# Patient Record
Sex: FEMALE | Race: BLACK OR AFRICAN AMERICAN | NOT HISPANIC OR LATINO | Employment: UNEMPLOYED | URBAN - METROPOLITAN AREA
[De-identification: names, ages, dates, MRNs, and addresses within clinical notes are randomized per-mention and may not be internally consistent; named-entity substitution may affect disease eponyms.]

---

## 2017-01-16 ENCOUNTER — HISTORICAL (OUTPATIENT)
Dept: ADMINISTRATIVE | Facility: HOSPITAL | Age: 62
End: 2017-01-16

## 2017-01-26 ENCOUNTER — HISTORICAL (OUTPATIENT)
Dept: OTOLARYNGOLOGY | Facility: CLINIC | Age: 62
End: 2017-01-26

## 2017-02-08 ENCOUNTER — HISTORICAL (OUTPATIENT)
Dept: LAB | Facility: HOSPITAL | Age: 62
End: 2017-02-08

## 2017-02-16 ENCOUNTER — HISTORICAL (OUTPATIENT)
Dept: INTERNAL MEDICINE | Facility: CLINIC | Age: 62
End: 2017-02-16

## 2017-04-11 ENCOUNTER — HISTORICAL (OUTPATIENT)
Dept: INTERNAL MEDICINE | Facility: CLINIC | Age: 62
End: 2017-04-11

## 2017-09-30 ENCOUNTER — HISTORICAL (OUTPATIENT)
Dept: INTERNAL MEDICINE | Facility: CLINIC | Age: 62
End: 2017-09-30

## 2017-10-20 ENCOUNTER — HISTORICAL (OUTPATIENT)
Dept: INTERNAL MEDICINE | Facility: CLINIC | Age: 62
End: 2017-10-20

## 2017-10-23 ENCOUNTER — HISTORICAL (OUTPATIENT)
Dept: INTERNAL MEDICINE | Facility: CLINIC | Age: 62
End: 2017-10-23

## 2017-10-31 LAB
COLOR STL: NORMAL
CONSISTENCY STL: NORMAL
HEMOCCULT SP1 STL QL: NEGATIVE

## 2017-11-01 LAB
COLOR STL: NORMAL
CONSISTENCY STL: NORMAL
HEMOCCULT SP2 STL QL: NEGATIVE

## 2017-11-02 ENCOUNTER — HISTORICAL (OUTPATIENT)
Dept: INTERNAL MEDICINE | Facility: CLINIC | Age: 62
End: 2017-11-02

## 2017-11-02 LAB
COLOR STL: NORMAL
CONSISTENCY STL: NORMAL

## 2017-11-07 ENCOUNTER — HISTORICAL (OUTPATIENT)
Dept: RADIOLOGY | Facility: HOSPITAL | Age: 62
End: 2017-11-07

## 2017-11-16 ENCOUNTER — HISTORICAL (OUTPATIENT)
Dept: INTERNAL MEDICINE | Facility: CLINIC | Age: 62
End: 2017-11-16

## 2017-11-21 ENCOUNTER — HISTORICAL (OUTPATIENT)
Dept: INFUSION THERAPY | Facility: HOSPITAL | Age: 62
End: 2017-11-21

## 2017-11-28 ENCOUNTER — HISTORICAL (OUTPATIENT)
Dept: HEMATOLOGY/ONCOLOGY | Facility: CLINIC | Age: 62
End: 2017-11-28

## 2017-12-05 ENCOUNTER — HISTORICAL (OUTPATIENT)
Dept: ADMINISTRATIVE | Facility: HOSPITAL | Age: 62
End: 2017-12-05

## 2017-12-05 ENCOUNTER — HISTORICAL (OUTPATIENT)
Dept: INFUSION THERAPY | Facility: HOSPITAL | Age: 62
End: 2017-12-05

## 2017-12-05 LAB
ABS NEUT (OLG): 5.07 X10(3)/MCL (ref 2.1–9.2)
ALBUMIN SERPL-MCNC: 2.8 GM/DL (ref 3.4–5)
ALBUMIN/GLOB SERPL: 1 RATIO (ref 1–2)
ALP SERPL-CCNC: 79 UNIT/L (ref 45–117)
ALT SERPL-CCNC: 12 UNIT/L (ref 12–78)
APPEARANCE, UA: CLEAR
AST SERPL-CCNC: 10 UNIT/L (ref 15–37)
BACTERIA #/AREA URNS AUTO: ABNORMAL /[HPF]
BASOPHILS # BLD AUTO: 0.04 X10(3)/MCL
BASOPHILS NFR BLD AUTO: 0 % (ref 0–1)
BILIRUB SERPL-MCNC: 0.2 MG/DL (ref 0.2–1)
BILIRUB UR QL STRIP: NEGATIVE
BILIRUBIN DIRECT+TOT PNL SERPL-MCNC: <0.1 MG/DL
BILIRUBIN DIRECT+TOT PNL SERPL-MCNC: ABNORMAL MG/DL
BUN SERPL-MCNC: 60 MG/DL (ref 7–18)
CALCIUM SERPL-MCNC: 9.5 MG/DL (ref 8.5–10.1)
CHLORIDE SERPL-SCNC: 106 MMOL/L (ref 98–107)
CO2 SERPL-SCNC: 29 MMOL/L (ref 21–32)
COLOR UR: ABNORMAL
CREAT SERPL-MCNC: 2.6 MG/DL (ref 0.6–1.3)
CREAT UR-MCNC: 86 MG/DL
DEPRECATED CALCIDIOL+CALCIFEROL SERPL-MC: 23.73 NG/ML (ref 30–80)
EOSINOPHIL # BLD AUTO: 0.31 X10(3)/MCL
EOSINOPHIL NFR BLD AUTO: 4 % (ref 0–5)
ERYTHROCYTE [DISTWIDTH] IN BLOOD BY AUTOMATED COUNT: 13.8 % (ref 11.5–14.5)
FERRITIN SERPL-MCNC: 364.7 NG/ML (ref 8–388)
GLOBULIN SER-MCNC: 4.7 GM/ML (ref 2.3–3.5)
GLUCOSE (UA): NORMAL
GLUCOSE SERPL-MCNC: 205 MG/DL (ref 74–106)
HCT VFR BLD AUTO: 32.4 % (ref 35–46)
HGB BLD-MCNC: 10.4 GM/DL (ref 12–16)
HGB UR QL STRIP: NEGATIVE
HYALINE CASTS #/AREA URNS LPF: ABNORMAL /[LPF]
IMM GRANULOCYTES # BLD AUTO: 0.02 10*3/UL
IMM GRANULOCYTES NFR BLD AUTO: 0 %
IRON SATN MFR SERPL: 33.5 % (ref 15–50)
IRON SERPL-MCNC: 69 MCG/DL (ref 50–170)
KETONES UR QL STRIP: NEGATIVE
LEUKOCYTE ESTERASE UR QL STRIP: 250 LEU/UL
LYMPHOCYTES # BLD AUTO: 1.68 X10(3)/MCL
LYMPHOCYTES NFR BLD AUTO: 22 % (ref 15–40)
MAGNESIUM SERPL-MCNC: 2.2 MG/DL (ref 1.8–2.4)
MCH RBC QN AUTO: 29.5 PG (ref 26–34)
MCHC RBC AUTO-ENTMCNC: 32.1 GM/DL (ref 31–37)
MCV RBC AUTO: 92 FL (ref 80–100)
MONOCYTES # BLD AUTO: 0.54 X10(3)/MCL
MONOCYTES NFR BLD AUTO: 7 % (ref 4–12)
NEUTROPHILS # BLD AUTO: 5.07 X10(3)/MCL
NEUTROPHILS NFR BLD AUTO: 66 X10(3)/MCL
NITRITE UR QL STRIP: NEGATIVE
PH UR STRIP: 6.5 [PH] (ref 4.5–8)
PHOSPHATE SERPL-MCNC: 4.5 MG/DL (ref 2.5–4.9)
PLATELET # BLD AUTO: 199 X10(3)/MCL (ref 130–400)
PMV BLD AUTO: 10.4 FL (ref 7.4–10.4)
POTASSIUM SERPL-SCNC: 4.2 MMOL/L (ref 3.5–5.1)
PROT SERPL-MCNC: 7.5 GM/DL (ref 6.4–8.2)
PROT UR QL STRIP: 300 MG/DL
PROT UR STRIP-MCNC: 353.5 MG/DL
PROT/CREAT UR-RTO: 4110.5 MG/GM
PTH-INTACT SERPL-MCNC: 278.5 PG/ML (ref 13.8–85)
RBC # BLD AUTO: 3.52 X10(6)/MCL (ref 4–5.2)
RBC #/AREA URNS AUTO: ABNORMAL /[HPF]
SODIUM SERPL-SCNC: 139 MMOL/L (ref 136–145)
SP GR UR STRIP: 1.01 (ref 1–1.03)
SQUAMOUS #/AREA URNS LPF: ABNORMAL /[LPF]
TIBC SERPL-MCNC: 206 MCG/DL (ref 250–450)
TRANSFERRIN SERPL-MCNC: 163 MG/DL (ref 200–360)
UROBILINOGEN UR STRIP-ACNC: NORMAL MG/DL
WBC # SPEC AUTO: 7.7 X10(3)/MCL (ref 4.5–11)
WBC #/AREA URNS AUTO: ABNORMAL /HPF

## 2017-12-23 ENCOUNTER — HISTORICAL (OUTPATIENT)
Dept: LAB | Facility: HOSPITAL | Age: 62
End: 2017-12-23

## 2017-12-23 LAB
ABS NEUT (OLG): 3.24 X10(3)/MCL (ref 2.1–9.2)
ALBUMIN SERPL-MCNC: 2.8 GM/DL (ref 3.4–5)
ALBUMIN/GLOB SERPL: 1 RATIO (ref 1–2)
ALP SERPL-CCNC: 68 UNIT/L (ref 45–117)
ALT SERPL-CCNC: 8 UNIT/L (ref 12–78)
APPEARANCE, UA: ABNORMAL
AST SERPL-CCNC: 11 UNIT/L (ref 15–37)
BACTERIA #/AREA URNS AUTO: ABNORMAL /[HPF]
BASOPHILS # BLD AUTO: 0.02 X10(3)/MCL
BASOPHILS NFR BLD AUTO: 0 % (ref 0–1)
BILIRUB SERPL-MCNC: 0.3 MG/DL (ref 0.2–1)
BILIRUB UR QL STRIP: NEGATIVE
BILIRUBIN DIRECT+TOT PNL SERPL-MCNC: <0.1 MG/DL
BILIRUBIN DIRECT+TOT PNL SERPL-MCNC: ABNORMAL MG/DL
BUN SERPL-MCNC: 57 MG/DL (ref 7–18)
CALCIUM SERPL-MCNC: 8.8 MG/DL (ref 8.5–10.1)
CHLORIDE SERPL-SCNC: 111 MMOL/L (ref 98–107)
CO2 SERPL-SCNC: 29 MMOL/L (ref 21–32)
COLOR UR: ABNORMAL
CREAT SERPL-MCNC: 2.4 MG/DL (ref 0.6–1.3)
CREAT UR-MCNC: 68 MG/DL
DEPRECATED CALCIDIOL+CALCIFEROL SERPL-MC: 31.74 NG/ML (ref 30–80)
EOSINOPHIL # BLD AUTO: 0.19 10*3/UL
EOSINOPHIL NFR BLD AUTO: 4 % (ref 0–5)
ERYTHROCYTE [DISTWIDTH] IN BLOOD BY AUTOMATED COUNT: 14.3 % (ref 11.5–14.5)
EST. AVERAGE GLUCOSE BLD GHB EST-MCNC: 154 MG/DL
GLOBULIN SER-MCNC: 4.1 GM/ML (ref 2.3–3.5)
GLUCOSE (UA): NORMAL
GLUCOSE SERPL-MCNC: 127 MG/DL (ref 74–106)
HBA1C MFR BLD: 7 % (ref 4.2–6.3)
HCT VFR BLD AUTO: 29.2 % (ref 35–46)
HGB BLD-MCNC: 9.2 GM/DL (ref 12–16)
HGB UR QL STRIP: NEGATIVE
HYALINE CASTS #/AREA URNS LPF: ABNORMAL /[LPF]
IMM GRANULOCYTES # BLD AUTO: 0.01 10*3/UL
IMM GRANULOCYTES NFR BLD AUTO: 0 %
KETONES UR QL STRIP: NEGATIVE
LEUKOCYTE ESTERASE UR QL STRIP: 500 LEU/UL
LYMPHOCYTES # BLD AUTO: 1.34 X10(3)/MCL
LYMPHOCYTES NFR BLD AUTO: 25 % (ref 15–40)
MAGNESIUM SERPL-MCNC: 2.1 MG/DL (ref 1.8–2.4)
MCH RBC QN AUTO: 29.3 PG (ref 26–34)
MCHC RBC AUTO-ENTMCNC: 31.5 GM/DL (ref 31–37)
MCV RBC AUTO: 93 FL (ref 80–100)
MONOCYTES # BLD AUTO: 0.47 X10(3)/MCL
MONOCYTES NFR BLD AUTO: 9 % (ref 4–12)
NEUTROPHILS # BLD AUTO: 3.24 X10(3)/MCL
NEUTROPHILS NFR BLD AUTO: 62 X10(3)/MCL
NITRITE UR QL STRIP: NEGATIVE
PH UR STRIP: 6.5 [PH] (ref 4.5–8)
PHOSPHATE SERPL-MCNC: 3.9 MG/DL (ref 2.5–4.9)
PLATELET # BLD AUTO: 119 X10(3)/MCL (ref 130–400)
PMV BLD AUTO: 11.1 FL (ref 7.4–10.4)
POTASSIUM SERPL-SCNC: 4.5 MMOL/L (ref 3.5–5.1)
PROT SERPL-MCNC: 6.9 GM/DL (ref 6.4–8.2)
PROT UR QL STRIP: 300 MG/DL
PROT UR STRIP-MCNC: 236.9 MG/DL
PROT/CREAT UR-RTO: 3483.8 MG/GM
PTH-INTACT SERPL-MCNC: 254.1 PG/ML (ref 13.8–85)
RBC # BLD AUTO: 3.14 X10(6)/MCL (ref 4–5.2)
RBC #/AREA URNS AUTO: ABNORMAL /[HPF]
SODIUM SERPL-SCNC: 146 MMOL/L (ref 136–145)
SP GR UR STRIP: 1.01 (ref 1–1.03)
SQUAMOUS #/AREA URNS LPF: >100 /[LPF]
UROBILINOGEN UR STRIP-ACNC: NORMAL
WBC # SPEC AUTO: 5.3 X10(3)/MCL (ref 4.5–11)
WBC #/AREA URNS AUTO: ABNORMAL /HPF

## 2018-03-09 ENCOUNTER — HISTORICAL (OUTPATIENT)
Dept: ADMINISTRATIVE | Facility: HOSPITAL | Age: 63
End: 2018-03-09

## 2018-03-13 ENCOUNTER — HISTORICAL (OUTPATIENT)
Dept: INFUSION THERAPY | Facility: HOSPITAL | Age: 63
End: 2018-03-13

## 2018-03-29 ENCOUNTER — HISTORICAL (OUTPATIENT)
Dept: INFUSION THERAPY | Facility: HOSPITAL | Age: 63
End: 2018-03-29

## 2018-04-05 ENCOUNTER — HISTORICAL (OUTPATIENT)
Dept: INFUSION THERAPY | Facility: HOSPITAL | Age: 63
End: 2018-04-05

## 2018-04-12 ENCOUNTER — HISTORICAL (OUTPATIENT)
Dept: INFUSION THERAPY | Facility: HOSPITAL | Age: 63
End: 2018-04-12

## 2018-04-12 ENCOUNTER — HISTORICAL (OUTPATIENT)
Dept: INTERNAL MEDICINE | Facility: CLINIC | Age: 63
End: 2018-04-12

## 2018-04-19 ENCOUNTER — HISTORICAL (OUTPATIENT)
Dept: INFUSION THERAPY | Facility: HOSPITAL | Age: 63
End: 2018-04-19

## 2018-06-12 ENCOUNTER — HISTORICAL (OUTPATIENT)
Dept: ADMINISTRATIVE | Facility: HOSPITAL | Age: 63
End: 2018-06-12

## 2018-06-13 ENCOUNTER — HISTORICAL (OUTPATIENT)
Dept: ADMINISTRATIVE | Facility: HOSPITAL | Age: 63
End: 2018-06-13

## 2018-07-09 ENCOUNTER — HISTORICAL (OUTPATIENT)
Dept: ADMINISTRATIVE | Facility: HOSPITAL | Age: 63
End: 2018-07-09

## 2018-08-06 ENCOUNTER — HISTORICAL (OUTPATIENT)
Dept: LAB | Facility: HOSPITAL | Age: 63
End: 2018-08-06

## 2018-08-08 ENCOUNTER — HISTORICAL (OUTPATIENT)
Dept: INTERNAL MEDICINE | Facility: CLINIC | Age: 63
End: 2018-08-08

## 2018-08-09 ENCOUNTER — HISTORICAL (OUTPATIENT)
Dept: ADMINISTRATIVE | Facility: HOSPITAL | Age: 63
End: 2018-08-09

## 2018-08-09 ENCOUNTER — HISTORICAL (OUTPATIENT)
Dept: INFUSION THERAPY | Facility: HOSPITAL | Age: 63
End: 2018-08-09

## 2018-08-13 ENCOUNTER — TELEPHONE (OUTPATIENT)
Dept: TRANSPLANT | Facility: CLINIC | Age: 63
End: 2018-08-13

## 2018-09-04 ENCOUNTER — HISTORICAL (OUTPATIENT)
Dept: ADMINISTRATIVE | Facility: HOSPITAL | Age: 63
End: 2018-09-04

## 2018-09-28 DIAGNOSIS — Z76.82 ORGAN TRANSPLANT CANDIDATE: Primary | ICD-10-CM

## 2018-10-11 ENCOUNTER — HOSPITAL ENCOUNTER (OUTPATIENT)
Dept: RADIOLOGY | Facility: HOSPITAL | Age: 63
Discharge: HOME | End: 2018-10-11
Attending: NURSE PRACTITIONER
Payer: MEDICARE

## 2018-10-11 ENCOUNTER — TELEPHONE (OUTPATIENT)
Dept: TRANSPLANT | Facility: CLINIC | Age: 63
End: 2018-10-11

## 2018-10-11 ENCOUNTER — CLINICAL SUPPORT (OUTPATIENT)
Dept: INFECTIOUS DISEASES | Facility: CLINIC | Age: 63
End: 2018-10-11
Payer: MEDICARE

## 2018-10-11 ENCOUNTER — OFFICE VISIT (OUTPATIENT)
Dept: TRANSPLANT | Facility: CLINIC | Age: 63
End: 2018-10-11
Payer: MEDICARE

## 2018-10-11 VITALS
HEART RATE: 54 BPM | RESPIRATION RATE: 16 BRPM | TEMPERATURE: 98 F | SYSTOLIC BLOOD PRESSURE: 116 MMHG | BODY MASS INDEX: 23.57 KG/M2 | WEIGHT: 146.63 LBS | HEIGHT: 66 IN | DIASTOLIC BLOOD PRESSURE: 51 MMHG | OXYGEN SATURATION: 96 %

## 2018-10-11 DIAGNOSIS — Z76.82 ORGAN TRANSPLANT CANDIDATE: ICD-10-CM

## 2018-10-11 DIAGNOSIS — Z99.2 ESRD ON PERITONEAL DIALYSIS: ICD-10-CM

## 2018-10-11 DIAGNOSIS — Z99.2 ESRD ON DIALYSIS: ICD-10-CM

## 2018-10-11 DIAGNOSIS — E11.22 TYPE 2 DIABETES MELLITUS WITH CHRONIC KIDNEY DISEASE ON CHRONIC DIALYSIS, WITHOUT LONG-TERM CURRENT USE OF INSULIN: ICD-10-CM

## 2018-10-11 DIAGNOSIS — N18.6 TYPE 2 DIABETES MELLITUS WITH CHRONIC KIDNEY DISEASE ON CHRONIC DIALYSIS, WITHOUT LONG-TERM CURRENT USE OF INSULIN: ICD-10-CM

## 2018-10-11 DIAGNOSIS — I12.9 RENAL HYPERTENSION: ICD-10-CM

## 2018-10-11 DIAGNOSIS — N18.6 ESRD ON DIALYSIS: ICD-10-CM

## 2018-10-11 DIAGNOSIS — N18.6 ESRD ON PERITONEAL DIALYSIS: ICD-10-CM

## 2018-10-11 DIAGNOSIS — Z99.2 TYPE 2 DIABETES MELLITUS WITH CHRONIC KIDNEY DISEASE ON CHRONIC DIALYSIS, WITHOUT LONG-TERM CURRENT USE OF INSULIN: ICD-10-CM

## 2018-10-11 DIAGNOSIS — Z01.818 PRE-TRANSPLANT EVALUATION FOR CHRONIC KIDNEY DISEASE: Primary | ICD-10-CM

## 2018-10-11 PROCEDURE — 72170 X-RAY EXAM OF PELVIS: CPT | Mod: TC,TXP

## 2018-10-11 PROCEDURE — 72170 X-RAY EXAM OF PELVIS: CPT | Mod: 26,TXP,, | Performed by: RADIOLOGY

## 2018-10-11 PROCEDURE — 71046 X-RAY EXAM CHEST 2 VIEWS: CPT | Mod: TC,TXP

## 2018-10-11 PROCEDURE — 93978 VASCULAR STUDY: CPT | Mod: 26,TXP,, | Performed by: RADIOLOGY

## 2018-10-11 PROCEDURE — 90632 HEPA VACCINE ADULT IM: CPT | Mod: PBBFAC,TXP

## 2018-10-11 PROCEDURE — 93978 VASCULAR STUDY: CPT | Mod: TC,TXP

## 2018-10-11 PROCEDURE — 90670 PCV13 VACCINE IM: CPT | Mod: PBBFAC,TXP

## 2018-10-11 PROCEDURE — 99204 OFFICE O/P NEW MOD 45 MIN: CPT | Mod: S$PBB,TXP,, | Performed by: PHYSICIAN ASSISTANT

## 2018-10-11 PROCEDURE — 99205 OFFICE O/P NEW HI 60 MIN: CPT | Mod: S$PBB,TXP,, | Performed by: INTERNAL MEDICINE

## 2018-10-11 PROCEDURE — 99204 OFFICE O/P NEW MOD 45 MIN: CPT | Mod: S$PBB,TXP,, | Performed by: TRANSPLANT SURGERY

## 2018-10-11 PROCEDURE — 99999 PR PBB SHADOW E&M-EST. PATIENT-LVL II: CPT | Mod: PBBFAC,TXP,,

## 2018-10-11 PROCEDURE — 99999 PR PBB SHADOW E&M-EST. PATIENT-LVL IV: CPT | Mod: PBBFAC,TXP,, | Performed by: INTERNAL MEDICINE

## 2018-10-11 PROCEDURE — 99212 OFFICE O/P EST SF 10 MIN: CPT | Mod: PBBFAC,25,TXP

## 2018-10-11 PROCEDURE — 76770 US EXAM ABDO BACK WALL COMP: CPT | Mod: 26,TXP,, | Performed by: RADIOLOGY

## 2018-10-11 PROCEDURE — 99214 OFFICE O/P EST MOD 30 MIN: CPT | Mod: PBBFAC,25,27,TXP | Performed by: INTERNAL MEDICINE

## 2018-10-11 PROCEDURE — 71046 X-RAY EXAM CHEST 2 VIEWS: CPT | Mod: 26,TXP,, | Performed by: RADIOLOGY

## 2018-10-11 PROCEDURE — 76770 US EXAM ABDO BACK WALL COMP: CPT | Mod: TC,TXP

## 2018-10-11 RX ORDER — ASPIRIN 81 MG/1
81 TABLET ORAL DAILY
Status: ON HOLD | COMMUNITY
End: 2020-11-19 | Stop reason: HOSPADM

## 2018-10-11 RX ORDER — AMLODIPINE BESYLATE 10 MG/1
10 TABLET ORAL DAILY
Status: ON HOLD | COMMUNITY
End: 2020-11-16 | Stop reason: SDUPTHER

## 2018-10-11 RX ORDER — HYDRALAZINE HYDROCHLORIDE 100 MG/1
100 TABLET, FILM COATED ORAL 3 TIMES DAILY
Status: ON HOLD | COMMUNITY
End: 2020-11-19 | Stop reason: HOSPADM

## 2018-10-11 RX ORDER — LABETALOL 300 MG/1
300 TABLET, FILM COATED ORAL 2 TIMES DAILY
Status: ON HOLD | COMMUNITY
End: 2020-11-19 | Stop reason: HOSPADM

## 2018-10-11 RX ORDER — ATORVASTATIN CALCIUM 20 MG/1
20 TABLET, FILM COATED ORAL DAILY
COMMUNITY
End: 2022-05-06

## 2018-10-11 RX ORDER — GABAPENTIN 300 MG/1
400 CAPSULE ORAL 3 TIMES DAILY
Status: ON HOLD | COMMUNITY
End: 2020-11-19 | Stop reason: HOSPADM

## 2018-10-11 RX ORDER — FUROSEMIDE 20 MG/1
40 TABLET ORAL DAILY
Status: ON HOLD | COMMUNITY
End: 2020-11-19 | Stop reason: HOSPADM

## 2018-10-11 RX ORDER — CLONIDINE HYDROCHLORIDE 0.1 MG/1
0.1 TABLET ORAL 2 TIMES DAILY
Status: ON HOLD | COMMUNITY
End: 2020-11-19 | Stop reason: HOSPADM

## 2018-10-11 RX ORDER — ERGOCALCIFEROL 1.25 MG/1
50000 CAPSULE ORAL
COMMUNITY
End: 2020-12-07

## 2018-10-11 RX ORDER — ASCORBIC ACID 500 MG
500 TABLET ORAL DAILY
Status: ON HOLD | COMMUNITY
End: 2020-11-16 | Stop reason: HOSPADM

## 2018-10-11 NOTE — PROGRESS NOTES
CLINIC TEACHING NOTE    Kendra Mailk was seen in transplant clinic today.  Coordinator verified that the patient viewed the teaching video and received written educational material.    The following information was reviewed:  · Waiting list time for cadaveric vs. living donation  · Waiting list process including: back-up calls, notification of changes in contact information, dialysis/physician information to the transplant coordinator, notifications of changes in health or if hospitalized, and notification of travel out of the area  · Monthly antibody testing to be obtained by the dialysis unit or arranged through a local lab and mailed to the transplant center.  Patient informed that if blood is not sent monthly and a kidney becomes available, the patient will need to come to the hospital to provide a fresh sample of blood for testing.    Patient was instructed that once on the waiting list, an appointment with the transplant physician will be scheduled yearly or every 6 months to ensure patient remains an acceptable transplant candidate.    Patient also informed that at the time of transplant, participation in a research protocol may be offered.  Notified that this is strictly voluntary and will not affect the quality of care received.      The option to have name placed on multiple transplant lists to increase opportunity for transplant was reviewed.    All patient questions were answered.  Patient verbalized understanding of above information.    Patient presents as a suitable candidate for transplant.

## 2018-10-11 NOTE — PROGRESS NOTES
Transplant Surgery  Kidney Transplant Recipient Evaluation    Referring Physician: Anali Moura  Current Nephrologist: Anali Moura    Subjective:     Reason for Visit: evaluate transplant candidacy    History of Present Illness: Kendra Malik is a 63 y.o. year old female undergoing transplant evaluation.    Dialysis History: Kendra is on peritoneal dialysis.      Transplant History: N/A    Etiology of Renal Disease: Diabetes Mellitus - Type Other / Unknown (based on medical records from referral).    Review of Systems   Constitutional: Negative for activity change and chills.   HENT: Negative for congestion and sore throat.    Eyes: Negative for discharge and redness.   Respiratory: Negative for cough and shortness of breath.    Cardiovascular: Negative for chest pain and palpitations.   Gastrointestinal: Negative for nausea and vomiting.   Endocrine: Negative for polydipsia and polyuria.   Genitourinary: Negative for dysuria and frequency.   Musculoskeletal: Negative for arthralgias and gait problem.   Skin: Negative for pallor and rash.   Neurological: Negative for dizziness and light-headedness.   Hematological: Negative for adenopathy. Does not bruise/bleed easily.   Psychiatric/Behavioral: Negative for agitation and suicidal ideas.       Objective:     Physical Exam:  Constitutional:   Vitals reviewed: yes   Well-nourished and well-groomed: yes  Eyes:   Sclerae icteric: no   Extraocular movements intact: yes  GI:    Bowel sounds normal: yes   Tenderness: no    If yes, quadrant/location: not applicable   Palpable masses: no    If yes, quadrant/location: not applicable   Hepatosplenomegaly: no   Ascites: no   Hernia: no    If yes, type/location: not applicable   Surgical scars: no    If yes, type/location: not applicable  Resp:   Effort normal: yes   Breath sounds normal: yes    CV:   Regular rate and rhythm: yes   Heart sounds normal: yes   Femoral pulses normal: yes   Extremities edematous:  no  Skin:   Rashes or lesions present: no    If yes, describe:not applicable   Jaundice:: no    Musculoskeletal:   Gait normal: yes   Strength normal: yes  Psych:   Oriented to person, place, and time: yes   Affect and mood normal: yes    Additional comments: not applicable    Counseling: We provided Kendra Malik with a group education session today.  We discussed kidney transplantation at length with her, including risks, potential complications, and alternatives in the management of her renal failure.  The discussion included complications related to anesthesia, bleeding, infection, primary nonfunction, and ATN.  I discussed the typical postoperative course, length of hospitalization, the need for long-term immunosuppression, and the need for long-term routine follow-up.  I discussed living-donor and -donor transplantation and the relative advantages and disadvantages of each.  I also discussed average waiting times for both living donation and  donation.  I discussed national and center-specific survival rates.  I also mentioned the potential benefit of multicenter listing to candidates listed with centers within more than one organ procurement organization.  All questions were answered.    Final determination of transplant candidacy will be made once evaluation is complete and reviewed by the Kidney & Kidney/Pancreas Selection Committee.         Transplant Surgery - Candidacy   Assessment/Plan:   Kendra Malik has end stage renal disease (ESRD) on dialysis. I see no surgical contraindication to placing a kidney transplant. Based on available information, Kendra Malik is a suitable kidney transplant candidate.     Eron Colvin MD

## 2018-10-11 NOTE — PROGRESS NOTES
Transplant Recipient Adult Psychosocial Assessment    Kendra Malik  [address] [facility] [place] [address]    Telephone Information:   Mobile [phone]   Home  [phone] (home)  Work  There is no work phone number on file.  E-mail  No e-mail address on record    Sex: female  YOB: 1955  Age: 63 y.o.    Encounter Date: 10/11/2018  [place] Citizen: yes  Primary Language: English   Needed: no    Emergency Contact:  Name: Yue Malik  Relationship: daughter  Address: [address] [place] [address], [place] [address]  Phone Numbers:  n/a(home), [phone] (work), n/a (mobile)    Family/Social Support:   Number of dependents/: no dependents  Marital history:  and  once.  Not currently in a relationship. Four adult children who she reports are supportive.  Other family dynamics: One  sister, two brothers with whom she is close and living in [place].    Household Composition:  Name: Kendra Malik  Age: 63  Relationship: patient  Does person drive? yes    Name: Kelley Corral (Jackie)  [phone]  Age: 51  Relationship: friend  Does person drive? yes    Name: Yue Malik  (lives with pt part time)  Age: 43  Relationship: daughter  Does person drive? yes    Do you and your caregivers have access to reliable transportation? yes  PRIMARY CAREGIVER: Yue Malik (unless donor) will be primary caregiver, phone number [phone].      provided in-depth information to patient and caregiver regarding pre- and post-transplant caregiver role.   strongly encourages patient and caregiver to have concrete plan regarding post-transplant care giving, including back-up caregiver(s) to ensure care giving needs are met as needed.    Patient and Caregiver states understanding all aspects of caregiver role/commitment and is able/willing/committed to being caregiver to the fullest extent necessary.    Patient and Caregiver verbalizes understanding of  the education provided today and caregiver responsibilities.         remains available. Patient and Caregiver agree to contact  in a timely manner if concerns arise.      Able to take time off work without financial concerns: yes. Yue and siblings will take turns as caregivers.    Additional Significant Others who will Assist with Transplant:  Name: Gladys Corral  Age: 51  City: [place] State: [place]  Relationship: friend  Does person drive? yes    Name: Sathish Malik [phone]  Age: 44  City: [place] State: [place]  Relationship: son  Does person drive? yes     Name: Kaylee Malik  Age: 39  City: [place]: [place]  Relationship: son  Does person drive? yes    Name: Ubaldo Malik  Age: 29  City: [place] State: [place]  Relationship: son  Does person drive? yes      Living Will: no  Healthcare Power of : no  Advance Directives on file: <<no information> per medical record.  Verbally reviewed LW/HCPA information.   provided patient with copy of LW/HCPA documents and provided education on completion of forms.    Living Donors: Education and resource information given to patient. All four of pt's children have expressed interest in donation.    Highest Education Level: High School (9-12) or GED  Reading Ability: 12th grade  Reports difficulty with: some visual disturbance from retinopathy.  Being treated.  Pt able to read.  Learns Best By:  listening     Status: no  VA Benefits: no     Working for Income: yes, retiring on 10/13/18  If yes, working activity level: Working, Part Time vs. 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    Spouse/Significant Other Employment: dtr is a pharmacist tech, one son is an LPN and friend works full time.  Dtr and friend state ability to get off work for caregiving.    Disabled: no    Monthly Income:  SS Reitrement: $749.00; encouraged pt to apply for food stamps  Able  to afford all costs now and if transplanted, including medications: yes  Patient and Caregiver verbalizes understanding of personal responsibilities related to transplant costs and the importance of having a financial plan to ensure that patients transplant costs are fully covered.       provided fundraising information/education. Patient and Caregiververbalizes understanding.   remains available.    Insurance:   Payor/Plan Subscr  Sex Relation Sub. Ins. ID Effective Group Num   1. MEDICAID - HE* HOLLAND PETERSEN 1955 Female  [id number]                                   [address] [address]     Primary Insurance (for UNOS reporting): Public Insurance - Medicaid  Secondary Insurance (for UNOS reporting): None  Patient and Caregiver verbalizes clear understanding that patient may experience difficulty obtaining and/or be denied insurance coverage post-surgery. This includes and is not limited to disability insurance, life insurance, health insurance, burial insurance, long term care insurance, and other insurances.      Patient and Caregiver also reports understanding that future health concerns related to or unrelated to transplantation may not be covered by patient's insurance.  Resources and information provided and reviewed.     Patient and Caregiver provides verbal permission to release any necessary information to outside resources for patient care and discharge planning.  Resources and information provided are reviewed.      Dialysis Adherence: Patient reports began PD (four exchanges daily and training for cycler) two weeks ago.  She states she is highly adherent to PD treatments and MD orders.  Please see separate note for dialysis compliance report.  Infusion Service: patient utilizing? no  Home Health: patient utilizing? no  DME: PD equipment and supplies  Pulmonary/Cardiac Rehab: none   ADLS:  Pt states ability to independently accomplish all  adls.    Adherence:   Pt states current and expected compliance with all healthcare recommendations..  Adherence education and counseling provided.     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  Alcohol: less than once per month.  Illicit Drugs/Non-prescribed Medications: none, patient denies any use.    Patient and Caregiver states clear understanding of the potential impact of substance use as it relates to transplant candidacy and is aware of possible random substance screening.  Substance abstinence/cessation counseling, education and resources provided and reviewed.     Arrests/DWI/Treatment/Rehab: patient denies    Psychiatric History:    Mental Health: Pt denies mental health concerns.  Psychiatrist/Counselor:  Pt denied  Medications:  none  Suicide/Homicide Issues: Pt denies current or past suicidal/homicidal ideation.   Safety at home: Pt denies any home safety concerns.    Knowledge: Patient and Caregiver states having clear understanding and realistic expectations regarding the potential risks and potential benefits of organ transplantation and organ donation and agrees to discuss with health care team members and support system members, as well as to utilize available resources and express questions and/or concerns in order to further facilitate the pt informed  decision-making.  Resources and information provided and reviewed.    Patient and Caregiver is aware of [facility]'s affiliation and/or partnership with agencies in home health care, LTAC, SNF, [facility] and other hospitals and clinics.    Understanding: Patient and Caregiver reports having a clear understanding of the many lifetime commitments involved with being a transplant recipient, including costs, compliance, medications, lab work, procedures, appointments, concrete and financial planning, preparedness, timely and appropriate communication of concerns, abstinence (ETOH, tobacco, illicit non-prescribed drugs), adherence to all health care team recommendations, support system and caregiver involvement, appropriate and timely resource utilization and follow-through, mental health counseling as needed/recommended, and patient and caregiver responsibilities.  Social Service Handbook, resources and detailed educational information provided and reviewed.  Educational information provided.    Patient and Caregiver also reports current and expected compliance with health care regime and states having a clear understanding of the importance of compliance.      Patient and Caregiver reports a clear understanding that risks and benefits may be involved with organ transplantation and with organ donation.       Patient and Caregiver also reports clear understanding that psychosocial risk factors may affect patient, and include but are not limited to feelings of depression, generalized anxiety, anxiety regarding dependence on others, post traumatic stress disorder, feelings of guilt and other emotional and/or mental concerns, and/or exacerbation of existing mental health concerns.  Detailed resources provided and discussed.      Patient and Caregiver agrees to access appropriate resources in a timely manner as needed and/or as recommended, and to communicate concerns appropriately.  Patient and Caregiver also reports a clear  understanding of treatment options available.     Patient and Caregiver received education in a group setting.   reviewed education, provided additional information, and answered questions.    Feelings or Concerns: Pt and wife deny concerns or questions.    Coping: Pt stated she camilo through prayer, [employer] attendance, cooking, spending time with family, decorating her home and shopping.     Goals: End dialysis.  Patient referred to Vocational Rehabilitation.    Interview Behavior: Patient and Caregivers presents as alert and oriented x 4, pleasant, good eye contact, well groomed, recall good, concentration/judgement good, average intelligence, calm, communicative, cooperative and asking and answering questions appropriately.  She was accompanied by dtr, Yue and friend, Gladys who presented as very supportive of pt's pursuit of transplant.          Transplant Social Work - Candidacy  Assessment/Plan:     Psychosocial Suitability: Patient presents as a suitable candidate for kidney transplant at this time. Based on psychosocial risk factors, patient presents as low risk, due to absence of significant psychosocial risk factors. .    Recommendations/Additional Comments: fundraising, will benefit from [facility].    Josie Sheikh LCSW

## 2018-10-11 NOTE — TELEPHONE ENCOUNTER
Reviewed pt transplant labs.  Notified dialysis unit dietitian of the following abnormal labs via fax.     Albumin           2.7g/dl                                                                   Phosphorus    6.0mg/dl                                                                    Cholesterol      275mg/dl        Pao Nicole MS RD LDN

## 2018-10-11 NOTE — PROGRESS NOTES
PHARM.D. PRE-TRANSPLANT NOTE:    This patient's medication therapy was evaluated as part of her pre-transplant evaluation.    The following pharmacologic concerns were noted: none    This patient's medication profile was reviewed for contraindications for DAA Hepatitis C therapy:    [x]  No current inducers of CYP 3A4 or PGP  [x]  No amiodarone on this patient's EMR profile in the last 24 months  [x]  No past or current atrial fibrillation on this patient's EMR profile       Current Outpatient Medications   Medication Sig Dispense Refill    amLODIPine (NORVASC) 10 MG tablet Take 10 mg by mouth once daily.      ascorbic acid, vitamin C, (VITAMIN C) 500 MG tablet Take 500 mg by mouth once daily.      aspirin (ECOTRIN) 81 MG EC tablet Take 81 mg by mouth once daily.      atorvastatin (LIPITOR) 20 MG tablet Take 20 mg by mouth once daily.      cloNIDine (CATAPRES) 0.1 MG tablet Take 0.1 mg by mouth 2 (two) times daily.      ergocalciferol (ERGOCALCIFEROL) 50,000 unit Cap Take 50,000 Units by mouth every 7 days.      folic acid/vit B complex and C (FOLBEE PLUS ORAL) Take 1 tablet by mouth once daily.      furosemide (LASIX) 20 MG tablet Take 20 mg by mouth 2 (two) times daily.      gabapentin (NEURONTIN) 300 MG capsule Take 300 mg by mouth 2 (two) times daily.      hydrALAZINE (APRESOLINE) 100 MG tablet Take 100 mg by mouth 3 (three) times daily.      labetalol (NORMODYNE) 300 MG tablet Take 300 mg by mouth 2 (two) times daily.       No current facility-administered medications for this visit.          Currently she is responsible for preparing / administering this patient's medications on a daily basis.  I am available for consultation and can be contacted, as needed by the other members of the Kidney Transplant team.

## 2018-10-11 NOTE — PROGRESS NOTES
Transplant Nephrology  Kidney Transplant Recipient Evaluation    Referring Physician: Anali Moura  Current Nephrologist: Anali Moura    Subjective:   CC:  Initial evaluation of kidney transplant candidacy.    HPI:  Ms. Malik is a 63 y.o. year old Black or  female who has presented to be evaluated as a potential kidney transplant recipient.  She has ESRD secondary to diabetic nephropathy, just initiated dialysis about 2 weeks prior to today's visit.  Patient is currently on peritoneal dialysis started on mid Sept 2018. Patient is dialyzing on CAPD x 6 wks then will convert to cycler.  Patient reports that she is tolerating dialysis well. . She has a peritoneal dialysis catheter for dialysis access.     Kendra brought outside records for us to review.  These show a negative mammogram March 2016 and again November 2017.  She is aware she will need a follow-up mammogram November 2018.  Kidney ultrasound in February 2017 showed normal size kidneys without hydronephrosis or masses, or other significant abnormalities.  CT of the abdomen and pelvis November 2017 (for abd pain)  showed no significant abnormalities. + diffuse thickening of gallbladder wall was noted which may have been related to decompression.  Right pleural effusion was noted to be improved, and the pericardial effusion was appreciated, but no further information provided.    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  She remains active doing household chores.  She reports she can walk greater than 6 blocks.  She is afraid of stairs, and will avoid them at all costs.  She reports making a good bit of urine, and reports no lower urinary tract symptoms.    Previous Transplant: no    Past Medical and Surgical History: Ms. Malik  has a past medical history of DM2 w CKD on chronic dialysis, without long-term current use of insulin, ESRD on dialysis, and Renal  "hypertension.  She has a past surgical history that includes Peritoneal catheter insertion; Hysterectomy;  section; and Retinal detachment surgery.    Past Social and Family History: Ms. Malik reports that  has never smoked. she has never used smokeless tobacco. She reports that she does not use drugs. Her family history includes Cancer in her paternal uncle; Diabetes in her brother, brother, father, mother, paternal aunt, and paternal grandmother; Heart disease in her father.    Review of Systems   Constitutional: Negative.  Negative for activity change, fatigue, fever and unexpected weight change.   HENT: Negative.         Denies ENT concerns.   Eyes: Positive for visual disturbance (Decreased vision, but still can drive).   Respiratory: Negative for shortness of breath.    Cardiovascular: Positive for leg swelling (Occasional). Negative for chest pain and palpitations.   Gastrointestinal: Positive for constipation. Negative for abdominal pain.   Genitourinary: Negative for decreased urine volume, difficulty urinating and hematuria.   Musculoskeletal: Positive for back pain (Intermittent).   Skin: Negative for rash.   Neurological: Negative for weakness.   Hematological: Does not bruise/bleed easily.   Psychiatric/Behavioral: Negative for sleep disturbance.     Objective:   Blood pressure (!) 116/51, pulse (!) 54, temperature 97.7 °F (36.5 °C), temperature source Oral, resp. rate 16, height 5' 5.91" (1.674 m), weight 66.5 kg (146 lb 9.7 oz), SpO2 96 %.body mass index is 23.73 kg/m².    Physical Exam   Constitutional: She is oriented to person, place, and time. She is cooperative. No distress.   HENT:   Head: Normocephalic.   Right Ear: External ear normal.   Left Ear: External ear normal.   Nose: Nose normal. No nasal deformity.   Mouth/Throat: Mucous membranes are normal. No oral lesions.   Eyes: Conjunctivae and lids are normal. No scleral icterus.   Neck: Trachea normal. Neck supple. No JVD present. " No thyroid mass and no thyromegaly present.   Cardiovascular: Normal rate and regular rhythm. Exam reveals no friction rub.   No murmur heard.  Pulmonary/Chest: Effort normal. She has no rales.   Abdominal: Soft. She exhibits no mass. There is no hepatosplenomegaly. There is no tenderness. There is no CVA tenderness. No hernia.   Musculoskeletal: Normal range of motion. She exhibits no edema or tenderness (knees or ankes).   Neurological: She is alert and oriented to person, place, and time. She displays no tremor. Gait normal.   Skin: Skin is warm and dry. No lesion and no rash noted. No cyanosis. Nails show no clubbing.   Psychiatric: She has a normal mood and affect. Her speech is normal. Cognition and memory are normal.     Labs:  Lab Results   Component Value Date    WBC 8.20 10/11/2018    HGB 10.1 (L) 10/11/2018    HCT 31.9 (L) 10/11/2018     10/11/2018    K 3.1 (L) 10/11/2018    CL 99 10/11/2018    CO2 25 10/11/2018    BUN 60 (H) 10/11/2018    CREATININE 6.2 (H) 10/11/2018    EGFRNONAA 6.7 (A) 10/11/2018    CALCIUM 9.7 10/11/2018    PHOS 6.0 (H) 10/11/2018    ALBUMIN 2.7 (L) 10/11/2018    AST 21 10/11/2018    ALT 8 (L) 10/11/2018    .0 (H) 10/11/2018   Labs were reviewed with the patient.    Assessment:     1. Pre-transplant evaluation for chronic kidney disease    2. Organ transplant candidate    3. ESRD on peritoneal dialysis    4. ESRD on dialysis    5. DM2 w CKD on chronic dialysis, without long-term current use of insulin    6. Renal hypertension      Plan:     Transplant Candidacy:   Based on available information, Ms. Malik is a suitable kidney transplant candidate.  Up-to-date GYN and mammography will be requested.  She is aware she will need a colonoscopy performed.  Additionally, routine echo and stress testing will be requested given her age, diabetes, and standard written guidelines for transplant eval.    Outside records mention that she was to see Urology for hematuria, and these  records will need to be reviewed.    No additional evaluation is needed at present time.  Final determination of transplant candidacy will be made once workup is complete and reviewed by the selection committee.    MD FRANCESCA Serna Patient Status  Functional Status: 60% - Requires occasional assistance but is able to care for needs  Physical Capacity: No Limitations

## 2018-10-11 NOTE — PROGRESS NOTES
Pre Transplant Infectious Diseases Consult  Kidney Transplant Recipient Evaluation    Requesting Physician: Anali Moura    Reason for Visit:  Kidney transplant evaluation    History of Present Illness  Kendra Malik is a 63 y.o. year old  female with advanced Kidney disease secondary to diabetic nephropathy currently being evaluated for Kidney transplant. Patient is currently on peritoneal dialysis. She is tolerating dialysis well. Denies PD related peritonitis/problems with her catheter. Reports hx of a two day hospitalization for a UTI and dehydration September 17th. She was treated with IV Ciprofloxacin in the hospital. Denies current urinary symptoms. Patient denies any recent fever, chills, or other infective illnesses.      1) Do you have a history of:   YES NO   Diabetes      [x] []     Diabetic Foot Infection/Bone Infection  []        [x] follows with a podiatrist. Hx of calluses. No infections.    Surgical Removal of Spleen   []  [x]                  2) Have you had recurrent infections involving:             YES NO  Sinus infections  []         [x]   Sore Throat   []         [x]                 Prostate Infections  []         [x]              Bladder Infections  [x]         [] 3x/year                     Kidney Infections  []         [x]                               Intestinal Infections  []         [x]      Skin Infections   []         [x]       Reproductive Infections          []  [x]   Periodontal Disease  []         [x]        3)Have you ever had: YES     NO UNKNOWN      Chicken Pox   []         [x]  []   Shingles   []         [x]  []   Orolabial Herpes             []  [x]  []   Genital Herpes  []         [x]  []   Cytomegalovirus  []         []  [x]   Tania-Barr Virus  []         []  [x]   Genital Warts   []         [x]  []   Hepatitis A   []         [x]  []   Hepatitis B   []         [x]  []   Hepatitis C   []         [x]  []   Syphilis   []         [x]  []   Gonorrhea   []          [x]  []   Pelvic Inflammatory   Disease   []         [x]  []    Chlamydia Infection  []         [x]  []   Intestinal parasites   or worms   []         [x]  []   Fungal Infections  []         [x]  []   Blood Infections  []         [x]  []     Hospitalized two days for UTI and hypotension - Sept 2018     Comment:       4) Have you ever been exposed   YES NO  To someone with tuberculosis?  [x]   []   If yes, what treatment did you receive:  Unclear exposure. Hx of positive PPD skin tests. CXR clear. She has never been treated for latent or active Tb.    5) What states have you lived in? [place]    6) What countries have you visited for more than 2 weeks? N/A                        YES NO  7) Did you have any associated infections?  []  [x]       8) Are you planning to travel outside the    []  [x]   [place] after your transplant?    9) Household                   YES NO  Do you have pets living in your house?    []         [x]   If yes, describe:     Do you spend time or live on a farm or     []         [x]   have livestock or other farm animals?  If yes, which ones:    Do you have a fish tank?          []  [x]       Do you have a litter box?      []         [x]     Do you fish or hunt?      []         [x]     Do you clean or skin fish or animals?    []         [x]     Do you consume raw or undercooked    []         [x]   meat, fish, or shellfish?      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    []         [x]   13)Do you hike, camp, or spend     time in wooded areas?   []         [x]        14) The patient's immunization history was reviewed.    Have you ever received:  YES NO UNKNOWN DATES   Routine Childhood vaccines  [x]         []  []      Influenza vaccine   [x]  []  [] 10/2018   Pneumovax    [x]  []  []     Tetanus-diptheria   [x]         []  []    Hepatitis A vaccine series        []  [x]  []    Hepatitis B vaccine series         [x]  []  []    Meningitis vaccine   []         []  [x]    Varicella vaccine   []         []  [x]         Based on the patients immunization history and serologies, immunizations were ordered:         Ordered  Not Ordered  Influenza Vaccine     []    []   Hepatitis A series at 0,  6 months   [x]    []   Hepatitis B seriesat 0, 1, and 6 months  []    []   Hepatitis B High Dose 0,1, and 6 months  []    []   Prevnar      [x]    []   Pneumovax      []    []    TDap       []    []    Zoster       []    []   Menactra      []    []            The patient was encouraged to contact us about any problems that may develop after immunization and possible side effects were reviewed.      Previous Transplant: no    Etiology of Kidney Disease: diabetic nephropathy    Allergies: Patient has no known allergies.  There is no immunization history for the selected administration types on file for this patient.  History reviewed. No pertinent past medical history.  History reviewed. No pertinent surgical history.   Social History     Socioeconomic History    Marital status:      Spouse name: Not on file    Number of children: Not on file    Years of education: Not on file    Highest education level: Not on file   Social Needs    Financial resource strain: Not on file    Food insecurity - worry: Not on file    Food insecurity - inability: Not on file    Transportation needs - medical: Not on file    Transportation needs - non-medical: Not on file   Occupational History    Not on file   Tobacco Use    Smoking status: Not on file   Substance and Sexual Activity    Alcohol use: Not on file    Drug use: Not on file    Sexual activity: Not on file   Other Topics Concern    Not on file   Social History Narrative    Not on file       Review of Systems   Constitution: Positive for weight loss. Negative for chills, decreased appetite, fever, weakness, malaise/fatigue,  night sweats and weight gain.   HENT: Negative for congestion, ear pain, hearing loss, hoarse voice, sore throat and tinnitus.    Eyes: Negative for blurred vision, redness and visual disturbance.   Cardiovascular: Negative for chest pain, leg swelling and palpitations.   Respiratory: Negative for cough, hemoptysis, shortness of breath, sputum production and wheezing.    Endocrine: Negative for cold intolerance and heat intolerance.   Hematologic/Lymphatic: Negative for adenopathy. Does not bruise/bleed easily.   Skin: Negative for dry skin, itching, rash and suspicious lesions.   Musculoskeletal: Negative for back pain, joint pain, myalgias and neck pain.   Gastrointestinal: Negative for abdominal pain, constipation, diarrhea, heartburn, nausea and vomiting.   Genitourinary: Negative for dysuria, flank pain, frequency, hematuria, hesitancy and urgency.   Neurological: Negative for dizziness, headaches, numbness and paresthesias.   Psychiatric/Behavioral: Negative for depression and memory loss. The patient does not have insomnia and is not nervous/anxious.    Allergic/Immunologic: Negative for environmental allergies, HIV exposure, hives and persistent infections.     Physical Exam   Constitutional: She is oriented to person, place, and time. She appears well-developed and well-nourished. No distress.   HENT:   Head: Normocephalic and atraumatic.   Mouth/Throat: She does not have dentures. No oral lesions. Abnormal dentition. Dental caries present. No dental abscesses or lacerations. No oropharyngeal exudate.   Eyes: Conjunctivae are normal. Pupils are equal, round, and reactive to light. No scleral icterus.   Neck: Neck supple.   Cardiovascular: Normal rate, regular rhythm, normal heart sounds and intact distal pulses.   No murmur heard.  Pulmonary/Chest: Effort normal and breath sounds normal. No respiratory distress. She has no wheezes.   Abdominal: Soft. Bowel sounds are normal. She exhibits no distension.  There is no tenderness. There is no guarding.   PD catheter c/d/i. No drainage, erythema, warmth or induration.    Musculoskeletal: She exhibits no edema or deformity.   Lymphadenopathy:        Head (right side): No submandibular adenopathy present.        Head (left side): No submandibular adenopathy present.     She has no cervical adenopathy.   Neurological: She is alert and oriented to person, place, and time. No cranial nerve deficit.   Skin: Skin is warm and dry. No rash noted. She is not diaphoretic. No erythema.   No open wounds. Calluses noted on the plantar surfaces of feet.   Psychiatric: She has a normal mood and affect. Her behavior is normal. Judgment and thought content normal.   Vitals reviewed.    Diagnostics: No results found for: RPR  No results found for: CMVANTIBODIE  No results found for: HIV1X2  No results found for: HTLVIIIANTIB  No results found for: HEPBSAG  No results found for: HEPBCAB  No results found for: HEPCAB  No results found for: TOXOIGG  No components found for: TOXOIGGINTER  No results found for: LKI5IGC  No results found for: BKX0GUK  No results found for: VARICELLAZOS  No results found for: VARICELLAINT  No results found for: STRONGANTIGG  No results found for: EPSTEINBARRV  No results found for: HEPBSAB  No results found for: QUANTIFERON  No results found for: HEPAIGM  No results found for: PPD  No results found for this or any previous visit.         Transplant Infectious Diseases - Candidacy    Assessment/Plan:     Transplant Candidacy: Based on available information, there are no identified significant barriers to transplantation from an infectious disease standpoint pending acceptable serologies and the following. HIV, strongyloides IgG and RPR are pending.  If positive, please refer to ID clinic. Patient reports history of a positive PPD skin test with negative CXR. Would obtain documentation of positive PPD. A Quantiferon gold is pending. If positive, please refer to ID  clinic for further evaluation and treatment. Patient reports history of recurrent urinary tract infections. Recommend evaluation by Urology prior to transplant. It is recommended the patient have their teeth treated prior to transplant.    Immunizations recommended:  - Influenza annually  - Prevnar 13 today followed by Pneumovax 23 booster every 5 years  - Hepatitis A vaccine today and in 6 months  - Shingrix (two doses at 0 and 2-6 months) if Varicella IgG is positive.     Final determination of transplant candidacy will be made once evaluation is complete and reviewed by the Transplant Selection Committee.    Ansley Staples PA-C         Counseling:I discussed with Kendra the risk for increased susceptibility to infections following transplantation including increased risk for infection right after transplant and if rejection should occur.  The patients has been counseled on the importance of vaccinations including but not limited to a yearly flu vaccine.  Specific guidance has been provided to the patient regarding the patients occupation, hobbies and activities to avoid future infectious complications including but not limited to avoiding undercooked meats and seafood, proper hygiene, and contact with animals.

## 2018-10-11 NOTE — LETTER
October 15, 2018        Anali Moura  [address]  [place] [address]  Phone: [phone]  Fax: [phone]             [facility]  [address]  [facility] [address]  Phone: [phone]   Patient: Kendra Malki   MR Number: [id number]   YOB: 1955   Date of Visit: 10/11/2018       Dear Dr. Anali Moura    Thank you for referring Kendra Malik to me for evaluation. Attached you will find relevant portions of my assessment and plan of care.    If you have questions, please do not hesitate to call me. I look forward to following Kendra Malik along with you.    Sincerely,    Kelley Denny MD    Enclosure    If you would like to receive this communication electronically, please contact [email or web address] or [phone] to request [employer] access.    [employer] is a tool which provides read-only access to select patient information with whom you have a relationship. Its easy to use and provides real time access to review your patients record including encounter summaries, notes, results, and demographic information.    If you feel you have received this communication in error or would no longer like to receive these types of communications, please e-mail [email or web address]

## 2018-10-18 ENCOUNTER — TELEPHONE (OUTPATIENT)
Dept: TRANSPLANT | Facility: CLINIC | Age: 63
End: 2018-10-18

## 2018-10-18 NOTE — TELEPHONE ENCOUNTER
"Compliance check:  Dialysis unit reports in the past three months pt has had "none" no shows, "none" AMAs, labs , no concerns with labs identified, transportation no concerns identified and family support no concerns identified .    Reported by fax back sheet    "

## 2018-10-18 NOTE — TELEPHONE ENCOUNTER
----- Message from Kindra Felton sent at 10/17/2018  3:58 PM CDT -----  Contact: patient  Kaye not sure if you called patient, but she's returning someone's call. She came to round [facility] on 10/11/18.     ----- Message -----  From: Mansi Fair  Sent: 10/17/2018   3:48 PM  To: Kindra Felton        ----- Message -----  From: Anival Rodriguez RN  Sent: 10/17/2018  12:03 PM  To: [facility] Pre-Kidney Transplant Non-Clinical        ----- Message -----  From: Bonnie Patel  Sent: 10/17/2018  11:42 AM  To: [facility] Pre-Kidney Transplant Clinical    Needs Advice    Reason for call: returning call        Communication Preference: [phone]     Additional Information: n/a

## 2018-10-30 ENCOUNTER — TELEPHONE (OUTPATIENT)
Dept: TRANSPLANT | Facility: CLINIC | Age: 63
End: 2018-10-30

## 2018-10-30 NOTE — TELEPHONE ENCOUNTER
Nutritional assessment from dialysis unit received and reviewed--no nutritional changes to plan needed at this time.  Pt to continue to follow-up with renal dietitians recommendations.      Pao Nicole MS RD LDN

## 2018-12-31 ENCOUNTER — HISTORICAL (OUTPATIENT)
Dept: INTERNAL MEDICINE | Facility: CLINIC | Age: 63
End: 2018-12-31

## 2018-12-31 LAB
CHOLEST SERPL-MCNC: 291 MG/DL
CHOLEST/HDLC SERPL: 3.8 {RATIO} (ref 0–4.4)
EST. AVERAGE GLUCOSE BLD GHB EST-MCNC: 137 MG/DL
HBA1C MFR BLD: 6.4 % (ref 4.2–6.3)
HDLC SERPL-MCNC: 76 MG/DL
LDLC SERPL CALC-MCNC: 192 MG/DL (ref 0–130)
TRIGL SERPL-MCNC: 115 MG/DL
VLDLC SERPL CALC-MCNC: 23 MG/DL

## 2019-01-07 ENCOUNTER — HISTORICAL (OUTPATIENT)
Dept: RADIOLOGY | Facility: HOSPITAL | Age: 64
End: 2019-01-07

## 2019-02-04 ENCOUNTER — TELEPHONE (OUTPATIENT)
Dept: TRANSPLANT | Facility: CLINIC | Age: 64
End: 2019-02-04

## 2019-02-04 DIAGNOSIS — Z76.82 ORGAN TRANSPLANT CANDIDATE: Primary | ICD-10-CM

## 2019-02-04 NOTE — TELEPHONE ENCOUNTER
----- Message from Terri Shaffer sent at 1/31/2019  3:47 PM CST -----  Contact: Terri KABA   The above patient would like to have colonoscopy done in [place], please put an order in. SESAR stress and echo were requested 1/31/2019 from [facility], cardiac clearance pending patient will call with doctor's name so we can request, mammo and pap smear to be scheduled within a week by patient or she will need it scheduled in [place] also. HBV PCR labs requested from dialysis center on 1/31/2019. Patient will be receiving a 30 day letter since she was seen in  in October 2018 and is still pending all of her evaluation testing. Please make note of this.    Thank you,   Terri

## 2019-02-07 ENCOUNTER — TELEPHONE (OUTPATIENT)
Dept: ENDOSCOPY | Facility: HOSPITAL | Age: 64
End: 2019-02-07

## 2019-02-07 RX ORDER — POLYETHYLENE GLYCOL 3350, SODIUM SULFATE ANHYDROUS, SODIUM BICARBONATE, SODIUM CHLORIDE, POTASSIUM CHLORIDE 236; 22.74; 6.74; 5.86; 2.97 G/4L; G/4L; G/4L; G/4L; G/4L
4 POWDER, FOR SOLUTION ORAL ONCE
Qty: 4000 ML | Refills: 0 | OUTPATIENT
Start: 2019-02-07 | End: 2019-02-07

## 2019-02-07 NOTE — TELEPHONE ENCOUNTER
Endoscopy Scheduling Questionnaire:    Call Type:Incoming call via [facility]    1. Have you been admitted overnight to the hospital in the past 3 months? no  2. Do you get CP and SOB while walking up a flight of stairs? no  3. Have you had a stent placed in the past 12 months? no  4. Have you had a stroke or heart attack in the past 6 months? no  5. Have you had any chest pain in the past 3 months? no      If so, have you been evaluated by your PCP or Cardiologist? no  6. Do you take weight loss medications? no  7. Have you been diagnosed with Diverticulitis within the past 3 months? no  8. Are you having any GI symptoms that you feel need to be evaluated prior to your procedure? no  9. Are you on dialysis? yes  10. Are you diabetic? no  11. Do you have any other health issues that you feel might limit your ability to safely have the procedure and/or sedation? no  12. Is the patient over 79 yo? no        If so, has the patient been seen by their PCP or GI in the last 3 months? N/A       -I have reviewed the last colonoscopy for recommendations regarding surveillance and bowel prep  is NA  -I have reviewed the patient's medications and allergies. She is not on high risk medications and will require cardiac clearance. A clearance request NA  -I have verified the pharmacy information. The prep being used is [facility]. The patient's prep instructions were sent via mail..    Date Endoscopy Scheduled: Date: 3-27-19  Or  Date Gastro office visit Scheduled: NA

## 2019-02-08 ENCOUNTER — TELEPHONE (OUTPATIENT)
Dept: ENDOSCOPY | Facility: HOSPITAL | Age: 64
End: 2019-02-08

## 2019-02-08 NOTE — TELEPHONE ENCOUNTER
2/7/19 @ 1312 Per [employer], Person pressed touch tone key to speak with an endoscopy . Per chart, pt scheduled for endoscopy 3/27/19

## 2019-02-14 ENCOUNTER — TELEPHONE (OUTPATIENT)
Dept: ENDOSCOPY | Facility: HOSPITAL | Age: 64
End: 2019-02-14

## 2019-03-15 ENCOUNTER — HISTORICAL (OUTPATIENT)
Dept: ADMINISTRATIVE | Facility: HOSPITAL | Age: 64
End: 2019-03-15

## 2019-03-21 ENCOUNTER — HISTORICAL (OUTPATIENT)
Dept: SURGERY | Facility: HOSPITAL | Age: 64
End: 2019-03-21

## 2019-03-25 ENCOUNTER — TELEPHONE (OUTPATIENT)
Dept: ENDOSCOPY | Facility: HOSPITAL | Age: 64
End: 2019-03-25

## 2019-03-25 RX ORDER — SODIUM, POTASSIUM,MAG SULFATES 17.5-3.13G
1 SOLUTION, RECONSTITUTED, ORAL ORAL DAILY
Qty: 1 KIT | Refills: 0 | Status: CANCELLED | OUTPATIENT
Start: 2019-03-25 | End: 2019-03-27

## 2019-03-26 PROBLEM — Z12.11 COLON CANCER SCREENING: Status: ACTIVE | Noted: 2019-03-26

## 2019-03-27 ENCOUNTER — HOSPITAL ENCOUNTER (OUTPATIENT)
Facility: HOSPITAL | Age: 64
Discharge: HOME | End: 2019-03-27
Attending: INTERNAL MEDICINE | Admitting: INTERNAL MEDICINE
Payer: MEDICARE

## 2019-03-27 ENCOUNTER — ANESTHESIA (OUTPATIENT)
Dept: ENDOSCOPY | Facility: HOSPITAL | Age: 64
End: 2019-03-27
Payer: MEDICARE

## 2019-03-27 ENCOUNTER — ANESTHESIA EVENT (OUTPATIENT)
Dept: ENDOSCOPY | Facility: HOSPITAL | Age: 64
End: 2019-03-27
Payer: MEDICARE

## 2019-03-27 DIAGNOSIS — Z12.11 COLON CANCER SCREENING: Primary | ICD-10-CM

## 2019-03-27 LAB
POCT GLUCOSE: 207 MG/DL (ref 70–110)
POTASSIUM SERPL-SCNC: 3.2 MMOL/L (ref 3.5–5.1)

## 2019-03-27 PROCEDURE — 88305 TISSUE EXAM BY PATHOLOGIST: CPT | Mod: TXP | Performed by: PATHOLOGY

## 2019-03-27 PROCEDURE — 37000009 HC ANESTHESIA EA ADD 15 MINS: Mod: TXP | Performed by: INTERNAL MEDICINE

## 2019-03-27 PROCEDURE — 63600175 PHARM REV CODE 636 W HCPCS: Mod: TXP | Performed by: NURSE ANESTHETIST, CERTIFIED REGISTERED

## 2019-03-27 PROCEDURE — 25000003 PHARM REV CODE 250: Mod: TXP | Performed by: NURSE ANESTHETIST, CERTIFIED REGISTERED

## 2019-03-27 PROCEDURE — 36415 COLL VENOUS BLD VENIPUNCTURE: CPT | Mod: TXP

## 2019-03-27 PROCEDURE — 84132 ASSAY OF SERUM POTASSIUM: CPT | Mod: TXP

## 2019-03-27 PROCEDURE — 45385 COLONOSCOPY W/LESION REMOVAL: CPT | Mod: PT,NTX,, | Performed by: INTERNAL MEDICINE

## 2019-03-27 PROCEDURE — 25000003 PHARM REV CODE 250: Mod: TXP | Performed by: INTERNAL MEDICINE

## 2019-03-27 PROCEDURE — 27201089 HC SNARE, DISP (ANY): Mod: TXP | Performed by: INTERNAL MEDICINE

## 2019-03-27 PROCEDURE — 37000008 HC ANESTHESIA 1ST 15 MINUTES: Mod: TXP | Performed by: INTERNAL MEDICINE

## 2019-03-27 PROCEDURE — 88305 TISSUE SPECIMEN TO PATHOLOGY - SURGERY: ICD-10-PCS | Mod: 26,TXP,, | Performed by: PATHOLOGY

## 2019-03-27 PROCEDURE — 45385 COLONOSCOPY W/LESION REMOVAL: CPT | Mod: TXP | Performed by: INTERNAL MEDICINE

## 2019-03-27 PROCEDURE — 88305 TISSUE EXAM BY PATHOLOGIST: CPT | Mod: 26,TXP,, | Performed by: PATHOLOGY

## 2019-03-27 PROCEDURE — 45385 PR COLONOSCOPY,REMV LESN,SNARE: ICD-10-PCS | Mod: PT,NTX,, | Performed by: INTERNAL MEDICINE

## 2019-03-27 RX ORDER — LIDOCAINE HYDROCHLORIDE 20 MG/ML
INJECTION, SOLUTION EPIDURAL; INFILTRATION; INTRACAUDAL; PERINEURAL
Status: DISCONTINUED | OUTPATIENT
Start: 2019-03-27 | End: 2019-03-27

## 2019-03-27 RX ORDER — SODIUM CHLORIDE 9 MG/ML
INJECTION, SOLUTION INTRAVENOUS CONTINUOUS
Status: DISCONTINUED | OUTPATIENT
Start: 2019-03-27 | End: 2019-03-27 | Stop reason: HOSPADM

## 2019-03-27 RX ORDER — SODIUM CHLORIDE, SODIUM LACTATE, POTASSIUM CHLORIDE, CALCIUM CHLORIDE 600; 310; 30; 20 MG/100ML; MG/100ML; MG/100ML; MG/100ML
INJECTION, SOLUTION INTRAVENOUS CONTINUOUS
Status: DISCONTINUED | OUTPATIENT
Start: 2019-03-27 | End: 2019-03-27 | Stop reason: HOSPADM

## 2019-03-27 RX ORDER — PROPOFOL 10 MG/ML
VIAL (ML) INTRAVENOUS
Status: DISCONTINUED | OUTPATIENT
Start: 2019-03-27 | End: 2019-03-27

## 2019-03-27 RX ORDER — SODIUM CHLORIDE, SODIUM LACTATE, POTASSIUM CHLORIDE, CALCIUM CHLORIDE 600; 310; 30; 20 MG/100ML; MG/100ML; MG/100ML; MG/100ML
INJECTION, SOLUTION INTRAVENOUS CONTINUOUS PRN
Status: DISCONTINUED | OUTPATIENT
Start: 2019-03-27 | End: 2019-03-27

## 2019-03-27 RX ADMIN — PROPOFOL 30 MG: 10 INJECTION, EMULSION INTRAVENOUS at 10:03

## 2019-03-27 RX ADMIN — SODIUM CHLORIDE: 0.9 INJECTION, SOLUTION INTRAVENOUS at 10:03

## 2019-03-27 RX ADMIN — SODIUM CHLORIDE, SODIUM LACTATE, POTASSIUM CHLORIDE, AND CALCIUM CHLORIDE: 600; 310; 30; 20 INJECTION, SOLUTION INTRAVENOUS at 10:03

## 2019-03-27 RX ADMIN — PROPOFOL 20 MG: 10 INJECTION, EMULSION INTRAVENOUS at 10:03

## 2019-03-27 RX ADMIN — PROPOFOL 40 MG: 10 INJECTION, EMULSION INTRAVENOUS at 10:03

## 2019-03-27 RX ADMIN — PROPOFOL 30 MG: 10 INJECTION, EMULSION INTRAVENOUS at 11:03

## 2019-03-27 RX ADMIN — LIDOCAINE HYDROCHLORIDE 40 MG: 20 INJECTION, SOLUTION EPIDURAL; INFILTRATION; INTRACAUDAL; PERINEURAL at 10:03

## 2019-03-27 RX ADMIN — PROPOFOL 60 MG: 10 INJECTION, EMULSION INTRAVENOUS at 10:03

## 2019-03-27 NOTE — ANESTHESIA RELEASE NOTE
"Anesthesia Release from PACU Note    Patient: Kendra Malik    Procedure(s) Performed: Procedure(s) (LRB):  COLONOSCOPY (N/A)    Anesthesia type: MAC    Post pain: Adequate analgesia    Post assessment: no apparent anesthetic complications and tolerated procedure well    Last Vitals:   Visit Vitals  BP (!) 114/58 (BP Location: Left arm, Patient Position: Lying)   Pulse 72   Temp 36.6 °C (97.8 °F) (Oral)   Resp 16   Ht 5' 5" (1.651 m)   Wt 70 kg (154 lb 5.2 oz)   SpO2 98%   Breastfeeding? No   BMI 25.68 kg/m²       Post vital signs: stable    Level of consciousness: awake, alert  and oriented    Nausea/Vomiting: no nausea/no vomiting    Complications: none    Airway Patency: patent    Respiratory: unassisted, spontaneous ventilation, room air    Cardiovascular: stable and blood pressure at baseline    Hydration: euvolemic  "

## 2019-03-27 NOTE — ANESTHESIA PREPROCEDURE EVALUATION
03/27/2019  Kendra Malik is a 63 y.o., female.    Anesthesia Evaluation    I have reviewed the Patient Summary Reports.     I have reviewed the Medications.     Review of Systems  Anesthesia Hx:  No problems with previous Anesthesia  History of prior surgery of interest to airway management or planning: Previous anesthesia: General Denies Family Hx of Anesthesia complications.   Denies Personal Hx of Anesthesia complications.   Social:  Non-Smoker    Hematology/Oncology:     Oncology Normal    -- Anemia:   EENT/Dental:EENT/Dental Normal   Cardiovascular:   Hypertension Sees cardiology for clearance with procedures. Last time saw him approximately 5 months ago   Pulmonary:  Pulmonary Normal FINDINGS:  There is borderline prominent appearance of the cardiac silhouette with otherwise unremarkable radiographic appearance of the cardiomediastinal shadow.  The trachea is midline.  There are findings consistent with vascular densities in the perihilar regions bilaterally with no radiographic indication of hilar enlargement or pulmonary edema.    Minor linear atelectasis or scarring is demonstrated within the midportion of the left lung with both lungs otherwise appearing fully expanded and clear.  The hemidiaphragms are sharply outlined and the costophrenic angles are acute with no pleural effusion demonstrated.  There is unremarkable appearance of the included osseous structures.    Impression      No radiographic evidence of acute cardiac or pulmonary process with chronic type findings as described.     Renal/:   Chronic Renal Disease, ESRD, Dialysis    Hepatic/GI:   Bowel Prep.    Musculoskeletal:  Musculoskeletal Normal    Neurological:  Neurology Normal    Endocrine:   Diabetes, type 2    Dermatological:  Skin Normal    Psych:  Psychiatric Normal           Physical Exam  General:  Well nourished     Airway/Jaw/Neck:  Airway Findings: Mouth Opening: Normal Tongue: Normal  Mallampati: II  Improves to II with phonation.  TM Distance: Normal, at least 6 cm  Jaw/Neck Findings:  Neck ROM: Normal ROM      Dental:  Dental Findings: In tact, Upper front caps        Mental Status:  Mental Status Findings:  Alert and Oriented, Cooperative         Anesthesia Plan  Type of Anesthesia, risks & benefits discussed:  Anesthesia Type:  MAC  Patient's Preference:   Intra-op Monitoring Plan: standard ASA monitors  Intra-op Monitoring Plan Comments:   Post Op Pain Control Plan:   Post Op Pain Control Plan Comments:   Induction:   IV  Beta Blocker:  Patient is on a Beta-Blocker and has received one dose within the past 24 hours (No further documentation required).       Informed Consent: Patient understands risks and agrees with Anesthesia plan.  Questions answered. Anesthesia consent signed with patient.  ASA Score: 4     Day of Surgery Review of History & Physical: I have interviewed and examined the patient. I have reviewed the patient's H&P dated:    H&P update referred to the provider.         Ready For Surgery From Anesthesia Perspective.

## 2019-03-27 NOTE — DISCHARGE SUMMARY
Endoscopy Discharge Summary      Admit Date: 3/27/2019    Discharge Date and Time:  3/27/2019 11:12 AM    Attending Physician: Arianna Srinivasan MD     Discharge Physician: Arianna Srinivasan MD     Principal Admitting Diagnoses: Colon cancer screening         Discharge Diagnosis: The encounter diagnosis was Colon cancer screening.     Discharged Condition: Good    Indication for Admission: Colon cancer screening     Hospital Course: Patient was admitted for an inpatient procedure and tolerated the procedure well with no complications.    Significant Diagnostic Studies: Colonoscopy with polypectomy    Pathology (if any):  Specimen (12h ago, onward)    Start     Ordered    03/27/19 1059  Specimen to Pathology - Surgery  Once     Comments:  #1 Transverse Colon Polyp     Start Status     03/27/19 1059 Collected (03/27/19 1105) Order ID: [id number]       03/27/19 1105          Estimated Blood Loss: 1 ml.    Discussed with: patient.    Disposition: Home.    Follow Up/Patient Instructions:   Current Discharge Medication List      CONTINUE these medications which have NOT CHANGED    Details   amLODIPine (NORVASC) 10 MG tablet Take 10 mg by mouth once daily.      ascorbic acid, vitamin C, (VITAMIN C) 500 MG tablet Take 500 mg by mouth once daily.      aspirin (ECOTRIN) 81 MG EC tablet Take 81 mg by mouth once daily.      atorvastatin (LIPITOR) 20 MG tablet Take 20 mg by mouth once daily.      cloNIDine (CATAPRES) 0.1 MG tablet Take 0.1 mg by mouth 2 (two) times daily.      ergocalciferol (ERGOCALCIFEROL) 50,000 unit Cap Take 50,000 Units by mouth every 7 days.      folic acid/vit B complex and C (FOLBEE PLUS ORAL) Take 1 tablet by mouth once daily.      furosemide (LASIX) 20 MG tablet Take 20 mg by mouth 2 (two) times daily.      gabapentin (NEURONTIN) 300 MG capsule Take 300 mg by mouth 2 (two) times daily.      hydrALAZINE (APRESOLINE) 100 MG tablet Take 100 mg by mouth 3 (three) times daily.      labetalol (NORMODYNE) 300  MG tablet Take 300 mg by mouth 2 (two) times daily.             Discharge Procedure Orders   Diet general     Call MD for:  temperature >100.4     Call MD for:  persistent nausea and vomiting     Call MD for:  severe uncontrolled pain     Call MD for:  difficulty breathing, headache or visual disturbances     Activity as tolerated       Follow-up Information     Arianna Srinivasan MD. Call in 1 week.    Specialty:  Gastroenterology  Why:  For pathology results  Contact information:  [id number]  [place] [address] [phone]

## 2019-03-27 NOTE — TRANSFER OF CARE
"Anesthesia Transfer of Care Note    Patient: Kendra Malik    Procedure(s) Performed: Procedure(s) (LRB):  COLONOSCOPY (N/A)    Patient location: GI    Anesthesia Type: MAC    Transport from OR: Transported from OR on room air with adequate spontaneous ventilation    Post pain: adequate analgesia    Post assessment: no apparent anesthetic complications and tolerated procedure well    Post vital signs: stable    Level of consciousness: awake, alert and oriented    Nausea/Vomiting: no nausea/vomiting    Complications: none    Transfer of care protocol was followed      Last vitals:   Visit Vitals  BP (!) 114/58 (BP Location: Left arm, Patient Position: Lying)   Pulse 72   Temp 36.6 °C (97.8 °F) (Oral)   Resp 16   Ht 5' 5" (1.651 m)   Wt 70 kg (154 lb 5.2 oz)   SpO2 98%   Breastfeeding? No   BMI 25.68 kg/m²     "

## 2019-03-27 NOTE — [employer] PATIENT INSTRUCTIONS
Discharge Summary/Instructions after an Endoscopic Procedure  Patient Name: Kendra Malik  Patient MRN: [id number]  Patient YOB: 1955 Wednesday, March 27, 2019 Arianna Srinivasan MD  RESTRICTIONS:  During your procedure today, you received medications for sedation.  These   medications may affect your judgment, balance and coordination.  Therefore,   for 24 hours, you have the following restrictions:   - DO NOT drive a car, operate machinery, make legal/financial decisions,   sign important papers or drink alcohol.    ACTIVITY:  Today: no heavy lifting, straining or running due to procedural   sedation/anesthesia.  The following day: return to full activity including work.  DIET:  Eat and drink normally unless instructed otherwise.     TREATMENT FOR COMMON SIDE EFFECTS:  - Mild abdominal pain, nausea, belching, bloating or excessive gas:  rest,   eat lightly and use a heating pad.  - Sore Throat: treat with throat lozenges and/or gargle with warm salt   water.  - Because air was used during the procedure, expelling large amounts of air   from your rectum or belching is normal.  - If a bowel prep was taken, you may not have a bowel movement for 1-3 days.    This is normal.  SYMPTOMS TO WATCH FOR AND REPORT TO YOUR PHYSICIAN:  1. Abdominal pain or bloating, other than gas cramps.  2. Chest pain.  3. Back pain.  4. Signs of infection such as: chills or fever occurring within 24 hours   after the procedure.  5. Rectal bleeding, which would show as bright red, maroon, or black stools.   (A tablespoon of blood from the rectum is not serious, especially if   hemorrhoids are present.)  6. Vomiting.  7. Weakness or dizziness.  GO DIRECTLY TO THE NEAREST EMERGENCY ROOM IF YOU HAVE ANY OF THE FOLLOWING:      Difficulty breathing              Chills and/or fever over 101 F   Persistent vomiting and/or vomiting blood   Severe abdominal pain   Severe chest pain   Black, tarry stools   Bleeding- more than one  tablespoon   Any other symptom or condition that you feel may need urgent attention  Your doctor recommends these additional instructions:  If any biopsies were taken, your doctors clinic will contact you in 1 to 2   weeks with any results.  - Patient has a contact number available for emergencies.  The signs and   symptoms of potential delayed complications were discussed with the   patient.  Return to normal activities tomorrow.  Written discharge   instructions were provided to the patient.   - Discharge patient to home (via wheelchair).   - Resume previous diet today.   - Continue present medications.   - No aspirin, ibuprofen, naproxen, or other non-steroidal anti-inflammatory   drugs for 7 days after polyp removal.   - Await pathology results.   - Repeat colonoscopy in 1 year for surveillance after piecemeal   polypectomy.  For questions, problems or results please call your physician Arianna Srinivasan MD at Work:  [phone]  If you have any questions about the above instructions, call the GI   department at [phone] or call the endoscopy unit at [phone]   from 7am until 3 pm.  [facility] [place], EMERGENCY ROOM PHONE NUMBER:   [phone]  IF A COMPLICATION OR EMERGENCY SITUATION ARISES AND YOU ARE UNABLE TO REACH   YOUR PHYSICIAN - GO DIRECTLY TO THE EMERGENCY ROOM.  I have read or have had read to me these discharge instructions for my   procedure and have received a written copy.  I understand these   instructions and will follow-up with my physician if I have any questions.     __________________________________       _____________________________________  Nurse Signature                                          Patient/Designated   Responsible Party Signature  MD Arianna Barragan MD  3/27/2019 11:11:08 AM  This report has been verified and signed electronically.  [employer]

## 2019-03-27 NOTE — PLAN OF CARE
Craig MCKEON at bedside. Discussed procedure results with patient and family. Vital signs stable.

## 2019-03-27 NOTE — H&P
PRE PROCEDURE H&P    Patient Name: Kendra Malik  MRN: [id number]  : 1955  Date of Procedure:  3/27/2019  Referring Physician: Ana Maria Patel RN  Primary Physician: Provider Not[employer]  Procedure Physician: Arianna Srinivasan MD       Planned Procedure: Colonoscopy  Diagnosis: screening for colon cancer  Chief Complaint: Same as above    HPI: Patient is an 63 y.o. female is here for screening.     Last colonoscopy: no prior scopes  Family history: negative  Anticoagulation: none    Past Medical History:   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, without long-term current use of insulin 10/11/2018    ESRD on dialysis 10/11/2018    Peritoneal dialysis initiated mid 2018    Renal hypertension 10/11/2018        Past Surgical History:  Past Surgical History:   Procedure Laterality Date    cataract surgery       SECTION      x3    HYSTERECTOMY      PERITONEAL CATHETER INSERTION      RETINAL DETACHMENT SURGERY          Home Medications:  Prior to Admission medications    Medication Sig Start Date End Date Taking? Authorizing Provider   amLODIPine (NORVASC) 10 MG tablet Take 10 mg by mouth once daily.   Yes Historical Provider, MD   ascorbic acid, vitamin C, (VITAMIN C) 500 MG tablet Take 500 mg by mouth once daily.   Yes Historical Provider, MD   aspirin (ECOTRIN) 81 MG EC tablet Take 81 mg by mouth once daily.   Yes Historical Provider, MD   atorvastatin (LIPITOR) 20 MG tablet Take 20 mg by mouth once daily.   Yes Historical Provider, MD   cloNIDine (CATAPRES) 0.1 MG tablet Take 0.1 mg by mouth 2 (two) times daily.   Yes Historical Provider, MD   ergocalciferol (ERGOCALCIFEROL) 50,000 unit Cap Take 50,000 Units by mouth every 7 days.   Yes Historical Provider, MD   folic acid/vit B complex and C (FOLBEE PLUS ORAL) Take 1 tablet by mouth once daily.   Yes Historical Provider, MD   furosemide (LASIX) 20 MG tablet Take 20 mg by mouth 2 (two) times  daily.   Yes Historical Provider, MD   gabapentin (NEURONTIN) 300 MG capsule Take 300 mg by mouth 2 (two) times daily.   Yes Historical Provider, MD   hydrALAZINE (APRESOLINE) 100 MG tablet Take 100 mg by mouth 3 (three) times daily.   Yes Historical Provider, MD   labetalol (NORMODYNE) 300 MG tablet Take 300 mg by mouth 2 (two) times daily.   Yes Historical Provider, MD        Allergies:  Review of patient's allergies indicates:  No Known Allergies     Social History:   Social History     Socioeconomic History    Marital status:      Spouse name: Not on file    Number of children: Not on file    Years of education: Not on file    Highest education level: Not on file   Occupational History    Not on file   Social Needs    Financial resource strain: Not on file    Food insecurity:     Worry: Not on file     Inability: Not on file    Transportation needs:     Medical: Not on file     Non-medical: Not on file   Tobacco Use    Smoking status: Never Smoker    Smokeless tobacco: Never Used   Substance and Sexual Activity    Alcohol use: Not on file     Comment: rare; can go months w/o a drink    Drug use: No    Sexual activity: Not on file   Lifestyle    Physical activity:     Days per week: Not on file     Minutes per session: Not on file    Stress: Not on file   Relationships    Social connections:     Talks on phone: Not on file     Gets together: Not on file     Attends Religion service: Not on file     Active member of club or organization: Not on file     Attends meetings of clubs or organizations: Not on file     Relationship status: Not on file    Intimate partner violence:     Fear of current or ex partner: Not on file     Emotionally abused: Not on file     Physically abused: Not on file     Forced sexual activity: Not on file   Other Topics Concern    Not on file   Social History Narrative    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     Physical Exam (all patients):    /63 (BP Location: Left arm, Patient Position: Lying)   Pulse 84   Temp 97.8 °F (36.6 °C) (Oral)   Resp 20   Ht 5' 5" (1.651 m)   Wt 70 kg (154 lb 5.2 oz)   SpO2 99%   Breastfeeding? No   BMI 25.68 kg/m²   Lungs: Clear to auscultation bilaterally, respirations unlabored  Heart: Regular rate and rhythm, S1 and S2 normal, no obvious murmurs  Abdomen:         Soft, non-tender, bowel sounds normal, no masses, no organomegaly    Lab Results   Component Value Date    WBC 8.20 10/11/2018    MCV 94 10/11/2018    RDW 17.0 (H) 10/11/2018     10/11/2018    INR 0.9 10/11/2018     (H) 10/11/2018    HGBA1C 6.7 (H) 10/11/2018    BUN 60 (H) 10/11/2018     10/11/2018    K 3.1 (L) 10/11/2018    CL 99 10/11/2018        SEDATION PLAN: per anesthesia      History reviewed, vital signs satisfactory, cardiopulmonary status satisfactory, sedation options, risks and plans have been discussed with the patient  All their questions were answered and the patient agrees to the sedation procedures as planned and the patient is deemed an appropriate candidate for the sedation as planned.    Procedure explained to patient, informed consent obtained and placed in chart.    Arianna Srinivasan  3/27/2019  10:35 AM   "

## 2019-03-27 NOTE — ANESTHESIA POSTPROCEDURE EVALUATION
Anesthesia Post Evaluation    Patient: Kendra Malik    Procedure(s) Performed: Procedure(s) (LRB):  COLONOSCOPY (N/A)    Final Anesthesia Type: MAC  Patient location during evaluation: GI PACU  Patient participation: Yes- Able to Participate  Level of consciousness: awake and alert and oriented  Post-procedure vital signs: reviewed and stable  Pain management: adequate  Airway patency: patent  PONV status at discharge: No PONV  Anesthetic complications: no      Cardiovascular status: hemodynamically stable  Respiratory status: unassisted, room air and spontaneous ventilation  Hydration status: euvolemic  Follow-up not needed.          Vitals Value Taken Time   /58 3/27/2019 11:09 AM   Temp 36.6 °C (97.8 °F) 3/27/2019 10:15 AM   Pulse 72 3/27/2019 11:09 AM   Resp 16 3/27/2019 11:09 AM   SpO2 98 % 3/27/2019 11:09 AM         No case tracking events are documented in the log.      Pain/Lisa Score: Lisa Score: 10 (3/27/2019 11:10 AM)

## 2019-03-27 NOTE — DISCHARGE INSTRUCTIONS
Understanding Colon and Rectal Polyps    The colon (also called the large intestine) is a muscular tube that forms the last part of the digestive tract. It absorbs water and stores food waste. The colon is about 4 to 6 feet long. The rectum is the last 6 inches of the colon. The colon and rectum have a smooth lining composed of millions of cells. Changes in these cells can lead to growths in the colon that can become cancerous and should be removed. Multiple tests are available to screen for colon cancer, but the colonoscopy is the most recommended test. During colonoscopy, these polyps can be removed. How often you need this test depends on many things including your condition, your family history, symptoms, and what the findings were at the previous colonoscopy.   When the colon lining changes  Changes that happen in the cells that line the colon or rectum can lead to growths called polyps. Over a period of years, polyps can turn cancerous. Removing polyps early may prevent cancer from ever forming.  Polyps  Polyps are fleshy clumps of tissue that form on the lining of the colon or rectum. Small polyps are usually benign (not cancerous). However, over time, cells in a polyp can change and become cancerous. Certain types of polyps known as adenomatous polyps are premalignant. The risk for invasive cancer increases with the size of the polyp and certain cell and gene features. This means that they can become cancerous if they're not removed. Hyperplastic polyps are benign. They can grow quite large and not turn cancerous.   Cancer  Almost all colorectal cancers start when polyp cells begin growing abnormally. As a cancerous tumor grows, it may involve more and more of the colon or rectum. In time, cancer can also grow beyond the colon or rectum and spread to nearby organs or to glands called lymph nodes. The cells can also travel to other parts of the body. This is known as metastasis. The earlier a cancerous  tumor is removed, the better the chance of preventing its spread.    Date Last Reviewed: 8/1/2016  © 7835-0253 [employer]. [address] [place] [address], [place] [address]. All rights reserved. This information is not intended as a substitute for professional medical care. Always follow your healthcare professional's instructions.        Colonoscopy     A camera attached to a flexible tube with a viewing lens is used to take video pictures.     Colonoscopy is a test to view the inside of your lower digestive tract (colon and rectum). Sometimes it can show the last part of the small intestine (ileum). During the test, small pieces of tissue may be removed for testing. This is called a biopsy. Small growths, such as polyps, may also be removed.   Why is colonoscopy done?  The test is done to help look for colon cancer. And it can help find the source of abdominal pain, bleeding, and changes in bowel habits. It may be needed once a year, depending on factors such as your:  · Age  · Health history  · Family health history  · Symptoms  · Results from any prior colonoscopy  Risks and possible complications  These include:  · Bleeding               · A puncture or tear in the colon   · Risks of anesthesia  · A cancer lesion not being seen  Getting ready   To prepare for the test:  · Talk with your healthcare provider about the risks of the test (see below). Also ask your healthcare provider about alternatives to the test.  · Tell your healthcare provider about any medicines you take. Also tell him or her about any health conditions you may have.  · Make sure your rectum and colon are empty for the test. Follow the diet and bowel prep instructions exactly. If you dont, the test may need to be rescheduled.  · Plan for a friend or family member to drive you home after the test.     Colonoscopy provides an inside view of the entire colon.     You may discuss the results with your doctor right away or at a future  visit.  During the test   The test is usually done in the hospital on an outpatient basis. This means you go home the same day. The procedure takes about 30 minutes. During that time:  · You are given relaxing (sedating) medicine through an IV line. You may be drowsy, or fully asleep.  · The healthcare provider will first give you a physical exam to check for anal and rectal problems.  · Then the anus is lubricated and the scope inserted.  · If you are awake, you may have a feeling similar to needing to have a bowel movement. You may also feel pressure as air is pumped into the colon. Its OK to pass gas during the procedure.  · Biopsy, polyp removal, or other treatments may be done during the test.  After the test   You may have gas right after the test. It can help to try to pass it to help prevent later bloating. Your healthcare provider may discuss the results with you right away. Or you may need to schedule a follow-up visit to talk about the results. After the test, you can go back to your normal eating and other activities. You may be tired from the sedation and need to rest for a few hours.  Date Last Reviewed: 11/1/2016  © 9980-3837 [employer]. [address] [place] [address], [place] [address]. All rights reserved. This information is not intended as a substitute for professional medical care. Always follow your healthcare professional's instructions.

## 2019-03-29 VITALS
TEMPERATURE: 98 F | OXYGEN SATURATION: 97 % | HEIGHT: 65 IN | HEART RATE: 72 BPM | DIASTOLIC BLOOD PRESSURE: 62 MMHG | BODY MASS INDEX: 25.71 KG/M2 | RESPIRATION RATE: 14 BRPM | SYSTOLIC BLOOD PRESSURE: 137 MMHG | WEIGHT: 154.31 LBS

## 2019-04-02 DIAGNOSIS — Z76.82 ORGAN TRANSPLANT CANDIDATE: Primary | ICD-10-CM

## 2019-04-26 DIAGNOSIS — N18.6 ESRD ON DIALYSIS: Primary | ICD-10-CM

## 2019-04-26 DIAGNOSIS — Z99.2 ESRD ON DIALYSIS: Primary | ICD-10-CM

## 2019-04-29 ENCOUNTER — HOSPITAL ENCOUNTER (OUTPATIENT)
Dept: RADIOLOGY | Facility: HOSPITAL | Age: 64
Discharge: HOME | End: 2019-04-29
Attending: NURSE PRACTITIONER
Payer: MEDICARE

## 2019-04-29 ENCOUNTER — OFFICE VISIT (OUTPATIENT)
Dept: CARDIOLOGY | Facility: CLINIC | Age: 64
End: 2019-04-29
Payer: MEDICARE

## 2019-04-29 ENCOUNTER — HOSPITAL ENCOUNTER (OUTPATIENT)
Dept: PULMONOLOGY | Facility: HOSPITAL | Age: 64
Discharge: HOME | End: 2019-04-29
Attending: NURSE PRACTITIONER
Payer: MEDICARE

## 2019-04-29 ENCOUNTER — OFFICE VISIT (OUTPATIENT)
Dept: OBSTETRICS AND GYNECOLOGY | Facility: CLINIC | Age: 64
End: 2019-04-29
Payer: MEDICARE

## 2019-04-29 VITALS
WEIGHT: 166.69 LBS | BODY MASS INDEX: 27.77 KG/M2 | HEIGHT: 65 IN | SYSTOLIC BLOOD PRESSURE: 148 MMHG | DIASTOLIC BLOOD PRESSURE: 66 MMHG

## 2019-04-29 VITALS
HEART RATE: 80 BPM | HEIGHT: 65 IN | BODY MASS INDEX: 27.13 KG/M2 | SYSTOLIC BLOOD PRESSURE: 156 MMHG | DIASTOLIC BLOOD PRESSURE: 70 MMHG | WEIGHT: 162.81 LBS

## 2019-04-29 DIAGNOSIS — Z01.810 PREOP CARDIOVASCULAR EXAM: Primary | ICD-10-CM

## 2019-04-29 DIAGNOSIS — Z00.00 PREVENTATIVE HEALTH CARE: ICD-10-CM

## 2019-04-29 DIAGNOSIS — N18.6 ESRD ON DIALYSIS: ICD-10-CM

## 2019-04-29 DIAGNOSIS — Z76.82 ORGAN TRANSPLANT CANDIDATE: ICD-10-CM

## 2019-04-29 DIAGNOSIS — Z01.419 GYNECOLOGIC EXAM NORMAL: Primary | ICD-10-CM

## 2019-04-29 DIAGNOSIS — N18.6 TYPE 2 DIABETES MELLITUS WITH CHRONIC KIDNEY DISEASE ON CHRONIC DIALYSIS, WITHOUT LONG-TERM CURRENT USE OF INSULIN: ICD-10-CM

## 2019-04-29 DIAGNOSIS — Z12.39 BREAST CANCER SCREENING: ICD-10-CM

## 2019-04-29 DIAGNOSIS — E11.22 TYPE 2 DIABETES MELLITUS WITH CHRONIC KIDNEY DISEASE ON CHRONIC DIALYSIS, WITHOUT LONG-TERM CURRENT USE OF INSULIN: ICD-10-CM

## 2019-04-29 DIAGNOSIS — Z99.2 TYPE 2 DIABETES MELLITUS WITH CHRONIC KIDNEY DISEASE ON CHRONIC DIALYSIS, WITHOUT LONG-TERM CURRENT USE OF INSULIN: ICD-10-CM

## 2019-04-29 DIAGNOSIS — Z99.2 ESRD ON DIALYSIS: ICD-10-CM

## 2019-04-29 DIAGNOSIS — I12.9 RENAL HYPERTENSION: ICD-10-CM

## 2019-04-29 DIAGNOSIS — Z78.0 MENOPAUSE: ICD-10-CM

## 2019-04-29 DIAGNOSIS — E78.2 MIXED HYPERLIPIDEMIA: ICD-10-CM

## 2019-04-29 DIAGNOSIS — Z76.82 ORGAN TRANSPLANT CANDIDATE: Primary | ICD-10-CM

## 2019-04-29 PROCEDURE — 78452 HT MUSCLE IMAGE SPECT MULT: CPT | Mod: 26,TXP,, | Performed by: INTERNAL MEDICINE

## 2019-04-29 PROCEDURE — 93017 CV STRESS TEST TRACING ONLY: CPT | Mod: TXP

## 2019-04-29 PROCEDURE — 93018 NM MULTI PHARM STRESS CARDIAC COMPONENT: ICD-10-PCS | Mod: TXP,,, | Performed by: INTERNAL MEDICINE

## 2019-04-29 PROCEDURE — 77067 MAMMO DIGITAL SCREENING BILAT WITH TOMOSYNTHESIS_CAD: ICD-10-PCS | Mod: 26,TXP,, | Performed by: RADIOLOGY

## 2019-04-29 PROCEDURE — 77063 BREAST TOMOSYNTHESIS BI: CPT | Mod: 26,TXP,, | Performed by: RADIOLOGY

## 2019-04-29 PROCEDURE — 99204 PR OFFICE/OUTPT VISIT, NEW, LEVL IV, 45-59 MIN: ICD-10-PCS | Mod: S$PBB,TXP,, | Performed by: INTERNAL MEDICINE

## 2019-04-29 PROCEDURE — 77067 SCR MAMMO BI INCL CAD: CPT | Mod: TC,TXP

## 2019-04-29 PROCEDURE — A9502 TC99M TETROFOSMIN: HCPCS | Mod: TXP

## 2019-04-29 PROCEDURE — 93018 CV STRESS TEST I&R ONLY: CPT | Mod: TXP,,, | Performed by: INTERNAL MEDICINE

## 2019-04-29 PROCEDURE — G0101 CA SCREEN;PELVIC/BREAST EXAM: HCPCS | Mod: S$PBB,,, | Performed by: NURSE PRACTITIONER

## 2019-04-29 PROCEDURE — 99212 OFFICE O/P EST SF 10 MIN: CPT | Mod: PBBFAC,25,TXP | Performed by: NURSE PRACTITIONER

## 2019-04-29 PROCEDURE — 93016 NM MULTI PHARM STRESS CARDIAC COMPONENT: ICD-10-PCS | Mod: TXP,,, | Performed by: INTERNAL MEDICINE

## 2019-04-29 PROCEDURE — 77067 SCR MAMMO BI INCL CAD: CPT | Mod: 26,TXP,, | Performed by: RADIOLOGY

## 2019-04-29 PROCEDURE — 93016 CV STRESS TEST SUPVJ ONLY: CPT | Mod: TXP,,, | Performed by: INTERNAL MEDICINE

## 2019-04-29 PROCEDURE — 78452 NM MULTI PHARM STRESS CARDIAC COMPONENT: ICD-10-PCS | Mod: 26,TXP,, | Performed by: INTERNAL MEDICINE

## 2019-04-29 PROCEDURE — 99999 PR PBB SHADOW E&M-EST. PATIENT-LVL II: ICD-10-PCS | Mod: PBBFAC,,, | Performed by: NURSE PRACTITIONER

## 2019-04-29 PROCEDURE — 99999 PR PBB SHADOW E&M-EST. PATIENT-LVL III: CPT | Mod: PBBFAC,TXP,, | Performed by: INTERNAL MEDICINE

## 2019-04-29 PROCEDURE — 99213 OFFICE O/P EST LOW 20 MIN: CPT | Mod: PBBFAC,25,27,TXP | Performed by: INTERNAL MEDICINE

## 2019-04-29 PROCEDURE — G0101 PR CA SCREEN;PELVIC/BREAST EXAM: ICD-10-PCS | Mod: S$PBB,,, | Performed by: NURSE PRACTITIONER

## 2019-04-29 PROCEDURE — 63600175 PHARM REV CODE 636 W HCPCS: Mod: TXP | Performed by: NURSE PRACTITIONER

## 2019-04-29 PROCEDURE — 99999 PR PBB SHADOW E&M-EST. PATIENT-LVL III: ICD-10-PCS | Mod: PBBFAC,TXP,, | Performed by: INTERNAL MEDICINE

## 2019-04-29 PROCEDURE — 99204 OFFICE O/P NEW MOD 45 MIN: CPT | Mod: S$PBB,TXP,, | Performed by: INTERNAL MEDICINE

## 2019-04-29 PROCEDURE — 99999 PR PBB SHADOW E&M-EST. PATIENT-LVL II: CPT | Mod: PBBFAC,,, | Performed by: NURSE PRACTITIONER

## 2019-04-29 PROCEDURE — 77063 MAMMO DIGITAL SCREENING BILAT WITH TOMOSYNTHESIS_CAD: ICD-10-PCS | Mod: 26,TXP,, | Performed by: RADIOLOGY

## 2019-04-29 RX ORDER — REGADENOSON 0.08 MG/ML
0.4 INJECTION, SOLUTION INTRAVENOUS ONCE
Status: COMPLETED | OUTPATIENT
Start: 2019-04-29 | End: 2019-04-29

## 2019-04-29 RX ORDER — CALCIUM CITRATE/VITAMIN D3 200MG-6.25
TABLET ORAL
Refills: 6 | COMMUNITY
Start: 2019-01-22 | End: 2020-12-22 | Stop reason: SDUPTHER

## 2019-04-29 RX ORDER — FLUTICASONE PROPIONATE 50 MCG
SPRAY, SUSPENSION (ML) NASAL
Refills: 6 | Status: ON HOLD | COMMUNITY
Start: 2019-01-22 | End: 2020-11-19 | Stop reason: HOSPADM

## 2019-04-29 RX ORDER — LORATADINE 10 MG/1
TABLET ORAL
Status: ON HOLD | COMMUNITY
Start: 2019-02-18 | End: 2020-11-19 | Stop reason: HOSPADM

## 2019-04-29 RX ORDER — INSULIN ASPART 100 [IU]/ML
INJECTION, SUSPENSION SUBCUTANEOUS
Refills: 4 | Status: ON HOLD | COMMUNITY
Start: 2019-04-16 | End: 2020-11-19 | Stop reason: HOSPADM

## 2019-04-29 RX ORDER — CALCITRIOL 0.25 UG/1
0.25 CAPSULE ORAL
Status: ON HOLD | COMMUNITY
End: 2020-11-19 | Stop reason: HOSPADM

## 2019-04-29 RX ORDER — TRAMADOL HYDROCHLORIDE 50 MG/1
TABLET ORAL
Status: ON HOLD | COMMUNITY
Start: 2018-09-04 | End: 2020-11-19 | Stop reason: HOSPADM

## 2019-04-29 RX ORDER — ONDANSETRON 4 MG/1
TABLET, ORALLY DISINTEGRATING ORAL
Status: ON HOLD | COMMUNITY
Start: 2019-02-18 | End: 2020-11-19 | Stop reason: HOSPADM

## 2019-04-29 RX ORDER — PREDNISOLONE ACETATE 10 MG/ML
1 SUSPENSION/ DROPS OPHTHALMIC 4 TIMES DAILY
Status: ON HOLD | COMMUNITY
End: 2020-11-19 | Stop reason: HOSPADM

## 2019-04-29 RX ORDER — CINACALCET 30 MG/1
TABLET, FILM COATED ORAL
Refills: 99 | Status: ON HOLD | COMMUNITY
Start: 2019-04-09 | End: 2020-11-19 | Stop reason: HOSPADM

## 2019-04-29 RX ORDER — FOLIC ACID/VIT B COMPLEX AND C 5 MG
1 TABLET ORAL DAILY
Refills: 11 | COMMUNITY
Start: 2019-04-16 | End: 2019-04-29 | Stop reason: SDUPTHER

## 2019-04-29 RX ADMIN — REGADENOSON 0.4 MG: 0.08 INJECTION, SOLUTION INTRAVENOUS at 03:04

## 2019-04-29 NOTE — PROGRESS NOTES
Subjective:   Patient ID:  Kendra Malik is a 63 y.o. female who presents for evaluation of Pre-op Exam      64 yo female, pre kidney Tx clearance  PMH HTN, IDDM, esrd on HD since , s/p catheter on right chest.  No chest pain, dyspnea, palpitation, dizziness and orthopnea.  At home, her BP was at low end.  Walks a lot.  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 ENDO    HYSTERECTOMY      OOPHORECTOMY      PERITONEAL CATHETER INSERTION      RETINAL DETACHMENT SURGERY         Social History     Tobacco Use    Smoking status: Never Smoker    Smokeless tobacco: Never Used   Substance Use Topics    Alcohol use: Yes     Frequency: Monthly or less     Drinks per session: 1 or 2     Binge frequency: Never     Comment: rare; can go months w/o a drink    Drug use: No       Family History   Problem Relation Age of Onset    Diabetes Mother     Heart disease Mother         CHF    Hypertension Mother     Diabetes Father     Hypertension Father     Diabetes Brother     Diabetes Paternal Aunt     Cancer Paternal Uncle     Diabetes Paternal Grandmother     Diabetes Brother     Gout Brother     HIV Sister     Kidney disease Neg Hx        Review of Systems   Constitution: Negative for decreased appetite, diaphoresis, fever, malaise/fatigue and night sweats.   HENT: Negative for nosebleeds.    Eyes: Negative for blurred vision and double vision.   Cardiovascular: Negative for chest pain, claudication, dyspnea on exertion, irregular heartbeat, leg swelling, near-syncope, orthopnea,  palpitations, paroxysmal nocturnal dyspnea and syncope.   Respiratory: Negative for cough, shortness of breath, sleep disturbances due to breathing, snoring, sputum production and wheezing.    Endocrine: Negative for cold intolerance and polyuria.   Hematologic/Lymphatic: Does not bruise/bleed easily.   Skin: Negative for rash.   Musculoskeletal: Negative for back pain, falls, joint pain, joint swelling and neck pain.   Gastrointestinal: Negative for abdominal pain, heartburn, nausea and vomiting.   Genitourinary: Negative for dysuria, frequency and hematuria.   Neurological: Negative for difficulty with concentration, dizziness, focal weakness, headaches, light-headedness, numbness, seizures and weakness.   Psychiatric/Behavioral: Negative for depression, memory loss and substance abuse. The patient does not have insomnia.    Allergic/Immunologic: Negative for HIV exposure and hives.       Objective:   Physical Exam   Constitutional: She is oriented to person, place, and time. She appears well-nourished.   HENT:   Head: Normocephalic.   Eyes: Pupils are equal, round, and reactive to light.   Neck: Normal carotid pulses and no JVD present. Carotid bruit is not present. No thyromegaly present.   Cardiovascular: Normal rate, regular rhythm and normal pulses.  No extrasystoles are present. PMI is not displaced. Exam reveals no gallop and no S3.   Murmur heard.  Soft SM on LSB   Pulmonary/Chest: Breath sounds normal. No stridor. No respiratory distress.   Abdominal: Soft. Bowel sounds are normal. There is no tenderness. There is no rebound.   Musculoskeletal: Normal range of motion.   Neurological: She is alert and oriented to person, place, and time.   Skin: Skin is intact. No rash noted.   Psychiatric: Her behavior is normal.       Lab Results   Component Value Date    CHOL 275 (H) 10/11/2018     Lab Results   Component Value Date    HDL 62 10/11/2018     Lab Results   Component Value Date    LDLCALC 180.2 (H)  10/11/2018     Lab Results   Component Value Date    TRIG 164 (H) 10/11/2018     Lab Results   Component Value Date    CHOLHDL 22.5 10/11/2018       Chemistry        Component Value Date/Time     10/11/2018 0806    K 3.2 (L) 03/27/2019 0954    CL 99 10/11/2018 0806    CO2 25 10/11/2018 0806    BUN 60 (H) 10/11/2018 0806    CREATININE 6.2 (H) 10/11/2018 0806     (H) 10/11/2018 0806        Component Value Date/Time    CALCIUM 9.7 10/11/2018 0806    ALKPHOS 114 10/11/2018 0806    AST 21 10/11/2018 0806    ALT 8 (L) 10/11/2018 0806    BILITOT 0.3 10/11/2018 0806    ESTGFRAFRICA 7.7 (A) 10/11/2018 0806    EGFRNONAA 6.7 (A) 10/11/2018 0806          Lab Results   Component Value Date    HGBA1C 6.7 (H) 10/11/2018     No results found for: TSH  Lab Results   Component Value Date    INR 0.9 10/11/2018     Lab Results   Component Value Date    WBC 8.20 10/11/2018    HGB 10.1 (L) 10/11/2018    HCT 31.9 (L) 10/11/2018    MCV 94 10/11/2018     10/11/2018     BNP  @LABRCNTIP(BNP,BNPTRIAGEBLO)@  CrCl cannot be calculated (Patient's most recent lab result is older than the maximum 7 days allowed.).  No results found in the last 24 hours.  No results found in the last 24 hours.  No results found in the last 24 hours.    Assessment:      1. Preop cardiovascular exam    2. Renal hypertension    3. ESRD on dialysis    4. 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 Mixed hyperlipidemia      No CP and CHF  Nuke stress showed no stress induced ischemia and normal EF  BP high today but controlled at home   high  BS good    Plan:     Advise to increase Lipitor to 80 mg to keep LDL < 130  Continue Hydralazine, amlodipine, labetolol, and Clonidine for HTN Rx  DM rx per PCP  Continue lasix  DASH  F/u as indicated    Elevated periop risk of CV events for non-high risk procedure.  Good functional and exercise capacity.  No chest pain, active arrhythmia and CHF symptoms.  Ok to proceed  the scheduled surgery without further cardiac study.  OK to hold Aspirin 5 to 7 days before the procedure and resume ASAP postop.  Continue Labetalol and Statin periop.  Avoid periop fluid overloaded.

## 2019-04-29 NOTE — PROGRESS NOTES
"CC: Well woman exam    Kendra Malik is a 63 y.o. female No obstetric history on file. presents for a well woman exam.  No issues, problems, or complaints. Patient is s/p benign hysterectomy, without ovary conservation.Is on the renal transplant list. Patient has ESRF. PCP has ordered mammogram. Not sexually active.     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, without long-term current use of insulin 10/11/2018    ESRD on dialysis 10/11/2018    Peritoneal dialysis initiated mid 2018    Renal hypertension 10/11/2018     Past Surgical History:   Procedure Laterality Date    cataract surgery       SECTION      x3    COLONOSCOPY N/A 3/27/2019    [facility] ENDO    HYSTERECTOMY      PERITONEAL CATHETER INSERTION      RETINAL DETACHMENT SURGERY       Family History   Problem Relation Age of Onset    Diabetes Mother     Heart disease Mother         CHF    Hypertension Mother     Diabetes Father     Hypertension Father     Diabetes Brother     Diabetes Paternal Aunt     Cancer Paternal Uncle     Diabetes Paternal Grandmother     Diabetes Brother     Gout Brother     HIV Sister     Kidney disease Neg Hx      Social History     Tobacco Use    Smoking status: Never Smoker    Smokeless tobacco: Never Used   Substance Use Topics    Alcohol use: Yes     Frequency: Monthly or less     Drinks per session: 1 or 2     Binge frequency: Never     Comment: rare; can go months w/o a drink    Drug use: No     OB History    None         BP (!) 148/66 (BP Location: Left arm, Patient Position: Sitting, BP Method: Medium (Manual))   Ht 5' 5" (1.651 m)   Wt 75.6 kg (166 lb 10.7 oz)   BMI 27.73 kg/m²     ROS:  GENERAL: Denies weight gain or weight loss. Feeling well overall.   SKIN: Denies rash or lesions.   HEAD: Denies head injury or headache.   NODES: Denies enlarged lymph nodes.   CHEST: Denies chest pain or " shortness of breath.   CARDIOVASCULAR: Denies palpitations or left sided chest pain.   ABDOMEN: No abdominal pain, constipation, diarrhea, nausea, vomiting or rectal bleeding.   URINARY: No frequency, dysuria, hematuria, or burning on urination.  REPRODUCTIVE: See HPI.   BREASTS: The patient performs breast self-examination and denies pain, lumps, or nipple discharge.   HEMATOLOGIC: No easy bruisability or excessive bleeding.   MUSCULOSKELETAL: Denies joint pain or swelling.   NEUROLOGIC: Denies syncope or weakness.   PSYCHIATRIC: Denies depression, anxiety or mood swings.    PE:   APPEARANCE: Well nourished, well developed, in no acute distress.  AFFECT: WNL, alert and oriented x 3.  SKIN: No acne or hirsutism.  NECK: Neck symmetric without masses or thyromegaly.  NODES: No inguinal, cervical, axillary or femoral lymph node enlargement.  CHEST: Good respiratory effort.   ABDOMEN: Soft. No tenderness or masses. No hepatosplenomegaly. No hernias.  BREASTS: Symmetrical, no skin changes or visible lesions. No palpable masses, nipple discharge bilaterally.  PELVIC: Normal external female genitalia without lesions. Normal hair distribution. Adequate perineal body, normal urethral meatus. Vagina atrophic without lesions or discharge. No significant cystocele or rectocele. Bimanual exam shows uterus and cervix to be surgically absent. Adnexa without masses or tenderness.  RECTAL: Rectovaginal exam confirms above with normal sphincter tone, no masses.  EXTREMITIES: No edema.    PLAN:  Dexa scan  Patient was counseled today on A.C.S. Pap guidelines and recommendations for yearly pelvic exams, mammograms and monthly self breast exams; to see her PCP for other health maintenance.

## 2019-04-29 NOTE — LETTER
May 2, 2019      Pao Ramires, RAFAEL  [address]  [facility]  1st Floor Clinic  [facility] [address]           [facility]  [address] [place] [address]  Phone: [phone]  Fax: [phone]          Patient: Kendra Malik   MR Number: [id number]   YOB: 1955   Date of Visit: 4/29/2019       Dear Pao Ramires:    Thank you for referring Kendra Malik to me for evaluation. Attached you will find relevant portions of my assessment and plan of care.    If you have questions, please do not hesitate to call me. I look forward to following Kendra Malik along with you.    Sincerely,    Mauricio Ortiz MD    Enclosure  CC:  No Recipients    If you would like to receive this communication electronically, please contact [email or web address] or [phone] to request more information on [employer] access.    For providers and/or their staff who would like to refer a patient to [facility], please contact us through our one-stop-shop provider referral line, [facility], at [phone].    If you feel you have received this communication in error or would no longer like to receive these types of communications, please e-mail [email or web address]

## 2019-04-30 ENCOUNTER — TELEPHONE (OUTPATIENT)
Dept: TRANSPLANT | Facility: CLINIC | Age: 64
End: 2019-04-30

## 2019-04-30 NOTE — TELEPHONE ENCOUNTER
----- Message from Ranjan Shaffer sent at 3/20/2019  8:56 AM CDT -----  Contact: ranjan KABA   Patient was sent 30 day letter on 1/31/2019    The above patient is still pending the following: cardiac stress test, echocardiogram, colonoscopy, mammogram, gyn, and HBV PCR labs, colon scheduled in [facility] 3/27/2019

## 2019-05-01 LAB — DIASTOLIC DYSFUNCTION: NO

## 2019-05-02 ENCOUNTER — HISTORICAL (OUTPATIENT)
Dept: SURGERY | Facility: HOSPITAL | Age: 64
End: 2019-05-02

## 2019-05-02 LAB
ALBUMIN SERPL-MCNC: 2.6 GM/DL (ref 3.4–5)
ALBUMIN/GLOB SERPL: 0.5 RATIO (ref 1.1–2)
ALP SERPL-CCNC: 150 UNIT/L (ref 45–117)
ALT SERPL-CCNC: 14 UNIT/L (ref 12–78)
AST SERPL-CCNC: 23 UNIT/L (ref 15–37)
BILIRUB SERPL-MCNC: 0.3 MG/DL (ref 0.2–1)
BILIRUBIN DIRECT+TOT PNL SERPL-MCNC: <0.1 MG/DL
BILIRUBIN DIRECT+TOT PNL SERPL-MCNC: ABNORMAL MG/DL
BUN SERPL-MCNC: 50 MG/DL (ref 7–18)
CALCIUM SERPL-MCNC: 8.6 MG/DL (ref 8.5–10.1)
CHLORIDE SERPL-SCNC: 103 MMOL/L (ref 98–107)
CO2 SERPL-SCNC: 32 MMOL/L (ref 21–32)
CREAT SERPL-MCNC: 5.7 MG/DL (ref 0.6–1.3)
ERYTHROCYTE [DISTWIDTH] IN BLOOD BY AUTOMATED COUNT: 13.8 % (ref 11.5–14.5)
GLOBULIN SER-MCNC: 4.8 GM/ML (ref 2.3–3.5)
GLUCOSE SERPL-MCNC: 158 MG/DL (ref 74–106)
HCT VFR BLD AUTO: 30.3 % (ref 35–46)
HGB BLD-MCNC: 9.8 GM/DL (ref 12–16)
MCH RBC QN AUTO: 32.7 PG (ref 26–34)
MCHC RBC AUTO-ENTMCNC: 32.3 GM/DL (ref 31–37)
MCV RBC AUTO: 101 FL (ref 80–100)
PLATELET # BLD AUTO: 163 X10(3)/MCL (ref 130–400)
PMV BLD AUTO: 10.8 FL (ref 7.4–10.4)
POTASSIUM SERPL-SCNC: 3.3 MMOL/L (ref 3.5–5.1)
PROT SERPL-MCNC: 7.4 GM/DL (ref 6.4–8.2)
RBC # BLD AUTO: 3 X10(6)/MCL (ref 4–5.2)
SODIUM SERPL-SCNC: 142 MMOL/L (ref 136–145)
WBC # SPEC AUTO: 7.2 X10(3)/MCL (ref 4.5–11)

## 2019-06-04 ENCOUNTER — TELEPHONE (OUTPATIENT)
Dept: TRANSPLANT | Facility: CLINIC | Age: 64
End: 2019-06-04

## 2019-06-04 NOTE — TELEPHONE ENCOUNTER
----- Message from Mansi Fair sent at 6/3/2019  4:58 PM CDT -----  Contact: patient       ----- Message -----  From: Kirk Calderón  Sent: 6/3/2019   1:50 PM  To: [facility] Pre-Kidney Transplant Clinical    Please call pt at [phone]    Patient is calling for her test results    Thank you

## 2019-06-04 NOTE — TELEPHONE ENCOUNTER
----- Message from Benjamin Mendoza sent at 6/4/2019 11:14 AM CDT -----  Contact: patient   Contact: Patient     Message: Patient returning a call from Ana Maria     Communication Preference: [phone]    Additional Information:     Thanks!

## 2019-06-17 ENCOUNTER — HISTORICAL (OUTPATIENT)
Dept: INTERNAL MEDICINE | Facility: CLINIC | Age: 64
End: 2019-06-17

## 2019-06-17 LAB
APPEARANCE, UA: CLEAR
BACTERIA #/AREA URNS AUTO: ABNORMAL /[HPF]
BILIRUB UR QL STRIP: NEGATIVE
BUN SERPL-MCNC: 38 MG/DL (ref 7–18)
CALCIUM SERPL-MCNC: 9.2 MG/DL (ref 8.5–10.1)
CHLORIDE SERPL-SCNC: 110 MMOL/L (ref 98–107)
CHOLEST SERPL-MCNC: 172 MG/DL
CHOLEST/HDLC SERPL: 2.9 {RATIO} (ref 0–4.4)
CO2 SERPL-SCNC: 29 MMOL/L (ref 21–32)
COLOR UR: YELLOW
CREAT SERPL-MCNC: 4.9 MG/DL (ref 0.6–1.3)
CREAT/UREA NIT SERPL: 8
EST. AVERAGE GLUCOSE BLD GHB EST-MCNC: 137 MG/DL
GLUCOSE (UA): NORMAL
GLUCOSE SERPL-MCNC: 124 MG/DL (ref 74–106)
HBA1C MFR BLD: 6.4 % (ref 4.2–6.3)
HDLC SERPL-MCNC: 59 MG/DL
HGB UR QL STRIP: NEGATIVE
HYALINE CASTS #/AREA URNS LPF: ABNORMAL /[LPF]
KETONES UR QL STRIP: NEGATIVE
LDLC SERPL CALC-MCNC: 84 MG/DL (ref 0–130)
LEUKOCYTE ESTERASE UR QL STRIP: NEGATIVE
NITRITE UR QL STRIP: NEGATIVE
PH UR STRIP: 7 [PH] (ref 4.5–8)
POTASSIUM SERPL-SCNC: 4 MMOL/L (ref 3.5–5.1)
PROT UR QL STRIP: 300 MG/DL
RBC #/AREA URNS AUTO: ABNORMAL /[HPF]
SODIUM SERPL-SCNC: 146 MMOL/L (ref 136–145)
SP GR UR STRIP: 1.01 (ref 1–1.03)
SQUAMOUS #/AREA URNS LPF: ABNORMAL /[LPF]
TRIGL SERPL-MCNC: 144 MG/DL
UROBILINOGEN UR STRIP-ACNC: NORMAL
VLDLC SERPL CALC-MCNC: 29 MG/DL
WBC #/AREA URNS AUTO: ABNORMAL /HPF

## 2019-06-21 ENCOUNTER — HISTORICAL (OUTPATIENT)
Dept: ADMINISTRATIVE | Facility: HOSPITAL | Age: 64
End: 2019-06-21

## 2019-06-21 LAB
CREAT UR-MCNC: 210 MG/DL
MICROALBUMIN UR-MCNC: 2860 MG/L (ref 0–19)
MICROALBUMIN/CREAT RATIO PNL UR: 1361.9 MCG/MG CR (ref 0–29)

## 2019-06-28 ENCOUNTER — COMMITTEE REVIEW (OUTPATIENT)
Dept: TRANSPLANT | Facility: CLINIC | Age: 64
End: 2019-06-28

## 2019-06-28 ENCOUNTER — TELEPHONE (OUTPATIENT)
Dept: TRANSPLANT | Facility: CLINIC | Age: 64
End: 2019-06-28

## 2019-06-28 DIAGNOSIS — Z76.82 ORGAN TRANSPLANT CANDIDATE: Primary | ICD-10-CM

## 2019-06-28 NOTE — COMMITTEE REVIEW
"Native Organ Dx: Diabetes Mellitus - Type Other / Unknown      SELECTION COMMITTEE NOTE    Kendra Malik was presented at selection committee on 06/28/19.  Patient met selection criteria for kidney transplant related to ESRD due to  Diabetes Mellitus - Type Other / Unknown.  No absolute contraindications to transplant at this time.  Patient will be placed on the cadaveric wait list pending clarification of moderate Pulmonary Hypertension noted on echo (no PA pressure noted) and final financial approval from insurance company.  Patient will return to clinic for routine appointment in 1 year(s). Patient does meet criteria for High KDPI kidney offer. Patient does meet HCV+ donor offer.     After speaking with patient, this echo was done when she was admitted for "fluid on her lungs" at [facility]. She was seen by Dr. Zheng, [facility], where she underwent a nuclear stress. Patient denies following up with any other cardiologist.     HCV+ donors discussed patient. Declined HCV+ donor at this time and wants to talk with her family. All questions were answered and pt verbalized understanding.      Note written by Ana Maria Patel RN  ===============================================    I was present at the meeting and attest to the decision of the committee.  "

## 2019-06-28 NOTE — LETTER
June 28, 2019    Ann Marie Scruggs MD  [address], [place] [address]       Dear Dr. Scruggs    Patient: Kendra Malik   MR Number: [id number]   YOB: 1955     Your patient, Kendra Malik, was recently discussed at the [facility] Selection Committee.  I am happy to inform you that Kendra has been approved for transplantation pending clarification of moderate Pulmonary Hypertension noted on an echocardiogram done 01/07/2019.  She has met selection criteria for a kidney transplant related to ESRD secondary to primary diagnosis of Diabetes Mellitus - Type Other / Unknown. Your patient will be placed on the cadaveric wait list pending final financial approval from insurance company.     We appreciate your confidence in allowing us to participate in your patients care.      If you have any questions or concerns, please do not hesitate to contact me.    Sincerely,      Kelley Esquivel MD  Medical Director, Kidney & Kidney/Pancreas Transplantation  lh  Copy to patient    Faxed to:  Dr. Karla Scruggs                   [facility]

## 2019-06-28 NOTE — TELEPHONE ENCOUNTER
"----- Message from Kelley Denny MD sent at 6/28/2019  3:52 PM CDT -----  Yes- great plan. Thanks!    ----- Message -----  From: Ana Maria Patel  Sent: 6/28/2019   2:29 PM  To: Kelley Denny MD    This patient was presented today. Approved pending cardiac clarification regarding moderate pulmonary hypertension present on echo (no PA pressure noted).     After speaking with patient, this echo was done when she was admitted for "fluid on her lungs" at [facility]. She was seen by Dr. Zheng, [facility], where she underwent a nuclear stress. Patient denies following up with any other cardiologist.     Would you like for me to get another echo to assess PA pressure at [facility]?     Ana Maria"

## 2019-07-09 ENCOUNTER — TELEPHONE (OUTPATIENT)
Dept: TRANSPLANT | Facility: CLINIC | Age: 64
End: 2019-07-09

## 2019-07-25 ENCOUNTER — CLINICAL SUPPORT (OUTPATIENT)
Dept: CARDIOLOGY | Facility: CLINIC | Age: 64
End: 2019-07-25
Attending: NURSE PRACTITIONER
Payer: MEDICARE

## 2019-07-25 ENCOUNTER — LAB VISIT (OUTPATIENT)
Dept: LAB | Facility: HOSPITAL | Age: 64
End: 2019-07-25
Attending: NURSE PRACTITIONER
Payer: MEDICARE

## 2019-07-25 DIAGNOSIS — Z76.82 ORGAN TRANSPLANT CANDIDATE: ICD-10-CM

## 2019-07-25 DIAGNOSIS — Z76.82 AWAITING ORGAN TRANSPLANT STATUS: Primary | ICD-10-CM

## 2019-07-25 DIAGNOSIS — Z76.82 AWAITING ORGAN TRANSPLANT STATUS: ICD-10-CM

## 2019-07-25 DIAGNOSIS — Z76.82 ORGAN TRANSPLANT CANDIDATE: Primary | ICD-10-CM

## 2019-07-25 LAB
AORTIC VALVE REGURGITATION: ABNORMAL
AORTIC VALVE STENOSIS: ABNORMAL
DIASTOLIC DYSFUNCTION: YES
ESTIMATED PA SYSTOLIC PRESSURE: 44.73
RETIRED EF AND QEF - SEE NOTES: 60 (ref 55–65)
TRICUSPID VALVE REGURGITATION: ABNORMAL

## 2019-07-25 PROCEDURE — 93306 TTE W/DOPPLER COMPLETE: CPT | Mod: PBBFAC,TXP | Performed by: INTERNAL MEDICINE

## 2019-07-25 PROCEDURE — 86977 RBC SERUM PRETX INCUBJ/INHIB: CPT | Mod: TXP

## 2019-07-25 PROCEDURE — 86832 HLA CLASS I HIGH DEFIN QUAL: CPT | Mod: TXP

## 2019-07-25 PROCEDURE — 86833 HLA CLASS II HIGH DEFIN QUAL: CPT | Mod: TXP

## 2019-07-25 PROCEDURE — 93306 2D ECHO WITH COLOR FLOW DOPPLER: ICD-10-PCS | Mod: 26,S$PBB,TXP, | Performed by: INTERNAL MEDICINE

## 2019-08-01 LAB — HPRA INTERPRETATION: NORMAL

## 2019-08-02 LAB
CLASS I ANTIBODIES - LUMINEX: NORMAL
CLASS I ANTIBODY COMMENTS - LUMINEX: NORMAL
CLASS II ANTIBODIES - LUMINEX: NORMAL
CLASS II ANTIBODY COMMENTS - LUMINEX: NORMAL
CPRA %: 84
SERUM COLLECTION DT - LUMINEX CLASS I: NORMAL
SERUM COLLECTION DT - LUMINEX CLASS II: NORMAL
SPCL1 TESTING DATE: NORMAL
SPCL2 TESTING DATE: NORMAL
SPLUA TESTING DATE: NORMAL

## 2019-09-16 DIAGNOSIS — Z76.82 ORGAN TRANSPLANT CANDIDATE: Primary | ICD-10-CM

## 2019-09-27 ENCOUNTER — TELEPHONE (OUTPATIENT)
Dept: TRANSPLANT | Facility: CLINIC | Age: 64
End: 2019-09-27

## 2019-09-27 DIAGNOSIS — Z76.82 ORGAN TRANSPLANT CANDIDATE: Primary | ICD-10-CM

## 2019-09-27 NOTE — LETTER
2019     Kendra Acs  [address] [facility] [address]    Dear Kendra Malik:  MRN: [id number]    Congratulations! On 2019, you were placed on  the waiting list for a  donor transplant.    Your candidacy for kidney transplant is based on the following criteria: ESRD due to Diabetes Mellitus.    Your transplant coordinator while on the waiting list is Catia Hager RN. They can be reached at [phone] or [phone] with any questions.      What to do now?    ? Ask your living donors to call and begin testing   Give your donors our phone number, [phone]   Make sure your donors have your name and date of birth when they call   You will get transplanted much faster if you have a living donor    ? Have your blood sent to our Transplant Lab every month   If you are on dialysis - our Transplant Lab will work with your dialysis unit to send your blood every month   If you are not on dialysis   - If you live near an [facility] lab, we will schedule you to have blood drawn every month  - If you do not live near an [facility] lab, you will be sent blood kits in the mail. You will need to take a kit to your local lab or doctor to have your blood drawn every month and mail to the Transplant Lab.     ? Call us with ANY CHANGES   Phone numbers - we must be able to reach you anytime of the day or night when a kidney is available   Address   Insurance coverage   Dialysis unit or kidney doctor   Elver: if you have surgery, stay in the hospital, have to get blood, or have an infection    ? Review your Kidney/Kidney-Pancreas Transplant Guide    This will give you detailed information about what happens when  - you are on the waiting list   - you are called when a kidney is available     The [facility] has a transplant surgeon and physician available 365  days a year, 24 hours a day to coordinate organ acceptance, procurement, surgical placement and to   address urgent patient care issues.  You will be notified in writing of any changes to our Transplant  Centers staffing plan that would impact your ability to receive a transplant.     Attached is a letter from the United Network for Organ Sharing (UNOS). It describes the services and  information offered to patients by UNOS and the Organ Procurement and Transplant Network. We look  forward to working with you while on the waiting list.      Congratulations,     Your Transplant Partner   [facility]    [address] [place] [id number]   [phone]  /[facility].    Faxed to:  Dr. Ann Marie Scruggs                    [facility]                                                                        OPTN/UNOS: Your Resource for Organ Transplant Information      If you have a question regarding your own medical care, you always should call your transplant center first. However, for general organ transplant-related information, you can call the [employer] for Organ Sharing (UNOS) toll-free patient services line at [phone].    Anyone, including potential transplant candidates, recipients, family members/friends, living donors, and/or donor family members can call this number to:    · talk about organ donation, living donation, how transplant and donation work, the donation process, transplant policies, and transplant/donor information;  · get a free patient information kit with helpful booklets, waiting list and transplant information, and a list of all transplant centers;  · ask questions about the Organ Procurement and Transplantation Network (OPTN) web site (www.optn.transplant.hrsa.gov); the UNOS Web site (www.unos.org); or the UNOS web site for living donors and transplant recipients (www.transplantliving.org);  · learn how UNOS and the OPTN can help you;  · talk about any concerns that you may have with a transplant center and how they perform    UNOS is a  not-for-profit organization that provides all of the administrative services for the national OPTN under federal contract to the Health Resources and Services Administration (HRSA), an agency under the U.S. Department of Health and Human Services (HHS).     UNOS and OPTN responsibilities include:    · writing educational material for patients, the public and professionals;  · helping to make people aware of the need for donated organs and tissue;  · writing organ transplant policy with help from doctors, nurses, transplant patients/candidates, donor families, living donors, and the public;  · coordinating the organ matching and placement process;  · collecting information about every organ transplant and donation that occurs in the [place].    Remember, you should contact your transplant center directly if you have questions or concerns about your own medical care including medical records, work-up progress and test reports. [facility] is not your transplant center, and staff at [facility] will not be able to transfer you to your transplant center, so keep your transplant centers phone number handy. But, while you research your transplant needs and learn as much as you can about transplantation and donation, we welcome your call to our toll-free patient services line at [phone].

## 2019-09-27 NOTE — TELEPHONE ENCOUNTER
"  KIDNEY WAIT LISTING NOTE    Date of Financial clearance to list: 19    [facility]:     Organ: Kidney  Name:       Kendra Malik   : 1955          Gender:     female    MRN#: [id number]                                 State of Permanent Residence:  [address] [place] [id number]  Ethnicity: /Black   Race:      Black or     CLINICAL INFORMATION   Candidate Medical Urgency Status: Active  Number of Previous Kidney Transplants: 0  Number of Previous Solid Organ Transplants: 0  Did you enter number of previous kidney or other solid organ transplants? yes  Is this Candidate a Prior Living Donor: no  (If yes, please generate letter to [facility] with patient's date of donation, recipient SSN, signed by Surgical Director after patient is listed in order to receive priority points).      ABO  ABO Blood Group:   O POS     ABO Confirmation: (THESE DATES MUST BE PRIOR TO THE LIST DATE AND SUPPORTED BY SEPARATE LAB REPORTS)    Internal Results    Lab Results   Component Value Date    GROUPTRH O POS 10/11/2018     No results found for: ABO    External Results    ABO Date 1: 18   ABO Date 2  Are either of these ABO results based on External Labs? yes  (If Yes, STOP and go to source document in Media Tab for verification).    VITALS  Height:  5' 5"  Weight:  146 lbs  (Use height from Transplant clinic visits only).  Did you enter height/weight? Yes    HLA    Class I:  Lab Results   Component Value Date    EUCU7YS 3 10/11/2018    RFTM4JZ 30 10/11/2018    JMEM9BW 71 10/11/2018    IIMG5QW XX 10/11/2018    GLIXY5XR 6 10/11/2018    PPBNZ4HK XX 10/11/2018    BDAYY8JM 10 10/11/2018    EWIVN5YU XX 10/11/2018       Class II:  Lab Results   Component Value Date    ZDMAAJ73UZ 7 10/11/2018    AFUYZY87ML 13 10/11/2018    QGJSRE647JF 52 10/11/2018    ZWWRSD8800 53 10/11/2018    PFTFN8EC 2 10/11/2018    HHCHV2AS 7 10/11/2018       Tested for HLA Antibodies: Yes, antibodies detected     If " "result is "Positive" antibodies are detected     If result is "Negative or questionable" no antibodies detected    Lab Results   Component Value Date    CIPRAS Positive 10/11/2018    CIIPRAS Negative 10/11/2018       DIALYSIS INFORMATION  Is patient Pre-Dialysis: No     GFR Information  Report GFR being used as the criteria for placement on the kidney list. If not, leave blank  GFR < or = 20 ml/min? n/a  If Yes, Specify value  ___   ml/min     Initial date GFR became 20 or less:   Is GFR obtained from an Outside lab Result? n/a  (If YES verify with source document scanned into media)    If patient on Dialysis:    Is candidate currently on dialysis for ESRD? Yes  If Yes,  Date Chronic Dialysis Started:   9/24/2018  (verify with source document in Media Tab)   Dialysis Unit Name: [place] [id number] [facility] [id number]                        Physician Name:  Dr. Kelley Ocasio  NPI#: [id number]    DIABETES INFORMATION  Primary Native Kidney Diagnosis: Diabetes Mellitus - Type Other / Unknown  C-Peptide Value - No results found for: CPEPTIDE  Current Diabetes Status: Type II    FOR NON-KIDNEY DEPARTMENT USE ONLY:  Additional Organs Registered? none    Maximum Acceptable Number of HLA Mismatches  ABDR:     6      (0-6)               AB:               (0-4)  ADR:   _____  (0-4)              BDR: _____ (0-4)  A:        _____  (0-2)              B:      _____ (0-2)          DR: ______ (0-2)    Will Recipient Accept?   Accept HBcAB Positive Organ:            Yes  Accept HBV MELINDA Positive Organ:        no  Accept HCV Antibody Positive Organ: no   Accept HCV MELINDA Positive Organ: no    Dual Kidney and En Bloc Opt In : No  Dual  Local:   No  Dual Import:   No  En Bloc Local:   No  En Bloc Import: No     Accept KDPI > 85: Single: No     Local: No     Import: No  Accept KDPI > 85: Dual: No     Local: No     Import: No      ### NURSE TO VERIFY CONSENT AND MAKE ANY NECESSARY CHANGES NEEDED IN UNET AT THE TIME " OF VERIFICATION ###    Unacceptible Antigens  If yes, list     Lab Results   Component Value Date    VX3XAFB  07/25/2019     Cw12,A34,Cw15,A66,A2,A69,Cw1,A68,A25,A24,A23,B63,A32,B57,B58    CIABCLM WEAK-- B51, B38, B53, B49 07/25/2019    CIIAB DP14 07/25/2019    ABCMT WEAK DRB4*01:01, DQ5, , DR10, DP1, 07/25/2019       ### DO NOT LIST IF ANTIGEN VALUE WEAK ###

## 2019-09-30 DIAGNOSIS — Z76.82 ORGAN TRANSPLANT CANDIDATE: Primary | ICD-10-CM

## 2019-09-30 DIAGNOSIS — Z76.82 PRE-KIDNEY TRANSPLANT, LISTED: Primary | ICD-10-CM

## 2019-09-30 NOTE — PROGRESS NOTES
YEARLY LIST MANAGEMENT NOTE    Kendra Malik's kidney transplant listing status reviewed.  Patient is due for follow-up appointments on 10/11/19.  Appointments will be scheduled per protocol.

## 2019-10-03 ENCOUNTER — TELEPHONE (OUTPATIENT)
Dept: TRANSPLANT | Facility: CLINIC | Age: 64
End: 2019-10-03

## 2019-10-18 ENCOUNTER — HISTORICAL (OUTPATIENT)
Dept: LAB | Facility: HOSPITAL | Age: 64
End: 2019-10-18

## 2019-11-19 ENCOUNTER — TELEPHONE (OUTPATIENT)
Dept: TRANSPLANT | Facility: CLINIC | Age: 64
End: 2019-11-19

## 2019-11-19 NOTE — TELEPHONE ENCOUNTER
----- Message from Mansi Fair sent at 11/18/2019  5:16 PM CST -----  Regarding: FW: Dual / Enbloc Kidney Transplant Eligibility      ----- Message -----  From: Teofilo Butt Jr., MD  Sent: 11/18/2019   1:55 PM CST  To: [facility] Pre-Kidney Transplant Clinical  Subject: Dual / Enbloc Kidney Transplant Eligibility      Acceptable for Dual / Enbloc kidney transplant?    No d/t highly sensitized

## 2019-12-05 ENCOUNTER — TELEPHONE (OUTPATIENT)
Dept: TRANSPLANT | Facility: CLINIC | Age: 64
End: 2019-12-05

## 2020-01-15 ENCOUNTER — HISTORICAL (OUTPATIENT)
Dept: ADMINISTRATIVE | Facility: HOSPITAL | Age: 65
End: 2020-01-15

## 2020-01-31 ENCOUNTER — HOSPITAL ENCOUNTER (OUTPATIENT)
Dept: RADIOLOGY | Facility: HOSPITAL | Age: 65
Discharge: HOME | End: 2020-01-31
Attending: NURSE PRACTITIONER
Payer: MEDICARE

## 2020-01-31 ENCOUNTER — OFFICE VISIT (OUTPATIENT)
Dept: TRANSPLANT | Facility: CLINIC | Age: 65
End: 2020-01-31
Payer: MEDICARE

## 2020-01-31 VITALS
DIASTOLIC BLOOD PRESSURE: 61 MMHG | SYSTOLIC BLOOD PRESSURE: 175 MMHG | RESPIRATION RATE: 18 BRPM | HEIGHT: 66 IN | WEIGHT: 168.19 LBS | BODY MASS INDEX: 27.03 KG/M2 | TEMPERATURE: 98 F | HEART RATE: 69 BPM | OXYGEN SATURATION: 99 %

## 2020-01-31 DIAGNOSIS — Z99.2 TYPE 2 DIABETES MELLITUS WITH CHRONIC KIDNEY DISEASE ON CHRONIC DIALYSIS, WITHOUT LONG-TERM CURRENT USE OF INSULIN: Primary | ICD-10-CM

## 2020-01-31 DIAGNOSIS — N18.6 ESRD ON DIALYSIS: ICD-10-CM

## 2020-01-31 DIAGNOSIS — N18.6 TYPE 2 DIABETES MELLITUS WITH CHRONIC KIDNEY DISEASE ON CHRONIC DIALYSIS, WITHOUT LONG-TERM CURRENT USE OF INSULIN: Primary | ICD-10-CM

## 2020-01-31 DIAGNOSIS — E11.22 TYPE 2 DIABETES MELLITUS WITH CHRONIC KIDNEY DISEASE ON CHRONIC DIALYSIS, WITHOUT LONG-TERM CURRENT USE OF INSULIN: Primary | ICD-10-CM

## 2020-01-31 DIAGNOSIS — Z76.82 KIDNEY TRANSPLANT CANDIDATE: ICD-10-CM

## 2020-01-31 DIAGNOSIS — Z76.82 ORGAN TRANSPLANT CANDIDATE: ICD-10-CM

## 2020-01-31 DIAGNOSIS — I12.9 RENAL HYPERTENSION: ICD-10-CM

## 2020-01-31 DIAGNOSIS — Z99.2 ESRD ON DIALYSIS: ICD-10-CM

## 2020-01-31 PROCEDURE — 99999 PR PBB SHADOW E&M-EST. PATIENT-LVL V: ICD-10-PCS | Mod: PBBFAC,TXP,, | Performed by: INTERNAL MEDICINE

## 2020-01-31 PROCEDURE — 71046 X-RAY EXAM CHEST 2 VIEWS: CPT | Mod: 26,TXP,, | Performed by: RADIOLOGY

## 2020-01-31 PROCEDURE — 99215 OFFICE O/P EST HI 40 MIN: CPT | Mod: PBBFAC,25,TXP | Performed by: INTERNAL MEDICINE

## 2020-01-31 PROCEDURE — 71046 X-RAY EXAM CHEST 2 VIEWS: CPT | Mod: TC,TXP

## 2020-01-31 PROCEDURE — 71046 XR CHEST PA AND LATERAL: ICD-10-PCS | Mod: 26,TXP,, | Performed by: RADIOLOGY

## 2020-01-31 PROCEDURE — 99999 PR PBB SHADOW E&M-EST. PATIENT-LVL V: CPT | Mod: PBBFAC,TXP,, | Performed by: INTERNAL MEDICINE

## 2020-01-31 PROCEDURE — 99215 OFFICE O/P EST HI 40 MIN: CPT | Mod: S$PBB,TXP,, | Performed by: INTERNAL MEDICINE

## 2020-01-31 PROCEDURE — 99215 PR OFFICE/OUTPT VISIT, EST, LEVL V, 40-54 MIN: ICD-10-PCS | Mod: S$PBB,TXP,, | Performed by: INTERNAL MEDICINE

## 2020-01-31 NOTE — PATIENT INSTRUCTIONS
Thank you for visiting me today. Please:      - Monitor your blood pressure and aim to keep < 140/90  - Stay active   - Colonoscopy in March 2020

## 2020-01-31 NOTE — PROGRESS NOTES
INITIAL PATIENT EDUCATION NOTE    Ms. Kendra Malik was seen in pre-kidney transplant clinic for evaluation for kidney, kidney/pancreas or pancreas only transplant.  The patient attended a group education session that discussed/reviewed the following aspects of transplantation: evaluation and selection committee process, [facility] waitlist management/multiple listings, types of organs offered (KDPI < 85%, KDPI > 85%, PHS increased risk, DCD, HCV+, HIV+ for HIV+ recipients and enbloc/dual), financial aspects, surgical procedures, dietary instruction pre- and post-transplant, health maintenance pre- and post-transplant, post-transplant hospitalization and outpatient follow-up, potential to participate in a research protocol, and medication management and side effects.  A question and answer session was provided after the presentation.    The patient was seen by all members of the multi-disciplinary team to include: Nephrologist/PA, , Transplant Coordinator, , Pharmacist and Dietician (if applicable).    The patient reviewed and signed all consents for evaluation which were witnessed and sent to scanning into the [facility] chart.    The patient was given an education book and plan for further evaluation based on her individual assessment.      The patient was encouraged to call with any questions or concerns.

## 2020-01-31 NOTE — PROGRESS NOTES
KIDNEY, KIDNEY/PANCREAS, PANCREAS TRANSPLANT RECIPIENT REEVALUATION    Requesting Physician: Dr. Scruggs    SUBJECTIVE     CC:   Six monthly/Annual reassessment of kidney transplant candidacy.    HPI:  Ms. Malik is a 64 y.o. year old Black or  female who has presented to be reevaluated as a potential kidney transplant recipient.  She has ESRD secondary to diabetic nephropathy, on HD since 2019.  Patient is currently on peritoneal dialysis started on mid Sept 2018. Patient is dialyzing on CAPD x 6 wks then will convert to cycler.   She has a peritoneal dialysis catheter for dialysis access.      - Peritonitis in 2019  - Right little toe fracture in 2019  -  today     Patient denies any hospitalization or recent history of coronary artery disease, stroke, seizure disorder, chronic obstructive pulmonary disease, liver disease, kidney stones, gallstones, deep venous thrombosis, pulmonary embolism.  Patient states that he is doing well. she denies any CP, SOB,  orthopnea,  nausea, vomiting, diarrhea, abdominal pain,  cough, fever, chills, weight loss or night sweats.  she has sensory loss, numbness or paresthesias.         Functional Status:   She remains active doing household chores.  She reports she can walk greater than 6 blocks.  She is afraid of stairs, and will avoid them at all costs.  She reports making a good bit of urine, and reports no lower urinary tract symptoms.  Previous Transplant: no  Previous Blood Transfusion: yes  Previous Pregnancy: 4   Anticoagulation/ antiplatelet therapy and reason: ASA 81 mg daily   Potential Donor: yes  High KDPI candidate: yes  Meets center eligibility for accepting HCV+ donor offer: yes          Past Medical History:  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, without long-term current use of insulin 10/11/2018    ESRD on dialysis 10/11/2018    Peritoneal dialysis initiated mid September 2018    Renal  hypertension 10/11/2018       Past Surgical History:  Past Surgical History:   Procedure Laterality Date    cataract surgery       SECTION      x3    COLONOSCOPY N/A 3/27/2019    Procedure: COLONOSCOPY;  Surgeon: Arianna Srinivasan MD;  Location: [facility];  Service: Endoscopy;  Laterality: N/A;    HYSTERECTOMY      OOPHORECTOMY      PERITONEAL CATHETER INSERTION      RETINAL DETACHMENT SURGERY           Family History:  Family History   Problem Relation Age of Onset    Diabetes Mother     Heart disease Mother         CHF    Hypertension Mother     Diabetes Father     Hypertension Father     Diabetes Brother     Diabetes Paternal Aunt     Cancer Paternal Uncle     Diabetes Paternal Grandmother     Diabetes Brother     Gout Brother     HIV Sister     Kidney disease Neg Hx        Social History:  Social History     Socioeconomic History    Marital status:      Spouse name: Not on file    Number of children: Not on file    Years of education: Not on file    Highest education level: Not on file   Occupational History    Not on file   Social Needs    Financial resource strain: Not on file    Food insecurity:     Worry: Not on file     Inability: Not on file    Transportation needs:     Medical: Not on file     Non-medical: Not on file   Tobacco Use    Smoking status: Never Smoker    Smokeless tobacco: Never Used   Substance and Sexual Activity    Alcohol use: Yes     Frequency: Monthly or less     Drinks per session: 1 or 2     Binge frequency: Never     Comment: rare; can go months w/o a drink    Drug use: No    Sexual activity: Not Currently   Lifestyle    Physical activity:     Days per week: Not on file     Minutes per session: Not on file    Stress: Not on file   Relationships    Social connections:     Talks on phone: Not on file     Gets together: Not on file     Attends Taoism service: Not on file     Active member of club or organization: Not on file      Attends meetings of clubs or organizations: Not on file     Relationship status: Not on file   Other Topics Concern    Not on file   Social History Narrative    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      ascorbic acid, vitamin C, (VITAMIN C) 500 MG tablet Take 500 mg by mouth once daily.      aspirin (ECOTRIN) 81 MG EC tablet Take 81 mg by mouth once daily.      atorvastatin (LIPITOR) 20 MG tablet Take 20 mg by mouth once daily.      calcitRIOL (ROCALTROL) 0.25 MCG Cap Take 0.25 mcg by mouth 4 (four) times a week. Take 1 Capsule by mouth four times a week      cinacalcet (SENSIPAR) 30 MG Tab 5 (five) times daily.  99    cloNIDine (CATAPRES) 0.1 MG tablet Take 0.1 mg by mouth 2 (two) times daily.      diclofenac sodium/capsaicin (DICLOFENAC-CAPSAICIN TOP)   0 Refill(s)      ergocalciferol (ERGOCALCIFEROL) 50,000 unit Cap Take 50,000 Units by mouth every 7 days.      fluticasone (FLONASE) 50 mcg/actuation nasal spray INHALE 2 SPRAYS INTO EACH NOSTRIL TWICE A DAY  6    folic acid/vit B complex and C (FOLBEE PLUS ORAL) Take 1 tablet by mouth once daily.      furosemide (LASIX) 20 MG tablet Take 40 mg by mouth once daily.       gabapentin (NEURONTIN) 300 MG capsule Take 400 mg by mouth 3 (three) times daily.       hydrALAZINE (APRESOLINE) 100 MG tablet Take 100 mg by mouth 3 (three) times daily.      labetalol (NORMODYNE) 300 MG tablet Take 300 mg by mouth 2 (two) times daily.      loratadine (CLARITIN) 10 mg tablet Take by mouth.      NOVOLOG MIX 70-30FLEXPEN U-100 100 unit/mL (70-30) InPn pen TAKE 12 UNITS BEFORE BREAKFAST, LUNCH AND SUPPER IF CBG > 125 ROTATE INJECTION SITES  4    ondansetron (ZOFRAN-ODT) 4 MG TbDL Take by mouth.      SITagliptin (JANUVIA) 25 MG Tab Take by mouth.      traMADol (ULTRAM) 50  mg tablet       TRUE METRIX GLUCOSE TEST STRIP Strp CHECK BLOOD SUGAR TWICE DAILY  6    prednisoLONE acetate (PRED FORTE) 1 % DrpS 1 drop 4 (four) times daily.       No current facility-administered medications for this visit.              Review of Systems   Constitutional: Negative.  Negative for activity change, fatigue, fever and unexpected weight change.   Eyes: Positive for visual disturbance   Respiratory: Negative for shortness of breath.    Cardiovascular:  Negative for chest pain and palpitations.   Gastrointestinal: Positive for constipation. Negative for abdominal pain.   Genitourinary: Negative for decreased urine volume, difficulty urinating and hematuria.   Musculoskeletal: Positive for back pain (Intermittent).   Skin: Negative for rash.   Neurological: Negative for weakness.   Hematological: Does not bruise/bleed easily.   Psychiatric/Behavioral: Negative for sleep disturbance.     OBJECTIVE       Body mass index is 27.49 kg/m².    Vitals:    01/31/20 1246   BP: (!) 175/61   Pulse: 69   Resp: 18   Temp: 97.8 °F (36.6 °C)       Physical Exam  Constitutional: She is oriented to person, place, and time. She is cooperative. No distress.   Mouth/Throat: Mucous membranes are normal. No oral lesions.   Eyes: Conjunctivae and lids are normal. No scleral icterus.   Neck: Trachea normal. Neck supple. No JVD present  Cardiovascular: Normal rate and regular rhythm. Exam reveals no friction rub.   Pulmonary/Chest: Effort normal. She has no rales.   Abdominal: Soft. She exhibits no mass. There is no hepatosplenomegaly.  Musculoskeletal: Normal range of motion.  Neurological: She is alert and oriented to person, place, and time. She displays no tremor. Gait normal.   Skin: Skin is warm and dry. No lesion and no rash noted. No cyanosis. Nails show no clubbing.   Psychiatric: She has a normal mood and affect. Her speech is normal. Cognition and memory are normal.            Labs:  Reviewed with the  patient      ASSESSMENT     1. DM2 w CKD on chronic dialysis, without long-term current use of insulin    2. ESRD on dialysis    3. Renal hypertension    4. Kidney transplant candidate        PLAN       Transplant Candidacy:    Ms. Malik is a a suitable for kidney transplantation.   Meets center eligibility for accepting HCV+ donor offer - yes.  Patient educated on HCV+ donors. Kendra is willing to accept HCV+ donor offer - yes   Patient is a candidate for KDPI > 85 kidney donor offer - yes.   She remains in overall stable health, and will remain active on the transplant list.    - Colonoscopy in 1 year for surveillance which will be  3/2020  - Will follow PA pressure closely, it is 45

## 2020-01-31 NOTE — LETTER
February 2, 2020        Ann Marie Scruggs  [address]  [place] [address]  Phone: [phone]  Fax: [phone]             [facility]  [address]  [place] [address]  Phone: [phone]   Patient: Kendra Malik   MR Number: [id number]   YOB: 1955   Date of Visit: 1/31/2020       Dear Dr. Ann Marie Scruggs    Thank you for referring Kendra Malik to me for evaluation. Attached you will find relevant portions of my assessment and plan of care.    If you have questions, please do not hesitate to call me. I look forward to following Kendra Malik along with you.    Sincerely,    Lianet Bañuelos MD    Enclosure    If you would like to receive this communication electronically, please contact [email or web address] or [phone] to request [employer] access.    [employer] is a tool which provides read-only access to select patient information with whom you have a relationship. Its easy to use and provides real time access to review your patients record including encounter summaries, notes, results, and demographic information.    If you feel you have received this communication in error or would no longer like to receive these types of communications, please e-mail [email or web address]

## 2020-02-03 DIAGNOSIS — Z76.82 PRE-KIDNEY TRANSPLANT, LISTED: Primary | ICD-10-CM

## 2020-02-03 NOTE — PROGRESS NOTES
YEARLY LIST MANAGEMENT NOTE    Kendra Malik's kidney transplant listing status reviewed.  Patient is due for follow-up appointments on 1/31/21.  Appointments will be scheduled per protocol.

## 2020-02-04 NOTE — PROGRESS NOTES
Transplant Recipient Adult Psychosocial Assessment  Update from 10/11/2018    Kendra Malik  [address] [facility] [address]  Telephone Information:   Mobile [phone]   Home  [phone] (home)  Work  There is no work phone number on file.  E-mail  No e-mail address on record    Sex: female  YOB: 1955  Age: 64 y.o.    Encounter Date: 2020  [place] Citizen: yes  Primary Language: English   Needed: no    Emergency Contact:  Name: Yue Malik  Relationship: daughter  Address: [place], [place] (pt couldn't recall address)  Phone Numbers:  [phone] (work)    Name: Kelley Corral (Jackie)  [phone]  Age: 52  Address: same as pt  Relationship: significant other  Does person drive? yes    Family/Social Support:   Number of dependents/: no dependents  Marital history:  and  once.  Pt reports being in relationship with Kelley Iles, who she presented with, for 30 years. Four adult children who she reports are supportive.  Other family dynamics: One  sister, two brothers with whom she is close and living in [place].    Household Composition:  Name: Kendra Malik  Age: 64  Relationship: patient  Does person drive? yes    Name: Kelley Corral (Jackie)  [phone]  Age: 53  Relationship: significant other  Does person drive? yes    Do you and your caregivers have access to reliable transportation? yes  PRIMARY CAREGIVER: Yue Malik and Kelley  Ellis will be primary caregiver, phone number [phone] (Yue) and # [phone] (Gladys).      provided in-depth information to patient and caregiver regarding pre- and post-transplant caregiver role.   strongly encourages patient and caregiver to have concrete plan regarding post-transplant care giving, including back-up caregiver(s) to ensure care giving needs are met as needed.    Patient and Caregiver states understanding all aspects of caregiver  role/commitment and is able/willing/committed to being caregiver to the fullest extent necessary.    Patient and Caregiver verbalizes understanding of the education provided today and caregiver responsibilities.         remains available. Patient and Caregiver agree to contact  in a timely manner if concerns arise.      Able to take time off work without financial concerns: yes. Yue and siblings will take turns as caregivers.    Additional Significant Others who will Assist with Transplant:  Name: Sathish Malik [phone]  Age: 45  City: [place] State: [place]  Relationship: son  Does person drive? yes     Name: Kaylee Malik  Age: 39  City: [place]: [place]  Relationship: son  Does person drive? yes    Name: Ubaldo Malik   Age: 30  City: [place] State: [place]  Relationship: son  Does person drive? yes    Living Will: no  Healthcare Power of : no  Advance Directives on file: <<no information> per medical record.  Verbally reviewed LW/HCPA information.   provided patient with copy of LW/HCPA documents and provided education on completion of forms.    Living Donors: Education and resource information given to patient. All four of pt's children have expressed interest in donation. Sathish is currently looking into donating.     Highest Education Level: High School (9-12) or GED  Reading Ability: 12th grade  Reports difficulty with: some visual disturbance from retinopathy.  Being treated.  Pt able to read.  Learns Best By:  listening     Status: no  VA Benefits: no     Working for Income: no  Pt retired on 10/13/2018.   Patient is retired from contract CNA.    Spouse/Significant Other Employment: dtr is a pharmacist tech, one son is an LPN and Gladys, S.O., works full time as an  that refurbishes airplanes. Gladys reports being able to take time off at time of transplant.     Disabled: no    Monthly Income:  SS Disability: $850  Able to  afford all costs now and if transplanted, including medications: yes Pt reports Gladys and adult children can assist as needed at time of transplant.   Patient and Caregiver verbalizes understanding of personal responsibilities related to transplant costs and the importance of having a financial plan to ensure that patients transplant costs are fully covered.       provided fundraising information/education. Patient and Caregiververbalizes understanding.   remains available.    Insurance:   Payor/Plan Subscr  Sex Relation Sub. Ins. ID Effective Group Num   1. [place]ARE - [place]* HOLLAND PETERSEN 1955 Female Self [id number] 18                                    [address]   2. [place] PETERSEN 1955 Female  [id number]* [id number]                                    [address] [address]       Primary Insurance (for UNOS reporting): Public Insurance - Medicare FFS (Fee For Service)  Secondary Insurance (for UNOS reporting): Public Insurance - Medicaid  Patient and Caregiver verbalizes clear understanding that patient may experience difficulty obtaining and/or be denied insurance coverage post-surgery. This includes and is not limited to disability insurance, life insurance, health insurance, burial insurance, long term care insurance, and other insurances.      Patient and Caregiver also reports understanding that future health concerns related to or unrelated to transplantation may not be covered by patient's insurance.  Resources and information provided and reviewed.     Patient and Caregiver provides verbal permission to release any necessary information to outside resources for patient care and discharge planning.  Resources and information provided are reviewed.      Dialysis Adherence:   BARBARA Shaver nurse at pt's dialysis unit reports over the last three months that patient has had 0 no shows, always completes all of their exchanges and always remembers to bring  their log. PD nurse reports pt is compliant with labs and taking medications. PD nurse denied any concerns with transportation, caregiver support or any other psychosocial concerns.    Infusion Service: patient utilizing? no  Home Health: patient utilizing? no  DME: PD equipment, shower chair (for fractured foot that's currently healing)  Pulmonary/Cardiac Rehab: none   ADLS:  Pt states ability to independently accomplish all ADLS including driving.     Adherence:   Pt states current and expected compliance with all healthcare recommendations..  Adherence education and counseling provided.     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, without long-term current use of insulin 10/11/2018    ESRD on dialysis 10/11/2018    Peritoneal dialysis initiated mid September 2018    Renal hypertension 10/11/2018     Social History     Tobacco Use    Smoking status: Never Smoker    Smokeless tobacco: Never Used   Substance Use Topics    Alcohol use: Yes     Frequency: Monthly or less     Drinks per session: 1 or 2     Binge frequency: Never     Comment: rare; can go months w/o a drink     Social History     Substance and Sexual Activity   Drug Use No     Social History     Substance and Sexual Activity   Sexual Activity Not Currently       Per Today's Psychosocial:  Tobacco: none, patient denies any use.  Alcohol: pt reports drinking wine 2x a month.  Illicit Drugs/Non-prescribed Medications: none, patient denies any use.    Patient and Caregiver states clear understanding of the potential impact of substance use as it relates to transplant candidacy and is aware of possible random substance screening.  Substance abstinence/cessation counseling, education and resources provided and reviewed.     Arrests/DWI/Treatment/Rehab: patient denies    Psychiatric History:    Mental Health: Pt denies mental health concerns. Pt reports coping well by spending time at  home with friends and family.   Psychiatrist/Counselor:  Pt denied seeing a mental health professional.   Medications:  Pt denied any medications for their mental health.   Suicide/Homicide Issues: Pt denies current or past suicidal/homicidal ideation.   Safety at home: Pt denies any home safety concerns.    Knowledge: Patient and Caregiver states having clear understanding and realistic expectations regarding the potential risks and potential benefits of organ transplantation and organ donation and agrees to discuss with health care team members and support system members, as well as to utilize available resources and express questions and/or concerns in order to further facilitate the pt informed decision-making.  Resources and information provided and reviewed.    Patient and Caregiver is aware of [facility]'s affiliation and/or partnership with agencies in home health care, LTAC, SNF, [facility], and other hospitals and clinics.    Understanding: Patient and Caregiver reports having a clear understanding of the many lifetime commitments involved with being a transplant recipient, including costs, compliance, medications, lab work, procedures, appointments, concrete and financial planning, preparedness, timely and appropriate communication of concerns, abstinence (ETOH, tobacco, illicit non-prescribed drugs), adherence to all health care team recommendations, support system and caregiver involvement, appropriate and timely resource utilization and follow-through, mental health counseling as needed/recommended, and patient and caregiver responsibilities.  Social Service Handbook, resources and detailed educational information provided and reviewed.  Educational information provided.    Patient and Caregiver also reports current and expected compliance with health care regime and states having a clear understanding of the importance of compliance.      Patient and Caregiver reports a clear understanding that risks and  benefits may be involved with organ transplantation and with organ donation.       Patient and Caregiver also reports clear understanding that psychosocial risk factors may affect patient, and include but are not limited to feelings of depression, generalized anxiety, anxiety regarding dependence on others, post traumatic stress disorder, feelings of guilt and other emotional and/or mental concerns, and/or exacerbation of existing mental health concerns.  Detailed resources provided and discussed.      Patient and Caregiver agrees to access appropriate resources in a timely manner as needed and/or as recommended, and to communicate concerns appropriately.  Patient and Caregiver also reports a clear understanding of treatment options available.     Patient and Caregiver received education in a group setting.   reviewed education, provided additional information, and answered questions.    Feelings or Concerns: Pt and significant other, Gladys, denied any concerns and reports feeling highly motivated for transplant.     Coping: Pt reports coping well with transplant at this time with activities like spending time with friends and family at home.    Goals: Pt reports wanting to get off dialysis and travel for goals post transplant. Patient referred to Vocational Rehabilitation.    Interview Behavior: Patient and Caregivers presents as alert and oriented x 4, pleasant, good eye contact, well groomed, recall good, concentration/judgement good, average intelligence, calm, communicative, cooperative and asking and answering questions appropriately.  Pt presents with significant other, Gladys, per pt's request.          Transplant Social Work - Candidacy  Assessment/Plan:     Psychosocial Suitability: Patient presents as a suitable candidate for kidney transplant at this time. Based on psychosocial risk factors, patient presents as low risk, due to adequate caregiver plan, adequate financial plan, no mental  health concerns or psychosocial concerns.     Recommendations/Additional Comments: SW recommends pt conduct fundraising to assist in the transplant process. SW recommends that pt remain aware of potential mental health concerns and contact the team if any concerns arise. SW recommends that pt remain abstinent from tobacco, ETOH and drug use. SW support pt's continued peritoneal dialysis adherence. SW remains available to answer any questions or concerns that arise as the pt moves through the transplant process.     Yessenia Molina LMSW

## 2020-02-18 ENCOUNTER — HISTORICAL (OUTPATIENT)
Dept: LAB | Facility: HOSPITAL | Age: 65
End: 2020-02-18

## 2020-05-27 ENCOUNTER — HISTORICAL (OUTPATIENT)
Dept: INTERNAL MEDICINE | Facility: CLINIC | Age: 65
End: 2020-05-27

## 2020-10-04 ENCOUNTER — TELEPHONE (OUTPATIENT)
Dept: TRANSPLANT | Facility: CLINIC | Age: 65
End: 2020-10-04

## 2020-10-04 DIAGNOSIS — Z76.82 AWAITING ORGAN TRANSPLANT STATUS: Primary | ICD-10-CM

## 2020-10-04 NOTE — TELEPHONE ENCOUNTER
ON-CALL NOTE    OS# [id number]    Notified by Torres Díaz, , that Kendra Malik is eligible for kidney offer.  Spoke with patient and identified no acute medical issues with telephone assessment. Protocol script read to patient regarding N/A, standard donor offer. Patient verbalized understanding, all questions answered, patient accepts organ offer. Notified by Torres Díaz that virtual crossmatch is negative.  Current sample of blood is available from date 9/22/2020 for crossmatch.  Patient reports no sensitizing event since last blood sample for PRA received. Notified Melina in HLA Lab to perform a prospective  crossmatch per guideline.    Patient notified of plan to wait for further instructions and states understanding.  Dialysis unit notified.

## 2020-10-05 PROCEDURE — 99001 SPECIMEN HANDLING PT-LAB: CPT | Mod: PO,TXP

## 2020-10-13 ENCOUNTER — LAB VISIT (OUTPATIENT)
Dept: LAB | Facility: HOSPITAL | Age: 65
End: 2020-10-13
Payer: MEDICARE

## 2020-10-13 DIAGNOSIS — Z76.82 PRE-KIDNEY TRANSPLANT, LISTED: ICD-10-CM

## 2020-10-16 ENCOUNTER — HISTORICAL (OUTPATIENT)
Dept: RADIOLOGY | Facility: HOSPITAL | Age: 65
End: 2020-10-16

## 2020-10-21 LAB
HLA FREEZE AND HOLD INTERPRETATION: NORMAL
HLAFH COLLECTION DATE: NORMAL
HPRA INTERPRETATION: NORMAL

## 2020-11-10 DIAGNOSIS — Z76.82 ORGAN TRANSPLANT CANDIDATE: Primary | ICD-10-CM

## 2020-11-15 ENCOUNTER — HOSPITAL ENCOUNTER (INPATIENT)
Facility: HOSPITAL | Age: 65
LOS: 4 days | Discharge: HOME | DRG: 652 | End: 2020-11-19
Attending: TRANSPLANT SURGERY | Admitting: TRANSPLANT SURGERY
Payer: MEDICARE

## 2020-11-15 ENCOUNTER — ANESTHESIA (OUTPATIENT)
Dept: SURGERY | Facility: HOSPITAL | Age: 65
DRG: 652 | End: 2020-11-15
Payer: MEDICARE

## 2020-11-15 ENCOUNTER — TELEPHONE (OUTPATIENT)
Dept: TRANSPLANT | Facility: CLINIC | Age: 65
End: 2020-11-15

## 2020-11-15 ENCOUNTER — ANESTHESIA EVENT (OUTPATIENT)
Dept: SURGERY | Facility: HOSPITAL | Age: 65
DRG: 652 | End: 2020-11-15
Payer: MEDICARE

## 2020-11-15 DIAGNOSIS — Z91.89 AT RISK FOR OPPORTUNISTIC INFECTIONS: ICD-10-CM

## 2020-11-15 DIAGNOSIS — N18.9 ANEMIA OF RENAL DISEASE: ICD-10-CM

## 2020-11-15 DIAGNOSIS — N18.6 ESRD ON DIALYSIS: ICD-10-CM

## 2020-11-15 DIAGNOSIS — Z76.82 AWAITING ORGAN TRANSPLANT STATUS: Primary | ICD-10-CM

## 2020-11-15 DIAGNOSIS — Z01.818 PRE-OP EVALUATION: ICD-10-CM

## 2020-11-15 DIAGNOSIS — Z99.2 ESRD ON DIALYSIS: ICD-10-CM

## 2020-11-15 DIAGNOSIS — E11.3513 TYPE 2 DIABETES MELLITUS WITH PROLIFERATIVE RETINOPATHY OF BOTH EYES AND MACULAR EDEMA, UNSPECIFIED WHETHER LONG TERM INSULIN USE: ICD-10-CM

## 2020-11-15 DIAGNOSIS — T38.0X5S ADVERSE EFFECT OF ADRENAL CORTICAL STEROIDS, SEQUELA: ICD-10-CM

## 2020-11-15 DIAGNOSIS — Z79.899 LONG TERM CURRENT USE OF IMMUNOSUPPRESSIVE DRUG: ICD-10-CM

## 2020-11-15 DIAGNOSIS — Z76.82 KIDNEY TRANSPLANT CANDIDATE: ICD-10-CM

## 2020-11-15 DIAGNOSIS — Z94.0 STATUS POST DECEASED-DONOR KIDNEY TRANSPLANTATION: Primary | ICD-10-CM

## 2020-11-15 DIAGNOSIS — D63.1 ANEMIA OF RENAL DISEASE: ICD-10-CM

## 2020-11-15 LAB
25(OH)D3+25(OH)D2 SERPL-MCNC: 20 NG/ML (ref 30–96)
ABO + RH BLD: NORMAL
ALBUMIN SERPL BCP-MCNC: 3.2 G/DL (ref 3.5–5.2)
ALP SERPL-CCNC: 101 U/L (ref 55–135)
ALT SERPL W/O P-5'-P-CCNC: 12 U/L (ref 10–44)
ANION GAP SERPL CALC-SCNC: 13 MMOL/L (ref 8–16)
APPEARANCE FLD: CLEAR
APTT BLDCRRT: 27.7 SEC (ref 21–32)
AST SERPL-CCNC: 18 U/L (ref 10–40)
BASOPHILS # BLD AUTO: 0.03 K/UL (ref 0–0.2)
BASOPHILS NFR BLD: 0.3 % (ref 0–1.9)
BILIRUB SERPL-MCNC: 0.3 MG/DL (ref 0.1–1)
BLD GP AB SCN CELLS X3 SERPL QL: NORMAL
BODY FLD TYPE: NORMAL
BUN SERPL-MCNC: 46 MG/DL (ref 8–23)
CALCIUM SERPL-MCNC: 8.9 MG/DL (ref 8.7–10.5)
CHLORIDE SERPL-SCNC: 106 MMOL/L (ref 95–110)
CHOLEST SERPL-MCNC: 171 MG/DL (ref 120–199)
CHOLEST/HDLC SERPL: 3.7 {RATIO} (ref 2–5)
CO2 SERPL-SCNC: 25 MMOL/L (ref 23–29)
COLOR FLD: COLORLESS
CREAT SERPL-MCNC: 5.9 MG/DL (ref 0.5–1.4)
CREAT UR-MCNC: 152 MG/DL (ref 15–325)
DIFFERENTIAL METHOD: ABNORMAL
EOSINOPHIL # BLD AUTO: 0.3 K/UL (ref 0–0.5)
EOSINOPHIL NFR BLD: 3.8 % (ref 0–8)
EOSINOPHIL NFR FLD MANUAL: 5 %
ERYTHROCYTE [DISTWIDTH] IN BLOOD BY AUTOMATED COUNT: 13.1 % (ref 11.5–14.5)
EST. GFR  (AFRICAN AMERICAN): 8 ML/MIN/1.73 M^2
EST. GFR  (NON AFRICAN AMERICAN): 7 ML/MIN/1.73 M^2
ESTIMATED AVG GLUCOSE: 212 MG/DL (ref 68–131)
GLUCOSE SERPL-MCNC: 139 MG/DL (ref 70–110)
HBA1C MFR BLD HPLC: 9 % (ref 4–5.6)
HCT VFR BLD AUTO: 34.1 % (ref 37–48.5)
HDLC SERPL-MCNC: 46 MG/DL (ref 40–75)
HDLC SERPL: 26.9 % (ref 20–50)
HGB BLD-MCNC: 10.8 G/DL (ref 12–16)
IMM GRANULOCYTES # BLD AUTO: 0.03 K/UL (ref 0–0.04)
IMM GRANULOCYTES NFR BLD AUTO: 0.3 % (ref 0–0.5)
INR PPP: 0.9 (ref 0.8–1.2)
LDH SERPL L TO P-CCNC: 187 U/L (ref 110–260)
LDLC SERPL CALC-MCNC: 84.6 MG/DL (ref 63–159)
LYMPHOCYTES # BLD AUTO: 2.7 K/UL (ref 1–4.8)
LYMPHOCYTES NFR BLD: 30.4 % (ref 18–48)
LYMPHOCYTES NFR FLD MANUAL: 37 %
MCH RBC QN AUTO: 31.9 PG (ref 27–31)
MCHC RBC AUTO-ENTMCNC: 31.7 G/DL (ref 32–36)
MCV RBC AUTO: 101 FL (ref 82–98)
MONOCYTES # BLD AUTO: 0.6 K/UL (ref 0.3–1)
MONOCYTES NFR BLD: 6.5 % (ref 4–15)
MONOS+MACROS NFR FLD MANUAL: 55 %
NEUTROPHILS # BLD AUTO: 5.1 K/UL (ref 1.8–7.7)
NEUTROPHILS NFR BLD: 58.7 % (ref 38–73)
NEUTROPHILS NFR FLD MANUAL: 3 %
NONHDLC SERPL-MCNC: 125 MG/DL
NRBC BLD-RTO: 0 /100 WBC
PHOSPHATE SERPL-MCNC: 4.3 MG/DL (ref 2.7–4.5)
PLATELET # BLD AUTO: 152 K/UL (ref 150–350)
PMV BLD AUTO: 11.5 FL (ref 9.2–12.9)
POTASSIUM SERPL-SCNC: 3.5 MMOL/L (ref 3.5–5.1)
PROT SERPL-MCNC: 7.5 G/DL (ref 6–8.4)
PROT UR-MCNC: 249 MG/DL (ref 0–15)
PROT/CREAT UR: 1.64 MG/G{CREAT} (ref 0–0.2)
PROTHROMBIN TIME: 10.1 SEC (ref 9–12.5)
PTH-INTACT SERPL-MCNC: 485 PG/ML (ref 9–77)
RBC # BLD AUTO: 3.39 M/UL (ref 4–5.4)
SARS-COV-2 RDRP RESP QL NAA+PROBE: NEGATIVE
SODIUM SERPL-SCNC: 144 MMOL/L (ref 136–145)
TRIGL SERPL-MCNC: 202 MG/DL (ref 30–150)
URATE SERPL-MCNC: 9.6 MG/DL (ref 2.4–5.7)
WBC # BLD AUTO: 8.72 K/UL (ref 3.9–12.7)
WBC # FLD: 28 /CU MM

## 2020-11-15 PROCEDURE — 86920 COMPATIBILITY TEST SPIN: CPT

## 2020-11-15 PROCEDURE — 27201423 OPTIME MED/SURG SUP & DEVICES STERILE SUPPLY: Performed by: TRANSPLANT SURGERY

## 2020-11-15 PROCEDURE — 89051 BODY FLUID CELL COUNT: CPT

## 2020-11-15 PROCEDURE — 36620 PR INSERT CATH,ART,PERCUT,SHORTTERM: ICD-10-PCS | Mod: 59,,, | Performed by: ANESTHESIOLOGY

## 2020-11-15 PROCEDURE — 83615 LACTATE (LD) (LDH) ENZYME: CPT

## 2020-11-15 PROCEDURE — 82962 GLUCOSE BLOOD TEST: CPT | Performed by: TRANSPLANT SURGERY

## 2020-11-15 PROCEDURE — 36000931 HC OR TIME LEV VII EA ADD 15 MIN: Performed by: TRANSPLANT SURGERY

## 2020-11-15 PROCEDURE — C1729 CATH, DRAINAGE: HCPCS | Performed by: TRANSPLANT SURGERY

## 2020-11-15 PROCEDURE — 63600175 PHARM REV CODE 636 W HCPCS: Mod: NTX | Performed by: NURSE PRACTITIONER

## 2020-11-15 PROCEDURE — 82306 VITAMIN D 25 HYDROXY: CPT

## 2020-11-15 PROCEDURE — 93010 EKG 12-LEAD: ICD-10-PCS | Mod: NTX,,, | Performed by: INTERNAL MEDICINE

## 2020-11-15 PROCEDURE — 85730 THROMBOPLASTIN TIME PARTIAL: CPT

## 2020-11-15 PROCEDURE — 87340 HEPATITIS B SURFACE AG IA: CPT

## 2020-11-15 PROCEDURE — 85025 COMPLETE CBC W/AUTO DIFF WBC: CPT

## 2020-11-15 PROCEDURE — 99223 PR INITIAL HOSPITAL CARE,LEVL III: ICD-10-PCS | Mod: AI,NTX,, | Performed by: NURSE PRACTITIONER

## 2020-11-15 PROCEDURE — 50360 RNL ALTRNSPLJ W/O RCP NFRCT: CPT | Mod: LT,GC,, | Performed by: TRANSPLANT SURGERY

## 2020-11-15 PROCEDURE — 63600175 PHARM REV CODE 636 W HCPCS: Performed by: NURSE ANESTHETIST, CERTIFIED REGISTERED

## 2020-11-15 PROCEDURE — 99223 1ST HOSP IP/OBS HIGH 75: CPT | Mod: AI,NTX,, | Performed by: NURSE PRACTITIONER

## 2020-11-15 PROCEDURE — 27200677 HC TRANSDUCER MONITOR KIT SINGLE: Mod: NTX | Performed by: ANESTHESIOLOGY

## 2020-11-15 PROCEDURE — 37000009 HC ANESTHESIA EA ADD 15 MINS: Performed by: TRANSPLANT SURGERY

## 2020-11-15 PROCEDURE — C1751 CATH, INF, PER/CENT/MIDLINE: HCPCS | Mod: NTX | Performed by: ANESTHESIOLOGY

## 2020-11-15 PROCEDURE — C2617 STENT, NON-COR, TEM W/O DEL: HCPCS | Performed by: TRANSPLANT SURGERY

## 2020-11-15 PROCEDURE — 50360 PR TRANSPLANTATION OF KIDNEY: ICD-10-PCS | Mod: LT,GC,, | Performed by: TRANSPLANT SURGERY

## 2020-11-15 PROCEDURE — 87075 CULTR BACTERIA EXCEPT BLOOD: CPT

## 2020-11-15 PROCEDURE — 85610 PROTHROMBIN TIME: CPT

## 2020-11-15 PROCEDURE — D9220A PRA ANESTHESIA: Mod: ANES,,, | Performed by: ANESTHESIOLOGY

## 2020-11-15 PROCEDURE — U0002 COVID-19 LAB TEST NON-CDC: HCPCS

## 2020-11-15 PROCEDURE — 86705 HEP B CORE ANTIBODY IGM: CPT

## 2020-11-15 PROCEDURE — 50605 PR URETEROTOMY TO INSERT STENT: ICD-10-PCS | Mod: 51,LT,GC, | Performed by: TRANSPLANT SURGERY

## 2020-11-15 PROCEDURE — 86706 HEP B SURFACE ANTIBODY: CPT

## 2020-11-15 PROCEDURE — 84156 ASSAY OF PROTEIN URINE: CPT

## 2020-11-15 PROCEDURE — D9220A PRA ANESTHESIA: ICD-10-PCS | Mod: ANES,,, | Performed by: ANESTHESIOLOGY

## 2020-11-15 PROCEDURE — 27100025 HC TUBING, SET FLUID WARMER: Mod: NTX | Performed by: ANESTHESIOLOGY

## 2020-11-15 PROCEDURE — 36415 COLL VENOUS BLD VENIPUNCTURE: CPT

## 2020-11-15 PROCEDURE — 25000003 PHARM REV CODE 250: Performed by: NURSE ANESTHETIST, CERTIFIED REGISTERED

## 2020-11-15 PROCEDURE — 86850 RBC ANTIBODY SCREEN: CPT

## 2020-11-15 PROCEDURE — 80061 LIPID PANEL: CPT

## 2020-11-15 PROCEDURE — 87070 CULTURE OTHR SPECIMN AEROBIC: CPT

## 2020-11-15 PROCEDURE — 87522 HEPATITIS C REVRS TRNSCRPJ: CPT

## 2020-11-15 PROCEDURE — 50323 PR TRANSPLANT,PREP CADAVER RENAL GRAFT: ICD-10-PCS | Mod: 51,LT,GC, | Performed by: TRANSPLANT SURGERY

## 2020-11-15 PROCEDURE — 50605 INSERT URETERAL SUPPORT: CPT | Mod: 51,LT,GC, | Performed by: TRANSPLANT SURGERY

## 2020-11-15 PROCEDURE — 71000015 HC POSTOP RECOV 1ST HR: Performed by: TRANSPLANT SURGERY

## 2020-11-15 PROCEDURE — 83970 ASSAY OF PARATHORMONE: CPT

## 2020-11-15 PROCEDURE — 84100 ASSAY OF PHOSPHORUS: CPT

## 2020-11-15 PROCEDURE — 93010 ELECTROCARDIOGRAM REPORT: CPT | Mod: NTX,,, | Performed by: INTERNAL MEDICINE

## 2020-11-15 PROCEDURE — 80053 COMPREHEN METABOLIC PANEL: CPT

## 2020-11-15 PROCEDURE — 20600001 HC STEP DOWN PRIVATE ROOM: Mod: NTX

## 2020-11-15 PROCEDURE — 25000003 PHARM REV CODE 250: Mod: NTX | Performed by: NURSE PRACTITIONER

## 2020-11-15 PROCEDURE — 86703 HIV-1/HIV-2 1 RESULT ANTBDY: CPT

## 2020-11-15 PROCEDURE — 36620 INSERTION CATHETER ARTERY: CPT | Mod: 59,,, | Performed by: ANESTHESIOLOGY

## 2020-11-15 PROCEDURE — D9220A PRA ANESTHESIA: Mod: CRNA,,, | Performed by: NURSE ANESTHETIST, CERTIFIED REGISTERED

## 2020-11-15 PROCEDURE — 37000008 HC ANESTHESIA 1ST 15 MINUTES: Performed by: TRANSPLANT SURGERY

## 2020-11-15 PROCEDURE — D9220A PRA ANESTHESIA: ICD-10-PCS | Mod: CRNA,,, | Performed by: NURSE ANESTHETIST, CERTIFIED REGISTERED

## 2020-11-15 PROCEDURE — 93005 ELECTROCARDIOGRAM TRACING: CPT | Mod: NTX

## 2020-11-15 PROCEDURE — 71000033 HC RECOVERY, INTIAL HOUR: Performed by: TRANSPLANT SURGERY

## 2020-11-15 PROCEDURE — 36000930 HC OR TIME LEV VII 1ST 15 MIN: Performed by: TRANSPLANT SURGERY

## 2020-11-15 PROCEDURE — 83036 HEMOGLOBIN GLYCOSYLATED A1C: CPT

## 2020-11-15 PROCEDURE — 84550 ASSAY OF BLOOD/URIC ACID: CPT

## 2020-11-15 PROCEDURE — 87205 SMEAR GRAM STAIN: CPT

## 2020-11-15 RX ORDER — PREGABALIN 75 MG/1
75 CAPSULE ORAL
Status: DISCONTINUED | OUTPATIENT
Start: 2020-11-15 | End: 2020-11-19 | Stop reason: HOSPADM

## 2020-11-15 RX ORDER — HEPARIN SODIUM 5000 [USP'U]/ML
5000 INJECTION, SOLUTION INTRAVENOUS; SUBCUTANEOUS ONCE
Status: COMPLETED | OUTPATIENT
Start: 2020-11-15 | End: 2020-11-15

## 2020-11-15 RX ORDER — CEFAZOLIN SODIUM 1 G/3ML
2 INJECTION, POWDER, FOR SOLUTION INTRAMUSCULAR; INTRAVENOUS
Status: COMPLETED | OUTPATIENT
Start: 2020-11-15 | End: 2020-11-15

## 2020-11-15 RX ORDER — DIPHENHYDRAMINE HYDROCHLORIDE 50 MG/ML
INJECTION INTRAMUSCULAR; INTRAVENOUS
Status: DISCONTINUED | OUTPATIENT
Start: 2020-11-15 | End: 2020-11-16

## 2020-11-15 RX ORDER — MUPIROCIN 20 MG/G
OINTMENT TOPICAL
Status: DISCONTINUED | OUTPATIENT
Start: 2020-11-15 | End: 2020-11-19 | Stop reason: HOSPADM

## 2020-11-15 RX ORDER — DIPHENHYDRAMINE HYDROCHLORIDE 50 MG/ML
50 INJECTION INTRAMUSCULAR; INTRAVENOUS ONCE
Status: DISCONTINUED | OUTPATIENT
Start: 2020-11-15 | End: 2020-11-18

## 2020-11-15 RX ORDER — ACETAMINOPHEN 650 MG/20.3ML
650 LIQUID ORAL ONCE
Status: COMPLETED | OUTPATIENT
Start: 2020-11-15 | End: 2020-11-15

## 2020-11-15 RX ORDER — PROPOFOL 10 MG/ML
VIAL (ML) INTRAVENOUS
Status: DISCONTINUED | OUTPATIENT
Start: 2020-11-15 | End: 2020-11-16

## 2020-11-15 RX ORDER — FENTANYL CITRATE 50 UG/ML
INJECTION, SOLUTION INTRAMUSCULAR; INTRAVENOUS
Status: DISCONTINUED | OUTPATIENT
Start: 2020-11-15 | End: 2020-11-16

## 2020-11-15 RX ORDER — METHYLPREDNISOLONE SODIUM SUCCINATE 1 G/16ML
INJECTION INTRAMUSCULAR; INTRAVENOUS
Status: DISCONTINUED | OUTPATIENT
Start: 2020-11-15 | End: 2020-11-16

## 2020-11-15 RX ORDER — CISATRACURIUM BESYLATE 2 MG/ML
INJECTION, SOLUTION INTRAVENOUS
Status: DISCONTINUED | OUTPATIENT
Start: 2020-11-15 | End: 2020-11-16

## 2020-11-15 RX ORDER — CLONIDINE HYDROCHLORIDE 0.1 MG/1
0.2 TABLET ORAL ONCE
Status: COMPLETED | OUTPATIENT
Start: 2020-11-15 | End: 2020-11-15

## 2020-11-15 RX ORDER — MIDAZOLAM HYDROCHLORIDE 1 MG/ML
INJECTION, SOLUTION INTRAMUSCULAR; INTRAVENOUS
Status: DISCONTINUED | OUTPATIENT
Start: 2020-11-15 | End: 2020-11-16

## 2020-11-15 RX ORDER — LIDOCAINE HYDROCHLORIDE 20 MG/ML
INJECTION, SOLUTION EPIDURAL; INFILTRATION; INTRACAUDAL; PERINEURAL
Status: DISCONTINUED | OUTPATIENT
Start: 2020-11-15 | End: 2020-11-16

## 2020-11-15 RX ORDER — EPHEDRINE SULFATE 50 MG/ML
INJECTION, SOLUTION INTRAVENOUS
Status: DISCONTINUED | OUTPATIENT
Start: 2020-11-15 | End: 2020-11-16

## 2020-11-15 RX ADMIN — EPHEDRINE SULFATE 5 MG: 50 INJECTION INTRAVENOUS at 11:11

## 2020-11-15 RX ADMIN — CISATRACURIUM BESYLATE 12 MG: 2 INJECTION INTRAVENOUS at 10:11

## 2020-11-15 RX ADMIN — HEPARIN SODIUM 5000 UNITS: 5000 INJECTION INTRAVENOUS; SUBCUTANEOUS at 09:11

## 2020-11-15 RX ADMIN — LIDOCAINE HYDROCHLORIDE 60 MG: 20 INJECTION, SOLUTION EPIDURAL; INFILTRATION; INTRACAUDAL at 10:11

## 2020-11-15 RX ADMIN — SODIUM CHLORIDE, SODIUM GLUCONATE, SODIUM ACETATE, POTASSIUM CHLORIDE, MAGNESIUM CHLORIDE, SODIUM PHOSPHATE, DIBASIC, AND POTASSIUM PHOSPHATE: .53; .5; .37; .037; .03; .012; .00082 INJECTION, SOLUTION INTRAVENOUS at 11:11

## 2020-11-15 RX ADMIN — MIDAZOLAM 1.5 MG: 1 INJECTION INTRAMUSCULAR; INTRAVENOUS at 10:11

## 2020-11-15 RX ADMIN — HEPARIN SODIUM 125 MG: 1000 INJECTION, SOLUTION INTRAVENOUS; SUBCUTANEOUS at 11:11

## 2020-11-15 RX ADMIN — ACETAMINOPHEN ORAL SOLUTION 650 MG: 650 SOLUTION ORAL at 10:11

## 2020-11-15 RX ADMIN — FENTANYL CITRATE 25 MCG: 50 INJECTION INTRAMUSCULAR; INTRAVENOUS at 11:11

## 2020-11-15 RX ADMIN — DIPHENHYDRAMINE HYDROCHLORIDE 50 MG: 50 INJECTION, SOLUTION INTRAMUSCULAR; INTRAVENOUS at 10:11

## 2020-11-15 RX ADMIN — SODIUM CHLORIDE, SODIUM GLUCONATE, SODIUM ACETATE, POTASSIUM CHLORIDE, MAGNESIUM CHLORIDE, SODIUM PHOSPHATE, DIBASIC, AND POTASSIUM PHOSPHATE: .53; .5; .37; .037; .03; .012; .00082 INJECTION, SOLUTION INTRAVENOUS at 10:11

## 2020-11-15 RX ADMIN — METHYLPREDNISOLONE SODIUM SUCCINATE 500 MG: 1 INJECTION, POWDER, FOR SOLUTION INTRAMUSCULAR; INTRAVENOUS at 10:11

## 2020-11-15 RX ADMIN — FENTANYL CITRATE 50 MCG: 50 INJECTION INTRAMUSCULAR; INTRAVENOUS at 11:11

## 2020-11-15 RX ADMIN — PROPOFOL 110 MG: 10 INJECTION, EMULSION INTRAVENOUS at 10:11

## 2020-11-15 RX ADMIN — FENTANYL CITRATE 50 MCG: 50 INJECTION INTRAMUSCULAR; INTRAVENOUS at 10:11

## 2020-11-15 RX ADMIN — CEFAZOLIN 2 G: 330 INJECTION, POWDER, FOR SOLUTION INTRAMUSCULAR; INTRAVENOUS at 11:11

## 2020-11-15 RX ADMIN — CLONIDINE HYDROCHLORIDE 0.2 MG: 0.1 TABLET ORAL at 08:11

## 2020-11-15 NOTE — ANESTHESIA PREPROCEDURE EVALUATION
[facility]  Anesthesia Pre-Operative Evaluation         Patient Name: Kendra Malik  YOB: 1955  MRN: [id number]    SUBJECTIVE:     Pre-operative evaluation for Procedure(s) (LRB):  TRANSPLANT, KIDNEY (N/A)     11/15/2020    Kendra Malik is a 65 y.o. female w/ a significant PMHx of ESRD secondary to diabetic nephropathy, on HD since 2019 (peritoneal dialysis.)    Patient now presents for the above procedure(s).      LDA: None documented       Prev airway: None documented    Drips: None documented.      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, without long-term current use of insulin    Renal hypertension    Colon cancer screening    Preop cardiovascular exam    Mixed hyperlipidemia    Kidney transplant candidate       Review of patient's allergies indicates:  No Known Allergies    Current Inpatient Medications:   acetaminophen  650 mg Per NG tube Once    Thymoglobulin 1.5mg/kg, hydrocortisone 20mg, heparin 1000 units in NS 500ml (Peripheral line)  1.5 mg/kg Intravenous Once    diphenhydrAMINE  50 mg Intravenous Once    heparin (porcine)  5,000 Units Subcutaneous Once    methylPREDNISolone (SOLU-Medrol) IVPB (doses > 250 mg)  500 mg Intravenous Once       No current facility-administered medications on file prior to encounter.      Current Outpatient Medications on File Prior to Encounter   Medication Sig Dispense Refill    amLODIPine (NORVASC) 10 MG tablet Take 10 mg by mouth once daily.      ascorbic acid, vitamin C, (VITAMIN C) 500 MG tablet Take 500 mg by mouth once daily.      aspirin (ECOTRIN) 81 MG EC tablet Take 81 mg by mouth once daily.      atorvastatin (LIPITOR) 20 MG tablet Take 20 mg by mouth once daily.      calcitRIOL (ROCALTROL) 0.25 MCG Cap Take 0.25 mcg by mouth 4 (four) times a week. Take 1 Capsule by mouth four times a week      cinacalcet (SENSIPAR) 30 MG Tab 5 (five) times daily.  99     cloNIDine (CATAPRES) 0.1 MG tablet Take 0.1 mg by mouth 2 (two) times daily.      diclofenac sodium/capsaicin (DICLOFENAC-CAPSAICIN TOP)   0 Refill(s)      ergocalciferol (ERGOCALCIFEROL) 50,000 unit Cap Take 50,000 Units by mouth every 7 days.      fluticasone (FLONASE) 50 mcg/actuation nasal spray INHALE 2 SPRAYS INTO EACH NOSTRIL TWICE A DAY  6    folic acid/vit B complex and C (FOLBEE PLUS ORAL) Take 1 tablet by mouth once daily.      furosemide (LASIX) 20 MG tablet Take 40 mg by mouth once daily.       gabapentin (NEURONTIN) 300 MG capsule Take 400 mg by mouth 3 (three) times daily.       hydrALAZINE (APRESOLINE) 100 MG tablet Take 100 mg by mouth 3 (three) times daily.      labetalol (NORMODYNE) 300 MG tablet Take 300 mg by mouth 2 (two) times daily.      loratadine (CLARITIN) 10 mg tablet Take by mouth.      NOVOLOG MIX 70-30FLEXPEN U-100 100 unit/mL (70-30) InPn pen TAKE 12 UNITS BEFORE BREAKFAST, LUNCH AND SUPPER IF CBG > 125 ROTATE INJECTION SITES  4    ondansetron (ZOFRAN-ODT) 4 MG TbDL Take by mouth.      prednisoLONE acetate (PRED FORTE) 1 % DrpS 1 drop 4 (four) times daily.      SITagliptin (JANUVIA) 25 MG Tab Take by mouth.      traMADol (ULTRAM) 50 mg tablet       TRUE METRIX GLUCOSE TEST STRIP Strp CHECK BLOOD SUGAR TWICE DAILY  6       Past Surgical History:   Procedure Laterality Date    cataract surgery       SECTION      x3    COLONOSCOPY N/A 3/27/2019    Procedure: COLONOSCOPY;  Surgeon: Arianna Srinivasan MD;  Location: [facility];  Service: Endoscopy;  Laterality: N/A;    HYSTERECTOMY      OOPHORECTOMY      PERITONEAL CATHETER INSERTION      RETINAL DETACHMENT SURGERY         Social History     Socioeconomic History    Marital status:      Spouse name: Not on file    Number of children: Not on file    Years of education: Not on file    Highest education level: Not on file   Occupational History    Not on file   Social Needs    Financial resource  strain: Not on file    Food insecurity     Worry: Not on file     Inability: Not on file    Transportation needs     Medical: Not on file     Non-medical: Not on file   Tobacco Use    Smoking status: Never Smoker    Smokeless tobacco: Never Used   Substance and Sexual Activity    Alcohol use: Yes     Frequency: Monthly or less     Drinks per session: 1 or 2     Binge frequency: Never     Comment: rare; can go months w/o a drink    Drug use: No    Sexual activity: Not Currently   Lifestyle    Physical activity     Days per week: Not on file     Minutes per session: Not on file    Stress: Not on file   Relationships    Social connections     Talks on phone: Not on file     Gets together: Not on file     Attends Yazidi service: Not on file     Active member of club or organization: Not on file     Attends meetings of clubs or organizations: Not on file     Relationship status: Not on file   Other Topics Concern    Not on file   Social History Narrative    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    EKG: No results found for this or any previous visit.    ECHOCARDIOGRAM:  TTE:  No results found for this or any previous visit.    ASSESSMENT/PLAN:         Anesthesia Evaluation    I have reviewed the Patient Summary Reports.      I have reviewed the Medications.     Review of Systems  Anesthesia Hx:  No problems with  previous Anesthesia  History of prior surgery of interest to airway management or planning: Previous anesthesia: General Denies Family Hx of Anesthesia complications.   Denies Personal Hx of Anesthesia complications.   Social:  Non-Smoker    Hematology/Oncology:     Oncology Normal    -- Anemia:   EENT/Dental:EENT/Dental Normal   Cardiovascular:   Hypertension Sees cardiology for clearance with procedures. Last time saw him approximately 5 months ago   Pulmonary:  Pulmonary Normal FINDINGS:  There is borderline prominent appearance of the cardiac silhouette with otherwise unremarkable radiographic appearance of the cardiomediastinal shadow.  The trachea is midline.  There are findings consistent with vascular densities in the perihilar regions bilaterally with no radiographic indication of hilar enlargement or pulmonary edema.    Minor linear atelectasis or scarring is demonstrated within the midportion of the left lung with both lungs otherwise appearing fully expanded and clear.  The hemidiaphragms are sharply outlined and the costophrenic angles are acute with no pleural effusion demonstrated.  There is unremarkable appearance of the included osseous structures.    Impression      No radiographic evidence of acute cardiac or pulmonary process with chronic type findings as described.     Renal/:   Chronic Renal Disease, ESRD, Dialysis    Musculoskeletal:  Musculoskeletal Normal    Neurological:  Neurology Normal    Endocrine:   Diabetes, type 2    Dermatological:  Skin Normal    Psych:  Psychiatric Normal           Physical Exam  General:  Well nourished    Airway/Jaw/Neck:  Airway Findings: Mouth Opening: Normal Tongue: Normal  Mallampati: III  Improves to II with phonation.  TM Distance: Normal, at least 6 cm  Jaw/Neck Findings:  Neck ROM: Normal ROM      Dental:  Dental Findings: In tact, Upper front caps   Chest/Lungs:  Chest/Lungs Clear    Heart/Vascular:  Heart Findings: Normal Heart murmur: negative        Mental Status:  Mental Status Findings:  Alert and Oriented, Cooperative         Anesthesia Plan  Type of Anesthesia, risks & benefits discussed:  Anesthesia Type:  general  Patient's Preference:   Intra-op Monitoring Plan: standard ASA monitors and arterial line  Intra-op Monitoring Plan Comments:   Post Op Pain Control Plan: per primary service following discharge from PACU, multimodal analgesia and IV/PO Opioids PRN  Post Op Pain Control Plan Comments:   Induction:   IV  Beta Blocker:  Patient is not currently on a Beta-Blocker (No further documentation required).       Informed Consent: Patient understands risks and agrees with Anesthesia plan.  Questions answered. Anesthesia consent signed with patient.  ASA Score: 3  emergent   Day of Surgery Review of History & Physical: I have interviewed and examined the patient. I have reviewed the patient's H&P dated:    H&P update referred to the provider.         Ready For Surgery From Anesthesia Perspective.

## 2020-11-15 NOTE — PROGRESS NOTES
Pia Jarquin NP notified of admit at 1703. EKG done. Interpretation : normal sinus rhythm, left axis deviation, left ventricular hypertrophy with repolariziation. Pia Jarquin notified of EKG interpretation.

## 2020-11-15 NOTE — HPI
Ms. Kendra Malik is a 64 year old female with PMH of ESRD secondary to diabetic nephropathy. She has been on the wait list for a kidney transplant at [facility] since 9/24/2018. Patient is currently on peritoneal dialysis started in mid September 2018. She has a peritoneal dialysis catheter for dialysis access. She reports that she is tolerating dialysis well. Patient denies any recent hospitalizations or ED visits.  Native UOP > 1000ml/d.

## 2020-11-15 NOTE — TELEPHONE ENCOUNTER
ON-CALL NOTE    UNOS# [id number]  11/15/20: 11:30 am:  Notified by Torres Díaz, , that Kendra Malik is eligible for kidney offer.  Spoke with patient and identified no acute medical issues with telephone assessment. Protocol script read to patient regarding N/A, standard donor offer. Patient verbalized understanding, all questions answered, patient accepts organ offer. Notified by Torres Díaz that virtual crossmatch is negative.  Current sample of blood is available from date 10/5/20 for crossmatch.  Patient reports no sensitizing event since last blood sample for PRA received. Notified Lesly in HLA Lab to perform a prospective  crossmatch per guideline.    Patient notified of plan to be on standby awaiting results of crossmatch and states understanding.      14:30: patient NPO, leaving now to come to [place] for admission.    15:12: notified by Phillip that crossmatch is negative. Called patient, states she is still on the road, informed her of results. She will call coordinator once she reaches hospital.    16:35: patient arrived, seems to be lost. Walked her through finding parking garage and enter through 1st floor. Instructed her to go to [address] [place] [address] for admit. Pt voiced understanding.

## 2020-11-15 NOTE — ASSESSMENT & PLAN NOTE
- Admitted for kidney transplant 11/15/20.  - NPO since 1430.  - Thymo induction.   - Surgeon is Dr Ann.  - transplant fellow to obtain consents.  - pre op labs and diagnostic testing pending, to be reviewed prior to surgery.  - needs STAT 2d echo. Last 7/2019 with PAP of 45.

## 2020-11-15 NOTE — PROGRESS NOTES
Patient arrived to the unit from home. AAO I with ambulation. Misty patients partner at the bedside.  Vitals stable. PD cath intact. Notified CIARA Jarquin NP, surgery, anesthesia, dialysis of patients arrival. OR time tentative 1900. Skin CDI left cyst noted to right scapula. Will continue with admit.

## 2020-11-16 PROBLEM — T38.0X5S ADVERSE EFFECT OF ADRENAL CORTICAL STEROIDS, SEQUELA: Status: ACTIVE | Noted: 2020-11-16

## 2020-11-16 PROBLEM — E11.9 DIABETES MELLITUS: Status: ACTIVE | Noted: 2018-10-11

## 2020-11-16 PROBLEM — Z29.89 PROPHYLACTIC IMMUNOTHERAPY: Status: ACTIVE | Noted: 2020-11-16

## 2020-11-16 PROBLEM — Z94.0 STATUS POST DECEASED-DONOR KIDNEY TRANSPLANTATION: Status: ACTIVE | Noted: 2020-11-16

## 2020-11-16 PROBLEM — N18.9 ANEMIA OF RENAL DISEASE: Status: ACTIVE | Noted: 2020-11-16

## 2020-11-16 PROBLEM — Z79.899 LONG TERM CURRENT USE OF IMMUNOSUPPRESSIVE DRUG: Status: ACTIVE | Noted: 2020-11-16

## 2020-11-16 PROBLEM — D63.1 ANEMIA OF RENAL DISEASE: Status: ACTIVE | Noted: 2020-11-16

## 2020-11-16 PROBLEM — Z79.60 LONG TERM CURRENT USE OF IMMUNOSUPPRESSIVE DRUG: Status: ACTIVE | Noted: 2020-11-16

## 2020-11-16 PROBLEM — Z91.89 AT RISK FOR OPPORTUNISTIC INFECTIONS: Status: ACTIVE | Noted: 2020-11-16

## 2020-11-16 LAB
ALBUMIN SERPL BCP-MCNC: 2.4 G/DL (ref 3.5–5.2)
ALBUMIN SERPL BCP-MCNC: 2.4 G/DL (ref 3.5–5.2)
ANION GAP SERPL CALC-SCNC: 14 MMOL/L (ref 8–16)
ANION GAP SERPL CALC-SCNC: 16 MMOL/L (ref 8–16)
ANISOCYTOSIS BLD QL SMEAR: SLIGHT
AV INDEX (PROSTH): 0.83
AV MEAN GRADIENT: 10 MMHG
AV PEAK GRADIENT: 15 MMHG
AV VALVE AREA: 2.16 CM2
AV VELOCITY RATIO: 0.79
BASOPHILS # BLD AUTO: 0.02 K/UL (ref 0–0.2)
BASOPHILS NFR BLD: 0.1 % (ref 0–1.9)
BSA FOR ECHO PROCEDURE: 1.9 M2
BUN SERPL-MCNC: 41 MG/DL (ref 8–23)
BUN SERPL-MCNC: 44 MG/DL (ref 8–23)
CALCIUM SERPL-MCNC: 7.1 MG/DL (ref 8.7–10.5)
CALCIUM SERPL-MCNC: 7.4 MG/DL (ref 8.7–10.5)
CHLORIDE SERPL-SCNC: 101 MMOL/L (ref 95–110)
CHLORIDE SERPL-SCNC: 106 MMOL/L (ref 95–110)
CO2 SERPL-SCNC: 18 MMOL/L (ref 23–29)
CO2 SERPL-SCNC: 22 MMOL/L (ref 23–29)
CREAT SERPL-MCNC: 4.3 MG/DL (ref 0.5–1.4)
CREAT SERPL-MCNC: 4.9 MG/DL (ref 0.5–1.4)
CV ECHO LV RWT: 0.51 CM
DIFFERENTIAL METHOD: ABNORMAL
DOP CALC AO PEAK VEL: 1.91 M/S
DOP CALC AO VTI: 38.16 CM
DOP CALC LVOT AREA: 2.6 CM2
DOP CALC LVOT DIAMETER: 1.82 CM
DOP CALC LVOT PEAK VEL: 1.5 M/S
DOP CALC LVOT STROKE VOLUME: 82.4 CM3
DOP CALCLVOT PEAK VEL VTI: 31.69 CM
E WAVE DECELERATION TIME: 250.71 MSEC
E/A RATIO: 0.76
E/E' RATIO: 10.25 M/S
ECHO LV POSTERIOR WALL: 1.1 CM (ref 0.6–1.1)
EOSINOPHIL # BLD AUTO: 0 K/UL (ref 0–0.5)
EOSINOPHIL NFR BLD: 0 % (ref 0–8)
ERYTHROCYTE [DISTWIDTH] IN BLOOD BY AUTOMATED COUNT: 13.6 % (ref 11.5–14.5)
EST. GFR  (AFRICAN AMERICAN): 10.1 ML/MIN/1.73 M^2
EST. GFR  (AFRICAN AMERICAN): 11.8 ML/MIN/1.73 M^2
EST. GFR  (NON AFRICAN AMERICAN): 10.2 ML/MIN/1.73 M^2
EST. GFR  (NON AFRICAN AMERICAN): 8.7 ML/MIN/1.73 M^2
FRACTIONAL SHORTENING: 37 % (ref 28–44)
GLUCOSE SERPL-MCNC: 189 MG/DL (ref 70–110)
GLUCOSE SERPL-MCNC: 397 MG/DL (ref 70–110)
HBV CORE IGM SERPL QL IA: NEGATIVE
HBV SURFACE AG SERPL QL IA: NEGATIVE
HCT VFR BLD AUTO: 28.4 % (ref 37–48.5)
HCT VFR BLD AUTO: 29.3 % (ref 37–48.5)
HGB BLD-MCNC: 8.6 G/DL (ref 12–16)
HIV 1+2 AB+HIV1 P24 AG SERPL QL IA: NEGATIVE
IMM GRANULOCYTES # BLD AUTO: 0.09 K/UL (ref 0–0.04)
IMM GRANULOCYTES NFR BLD AUTO: 0.5 % (ref 0–0.5)
INTERVENTRICULAR SEPTUM: 1.3 CM (ref 0.6–1.1)
LA MAJOR: 4.67 CM
LA MINOR: 4.85 CM
LA WIDTH: 3.45 CM
LEFT INTERNAL DIMENSION IN SYSTOLE: 2.7 CM (ref 2.1–4)
LEFT VENTRICLE MASS INDEX: 99 G/M2
LEFT VENTRICULAR INTERNAL DIMENSION IN DIASTOLE: 4.3 CM (ref 3.5–6)
LEFT VENTRICULAR MASS: 184.68 G
LV LATERAL E/E' RATIO: 8.2 M/S
LV SEPTAL E/E' RATIO: 13.67 M/S
LYMPHOCYTES # BLD AUTO: 0.1 K/UL (ref 1–4.8)
LYMPHOCYTES NFR BLD: 0.5 % (ref 18–48)
MCH RBC QN AUTO: 31.7 PG (ref 27–31)
MCHC RBC AUTO-ENTMCNC: 30.3 G/DL (ref 32–36)
MCV RBC AUTO: 105 FL (ref 82–98)
MONOCYTES # BLD AUTO: 0.4 K/UL (ref 0.3–1)
MONOCYTES NFR BLD: 2 % (ref 4–15)
MV PEAK A VEL: 1.08 M/S
MV PEAK E VEL: 0.82 M/S
MV STENOSIS PRESSURE HALF TIME: 72.7 MS
MV VALVE AREA P 1/2 METHOD: 3.03 CM2
NEUTROPHILS # BLD AUTO: 18.1 K/UL (ref 1.8–7.7)
NEUTROPHILS NFR BLD: 96.9 % (ref 38–73)
NRBC BLD-RTO: 0 /100 WBC
OVALOCYTES BLD QL SMEAR: ABNORMAL
PHOSPHATE SERPL-MCNC: 4.2 MG/DL (ref 2.7–4.5)
PHOSPHATE SERPL-MCNC: 4.9 MG/DL (ref 2.7–4.5)
PISA TR MAX VEL: 1.63 M/S
PLATELET # BLD AUTO: 100 K/UL (ref 150–350)
PLATELET BLD QL SMEAR: ABNORMAL
PMV BLD AUTO: 12 FL (ref 9.2–12.9)
POCT GLUCOSE: 182 MG/DL (ref 70–110)
POCT GLUCOSE: 306 MG/DL (ref 70–110)
POCT GLUCOSE: 406 MG/DL (ref 70–110)
POCT GLUCOSE: 416 MG/DL (ref 70–110)
POCT GLUCOSE: 420 MG/DL (ref 70–110)
POCT GLUCOSE: 445 MG/DL (ref 70–110)
POIKILOCYTOSIS BLD QL SMEAR: SLIGHT
POTASSIUM SERPL-SCNC: 3 MMOL/L (ref 3.5–5.1)
POTASSIUM SERPL-SCNC: 3.7 MMOL/L (ref 3.5–5.1)
RA MAJOR: 4.17 CM
RA WIDTH: 3.11 CM
RBC # BLD AUTO: 2.71 M/UL (ref 4–5.4)
RIGHT VENTRICULAR END-DIASTOLIC DIMENSION: 3.28 CM
SINUS: 2.81 CM
SODIUM SERPL-SCNC: 133 MMOL/L (ref 136–145)
SODIUM SERPL-SCNC: 144 MMOL/L (ref 136–145)
STJ: 2.96 CM
TDI LATERAL: 0.1 M/S
TDI SEPTAL: 0.06 M/S
TDI: 0.08 M/S
TR MAX PG: 11 MMHG
TRICUSPID ANNULAR PLANE SYSTOLIC EXCURSION: 2.43 CM
WBC # BLD AUTO: 18.71 K/UL (ref 3.9–12.7)

## 2020-11-16 PROCEDURE — 63600175 PHARM REV CODE 636 W HCPCS: Performed by: NURSE PRACTITIONER

## 2020-11-16 PROCEDURE — 25000003 PHARM REV CODE 250: Performed by: NURSE PRACTITIONER

## 2020-11-16 PROCEDURE — S5010 5% DEXTROSE AND 0.45% SALINE: HCPCS | Performed by: SURGERY

## 2020-11-16 PROCEDURE — 63600175 PHARM REV CODE 636 W HCPCS: Performed by: ANESTHESIOLOGY

## 2020-11-16 PROCEDURE — 25000003 PHARM REV CODE 250: Performed by: ANESTHESIOLOGY

## 2020-11-16 PROCEDURE — 81200001 HC KIDNEY ACQUISITION - CADAVER

## 2020-11-16 PROCEDURE — 63600175 PHARM REV CODE 636 W HCPCS: Performed by: NURSE ANESTHETIST, CERTIFIED REGISTERED

## 2020-11-16 PROCEDURE — 25000003 PHARM REV CODE 250: Performed by: NURSE ANESTHETIST, CERTIFIED REGISTERED

## 2020-11-16 PROCEDURE — 97803 MED NUTRITION INDIV SUBSEQ: CPT

## 2020-11-16 PROCEDURE — 36415 COLL VENOUS BLD VENIPUNCTURE: CPT

## 2020-11-16 PROCEDURE — 25000003 PHARM REV CODE 250: Performed by: STUDENT IN AN ORGANIZED HEALTH CARE EDUCATION/TRAINING PROGRAM

## 2020-11-16 PROCEDURE — 99223 1ST HOSP IP/OBS HIGH 75: CPT | Mod: ,,, | Performed by: NURSE PRACTITIONER

## 2020-11-16 PROCEDURE — 85025 COMPLETE CBC W/AUTO DIFF WBC: CPT

## 2020-11-16 PROCEDURE — 20600001 HC STEP DOWN PRIVATE ROOM

## 2020-11-16 PROCEDURE — 80069 RENAL FUNCTION PANEL: CPT | Mod: 91

## 2020-11-16 PROCEDURE — A4216 STERILE WATER/SALINE, 10 ML: HCPCS | Performed by: ANESTHESIOLOGY

## 2020-11-16 PROCEDURE — 80069 RENAL FUNCTION PANEL: CPT

## 2020-11-16 PROCEDURE — 85014 HEMATOCRIT: CPT

## 2020-11-16 PROCEDURE — 63600175 PHARM REV CODE 636 W HCPCS: Performed by: TRANSPLANT SURGERY

## 2020-11-16 PROCEDURE — 27201037 HC PRESSURE MONITORING SET UP

## 2020-11-16 PROCEDURE — 99223 PR INITIAL HOSPITAL CARE,LEVL III: ICD-10-PCS | Mod: ,,, | Performed by: NURSE PRACTITIONER

## 2020-11-16 PROCEDURE — 25000003 PHARM REV CODE 250: Performed by: TRANSPLANT SURGERY

## 2020-11-16 PROCEDURE — 63600175 PHARM REV CODE 636 W HCPCS: Performed by: SURGERY

## 2020-11-16 PROCEDURE — 25000003 PHARM REV CODE 250: Performed by: SURGERY

## 2020-11-16 PROCEDURE — 97802 MEDICAL NUTRITION INDIV IN: CPT

## 2020-11-16 DEVICE — STENT DOUBLE J 7FRX12CM
Type: IMPLANTABLE DEVICE | Site: URETER | Status: NON-FUNCTIONAL
Removed: 2020-12-09

## 2020-11-16 RX ORDER — BISACODYL 5 MG
10 TABLET, DELAYED RELEASE (ENTERIC COATED) ORAL NIGHTLY
Status: DISCONTINUED | OUTPATIENT
Start: 2020-11-16 | End: 2020-11-19 | Stop reason: HOSPADM

## 2020-11-16 RX ORDER — SULFAMETHOXAZOLE AND TRIMETHOPRIM 400; 80 MG/1; MG/1
1 TABLET ORAL EVERY MORNING
Status: DISCONTINUED | OUTPATIENT
Start: 2020-11-17 | End: 2020-11-19 | Stop reason: HOSPADM

## 2020-11-16 RX ORDER — DEXTROSE MONOHYDRATE AND SODIUM CHLORIDE 5; .45 G/100ML; G/100ML
INJECTION, SOLUTION INTRAVENOUS CONTINUOUS
Status: DISCONTINUED | OUTPATIENT
Start: 2020-11-16 | End: 2020-11-17

## 2020-11-16 RX ORDER — ONDANSETRON 2 MG/ML
4 INJECTION INTRAMUSCULAR; INTRAVENOUS ONCE AS NEEDED
Status: DISCONTINUED | OUTPATIENT
Start: 2020-11-16 | End: 2020-11-17

## 2020-11-16 RX ORDER — DOCUSATE SODIUM 100 MG/1
100 CAPSULE, LIQUID FILLED ORAL 3 TIMES DAILY
Status: DISCONTINUED | OUTPATIENT
Start: 2020-11-16 | End: 2020-11-19 | Stop reason: HOSPADM

## 2020-11-16 RX ORDER — EPINEPHRINE 1 MG/ML
1 INJECTION, SOLUTION INTRACARDIAC; INTRAMUSCULAR; INTRAVENOUS; SUBCUTANEOUS ONCE AS NEEDED
Status: DISCONTINUED | OUTPATIENT
Start: 2020-11-16 | End: 2020-11-19 | Stop reason: HOSPADM

## 2020-11-16 RX ORDER — PREDNISONE 20 MG/1
20 TABLET ORAL DAILY
Status: DISCONTINUED | OUTPATIENT
Start: 2020-11-19 | End: 2020-11-19 | Stop reason: HOSPADM

## 2020-11-16 RX ORDER — PREDNISONE 5 MG/1
TABLET ORAL
Qty: 120 TABLET | Refills: 11 | Status: SHIPPED | OUTPATIENT
Start: 2020-11-16 | End: 2021-12-15 | Stop reason: SDUPTHER

## 2020-11-16 RX ORDER — CEFAZOLIN SODIUM 1 G/3ML
INJECTION, POWDER, FOR SOLUTION INTRAMUSCULAR; INTRAVENOUS
Status: DISCONTINUED | OUTPATIENT
Start: 2020-11-16 | End: 2020-11-16 | Stop reason: HOSPADM

## 2020-11-16 RX ORDER — IBUPROFEN 200 MG
24 TABLET ORAL
Status: DISCONTINUED | OUTPATIENT
Start: 2020-11-16 | End: 2020-11-18

## 2020-11-16 RX ORDER — OXYCODONE HYDROCHLORIDE 5 MG/1
5 TABLET ORAL EVERY 4 HOURS PRN
Status: DISCONTINUED | OUTPATIENT
Start: 2020-11-16 | End: 2020-11-19 | Stop reason: HOSPADM

## 2020-11-16 RX ORDER — INSULIN ASPART 100 [IU]/ML
0-5 INJECTION, SOLUTION INTRAVENOUS; SUBCUTANEOUS
Status: DISCONTINUED | OUTPATIENT
Start: 2020-11-16 | End: 2020-11-18

## 2020-11-16 RX ORDER — MYCOPHENOLATE MOFETIL 250 MG/1
1000 CAPSULE ORAL 2 TIMES DAILY
Qty: 240 CAPSULE | Refills: 11 | Status: SHIPPED | OUTPATIENT
Start: 2020-11-16 | End: 2021-01-29

## 2020-11-16 RX ORDER — OXYCODONE HYDROCHLORIDE 5 MG/1
5 TABLET ORAL EVERY 6 HOURS PRN
Status: DISCONTINUED | OUTPATIENT
Start: 2020-11-16 | End: 2020-11-16

## 2020-11-16 RX ORDER — TRAMADOL HYDROCHLORIDE 50 MG/1
50 TABLET ORAL EVERY 6 HOURS PRN
Status: DISCONTINUED | OUTPATIENT
Start: 2020-11-16 | End: 2020-11-19 | Stop reason: HOSPADM

## 2020-11-16 RX ORDER — DIPHENHYDRAMINE HCL 25 MG
25 CAPSULE ORAL
Status: COMPLETED | OUTPATIENT
Start: 2020-11-17 | End: 2020-11-18

## 2020-11-16 RX ORDER — AMLODIPINE BESYLATE 10 MG/1
10 TABLET ORAL DAILY
Status: DISCONTINUED | OUTPATIENT
Start: 2020-11-16 | End: 2020-11-19 | Stop reason: HOSPADM

## 2020-11-16 RX ORDER — VALGANCICLOVIR 450 MG/1
450 TABLET, FILM COATED ORAL EVERY MORNING
Status: DISCONTINUED | OUTPATIENT
Start: 2020-11-26 | End: 2020-11-19 | Stop reason: HOSPADM

## 2020-11-16 RX ORDER — NEOSTIGMINE METHYLSULFATE 0.5 MG/ML
INJECTION, SOLUTION INTRAVENOUS
Status: DISCONTINUED | OUTPATIENT
Start: 2020-11-16 | End: 2020-11-16

## 2020-11-16 RX ORDER — TACROLIMUS 1 MG/1
6 CAPSULE ORAL EVERY 12 HOURS
Qty: 360 CAPSULE | Refills: 11 | Status: SHIPPED | OUTPATIENT
Start: 2020-11-16 | End: 2020-11-20

## 2020-11-16 RX ORDER — TACROLIMUS 1 MG/1
3 CAPSULE ORAL 2 TIMES DAILY
Status: DISCONTINUED | OUTPATIENT
Start: 2020-11-16 | End: 2020-11-17

## 2020-11-16 RX ORDER — ACETAMINOPHEN 500 MG
1000 TABLET ORAL 2 TIMES DAILY
Status: DISCONTINUED | OUTPATIENT
Start: 2020-11-16 | End: 2020-11-19 | Stop reason: HOSPADM

## 2020-11-16 RX ORDER — NYSTATIN 100000 [USP'U]/ML
500000 SUSPENSION ORAL
Qty: 480 ML | Refills: 0 | Status: SHIPPED | OUTPATIENT
Start: 2020-11-16 | End: 2020-12-22

## 2020-11-16 RX ORDER — ERGOCALCIFEROL 1.25 MG/1
50000 CAPSULE ORAL
Status: DISCONTINUED | OUTPATIENT
Start: 2020-11-16 | End: 2020-11-19 | Stop reason: HOSPADM

## 2020-11-16 RX ORDER — MUPIROCIN 20 MG/G
1 OINTMENT TOPICAL 2 TIMES DAILY
Status: DISCONTINUED | OUTPATIENT
Start: 2020-11-16 | End: 2020-11-19 | Stop reason: HOSPADM

## 2020-11-16 RX ORDER — VERAPAMIL HYDROCHLORIDE 2.5 MG/ML
INJECTION, SOLUTION INTRAVENOUS
Status: DISCONTINUED | OUTPATIENT
Start: 2020-11-16 | End: 2020-11-16 | Stop reason: HOSPADM

## 2020-11-16 RX ORDER — SULFAMETHOXAZOLE AND TRIMETHOPRIM 400; 80 MG/1; MG/1
1 TABLET ORAL DAILY
Qty: 30 TABLET | Refills: 11 | Status: SHIPPED | OUTPATIENT
Start: 2020-11-16 | End: 2021-01-29 | Stop reason: SINTOL

## 2020-11-16 RX ORDER — DIPHENHYDRAMINE HCL 50 MG
50 CAPSULE ORAL ONCE AS NEEDED
Status: DISCONTINUED | OUTPATIENT
Start: 2020-11-16 | End: 2020-11-19 | Stop reason: HOSPADM

## 2020-11-16 RX ORDER — IBUPROFEN 200 MG
16 TABLET ORAL
Status: DISCONTINUED | OUTPATIENT
Start: 2020-11-16 | End: 2020-11-16

## 2020-11-16 RX ORDER — ONDANSETRON 2 MG/ML
INJECTION INTRAMUSCULAR; INTRAVENOUS
Status: DISCONTINUED | OUTPATIENT
Start: 2020-11-16 | End: 2020-11-16

## 2020-11-16 RX ORDER — LABETALOL 100 MG/1
300 TABLET, FILM COATED ORAL 2 TIMES DAILY
Status: DISCONTINUED | OUTPATIENT
Start: 2020-11-16 | End: 2020-11-19

## 2020-11-16 RX ORDER — FAMOTIDINE 20 MG/1
20 TABLET, FILM COATED ORAL NIGHTLY
Qty: 30 TABLET | Refills: 0 | Status: SHIPPED | OUTPATIENT
Start: 2020-11-16 | End: 2021-01-15

## 2020-11-16 RX ORDER — ACETAMINOPHEN 500 MG
1000 TABLET ORAL EVERY 8 HOURS
Status: DISCONTINUED | OUTPATIENT
Start: 2020-11-16 | End: 2020-11-16

## 2020-11-16 RX ORDER — FUROSEMIDE 10 MG/ML
INJECTION INTRAMUSCULAR; INTRAVENOUS
Status: DISCONTINUED | OUTPATIENT
Start: 2020-11-16 | End: 2020-11-16

## 2020-11-16 RX ORDER — HEPARIN SODIUM 1000 [USP'U]/ML
INJECTION, SOLUTION INTRAVENOUS; SUBCUTANEOUS
Status: DISCONTINUED | OUTPATIENT
Start: 2020-11-16 | End: 2020-11-16 | Stop reason: HOSPADM

## 2020-11-16 RX ORDER — BUPIVACAINE HYDROCHLORIDE 2.5 MG/ML
INJECTION, SOLUTION EPIDURAL; INFILTRATION; INTRACAUDAL
Status: DISCONTINUED | OUTPATIENT
Start: 2020-11-16 | End: 2020-11-16 | Stop reason: HOSPADM

## 2020-11-16 RX ORDER — INSULIN ASPART 100 [IU]/ML
1-10 INJECTION, SOLUTION INTRAVENOUS; SUBCUTANEOUS
Status: DISCONTINUED | OUTPATIENT
Start: 2020-11-16 | End: 2020-11-16

## 2020-11-16 RX ORDER — INSULIN ASPART 100 [IU]/ML
10 INJECTION, SOLUTION INTRAVENOUS; SUBCUTANEOUS
Status: DISCONTINUED | OUTPATIENT
Start: 2020-11-16 | End: 2020-11-16

## 2020-11-16 RX ORDER — SODIUM CHLORIDE 9 MG/ML
INJECTION, SOLUTION INTRAVENOUS ONCE
Status: COMPLETED | OUTPATIENT
Start: 2020-11-16 | End: 2020-11-16

## 2020-11-16 RX ORDER — NYSTATIN 100000 [USP'U]/ML
500000 SUSPENSION ORAL
Status: DISCONTINUED | OUTPATIENT
Start: 2020-11-16 | End: 2020-11-19 | Stop reason: HOSPADM

## 2020-11-16 RX ORDER — METHYLPREDNISOLONE SOD SUCC 125 MG
125 VIAL (EA) INJECTION ONCE
Status: COMPLETED | OUTPATIENT
Start: 2020-11-18 | End: 2020-11-18

## 2020-11-16 RX ORDER — IBUPROFEN 200 MG
24 TABLET ORAL
Status: DISCONTINUED | OUTPATIENT
Start: 2020-11-16 | End: 2020-11-16

## 2020-11-16 RX ORDER — FUROSEMIDE 10 MG/ML
100 INJECTION INTRAMUSCULAR; INTRAVENOUS ONCE
Status: COMPLETED | OUTPATIENT
Start: 2020-11-16 | End: 2020-11-16

## 2020-11-16 RX ORDER — HYDROMORPHONE HYDROCHLORIDE 1 MG/ML
0.5 INJECTION, SOLUTION INTRAMUSCULAR; INTRAVENOUS; SUBCUTANEOUS ONCE
Status: COMPLETED | OUTPATIENT
Start: 2020-11-16 | End: 2020-11-16

## 2020-11-16 RX ORDER — POTASSIUM CHLORIDE 750 MG/1
30 CAPSULE, EXTENDED RELEASE ORAL 2 TIMES DAILY
Status: COMPLETED | OUTPATIENT
Start: 2020-11-16 | End: 2020-11-16

## 2020-11-16 RX ORDER — VALGANCICLOVIR 450 MG/1
450 TABLET, FILM COATED ORAL DAILY
Qty: 30 TABLET | Refills: 2 | Status: SHIPPED | OUTPATIENT
Start: 2020-11-16 | End: 2020-11-25

## 2020-11-16 RX ORDER — OXYCODONE HYDROCHLORIDE 10 MG/1
10 TABLET ORAL EVERY 4 HOURS PRN
Status: DISCONTINUED | OUTPATIENT
Start: 2020-11-16 | End: 2020-11-19 | Stop reason: HOSPADM

## 2020-11-16 RX ORDER — CALCIUM CARBONATE 200(500)MG
500 TABLET,CHEWABLE ORAL 3 TIMES DAILY PRN
Status: DISCONTINUED | OUTPATIENT
Start: 2020-11-16 | End: 2020-11-19 | Stop reason: HOSPADM

## 2020-11-16 RX ORDER — PHENYLEPHRINE HCL IN 0.9% NACL 1 MG/10 ML
SYRINGE (ML) INTRAVENOUS
Status: DISCONTINUED | OUTPATIENT
Start: 2020-11-16 | End: 2020-11-16

## 2020-11-16 RX ORDER — HEPARIN SODIUM 5000 [USP'U]/ML
5000 INJECTION, SOLUTION INTRAVENOUS; SUBCUTANEOUS EVERY 8 HOURS
Status: DISCONTINUED | OUTPATIENT
Start: 2020-11-16 | End: 2020-11-19 | Stop reason: HOSPADM

## 2020-11-16 RX ORDER — MANNITOL 250 MG/ML
INJECTION, SOLUTION INTRAVENOUS
Status: DISCONTINUED | OUTPATIENT
Start: 2020-11-16 | End: 2020-11-16

## 2020-11-16 RX ORDER — AMLODIPINE BESYLATE 10 MG/1
10 TABLET ORAL DAILY
Qty: 30 TABLET | Refills: 11 | Status: SHIPPED | OUTPATIENT
Start: 2020-11-16 | End: 2020-11-19 | Stop reason: SDUPTHER

## 2020-11-16 RX ORDER — INSULIN ASPART 100 [IU]/ML
14 INJECTION, SOLUTION INTRAVENOUS; SUBCUTANEOUS
Status: DISCONTINUED | OUTPATIENT
Start: 2020-11-17 | End: 2020-11-18

## 2020-11-16 RX ORDER — GLUCAGON 1 MG
1 KIT INJECTION
Status: DISCONTINUED | OUTPATIENT
Start: 2020-11-16 | End: 2020-11-18

## 2020-11-16 RX ORDER — CEFAZOLIN SODIUM 1 G/3ML
2 INJECTION, POWDER, FOR SOLUTION INTRAMUSCULAR; INTRAVENOUS
Status: COMPLETED | OUTPATIENT
Start: 2020-11-16 | End: 2020-11-16

## 2020-11-16 RX ORDER — SODIUM CHLORIDE 9 MG/ML
INJECTION, SOLUTION INTRAVENOUS CONTINUOUS
Status: DISCONTINUED | OUTPATIENT
Start: 2020-11-16 | End: 2020-11-17

## 2020-11-16 RX ORDER — HYDROMORPHONE HYDROCHLORIDE 1 MG/ML
INJECTION, SOLUTION INTRAMUSCULAR; INTRAVENOUS; SUBCUTANEOUS
Status: DISPENSED
Start: 2020-11-16 | End: 2020-11-16

## 2020-11-16 RX ORDER — SODIUM CHLORIDE 0.9 % (FLUSH) 0.9 %
3 SYRINGE (ML) INJECTION EVERY 6 HOURS
Status: DISCONTINUED | OUTPATIENT
Start: 2020-11-16 | End: 2020-11-19 | Stop reason: HOSPADM

## 2020-11-16 RX ORDER — ACETAMINOPHEN 325 MG/1
650 TABLET ORAL
Status: COMPLETED | OUTPATIENT
Start: 2020-11-17 | End: 2020-11-18

## 2020-11-16 RX ORDER — IBUPROFEN 200 MG
16 TABLET ORAL
Status: DISCONTINUED | OUTPATIENT
Start: 2020-11-16 | End: 2020-11-18

## 2020-11-16 RX ORDER — HYDROMORPHONE HYDROCHLORIDE 1 MG/ML
0.2 INJECTION, SOLUTION INTRAMUSCULAR; INTRAVENOUS; SUBCUTANEOUS EVERY 5 MIN PRN
Status: DISCONTINUED | OUTPATIENT
Start: 2020-11-16 | End: 2020-11-16

## 2020-11-16 RX ORDER — FAMOTIDINE 20 MG/1
20 TABLET, FILM COATED ORAL NIGHTLY
Status: DISCONTINUED | OUTPATIENT
Start: 2020-11-16 | End: 2020-11-19 | Stop reason: HOSPADM

## 2020-11-16 RX ORDER — GLUCAGON 1 MG
1 KIT INJECTION CONTINUOUS PRN
Status: DISCONTINUED | OUTPATIENT
Start: 2020-11-16 | End: 2020-11-16

## 2020-11-16 RX ORDER — MYCOPHENOLATE MOFETIL 250 MG/1
1000 CAPSULE ORAL 2 TIMES DAILY
Status: DISCONTINUED | OUTPATIENT
Start: 2020-11-16 | End: 2020-11-19 | Stop reason: HOSPADM

## 2020-11-16 RX ADMIN — CEFAZOLIN 2 G: 1 INJECTION, POWDER, FOR SOLUTION INTRAMUSCULAR; INTRAVENOUS at 09:11

## 2020-11-16 RX ADMIN — Medication 3 ML: at 05:11

## 2020-11-16 RX ADMIN — MUPIROCIN 1 G: 20 OINTMENT TOPICAL at 08:11

## 2020-11-16 RX ADMIN — SODIUM CHLORIDE: 0.9 INJECTION, SOLUTION INTRAVENOUS at 09:11

## 2020-11-16 RX ADMIN — HYDROMORPHONE HYDROCHLORIDE 0.2 MG: 1 INJECTION, SOLUTION INTRAMUSCULAR; INTRAVENOUS; SUBCUTANEOUS at 03:11

## 2020-11-16 RX ADMIN — OXYCODONE HYDROCHLORIDE 5 MG: 5 TABLET ORAL at 11:11

## 2020-11-16 RX ADMIN — DOCUSATE SODIUM 100 MG: 100 CAPSULE, LIQUID FILLED ORAL at 03:11

## 2020-11-16 RX ADMIN — Medication 100 MCG: at 01:11

## 2020-11-16 RX ADMIN — HEPARIN SODIUM 5000 UNITS: 5000 INJECTION INTRAVENOUS; SUBCUTANEOUS at 01:11

## 2020-11-16 RX ADMIN — POTASSIUM CHLORIDE 30 MEQ: 750 CAPSULE, EXTENDED RELEASE ORAL at 09:11

## 2020-11-16 RX ADMIN — TRAMADOL HYDROCHLORIDE 50 MG: 50 TABLET ORAL at 04:11

## 2020-11-16 RX ADMIN — TACROLIMUS 3 MG: 1 CAPSULE ORAL at 05:11

## 2020-11-16 RX ADMIN — HEPARIN SODIUM 5000 UNITS: 5000 INJECTION INTRAVENOUS; SUBCUTANEOUS at 06:11

## 2020-11-16 RX ADMIN — TACROLIMUS 3 MG: 1 CAPSULE ORAL at 09:11

## 2020-11-16 RX ADMIN — SODIUM CHLORIDE: 0.9 INJECTION, SOLUTION INTRAVENOUS at 03:11

## 2020-11-16 RX ADMIN — NYSTATIN 500000 UNITS: 100000 SUSPENSION ORAL at 01:11

## 2020-11-16 RX ADMIN — FENTANYL CITRATE 25 MCG: 50 INJECTION INTRAMUSCULAR; INTRAVENOUS at 01:11

## 2020-11-16 RX ADMIN — DEXTROSE AND SODIUM CHLORIDE: 5; .45 INJECTION, SOLUTION INTRAVENOUS at 03:11

## 2020-11-16 RX ADMIN — SODIUM CHLORIDE, SODIUM GLUCONATE, SODIUM ACETATE, POTASSIUM CHLORIDE, MAGNESIUM CHLORIDE, SODIUM PHOSPHATE, DIBASIC, AND POTASSIUM PHOSPHATE: .53; .5; .37; .037; .03; .012; .00082 INJECTION, SOLUTION INTRAVENOUS at 01:11

## 2020-11-16 RX ADMIN — NYSTATIN 500000 UNITS: 100000 SUSPENSION ORAL at 08:11

## 2020-11-16 RX ADMIN — OXYCODONE HYDROCHLORIDE 5 MG: 5 TABLET ORAL at 09:11

## 2020-11-16 RX ADMIN — INSULIN HUMAN 1.3 UNITS/HR: 100 INJECTION, SOLUTION PARENTERAL at 04:11

## 2020-11-16 RX ADMIN — ONDANSETRON 4 MG: 2 INJECTION INTRAMUSCULAR; INTRAVENOUS at 02:11

## 2020-11-16 RX ADMIN — DOCUSATE SODIUM 100 MG: 100 CAPSULE, LIQUID FILLED ORAL at 09:11

## 2020-11-16 RX ADMIN — LIDOCAINE HYDROCHLORIDE 40 MG: 20 INJECTION, SOLUTION EPIDURAL; INFILTRATION; INTRACAUDAL at 02:11

## 2020-11-16 RX ADMIN — INSULIN ASPART 10 UNITS: 100 INJECTION, SOLUTION INTRAVENOUS; SUBCUTANEOUS at 05:11

## 2020-11-16 RX ADMIN — FUROSEMIDE 100 MG: 10 INJECTION, SOLUTION INTRAMUSCULAR; INTRAVENOUS at 08:11

## 2020-11-16 RX ADMIN — PROPOFOL 50 MG: 10 INJECTION, EMULSION INTRAVENOUS at 02:11

## 2020-11-16 RX ADMIN — OXYCODONE HYDROCHLORIDE 10 MG: 10 TABLET ORAL at 04:11

## 2020-11-16 RX ADMIN — CEFAZOLIN 2 G: 1 INJECTION, POWDER, FOR SOLUTION INTRAMUSCULAR; INTRAVENOUS at 04:11

## 2020-11-16 RX ADMIN — FAMOTIDINE 20 MG: 20 TABLET, FILM COATED ORAL at 03:11

## 2020-11-16 RX ADMIN — INSULIN ASPART 4 UNITS: 100 INJECTION, SOLUTION INTRAVENOUS; SUBCUTANEOUS at 09:11

## 2020-11-16 RX ADMIN — ERGOCALCIFEROL 50000 UNITS: 1.25 CAPSULE ORAL at 03:11

## 2020-11-16 RX ADMIN — OXYCODONE HYDROCHLORIDE 5 MG: 5 TABLET ORAL at 03:11

## 2020-11-16 RX ADMIN — CALCIUM CHLORIDE 200 MG: 100 INJECTION, SOLUTION INTRAVENOUS at 02:11

## 2020-11-16 RX ADMIN — INSULIN ASPART 10 UNITS: 100 INJECTION, SOLUTION INTRAVENOUS; SUBCUTANEOUS at 01:11

## 2020-11-16 RX ADMIN — LABETALOL HYDROCHLORIDE 300 MG: 100 TABLET, FILM COATED ORAL at 08:11

## 2020-11-16 RX ADMIN — POTASSIUM CHLORIDE 30 MEQ: 750 CAPSULE, EXTENDED RELEASE ORAL at 08:11

## 2020-11-16 RX ADMIN — CALCIUM CARBONATE (ANTACID) CHEW TAB 500 MG 500 MG: 500 CHEW TAB at 09:11

## 2020-11-16 RX ADMIN — BISACODYL 10 MG: 5 TABLET, COATED ORAL at 08:11

## 2020-11-16 RX ADMIN — HEPARIN SODIUM 5000 UNITS: 5000 INJECTION INTRAVENOUS; SUBCUTANEOUS at 08:11

## 2020-11-16 RX ADMIN — DOCUSATE SODIUM 100 MG: 100 CAPSULE, LIQUID FILLED ORAL at 08:11

## 2020-11-16 RX ADMIN — LABETALOL HYDROCHLORIDE 300 MG: 100 TABLET, FILM COATED ORAL at 09:11

## 2020-11-16 RX ADMIN — MYCOPHENOLATE MOFETIL 1000 MG: 250 CAPSULE ORAL at 09:11

## 2020-11-16 RX ADMIN — MYCOPHENOLATE MOFETIL 1000 MG: 250 CAPSULE ORAL at 08:11

## 2020-11-16 RX ADMIN — MUPIROCIN 1 G: 20 OINTMENT TOPICAL at 09:11

## 2020-11-16 RX ADMIN — FAMOTIDINE 20 MG: 20 TABLET, FILM COATED ORAL at 08:11

## 2020-11-16 RX ADMIN — AMLODIPINE BESYLATE 10 MG: 10 TABLET ORAL at 09:11

## 2020-11-16 RX ADMIN — ACETAMINOPHEN 1000 MG: 500 TABLET ORAL at 01:11

## 2020-11-16 RX ADMIN — TRAMADOL HYDROCHLORIDE 50 MG: 50 TABLET ORAL at 01:11

## 2020-11-16 RX ADMIN — NYSTATIN 500000 UNITS: 100000 SUSPENSION ORAL at 09:11

## 2020-11-16 RX ADMIN — MANNITOL 25 G: 12.5 INJECTION, SOLUTION INTRAVENOUS at 12:11

## 2020-11-16 RX ADMIN — EPHEDRINE SULFATE 5 MG: 50 INJECTION INTRAVENOUS at 01:11

## 2020-11-16 RX ADMIN — ACETAMINOPHEN 1000 MG: 500 TABLET ORAL at 06:11

## 2020-11-16 RX ADMIN — INSULIN ASPART 5 UNITS: 100 INJECTION, SOLUTION INTRAVENOUS; SUBCUTANEOUS at 05:11

## 2020-11-16 RX ADMIN — Medication 3 ML: at 11:11

## 2020-11-16 RX ADMIN — FUROSEMIDE 100 MG: 10 INJECTION, SOLUTION INTRAMUSCULAR; INTRAVENOUS at 12:11

## 2020-11-16 RX ADMIN — THERA TABS 1 TABLET: TAB at 09:11

## 2020-11-16 RX ADMIN — GLYCOPYRROLATE 0.8 MG: 0.2 INJECTION INTRAMUSCULAR; INTRAVENOUS at 02:11

## 2020-11-16 RX ADMIN — EPHEDRINE SULFATE 5 MG: 50 INJECTION INTRAVENOUS at 12:11

## 2020-11-16 RX ADMIN — NYSTATIN 500000 UNITS: 100000 SUSPENSION ORAL at 05:11

## 2020-11-16 RX ADMIN — HYDROMORPHONE HYDROCHLORIDE 0.5 MG: 1 INJECTION, SOLUTION INTRAMUSCULAR; INTRAVENOUS; SUBCUTANEOUS at 11:11

## 2020-11-16 RX ADMIN — BISACODYL 10 MG: 5 TABLET, COATED ORAL at 04:11

## 2020-11-16 RX ADMIN — CISATRACURIUM BESYLATE 4 MG: 2 INJECTION INTRAVENOUS at 12:11

## 2020-11-16 RX ADMIN — EPHEDRINE SULFATE 10 MG: 50 INJECTION INTRAVENOUS at 01:11

## 2020-11-16 RX ADMIN — NEOSTIGMINE METHYLSULFATE 5 MG: 0.5 INJECTION INTRAVENOUS at 02:11

## 2020-11-16 NOTE — SUBJECTIVE & OBJECTIVE
Subjective:     Chief Complaint/Reason for Admission: Admission for kidney transplant, accepted kidney offer.     History of Present Illness:  Ms. Kendra Malik is a 64 year old female with PMH of ESRD secondary to diabetic nephropathy. She has been on the wait list for a kidney transplant at [facility] since 9/24/2018. Patient is currently on peritoneal dialysis started in mid September 2018. She has a peritoneal dialysis catheter for dialysis access. She reports that she is tolerating dialysis well. Patient denies any recent hospitalizations or ED visits.            Dialysis History: Ms. Gardner with ESRD, requiring chronic dialysis who is on peritoneal dialysis. Patient is dialyzing on cyclic peritoneal dialysis.  Patient reports that she is tolerating dialysis well.     Date of Last Dialysis:  11/14/2020, evening.     Native urine output per day: >1000 mL    Previous Transplant: no    PTA Medications   Medication Sig    amLODIPine (NORVASC) 10 MG tablet Take 10 mg by mouth once daily.    ascorbic acid, vitamin C, (VITAMIN C) 500 MG tablet Take 500 mg by mouth once daily.    aspirin (ECOTRIN) 81 MG EC tablet Take 81 mg by mouth once daily.    atorvastatin (LIPITOR) 20 MG tablet Take 20 mg by mouth once daily.    calcitRIOL (ROCALTROL) 0.25 MCG Cap Take 0.25 mcg by mouth 4 (four) times a week. Take 1 Capsule by mouth four times a week    cinacalcet (SENSIPAR) 30 MG Tab 5 (five) times daily.    cloNIDine (CATAPRES) 0.1 MG tablet Take 0.1 mg by mouth 2 (two) times daily.    diclofenac sodium/capsaicin (DICLOFENAC-CAPSAICIN TOP)   0 Refill(s)    ergocalciferol (ERGOCALCIFEROL) 50,000 unit Cap Take 50,000 Units by mouth every 7 days.    fluticasone (FLONASE) 50 mcg/actuation nasal spray INHALE 2 SPRAYS INTO EACH NOSTRIL TWICE A DAY    folic acid/vit B complex and C (FOLBEE PLUS ORAL) Take 1 tablet by mouth once daily.    furosemide (LASIX) 20 MG tablet Take 40 mg by mouth once daily.     gabapentin  (NEURONTIN) 300 MG capsule Take 400 mg by mouth 3 (three) times daily.     hydrALAZINE (APRESOLINE) 100 MG tablet Take 100 mg by mouth 3 (three) times daily.    labetalol (NORMODYNE) 300 MG tablet Take 300 mg by mouth 2 (two) times daily.    loratadine (CLARITIN) 10 mg tablet Take by mouth.    NOVOLOG MIX 70-30FLEXPEN U-100 100 unit/mL (70-30) InPn pen TAKE 12 UNITS BEFORE BREAKFAST, LUNCH AND SUPPER IF CBG > 125 ROTATE INJECTION SITES    ondansetron (ZOFRAN-ODT) 4 MG TbDL Take by mouth.    prednisoLONE acetate (PRED FORTE) 1 % DrpS 1 drop 4 (four) times daily.    SITagliptin (JANUVIA) 25 MG Tab Take by mouth.    traMADol (ULTRAM) 50 mg tablet     TRUE METRIX GLUCOSE TEST STRIP Strp CHECK BLOOD SUGAR TWICE DAILY       Review of patient's allergies indicates:  No Known Allergies    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, without long-term current use of insulin 10/11/2018    ESRD on dialysis 10/11/2018    Peritoneal dialysis initiated mid 2018    Renal hypertension 10/11/2018     Past Surgical History:   Procedure Laterality Date    cataract surgery       SECTION      x3    COLONOSCOPY N/A 3/27/2019    Procedure: COLONOSCOPY;  Surgeon: Arianna Srinivasan MD;  Location: [facility];  Service: Endoscopy;  Laterality: N/A;    HYSTERECTOMY      OOPHORECTOMY      PERITONEAL CATHETER INSERTION      RETINAL DETACHMENT SURGERY       Family History     Problem Relation (Age of Onset)    Cancer Paternal Uncle    Diabetes Mother, Father, Brother, Paternal Aunt, Paternal Grandmother, Brother    Gout Brother    HIV Sister    Heart disease Mother    Hypertension Mother, Father        Tobacco Use    Smoking status: Never Smoker    Smokeless tobacco: Never Used   Substance and Sexual Activity    Alcohol use: Yes     Frequency: Monthly or less     Drinks per session: 1 or 2     Binge frequency: Never     Comment: rare; can go months w/o a drink     "Drug use: No    Sexual activity: Not Currently        Review of Systems   Constitutional: Negative for chills, diaphoresis and fever.   HENT: Negative for congestion, nosebleeds, sinus pressure, sneezing, sore throat and trouble swallowing.    Eyes: Negative for discharge and visual disturbance.   Respiratory: Negative for cough, chest tightness and shortness of breath.    Cardiovascular: Negative for chest pain.   Gastrointestinal: Negative for abdominal distention, abdominal pain, constipation, diarrhea and nausea.   Genitourinary: Negative for difficulty urinating, dysuria, frequency and hematuria.   Skin: Negative for color change and pallor.   Neurological: Negative for tremors, seizures and syncope.   Psychiatric/Behavioral: Negative for confusion, self-injury and suicidal ideas.          Objective:     Vital Signs (Most Recent):  Temp: 97.8 °F (36.6 °C) (11/15/20 1748)  Pulse: 80 (11/15/20 1748)  BP: (!) 184/80 (11/15/20 1751)  SpO2: 98 % (11/15/20 1751)  Height: 5' 6" (167.6 cm)  Weight: 75.9 kg (167 lb 5.3 oz)  Body mass index is 27.01 kg/m².     Physical Exam  Constitutional:       Appearance: She is well-developed.   HENT:      Head: Normocephalic and atraumatic.   Eyes:      General: No scleral icterus.     Conjunctiva/sclera: Conjunctivae normal.      Pupils: Pupils are equal, round, and reactive to light.   Neck:      Musculoskeletal: Normal range of motion and neck supple.   Cardiovascular:      Rate and Rhythm: Normal rate and regular rhythm.   Pulmonary:      Effort: Pulmonary effort is normal.      Breath sounds: Normal breath sounds.   Abdominal:      General: Bowel sounds are normal. There is no distension.      Palpations: Abdomen is soft.      Tenderness: There is no abdominal tenderness. There is no guarding or rebound.      Comments: Peritoneal dialysis Catheter in place.    Musculoskeletal: Normal range of motion.   Skin:     General: Skin is warm and dry.   Neurological:      Mental " Status: She is alert and oriented to person, place, and time.              Laboratory  CBC:   Recent Labs   Lab 11/15/20  1735   WBC 8.72   RBC 3.39*   HGB 10.8*   HCT 34.1*      *   MCH 31.9*   MCHC 31.7*     CMP:   Recent Labs   Lab 11/15/20  1735   *   CALCIUM 8.9   ALBUMIN 3.2*   PROT 7.5      K 3.5   CO2 25      BUN 46*   CREATININE 5.9*   ALKPHOS 101   ALT 12   AST 18     Coagulation:   Recent Labs   Lab 11/15/20  1735   APTT 27.7       Diagnostic Results:  Echo:   Results for orders placed or performed in visit on 07/25/19   2D Echo w/ Color Flow Doppler   Result Value Ref Range    QEF 60 55 - 65    Diastolic Dysfunction Yes (A)     Aortic Valve Regurgitation MILD     Aortic Valve Stenosis TRIVIAL     Est. PA Systolic Pressure 44.73 (A)     Tricuspid Valve Regurgitation TRIVIAL

## 2020-11-16 NOTE — PROGRESS NOTES
TRANSPLANT NOTE:      ORGAN: LEFT KIDNEY    Disease Etiology: Diabetes Mellitus - Type Other / Unknown  Donor CMV Status:  POSITIVE  Donor HCV Status:  NEGATIVE  Donor HBcAb:  NEGATIVE  Donor HBV MELINDA:  NEGATIVE  Donor HCV MELINDA:NEGATIVE  Peak cPRA % (within 1 year of transplant): 93      Kendra Malik is a 65 y.o. female s/p  Donation after Brain Death   kidney transplant on 11/16/2020 (Kidney) for Diabetes Mellitus - Type Other / Unknown.   This patient will receive 3 doses of Thymoglobulin for induction.  This patients maintenance immunosuppression will include a steroid taper per protocol to 5mg daily, Prograf, and Cellcept maintenance.  Opportunistic infection prophylaxis will include Valcyte for 3 months (CMV D + , R + ), Bactrim for 1 year, and nystatin for 4 weeks.  Patient is to begin self medications upon transfer to the [facility], and I plan to meet with this patient and his/her support person prior to discharge to review the medication section of the Kidney Transplant Education Manual.  I have reviewed the pre-op medications and have restarted those, as appropriate.

## 2020-11-16 NOTE — NURSING
Laying . Standing BP was 126/59 1 hour after administration of Clonidine. NP notified. Pt denies lightheadedness or dizziness when standing. Will continue to monitor.

## 2020-11-16 NOTE — PROGRESS NOTES
[facility]  Kidney Transplant  Progress Note      Reason for Follow-up: Reassessment of Kidney Transplant - 11/16/2020  (#1) recipient and management of immunosuppression.    ORGAN: LEFT KIDNEY    Donor Type: Donation after Brain Death    Donor CMV Status: Positive  Donor HBcAB:Negative  Donor HCV Status:Negative  Donor HBV MELINDA: Negative  Donor HCV MELINDA: Negative      Subjective:   History of Present Illness:  Ms. Kendra Malik is a 64 year old female with PMH of ESRD secondary to diabetic nephropathy. She has been on the wait list for a kidney transplant at [facility] since 9/24/2018. Patient is currently on peritoneal dialysis started in mid September 2018. She has a peritoneal dialysis catheter for dialysis access. She reports that she is tolerating dialysis well. Patient denies any recent hospitalizations or ED visits.  Native UOP > 1000ml/d.           Ms. Malik is a 65 y.o. year old female who is status post Kidney Transplant - 11/16/2020  (#1).  Her maintenance immunosuppression consists of:   Immunosuppressants (From admission, onward)    Start     Stop Route Frequency Ordered    11/17/20 0900  antithymocyte globulin (rabbit) 125 mg, hydrocortisone sodium succinate (SOLU-CORTEF) 20 mg, heparin (porcine) 1,000 Units in sodium chloride 0.9% 500 mL  (IP TXP KIDNEY POST-OP THYMO WITH PERIPHERAL PRECHECKED)      11/19 0859 IV Daily 11/16/20 0242    11/16/20 1000  mycophenolate capsule 1,000 mg  (IP TXP KIDNEY POST-OP THYMO WITH PERIPHERAL PRECHECKED)      -- Oral 2 times daily 11/16/20 0242    11/16/20 0800  tacrolimus capsule 3 mg  (IP TXP KIDNEY POST-OP THYMO WITH PERIPHERAL PRECHECKED)      -- Oral 2 times daily 11/16/20 0242          Her post transplant course has been uncomplicated to date.    Hospital Course:  Now s/p KTx from a DBD today early morning 11/16/20 (Thymo, CIT ~13 hrs, WIT 42 min, KDPI 29%). x2 Jeff drains. Graft with 1 artery, 1 vein, 1 ureter. Sebastian for 5 days. Satisfactory  UOP with clear yellow color.       Interval History:      Past Medical, Surgical, Family, and Social History:   Unchanged from H&P.    Scheduled Meds:   acetaminophen  1,000 mg Oral Q8H    [START ON 11/17/2020] acetaminophen  650 mg Oral Q24H    amLODIPine  10 mg Oral Daily    [START ON 11/17/2020] Thymoglobulin 1.5mg/kg, hydrocortisone 20mg, heparin 1000 units in NS 500ml (Peripheral line)  1.5 mg/kg Intravenous Daily    bisacodyL  10 mg Oral QHS    [START ON 11/17/2020] diphenhydrAMINE  25 mg Oral Q24H    diphenhydrAMINE  50 mg Intravenous Once    docusate sodium  100 mg Oral TID    ergocalciferol  50,000 Units Oral Q7 Days    famotidine  20 mg Oral QHS    heparin (porcine)  5,000 Units Subcutaneous Q8H    insulin aspart U-100  10 Units Subcutaneous TIDWM    labetaloL  300 mg Oral BID    [START ON 11/17/2020] methylPREDNISolone (SOLU-Medrol) IVPB (doses > 250 mg)  250 mg Intravenous Once    methylPREDNISolone (SOLU-Medrol) IVPB (doses > 250 mg)  500 mg Intravenous Once    [START ON 11/18/2020] methylPREDNISolone sodium succinate  125 mg Intravenous Once    multivitamin  1 tablet Oral Daily    mupirocin  1 g Nasal BID    mycophenolate  1,000 mg Oral BID    nystatin  500,000 Units Mouth/Throat QID (PC + HS)    potassium chloride  30 mEq Oral BID    [START ON 11/19/2020] predniSONE  20 mg Oral Daily    sodium chloride 0.9%  3 mL Intravenous Q6H    [START ON 11/17/2020] sulfamethoxazole-trimethoprim 400-80mg  1 tablet Oral Daily AM    tacrolimus  3 mg Oral BID    [START ON 11/26/2020] valGANciclovir  450 mg Oral Daily AM     Continuous Infusions:   sodium chloride 0.9% Stopped (11/16/20 0815)    dextrose 5 % and 0.45 % NaCl 50 mL/hr at 11/16/20 0315    glucagon (human recombinant)       PRN Meds:dextrose 50%, diphenhydrAMINE, EPINEPHrine, glucagon (human recombinant), glucose, glucose, glucose, hydrocortisone sodium succinate, HYDROmorphone, insulin aspart U-100, mupirocin, ondansetron,  "oxyCODONE, pregabalin, traMADoL    Intake/Output - Last 3 Shifts       11/14 0700 - 11/15 0659 11/15 0700 - 11/16 0659 11/16 0700 - 11/17 0659    P.O.  600 180    I.V. (mL/kg)  4122.5 (53.3) 539.2 (7)    IV Piggyback  700     Total Intake(mL/kg)  5422.5 (70.1) 719.2 (9.3)    Urine (mL/kg/hr)  1350 775 (1.4)    Drains  90 80    Blood  150     Total Output  1590 855    Net  +3832.5 -135.8                  Review of Systems   Constitutional: Negative.    HENT: Negative.    Eyes: Negative.    Respiratory: Negative.    Cardiovascular: Negative.    Gastrointestinal: Positive for abdominal pain.   Neurological: Negative.    Hematological: Negative.    Psychiatric/Behavioral: Negative.       Objective:     Vital Signs (Most Recent):  Temp: 97.7 °F (36.5 °C) (11/16/20 1244)  Pulse: 83 (11/16/20 1245)  Resp: 16 (11/16/20 1330)  BP: 112/61 (11/16/20 1245)  SpO2: 98 % (11/16/20 1245) Vital Signs (24h Range):  Temp:  [97.1 °F (36.2 °C)-97.9 °F (36.6 °C)] 97.7 °F (36.5 °C)  Pulse:  [] 83  Resp:  [11-20] 16  SpO2:  [94 %-100 %] 98 %  BP: (111-200)/(58-90) 112/61     Weight: 77.4 kg (170 lb 10.2 oz)  Height: 5' 5.98" (167.6 cm)  Body mass index is 27.55 kg/m².    Physical Exam  HENT:      Head: Normocephalic and atraumatic.      Nose: Nose normal.      Mouth/Throat:      Mouth: Mucous membranes are dry.   Eyes:      Pupils: Pupils are equal, round, and reactive to light.   Cardiovascular:      Rate and Rhythm: Normal rate and regular rhythm.      Pulses: Normal pulses.   Pulmonary:      Effort: Pulmonary effort is normal.   Abdominal:      Palpations: Abdomen is soft.   Skin:     General: Skin is warm.   Neurological:      General: No focal deficit present.      Mental Status: She is alert.   Psychiatric:         Mood and Affect: Mood normal.         Behavior: Behavior normal.         Laboratory:  CBC:   Recent Labs   Lab 11/15/20  1735 11/16/20  0242   WBC 8.72  --    RBC 3.39*  --    HGB 10.8*  --    HCT 34.1* 29.3*   PLT " 152  --    *  --    MCH 31.9*  --    MCHC 31.7*  --      BMP  Lab Results   Component Value Date     2020    K 3.0 (L) 2020     2020    CO2 22 (L) 2020    BUN 41 (H) 2020    CREATININE 4.9 (H) 2020    CALCIUM 7.4 (L) 2020    ANIONGAP 16 2020    ESTGFRAFRICA 10.1 (A) 2020    EGFRNONAA 8.7 (A) 2020       Diagnostic Results:  US - Kidney: No results found for this or any previous visit.    Assessment/Plan:     Prophylactic immunotherapy        Anemia of renal disease        Status post -donor kidney transplantation  KTx from DBD, KDPI 29%, CIT 13 hrs, Thymo, Cr is improving, satisfactory UOP        ESRD on dialysis  - Admitted for kidney transplant 11/15/20.  - NPO since 1430.  - Thymo induction.   - Surgeon is Dr Ann.  - transplant fellow to obtain consents.  - pre op labs and diagnostic testing pending, to be reviewed prior to surgery.  - needs STAT 2d echo. Last 2019 with PAP of 45.          Discharge Planning:  Not a candidate to discharge yet.    Shanon Farr MD  Kidney Transplant  [facility]

## 2020-11-16 NOTE — HPI
Reason for Consult: Management of T2DM, Hyperglycemia     Surgical Procedure and Date: N/A    Diabetes diagnosis year:  > 10 years ago.     Home Diabetes Medications:   - Novolog 70/30  15 units AM, Skips lunch, 15 units in PM  - Januvia 25 mg daily.     How often checking glucose at home? 1-3 x day   BG readings on regimen: 150'2  Hypoglycemia on the regimen?  No  Missed doses on regimen?  No    Diabetes Complications include:     Hyperglycemia and Diabetic retinopathy     Complicating diabetes co morbidities:   Glucocorticoid use       HPI:   Patient is a 65 y.o. female with a diagnosis of ESRD secondary to diabetic nephropathy. She has been on the wait list for a kidney transplant at [facility] since 9/24/2018. Patient is currently on peritoneal dialysis started in mid September 2018. Endocrinology consulted to manage DM2/glcyemic control during current admission to [facility].     Lab Results   Component Value Date    HGBA1C 9.0 (H) 11/15/2020

## 2020-11-16 NOTE — ANESTHESIA PROCEDURE NOTES
Arterial      Patient location during procedure: done in OR  Procedure start time: 11/15/2020 10:50 PM  Timeout: 11/15/2020 10:50 PM  Procedure end time: 11/15/2020 10:55 PM    Staffing  Authorizing Provider: Josafat Lorenz MD  Performing Provider: Sumeet Austin MD    Anesthesiologist was present at the time of the procedure.    Preanesthetic Checklist  Completed: patient identified, site marked, surgical consent, pre-op evaluation, timeout performed, IV checked, risks and benefits discussed, monitors and equipment checked and anesthesia consent givenArterial  Skin Prep: chlorhexidine gluconate and isopropyl alcohol  Local Infiltration: none  Orientation: left  Location: radial  Catheter Size: 20 G  Catheter placement by Anatomical landmarks. Heme positive aspiration all ports.Insertion Attempts: 1  Assessment  Dressing: secured with tape and tegaderm  Patient: Tolerated well

## 2020-11-16 NOTE — SIGNIFICANT EVENT
Pre-operative Discussion Note  Kidney Transplant Surgery    Kendra Malik is a 65 y.o. female with ESRD, requiring chronic dialysis admitted for kidney transplant.  I discussed the planned procedure in detail, including expected hospital course and outcomes, benefits, risks, and potential complications.  Complications discussed included death, graft failure, bleeding, infection, vascular thrombosis, and rejection.  I discussed the risks of anesthesia, as well as the potential need for re-operation.  The possibility of other complications not specifically mentioned was also discussed.  Also, I discussed the need for lifelong immunosuppression and the possibility of serious complications from immunosuppressive drugs.    The discussion included the risks that the patient will incur if she elects to not have the proposed procedure.    Relevant donor-specific risk factors were disclosed and discussed with the patient, including: none    Specific PHS Increased Risk Behavior criteria for the organ donor include:  None    HCV: none    COVID-19: I discussed the possibility of COVID-19 transmission with the patient. Although MELINDA testing is available for the virus using a technique which in theory should be very accurate, there is no data yet regarding the likelihood of a  false-negative test leading to virus transmission. Based on accuracy of testing for other viruses, it is expected that this risk is extremely small.     I also discussed that transplant immunosuppression will increase susceptibility to COVID-19 and other viruses, and that although we use stringent precautions to protect patients from infection, it is possible for a transplant recipient to contract this infection. If COVID-19 infection should occur, it would be a serious matter with a significant risk of death.    All questions were answered.  The patient and available family members voice understanding and agree to proceed with the transplant.    UNOS  Patient Status  Functional Status: 50% - Requires considerable assistance and frequent medical care  Physical Capacity: No Limitations

## 2020-11-16 NOTE — HOSPITAL COURSE
S/p KTx from a DBD 11/16/20 (Thymo, CIT ~13 hrs min, WIT 42 min, KDPI 29%). x2 Jeff drains. 1 artery, 1 vein, 1 ureter. Sebastian for 5 days. Satisfactory UOP postop. No blood through drains.       Int Hx:   AAOx3, afebrile. Not complaining of pain today. No BM or gas passing. No N/V/D. Satisfactory UOP (2 L/d) with clear yellow color. Sebastian in place. Drains are light ss (300 ml/d). Tomorrow will take out the superficial drain. She is on insulin gtt increased to 2 units/hr. Yesterday ? indigestion and was given S/p KTx from a DBD 11/16/20 (Thymo, CIT ~13 hrs min, WIT 42 min, KDPI 29%). x2 Jeff drains. 1 artery, 1 vein, 1 ureter. Sebastian for 5 days. Satisfactory UOP post-op. No blood through drains.

## 2020-11-16 NOTE — PROGRESS NOTES
Admit Note     Met with patient and significant other to assess needs. Patient is a 65 y.o.  female, admitted for for kidney transplant.      Patient admitted from home on 11/15/2020 .  At this time, patient presents as alert and oriented x 4, pleasant, good eye contact, well groomed, recall good, concentration/judgement good, average intelligence, calm, communicative, cooperative and asking and answering questions appropriately.  At this time, patients caregiver presents as alert and oriented x 4, pleasant, good eye contact, well groomed, recall good, concentration/judgement good, average intelligence, calm, communicative, cooperative and asking and answering questions appropriately.    Household/Family Systems     Patient resides with patient's significant other, at [address] [facility] [address].  Support system includes pt's daughter, significant other and 3 sons.  Patient does not have dependents that are need of being cared for.     Patients primary caregiver is Kelley Corral (Jackie), patients significant other, phone number [phone] and pt's daughter, Yue Malik # [phone].  Confirmed patients contact information is [phone] (home);   Telephone Information:   Mobile [phone]   .    During admission, patient's caregiver plans to stay in patient's room as allowed.  Confirmed patient and patients caregivers do have access to reliable transportation.    Cognitive Status/Learning     Patient reports reading ability as 12th grade and states patient does have difficulty with seeing due to retinopathy.  Patient reports patient learns best by combination of verbal, written and hands on demonstration.   Needed: No.   Highest education level: High School (9-12) or GED    Vocation/Disability   .  Working for Income: No  If no, reason not working: Patient Choice - Retired  Patient is retired from contract CNA.    Adherence     Patient reports a high level of adherence  to patients health care regimen.  Adherence counseling and education provided. Patient verbalizes understanding.    Substance Use    Patient reports the following substance usage.    Tobacco: none, patient denies any use.  Alcohol: none, patient denies any use.  Illicit Drugs/Non-prescribed Medications: none, patient denies any use.  Patient states clear understanding of the potential impact of substance use.  Substance abstinence/cessation counseling, education and resources provided and reviewed.     Services Utilizing/ADLS    Infusion Service: Prior to admission, patient utilizing? no  Home Health: Prior to admission, patient utilizing? no  DME: Prior to admission, yes PD equipment, shower chair   Pulmonary/Cardiac Rehab: Prior to admission, no  Dialysis:  Prior to admission, yes  Transplant Specialty Pharmacy:  Prior to admission, no.    Prior to admission, patient reports patient was independent with ADLS and was driving.  Patient reports patient is not able to care for self at this time due to compromised medical condition (as documented in medical record) and physical weakness..  Patient indicates a willingness to care for self once medically cleared to do so.    Insurance/Medications    Insured by   Payor/Plan Subscr  Sex Relation Sub. Ins. ID Effective Group Num   1. [place]ARE - [place]* HOLLAND PETERSEN 1955 Female Self [id number]                                    [address]   2. [place] PETERSEN 1955 Female  [id number]                                    [address] [address]      Primary Insurance (for UNOS reporting): Public Insurance - Medicare FFS (Fee For Service)  Secondary Insurance (for UNOS reporting): Public Insurance - Medicaid    Patient reports patient is able to obtain and afford medications at this time and at time of discharge.    Living Will/Healthcare Power of     Patient states patient does not have a LW and/or HCPA.   provided  education regarding LW and HCPA and the completion of forms.    Coping/Mental Health    Patient is coping adequately with the aid of  family members and friends. Pt reports feeling tired but well today.  Patient denies mental health difficulties.     Discharge Planning    At time of discharge, patient plans [facility], depending on LR vacancy, under the care of pt's significant other and daughter.  Patients significant other and daughter will transport patient.  Per rounds today, expected discharge date has not been medically determined at this time. Patient and patients caregiver  verbalize understanding and are involved in treatment planning and discharge process.    Additional Concerns    Patient's caretaker denies additional needs and/or concerns at this time. Patient is being followed for needs, education, resources, information, emotional support, supportive counseling, and for supportive and skilled discharge plan of care.  providing ongoing psychosocial support, education, resources and d/c planning as needed.  SW remains available.  remains available. Patient's caregiver verbalizes understanding and agreement with information reviewed,  availability and how to access available resources as needed. Patient denies additional needs and/or concerns at this time.

## 2020-11-16 NOTE — SUBJECTIVE & OBJECTIVE
Subjective:   History of Present Illness:  Ms. Kendra Malik is a 64 year old female with PMH of ESRD secondary to diabetic nephropathy. She has been on the wait list for a kidney transplant at [facility] since 9/24/2018. Patient is currently on peritoneal dialysis started in mid September 2018. She has a peritoneal dialysis catheter for dialysis access. She reports that she is tolerating dialysis well. Patient denies any recent hospitalizations or ED visits.  Native UOP > 1000ml/d.           Ms. Malik is a 65 y.o. year old female who is status post Kidney Transplant - 11/16/2020  (#1).  Her maintenance immunosuppression consists of:   Immunosuppressants (From admission, onward)    Start     Stop Route Frequency Ordered    11/17/20 0900  antithymocyte globulin (rabbit) 125 mg, hydrocortisone sodium succinate (SOLU-CORTEF) 20 mg, heparin (porcine) 1,000 Units in sodium chloride 0.9% 500 mL  (IP TXP KIDNEY POST-OP THYMO WITH PERIPHERAL PRECHECKED)      11/19 0859 IV Daily 11/16/20 0242    11/16/20 1000  mycophenolate capsule 1,000 mg  (IP TXP KIDNEY POST-OP THYMO WITH PERIPHERAL PRECHECKED)      -- Oral 2 times daily 11/16/20 0242    11/16/20 0800  tacrolimus capsule 3 mg  (IP TXP KIDNEY POST-OP THYMO WITH PERIPHERAL PRECHECKED)      -- Oral 2 times daily 11/16/20 0242          Her post transplant course has been uncomplicated to date.    Hospital Course:  Now s/p KTx from a DBD today early morning 11/16/20 (Thymo, CIT ~13 hrs, WIT 42 min, KDPI 29%). x2 Jeff drains. Graft with 1 artery, 1 vein, 1 ureter. Sebastian for 5 days. Satisfactory UOP with clear yellow color.       Interval History:      Past Medical, Surgical, Family, and Social History:   Unchanged from H&P.    Scheduled Meds:   acetaminophen  1,000 mg Oral Q8H    [START ON 11/17/2020] acetaminophen  650 mg Oral Q24H    amLODIPine  10 mg Oral Daily    [START ON 11/17/2020] Thymoglobulin 1.5mg/kg, hydrocortisone 20mg, heparin 1000 units in NS 500ml  (Peripheral line)  1.5 mg/kg Intravenous Daily    bisacodyL  10 mg Oral QHS    [START ON 11/17/2020] diphenhydrAMINE  25 mg Oral Q24H    diphenhydrAMINE  50 mg Intravenous Once    docusate sodium  100 mg Oral TID    ergocalciferol  50,000 Units Oral Q7 Days    famotidine  20 mg Oral QHS    heparin (porcine)  5,000 Units Subcutaneous Q8H    insulin aspart U-100  10 Units Subcutaneous TIDWM    labetaloL  300 mg Oral BID    [START ON 11/17/2020] methylPREDNISolone (SOLU-Medrol) IVPB (doses > 250 mg)  250 mg Intravenous Once    methylPREDNISolone (SOLU-Medrol) IVPB (doses > 250 mg)  500 mg Intravenous Once    [START ON 11/18/2020] methylPREDNISolone sodium succinate  125 mg Intravenous Once    multivitamin  1 tablet Oral Daily    mupirocin  1 g Nasal BID    mycophenolate  1,000 mg Oral BID    nystatin  500,000 Units Mouth/Throat QID (PC + HS)    potassium chloride  30 mEq Oral BID    [START ON 11/19/2020] predniSONE  20 mg Oral Daily    sodium chloride 0.9%  3 mL Intravenous Q6H    [START ON 11/17/2020] sulfamethoxazole-trimethoprim 400-80mg  1 tablet Oral Daily AM    tacrolimus  3 mg Oral BID    [START ON 11/26/2020] valGANciclovir  450 mg Oral Daily AM     Continuous Infusions:   sodium chloride 0.9% Stopped (11/16/20 0815)    dextrose 5 % and 0.45 % NaCl 50 mL/hr at 11/16/20 0315    glucagon (human recombinant)       PRN Meds:dextrose 50%, diphenhydrAMINE, EPINEPHrine, glucagon (human recombinant), glucose, glucose, glucose, hydrocortisone sodium succinate, HYDROmorphone, insulin aspart U-100, mupirocin, ondansetron, oxyCODONE, pregabalin, traMADoL    Intake/Output - Last 3 Shifts       11/14 0700 - 11/15 0659 11/15 0700 - 11/16 0659 11/16 0700 - 11/17 0659    P.O.  600 180    I.V. (mL/kg)  4122.5 (53.3) 539.2 (7)    IV Piggyback  700     Total Intake(mL/kg)  5422.5 (70.1) 719.2 (9.3)    Urine (mL/kg/hr)  1350 775 (1.4)    Drains  90 80    Blood  150     Total Output  1590 855    Net  +3832.5  "-135.8                  Review of Systems   Constitutional: Negative.    HENT: Negative.    Eyes: Negative.    Respiratory: Negative.    Cardiovascular: Negative.    Gastrointestinal: Positive for abdominal pain.   Neurological: Negative.    Hematological: Negative.    Psychiatric/Behavioral: Negative.       Objective:     Vital Signs (Most Recent):  Temp: 97.7 °F (36.5 °C) (11/16/20 1244)  Pulse: 83 (11/16/20 1245)  Resp: 16 (11/16/20 1330)  BP: 112/61 (11/16/20 1245)  SpO2: 98 % (11/16/20 1245) Vital Signs (24h Range):  Temp:  [97.1 °F (36.2 °C)-97.9 °F (36.6 °C)] 97.7 °F (36.5 °C)  Pulse:  [] 83  Resp:  [11-20] 16  SpO2:  [94 %-100 %] 98 %  BP: (111-200)/(58-90) 112/61     Weight: 77.4 kg (170 lb 10.2 oz)  Height: 5' 5.98" (167.6 cm)  Body mass index is 27.55 kg/m².    Physical Exam  HENT:      Head: Normocephalic and atraumatic.      Nose: Nose normal.      Mouth/Throat:      Mouth: Mucous membranes are dry.   Eyes:      Pupils: Pupils are equal, round, and reactive to light.   Cardiovascular:      Rate and Rhythm: Normal rate and regular rhythm.      Pulses: Normal pulses.   Pulmonary:      Effort: Pulmonary effort is normal.   Abdominal:      Palpations: Abdomen is soft.   Skin:     General: Skin is warm.   Neurological:      General: No focal deficit present.      Mental Status: She is alert.   Psychiatric:         Mood and Affect: Mood normal.         Behavior: Behavior normal.         Laboratory:  CBC:   Recent Labs   Lab 11/15/20  1735 11/16/20  0242   WBC 8.72  --    RBC 3.39*  --    HGB 10.8*  --    HCT 34.1* 29.3*     --    *  --    MCH 31.9*  --    MCHC 31.7*  --      BMP  Lab Results   Component Value Date     11/16/2020    K 3.0 (L) 11/16/2020     11/16/2020    CO2 22 (L) 11/16/2020    BUN 41 (H) 11/16/2020    CREATININE 4.9 (H) 11/16/2020    CALCIUM 7.4 (L) 11/16/2020    ANIONGAP 16 11/16/2020    ESTGFRAFRICA 10.1 (A) 11/16/2020    EGFRNONAA 8.7 (A) 11/16/2020 "       Diagnostic Results:  US - Kidney: No results found for this or any previous visit.

## 2020-11-16 NOTE — NURSING TRANSFER
Nursing Transfer Note      11/16/2020     Transfer To: [address]    Transfer via bed    Transfer with IV fluids    Transported by RN    Medicines sent: thymo    Chart send with patient: Yes    Patient reassessed at:

## 2020-11-16 NOTE — TRANSFER OF CARE
"Anesthesia Transfer of Care Note    Patient: Kendra Malik    Procedure(s) Performed: Procedure(s) (LRB):  TRANSPLANT, KIDNEY (N/A)    Patient location: PACU    Anesthesia Type: general    Transport from OR: Transported from OR on 6-10 L/min O2 by face mask with adequate spontaneous ventilation    Post pain: adequate analgesia    Post assessment: no apparent anesthetic complications    Post vital signs: stable    Level of consciousness: sedated    Nausea/Vomiting: no nausea/vomiting    Complications: none    Transfer of care protocol was followed      Last vitals:   Visit Vitals  BP (!) 152/68   Pulse 91   Temp 36.2 °C (97.1 °F) (Axillary)   Resp 14   Ht 5' 6" (1.676 m)   Wt 75.9 kg (167 lb 5.3 oz)   SpO2 99%   Breastfeeding No   BMI 27.01 kg/m²     "

## 2020-11-16 NOTE — OP NOTE
Operative Report    Date of Procedure: 11/15/2020  Date of Transplant (UNOS): 11/16/20    Surgeons:  Surgeon(s) and Role:     * Scott Ann MD - Primary     * Francisco Finnegan MD - Resident - Assisting     * Angelica Vera MD - Fellow    First Assistant Attestation:  The indicated resident served as first assistant for this procedure.    Pre-operative Diagnosis: ESRD, requiring chronic dialysis secondary to Diabetes Mellitus - Type Other / Unknown  Obesity  Post-operative Diagnosis: Same    Procedure(s) Performed:   1. Back Table Preparation of Left Kidney    2. Donation after Brain Death Kidney transplant    Anesthesia: General endotracheal    Preamble  Indications and Patient Counseling: The patient is a 65 y.o. year-old female with end-stage kidney disease secondary to Diabetes Mellitus - Type Other / Unknown who has been evaluated for a kidney transplant.  The procedure was thoroughly discussed with the patient, including potential risks, complications, and alternatives.  Specific complications mentioned included death, graft non-function, bleeding, infection, and rejection, as well as the possibility of other complications not specifically mentioned.    Donor Risk Factors: Prior to the operation, the patient was advised of any donor-specific risk factors requiring specific disclosure.  Factors in this case included nothing that required specific disclosure.      Specific PHS Increased Risk Behavior criteria for the organ donor include:  None    All questions were answered, the patient voiced appropriate understanding, and she agreed to proceed with the planned procedure.    ABO Confirmation: Immediately following arrival of the donor organ and prior to implantation, a formal ABO confirmation was done according to hospital and UNOS policies.  I confirmed the OS ID number ([id number]) of the donor organ and the donor and recipient ABO types, directly verifying these data by comparison  with the UNOS Match Run report ([id number]).  This confirmation was personally done by an attending surgeon and circulating nurse, and is officially documented elsewhere.    Time-Out: A complete time out was carried out prior to incision, with confirmation of patient identity, correct procedure, correct operative site, appropriate antibiotic prophylaxis, review of any known allergies, and presence of all needed equipment.    Procedure in Detail  Prior to starting the operation, the left kidney  was prepared on the back table. Arterial anatomy was single. Venous anatomy was single. Ureteral anatomy was single. Back table vascular reconstruction was not required .  Unneeded fat was removed from the kidney, the vessels were cleaned of adherent tissue and tested for leaks, and the kidney was maintained at ice temperature in organ preservation solution until it was brought to the operative field.     The patient was brought into the operating room and placed in a supine position on the OR table.  After the induction of general endotracheal anesthesia, lines were placed by the anesthesiologist.  The urinary bladder was catheterized and irrigated with antibiotic solution.  There was no tension on the axillae and all pressure points were padded.  Sequential compression boots were used as were Leandro Huggers.  The abdomen was prepped and draped in the usual sterile fashion.  Skin was incised over the left with a knife and deepened with electrocautery.  The peritoneum and its contents were swept medially, exposing the left external iliac artery and the left external iliac vein. The Bookwalter retractor was used to provide exposure.  Overlying lymphatics were ligated or cauterized and the vessels were dissected free for a length compatible with anastomosis.  The kidney was brought to the OR table at 11/16/2020 12:19 AM.  Venous control was obtained with a vascular clamp.  A venotomy was made, the vein irrigated, and an end renal to  left external iliac vein anastomosis was created with 5-0 polypropylene.  Arterial control was obtained with a vascular clamp.  Arteriotomy was made, the artery irrigated, and an aortic patch to left external iliac artery anastomosis was created with 5-0 polypropylene.  The kidney was unclamped and reperfused at 11/16/2020  1:01 AM.  Reperfusion quality was good though the tone was soft. 5 mg of verapamil were injected into the external iliac artery proximal to the renal artery anastomosis with improved of the perfusion.  Intraoperative urine production was observed.  After hemostasis was obtained, a Lich uretero-neocystostomy was created.  The bladder was filled and identified, opened, and the anastomosis created using 6-0 PDS.  The bladder muscle was closed over the distal ureter to create an antireflux tunnel.  A ureteral stent was used.  With the kidney well perfused and sitting appropriately without tension on the anastomoses, viscera were replaced in their usual position.  The wound was closed after a final check for hemostasis.  Overall, the graft quality was assessed to be good. At the end of the case the needle, sponge and instrument counts were all correct.  Sterile dressings were applied and the patient was brought to the recovery room/ICU in good condition.   The operation was more challenge that usual due to the patient body habitus, obesity and limited exposure.     Estimated Blood Loss: 50 mL  Fluids Administered:    Drains: 19f Jeff drains x 2  Specimens: none    Findings:    Organ Transplanted: Left Kidney    Arterial Anatomy: single  Number of Arteries: 1  Configuration of Multiple Arteries: not applicable  Venous Anatomy: single  Number of Veins: 1  Ureteral Anatomy: single  Number of Ureters: 1  Reperfusion Quality: good  Overall Graft Quality: good  Intraoperative Urine Production: yes  Sebastian: not to be removed before 5 days. Large cystostomy.   Ureteral Stent: Yes    Ischemic Times:    Anastomosis (warm ischemia) time: 42 minutes   Cold ischemia time: 801 minutes  Total ischemia time: 843 minutes    Donor Data:  UNOS ID: [id number]   UNOS Match Run: [id number]   Donor Type: Donation after Brain Death   Donor CMV Status: Positive   Donor HBcAB: Negative   Donor HCV Status: Negative

## 2020-11-16 NOTE — PLAN OF CARE
Recommendations    1. Continue Regular diet as tolerated, encourage PO intake   2. Monitor for ONS    Goals: 1. Pt to meet % EEN/EPN over the course of 7 days  Nutrition Goal Status: new  Communication of RD Recs: (POC)

## 2020-11-16 NOTE — ANESTHESIA POSTPROCEDURE EVALUATION
Anesthesia Post Evaluation    Patient: Kendra Malik    Procedure(s) Performed: Procedure(s) (LRB):  TRANSPLANT, KIDNEY (N/A)    Final Anesthesia Type: general    Patient location during evaluation: PACU  Patient participation: Yes- Able to Participate  Level of consciousness: awake and alert  Post-procedure vital signs: reviewed and stable  Pain management: adequate  Airway patency: patent    PONV status at discharge: No PONV  Anesthetic complications: no      Cardiovascular status: blood pressure returned to baseline  Respiratory status: unassisted and spontaneous ventilation  Hydration status: euvolemic  Follow-up not needed.          Vitals Value Taken Time   /70 11/16/20 0603   Temp 36.2 °C (97.1 °F) 11/16/20 0309   Pulse 100 11/16/20 0603   Resp 12 11/16/20 0603   SpO2 97 % 11/16/20 0603   Vitals shown include unvalidated device data.      Event Time   Out of Recovery 11/16/2020 03:45:00         Pain/Lisa Score: Pain Rating Prior to Med Admin: 7 (11/16/2020  4:27 AM)  Lisa Score: 9 (11/16/2020  3:45 AM)

## 2020-11-16 NOTE — PLAN OF CARE
Patient AAO today, complaints of 9/10 abd pain, pain meds adjusted. Creatinine trending down, uo 775 today per shaffer, I=O. 500 NS bolus given today. Patient is drinking plenty of water. 2x RUDDY moderate amount of sang drainage. R CW PD cath remains intact. Significant other at the bedside. Transition insulin drip started today at 1.3 units.

## 2020-11-16 NOTE — ADDENDUM NOTE
Addendum  created 11/16/20 0705 by Nichole Antony, CRNA    Flowsheet accepted, Intraprocedure Flowsheets edited

## 2020-11-16 NOTE — ANESTHESIA PROCEDURE NOTES
Intubation  Performed by: Nichole Antony CRNA  Authorized by: Josafat Lorenz MD     Intubation:     Induction:  Intravenous    Intubated:  Postinduction    Mask Ventilation:  Easy with oral airway    Attempts:  1    Attempted By:  CRNA    Method of Intubation:  Direct    Blade:  Navarrete 2    Laryngeal View Grade: Grade IIA - cords partially seen      Difficult Airway Encountered?: No      Complications:  None    Airway Device:  Oral endotracheal tube    Airway Device Size:  7.5    Style/Cuff Inflation:  Cuffed (inflated to minimal occlusive pressure)    Tube secured:  22    Secured at:  The teeth    Placement Verified By:  Capnometry    Complicating Factors:  None    Findings Post-Intubation:  BS equal bilateral and atraumatic/condition of teeth unchanged

## 2020-11-16 NOTE — H&P
[facility]  Kidney Transplant  H&P      Subjective:     Chief Complaint/Reason for Admission: Admission for kidney transplant, accepted kidney offer.     History of Present Illness:  Ms. Kendra Malik is a 64 year old female with PMH of ESRD secondary to diabetic nephropathy. She has been on the wait list for a kidney transplant at [facility] since 9/24/2018. Patient is currently on peritoneal dialysis started in mid September 2018. She has a peritoneal dialysis catheter for dialysis access. She reports that she is tolerating dialysis well. Patient denies any recent hospitalizations or ED visits.            Dialysis History: Ms. Gardner with ESRD, requiring chronic dialysis who is on peritoneal dialysis. Patient is dialyzing on cyclic peritoneal dialysis.  Patient reports that she is tolerating dialysis well.     Date of Last Dialysis:  11/14/2020, evening.     Native urine output per day: >1000 mL    Previous Transplant: no    PTA Medications   Medication Sig    amLODIPine (NORVASC) 10 MG tablet Take 10 mg by mouth once daily.    ascorbic acid, vitamin C, (VITAMIN C) 500 MG tablet Take 500 mg by mouth once daily.    aspirin (ECOTRIN) 81 MG EC tablet Take 81 mg by mouth once daily.    atorvastatin (LIPITOR) 20 MG tablet Take 20 mg by mouth once daily.    calcitRIOL (ROCALTROL) 0.25 MCG Cap Take 0.25 mcg by mouth 4 (four) times a week. Take 1 Capsule by mouth four times a week    cinacalcet (SENSIPAR) 30 MG Tab 5 (five) times daily.    cloNIDine (CATAPRES) 0.1 MG tablet Take 0.1 mg by mouth 2 (two) times daily.    diclofenac sodium/capsaicin (DICLOFENAC-CAPSAICIN TOP)   0 Refill(s)    ergocalciferol (ERGOCALCIFEROL) 50,000 unit Cap Take 50,000 Units by mouth every 7 days.    fluticasone (FLONASE) 50 mcg/actuation nasal spray INHALE 2 SPRAYS INTO EACH NOSTRIL TWICE A DAY    folic acid/vit B complex and C (FOLBEE PLUS ORAL) Take 1 tablet by mouth once daily.    furosemide (LASIX) 20 MG tablet  Take 40 mg by mouth once daily.     gabapentin (NEURONTIN) 300 MG capsule Take 400 mg by mouth 3 (three) times daily.     hydrALAZINE (APRESOLINE) 100 MG tablet Take 100 mg by mouth 3 (three) times daily.    labetalol (NORMODYNE) 300 MG tablet Take 300 mg by mouth 2 (two) times daily.    loratadine (CLARITIN) 10 mg tablet Take by mouth.    NOVOLOG MIX 70-30FLEXPEN U-100 100 unit/mL (70-30) InPn pen TAKE 12 UNITS BEFORE BREAKFAST, LUNCH AND SUPPER IF CBG > 125 ROTATE INJECTION SITES    ondansetron (ZOFRAN-ODT) 4 MG TbDL Take by mouth.    prednisoLONE acetate (PRED FORTE) 1 % DrpS 1 drop 4 (four) times daily.    SITagliptin (JANUVIA) 25 MG Tab Take by mouth.    traMADol (ULTRAM) 50 mg tablet     TRUE METRIX GLUCOSE TEST STRIP Strp CHECK BLOOD SUGAR TWICE DAILY       Review of patient's allergies indicates:  No Known Allergies    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, without long-term current use of insulin 10/11/2018    ESRD on dialysis 10/11/2018    Peritoneal dialysis initiated mid 2018    Renal hypertension 10/11/2018     Past Surgical History:   Procedure Laterality Date    cataract surgery       SECTION      x3    COLONOSCOPY N/A 3/27/2019    Procedure: COLONOSCOPY;  Surgeon: Arianna Srinivasan MD;  [facility];  Service: Endoscopy;  Laterality: N/A;    HYSTERECTOMY      OOPHORECTOMY      PERITONEAL CATHETER INSERTION      RETINAL DETACHMENT SURGERY       Family History     Problem Relation (Age of Onset)    Cancer Paternal Uncle    Diabetes Mother, Father, Brother, Paternal Aunt, Paternal Grandmother, Brother    Gout Brother    HIV Sister    Heart disease Mother    Hypertension Mother, Father        Tobacco Use    Smoking status: Never Smoker    Smokeless tobacco: Never Used   Substance and Sexual Activity    Alcohol use: Yes     Frequency: Monthly or less     Drinks per session: 1 or 2     Binge frequency: Never      "Comment: rare; can go months w/o a drink    Drug use: No    Sexual activity: Not Currently        Review of Systems   Constitutional: Negative for chills, diaphoresis and fever.   HENT: Negative for congestion, nosebleeds, sinus pressure, sneezing, sore throat and trouble swallowing.    Eyes: Negative for discharge and visual disturbance.   Respiratory: Negative for cough, chest tightness and shortness of breath.    Cardiovascular: Negative for chest pain.   Gastrointestinal: Negative for abdominal distention, abdominal pain, constipation, diarrhea and nausea.   Genitourinary: Negative for difficulty urinating, dysuria, frequency and hematuria.   Skin: Negative for color change and pallor.   Neurological: Negative for tremors, seizures and syncope.   Psychiatric/Behavioral: Negative for confusion, self-injury and suicidal ideas.          Objective:     Vital Signs (Most Recent):  Temp: 97.8 °F (36.6 °C) (11/15/20 1748)  Pulse: 80 (11/15/20 1748)  BP: (!) 184/80 (11/15/20 1751)  SpO2: 98 % (11/15/20 1751)  Height: 5' 6" (167.6 cm)  Weight: 75.9 kg (167 lb 5.3 oz)  Body mass index is 27.01 kg/m².     Physical Exam  Constitutional:       Appearance: She is well-developed.   HENT:      Head: Normocephalic and atraumatic.   Eyes:      General: No scleral icterus.     Conjunctiva/sclera: Conjunctivae normal.      Pupils: Pupils are equal, round, and reactive to light.   Neck:      Musculoskeletal: Normal range of motion and neck supple.   Cardiovascular:      Rate and Rhythm: Normal rate and regular rhythm.   Pulmonary:      Effort: Pulmonary effort is normal.      Breath sounds: Normal breath sounds.   Abdominal:      General: Bowel sounds are normal. There is no distension.      Palpations: Abdomen is soft.      Tenderness: There is no abdominal tenderness. There is no guarding or rebound.      Comments: Peritoneal dialysis Catheter in place.    Musculoskeletal: Normal range of motion.   Skin:     General: Skin is " warm and dry.   Neurological:      Mental Status: She is alert and oriented to person, place, and time.              Laboratory  CBC:   Recent Labs   Lab 11/15/20  1735   WBC 8.72   RBC 3.39*   HGB 10.8*   HCT 34.1*      *   MCH 31.9*   MCHC 31.7*     CMP:   Recent Labs   Lab 11/15/20  1735   *   CALCIUM 8.9   ALBUMIN 3.2*   PROT 7.5      K 3.5   CO2 25      BUN 46*   CREATININE 5.9*   ALKPHOS 101   ALT 12   AST 18     Coagulation:   Recent Labs   Lab 11/15/20  1735   APTT 27.7       Diagnostic Results:  Echo:   Results for orders placed or performed in visit on 07/25/19   2D Echo w/ Color Flow Doppler   Result Value Ref Range    QEF 60 55 - 65    Diastolic Dysfunction Yes (A)     Aortic Valve Regurgitation MILD     Aortic Valve Stenosis TRIVIAL     Est. PA Systolic Pressure 44.73 (A)     Tricuspid Valve Regurgitation TRIVIAL      Assessment/Plan:     ESRD on dialysis  - Admitted for kidney transplant 11/15/20.  - NPO since 1430.  - Thymo induction.   - Surgeon is Dr Ann.  - pre op labs and diagnostic testing pending, to be reviewed prior to surgery.  - Consider repeat 2d echo. Last 7/2019 with PAP of 45.          The patient presents for kidney transplant.  There are no apparent contraindications to proceeding with the planned transplant.  The patient understands that the transplant could potentially be cancelled pending detailed assessment of the donor organ.  She will receive Thymoglobulin induction.  A complete discussion of the transplant procedure, including risks, complications, and alternatives, as well as any donor-specific risk factors requiring specific disclosure, will be carried out by the responsible staff surgeon prior to the procedure.     Discharge Planning:  Not a candidate for discharge at this time.     Angelica Asif MD  Kidney Transplant  [facility]

## 2020-11-16 NOTE — CONSULTS
"  [facility]  Adult Nutrition  Consult Note    SUMMARY     Recommendations    1. Continue Regular diet as tolerated, encourage PO intake   2. Monitor for ONS    Goals: 1. Pt to meet % EEN/EPN over the course of 7 days  Nutrition Goal Status: new  Communication of RD Recs: (POC)    Reason for Assessment    Reason For Assessment: consult  Diagnosis: (ESRD on dialysis)  Relevant Medical History: DM2, ESRD on PD, HTN  Interdisciplinary Rounds: attended  General Information Comments: S/p KTx 11/16. Caregiver present. Pt reporst feeling good, just in pain at the incision site. PTA pt reports no wt changes, -170#. Followed somewhat a low sodium diet at home. Denies n/v/d/c. PO intake for breakfast ~30%, had lunch tray and stated she will eat lunch in a little while. NFPE not warranted, pt's wt stable.    Nutrition Discharge Planning: Post transplant nutrition education provided on 11/16. Food safety/drug interactions emphasized. General healthy/low salt diet recommended. Education material left. No other needs identified. Caregiver present.    Nutrition Risk Screen    Nutrition Risk Screen: no indicators present    Nutrition/Diet History    Patient Reported Diet/Restrictions/Preferences: low salt  Spiritual, Cultural Beliefs, Nondenominational Practices, Values that Affect Care: no  Food Allergies: NKFA  Factors Affecting Nutritional Intake: None identified at this time    Anthropometrics    Temp: 97.7 °F (36.5 °C)  Height Method: Stated  Height: 5' 5.98" (167.6 cm)  Height (inches): 65.98 in  Weight Method: Bed Scale  Weight: 77.4 kg (170 lb 10.2 oz)  Weight (lb): 170.64 lb  Ideal Body Weight (IBW), Female: 129.9 lb  % Ideal Body Weight, Female (lb): 131.36 %  BMI (Calculated): 27.6  BMI Grade: 25 - 29.9 - overweight       Lab/Procedures/Meds    Pertinent Labs Reviewed: reviewed  Pertinent Labs Comments: K 3.0, BUN 41, Cre 4.9, GFR 10.1, Glu 189  Pertinent Medications Reviewed: reviewed  Pertinent " Medications Comments: amlodipine, docusate, famotidine, heparin, insulin, methylprednisolone, MVI, mycophenolate, KCl, prednisone, tacrolimus    Estimated/Assessed Needs    Weight Used For Calorie Calculations: 77.4 kg (170 lb 10.2 oz)  Energy Calorie Requirements (kcal): 1935 kcals/day(25 kcal/kg)  Energy Need Method: Kcal/kg  Protein Requirements:  g/day(1.2-1.5 g/kg)  Weight Used For Protein Calculations: 77.4 kg (170 lb 10.2 oz)  Fluid Requirements (mL): 1 mL/kcal or per MD  Estimated Fluid Requirement Method: RDA Method  RDA Method (mL): 1935  CHO Requirement: 242 g      Nutrition Prescription Ordered    Current Diet Order: Regular    Evaluation of Received Nutrient/Fluid Intake    Comments: LBM: 11/15  Tolerance: tolerating  % Intake of Estimated Energy Needs: 25 - 50 %  % Meal Intake: 25 - 50 %    Nutrition Risk    Level of Risk/Frequency of Follow-up: high     Assessment and Plan    Nutrition Problem  increased nutient needs, protein    Related to (etiology):   Physiological demands, healing    Signs and Symptoms (as evidenced by):   S/p Kidney transplant 11/16     Interventions (treatment strategy):  Collaboration with other providers  General/healthful diet  Nutrition education    Nutrition Diagnosis Status:   New      Monitor and Evaluation    Food and Nutrient Intake: energy intake  Food and Nutrient Adminstration: diet order  Knowledge/Beliefs/Attitudes: food and nutrition knowledge/skill  Anthropometric Measurements: weight, weight change  Biochemical Data, Medical Tests and Procedures: electrolyte and renal panel, glucose/endocrine profile  Nutrition-Focused Physical Findings: overall appearance, skin     Nutrition Follow-Up    RD Follow-up?: Yes

## 2020-11-16 NOTE — NURSING
Pt BP was 200/90 manual check. Notified NP. Per NP give 0.2mg of Clonidine.  Will continue to monitor until pt goes to surgery.

## 2020-11-16 NOTE — PLAN OF CARE
AAOx4. LLQ incision is CDI. 2 RUDDY drains putting out bloody drainage. U/o adequate with hourly checks.Urine is pink tinged color. First round of Thymo given. NaCl and D5 1/2 infusing. Significant other at bedside. Safety maintained.

## 2020-11-16 NOTE — ASSESSMENT & PLAN NOTE
BG goal 140 - 180     - Continue transition IV insulin infusion with stepdown parameters. Initial rate starting at 2 units/hr.   - Continue Novolog to 14 units TIDWM.   - Continue Low Dose SQ Insulin Correction Scale. Considering kidney function.   - BG Monitoring /HS/0200    ** Please call Endocrine for any BG related issues **  ** Please notify Endocrine for any change and/or advance in diet**    Discharge Planning:   TBD. Please notify endocrinology prior to discharge.     Lab Results   Component Value Date    HGBA1C 9.0 (H) 11/15/2020

## 2020-11-16 NOTE — PROGRESS NOTES
Notified Dr Huang of the patients increased pain. Dr Huang  Gave a verbal one time dose of dilaudid .5mg once now and an additional oxycodone 5mg now, to total oxycodone 10mg.

## 2020-11-17 ENCOUNTER — TELEPHONE (OUTPATIENT)
Dept: TRANSPLANT | Facility: CLINIC | Age: 65
End: 2020-11-17

## 2020-11-17 LAB
ALBUMIN SERPL BCP-MCNC: 2.6 G/DL (ref 3.5–5.2)
ANION GAP SERPL CALC-SCNC: 13 MMOL/L (ref 8–16)
BASOPHILS # BLD AUTO: 0.01 K/UL (ref 0–0.2)
BASOPHILS NFR BLD: 0.1 % (ref 0–1.9)
BUN SERPL-MCNC: 47 MG/DL (ref 8–23)
CALCIUM SERPL-MCNC: 7.8 MG/DL (ref 8.7–10.5)
CHLORIDE SERPL-SCNC: 101 MMOL/L (ref 95–110)
CO2 SERPL-SCNC: 18 MMOL/L (ref 23–29)
CREAT SERPL-MCNC: 3.7 MG/DL (ref 0.5–1.4)
DIFFERENTIAL METHOD: ABNORMAL
EOSINOPHIL # BLD AUTO: 0 K/UL (ref 0–0.5)
EOSINOPHIL NFR BLD: 0.1 % (ref 0–8)
ERYTHROCYTE [DISTWIDTH] IN BLOOD BY AUTOMATED COUNT: 13.6 % (ref 11.5–14.5)
EST. GFR  (AFRICAN AMERICAN): 14.1 ML/MIN/1.73 M^2
EST. GFR  (NON AFRICAN AMERICAN): 12.3 ML/MIN/1.73 M^2
GLUCOSE SERPL-MCNC: 111 MG/DL (ref 70–110)
GLUCOSE SERPL-MCNC: 151 MG/DL (ref 70–110)
GLUCOSE SERPL-MCNC: 80 MG/DL (ref 70–110)
HBV SURFACE AB SER QL IA: POSITIVE
HBV SURFACE AB SERPL IA-ACNC: 42 MIU/ML
HCO3 UR-SCNC: 21.2 MMOL/L (ref 24–28)
HCO3 UR-SCNC: 23 MMOL/L (ref 24–28)
HCT VFR BLD AUTO: 27.4 % (ref 37–48.5)
HCT VFR BLD CALC: 22 %PCV (ref 36–54)
HCT VFR BLD CALC: 24 %PCV (ref 36–54)
HGB BLD-MCNC: 8.8 G/DL (ref 12–16)
IMM GRANULOCYTES # BLD AUTO: 0.1 K/UL (ref 0–0.04)
IMM GRANULOCYTES NFR BLD AUTO: 0.6 % (ref 0–0.5)
LYMPHOCYTES # BLD AUTO: 0.2 K/UL (ref 1–4.8)
LYMPHOCYTES NFR BLD: 1.3 % (ref 18–48)
MAGNESIUM SERPL-MCNC: 1.9 MG/DL (ref 1.6–2.6)
MCH RBC QN AUTO: 32 PG (ref 27–31)
MCHC RBC AUTO-ENTMCNC: 32.1 G/DL (ref 32–36)
MCV RBC AUTO: 100 FL (ref 82–98)
MONOCYTES # BLD AUTO: 0.5 K/UL (ref 0.3–1)
MONOCYTES NFR BLD: 2.6 % (ref 4–15)
NEUTROPHILS # BLD AUTO: 17 K/UL (ref 1.8–7.7)
NEUTROPHILS NFR BLD: 95.3 % (ref 38–73)
NRBC BLD-RTO: 0 /100 WBC
PCO2 BLDA: 31 MMHG (ref 35–45)
PCO2 BLDA: 33.7 MMHG (ref 35–45)
PH SMN: 7.41 [PH] (ref 7.35–7.45)
PH SMN: 7.48 [PH] (ref 7.35–7.45)
PHOSPHATE SERPL-MCNC: 5.1 MG/DL (ref 2.7–4.5)
PHOSPHATE SERPL-MCNC: 5.1 MG/DL (ref 2.7–4.5)
PLATELET # BLD AUTO: 87 K/UL (ref 150–350)
PLATELET BLD QL SMEAR: ABNORMAL
PMV BLD AUTO: 12 FL (ref 9.2–12.9)
PO2 BLDA: 197 MMHG (ref 80–100)
PO2 BLDA: 240 MMHG (ref 80–100)
POC BE: -4 MMOL/L
POC BE: 0 MMOL/L
POC IONIZED CALCIUM: 0.95 MMOL/L (ref 1.06–1.42)
POC IONIZED CALCIUM: 1.08 MMOL/L (ref 1.06–1.42)
POC SATURATED O2: 100 % (ref 95–100)
POC SATURATED O2: 100 % (ref 95–100)
POC TCO2: 22 MMOL/L (ref 23–27)
POC TCO2: 24 MMOL/L (ref 23–27)
POCT GLUCOSE: 112 MG/DL (ref 70–110)
POCT GLUCOSE: 152 MG/DL (ref 70–110)
POCT GLUCOSE: 158 MG/DL (ref 70–110)
POCT GLUCOSE: 192 MG/DL (ref 70–110)
POTASSIUM BLD-SCNC: 2.8 MMOL/L (ref 3.5–5.1)
POTASSIUM BLD-SCNC: 3 MMOL/L (ref 3.5–5.1)
POTASSIUM SERPL-SCNC: 3.9 MMOL/L (ref 3.5–5.1)
RBC # BLD AUTO: 2.75 M/UL (ref 4–5.4)
SAMPLE: ABNORMAL
SAMPLE: ABNORMAL
SODIUM BLD-SCNC: 142 MMOL/L (ref 136–145)
SODIUM BLD-SCNC: 145 MMOL/L (ref 136–145)
SODIUM SERPL-SCNC: 132 MMOL/L (ref 136–145)
TACROLIMUS BLD-MCNC: 2.6 NG/ML (ref 5–15)
WBC # BLD AUTO: 17.85 K/UL (ref 3.9–12.7)

## 2020-11-17 PROCEDURE — 25000003 PHARM REV CODE 250: Performed by: NURSE PRACTITIONER

## 2020-11-17 PROCEDURE — 83735 ASSAY OF MAGNESIUM: CPT

## 2020-11-17 PROCEDURE — A4216 STERILE WATER/SALINE, 10 ML: HCPCS | Performed by: ANESTHESIOLOGY

## 2020-11-17 PROCEDURE — 80197 ASSAY OF TACROLIMUS: CPT

## 2020-11-17 PROCEDURE — 25000003 PHARM REV CODE 250: Performed by: GENERAL ACUTE CARE HOSPITAL

## 2020-11-17 PROCEDURE — 20600001 HC STEP DOWN PRIVATE ROOM

## 2020-11-17 PROCEDURE — 80069 RENAL FUNCTION PANEL: CPT

## 2020-11-17 PROCEDURE — 63600175 PHARM REV CODE 636 W HCPCS: Performed by: INTERNAL MEDICINE

## 2020-11-17 PROCEDURE — S5010 5% DEXTROSE AND 0.45% SALINE: HCPCS | Performed by: SURGERY

## 2020-11-17 PROCEDURE — 25000003 PHARM REV CODE 250: Performed by: SURGERY

## 2020-11-17 PROCEDURE — 99232 SBSQ HOSP IP/OBS MODERATE 35: CPT | Mod: ,,, | Performed by: NURSE PRACTITIONER

## 2020-11-17 PROCEDURE — 36415 COLL VENOUS BLD VENIPUNCTURE: CPT

## 2020-11-17 PROCEDURE — 63600175 PHARM REV CODE 636 W HCPCS: Performed by: SURGERY

## 2020-11-17 PROCEDURE — 25000003 PHARM REV CODE 250: Performed by: INTERNAL MEDICINE

## 2020-11-17 PROCEDURE — 99232 PR SUBSEQUENT HOSPITAL CARE,LEVL II: ICD-10-PCS | Mod: ,,, | Performed by: NURSE PRACTITIONER

## 2020-11-17 PROCEDURE — 85025 COMPLETE CBC W/AUTO DIFF WBC: CPT

## 2020-11-17 PROCEDURE — 25000003 PHARM REV CODE 250: Performed by: ANESTHESIOLOGY

## 2020-11-17 RX ORDER — SODIUM BICARBONATE 650 MG/1
1300 TABLET ORAL 2 TIMES DAILY
Status: DISCONTINUED | OUTPATIENT
Start: 2020-11-17 | End: 2020-11-19

## 2020-11-17 RX ORDER — ONDANSETRON 4 MG/1
4 TABLET, ORALLY DISINTEGRATING ORAL EVERY 6 HOURS PRN
Status: DISCONTINUED | OUTPATIENT
Start: 2020-11-17 | End: 2020-11-18

## 2020-11-17 RX ORDER — TACROLIMUS 1 MG/1
2 CAPSULE ORAL ONCE
Status: COMPLETED | OUTPATIENT
Start: 2020-11-17 | End: 2020-11-17

## 2020-11-17 RX ORDER — TACROLIMUS 1 MG/1
5 CAPSULE ORAL 2 TIMES DAILY
Status: DISCONTINUED | OUTPATIENT
Start: 2020-11-17 | End: 2020-11-19

## 2020-11-17 RX ADMIN — TACROLIMUS 5 MG: 1 CAPSULE ORAL at 05:11

## 2020-11-17 RX ADMIN — DIPHENHYDRAMINE HYDROCHLORIDE 25 MG: 25 CAPSULE ORAL at 09:11

## 2020-11-17 RX ADMIN — Medication 3 ML: at 05:11

## 2020-11-17 RX ADMIN — INSULIN ASPART 1 UNITS: 100 INJECTION, SOLUTION INTRAVENOUS; SUBCUTANEOUS at 12:11

## 2020-11-17 RX ADMIN — LABETALOL HYDROCHLORIDE 300 MG: 100 TABLET, FILM COATED ORAL at 08:11

## 2020-11-17 RX ADMIN — DEXTROSE AND SODIUM CHLORIDE: 5; .45 INJECTION, SOLUTION INTRAVENOUS at 12:11

## 2020-11-17 RX ADMIN — INSULIN ASPART 14 UNITS: 100 INJECTION, SOLUTION INTRAVENOUS; SUBCUTANEOUS at 08:11

## 2020-11-17 RX ADMIN — INSULIN ASPART 14 UNITS: 100 INJECTION, SOLUTION INTRAVENOUS; SUBCUTANEOUS at 05:11

## 2020-11-17 RX ADMIN — MUPIROCIN 1 G: 20 OINTMENT TOPICAL at 08:11

## 2020-11-17 RX ADMIN — HEPARIN SODIUM 5000 UNITS: 5000 INJECTION INTRAVENOUS; SUBCUTANEOUS at 05:11

## 2020-11-17 RX ADMIN — DOCUSATE SODIUM 100 MG: 100 CAPSULE, LIQUID FILLED ORAL at 04:11

## 2020-11-17 RX ADMIN — DOCUSATE SODIUM 100 MG: 100 CAPSULE, LIQUID FILLED ORAL at 08:11

## 2020-11-17 RX ADMIN — MYCOPHENOLATE MOFETIL 1000 MG: 250 CAPSULE ORAL at 09:11

## 2020-11-17 RX ADMIN — ONDANSETRON 4 MG: 4 TABLET, ORALLY DISINTEGRATING ORAL at 02:11

## 2020-11-17 RX ADMIN — THERA TABS 1 TABLET: TAB at 08:11

## 2020-11-17 RX ADMIN — SODIUM BICARBONATE 650 MG TABLET 1300 MG: at 10:11

## 2020-11-17 RX ADMIN — SODIUM CHLORIDE: 0.9 INJECTION, SOLUTION INTRAVENOUS at 08:11

## 2020-11-17 RX ADMIN — TACROLIMUS 3 MG: 1 CAPSULE ORAL at 08:11

## 2020-11-17 RX ADMIN — SULFAMETHOXAZOLE AND TRIMETHOPRIM 1 TABLET: 400; 80 TABLET ORAL at 10:11

## 2020-11-17 RX ADMIN — TACROLIMUS 2 MG: 1 CAPSULE ORAL at 12:11

## 2020-11-17 RX ADMIN — HEPARIN SODIUM 125 MG: 1000 INJECTION, SOLUTION INTRAVENOUS; SUBCUTANEOUS at 10:11

## 2020-11-17 RX ADMIN — INSULIN ASPART 14 UNITS: 100 INJECTION, SOLUTION INTRAVENOUS; SUBCUTANEOUS at 12:11

## 2020-11-17 RX ADMIN — NYSTATIN 500000 UNITS: 100000 SUSPENSION ORAL at 08:11

## 2020-11-17 RX ADMIN — FAMOTIDINE 20 MG: 20 TABLET, FILM COATED ORAL at 08:11

## 2020-11-17 RX ADMIN — SODIUM CHLORIDE: 0.9 INJECTION, SOLUTION INTRAVENOUS at 02:11

## 2020-11-17 RX ADMIN — HEPARIN SODIUM 5000 UNITS: 5000 INJECTION INTRAVENOUS; SUBCUTANEOUS at 08:11

## 2020-11-17 RX ADMIN — SODIUM BICARBONATE 650 MG TABLET 1300 MG: at 08:11

## 2020-11-17 RX ADMIN — INSULIN ASPART 1 UNITS: 100 INJECTION, SOLUTION INTRAVENOUS; SUBCUTANEOUS at 08:11

## 2020-11-17 RX ADMIN — BISACODYL 10 MG: 5 TABLET, COATED ORAL at 08:11

## 2020-11-17 RX ADMIN — DEXTROSE 250 MG: 50 INJECTION, SOLUTION INTRAVENOUS at 09:11

## 2020-11-17 RX ADMIN — ACETAMINOPHEN 650 MG: 325 TABLET ORAL at 09:11

## 2020-11-17 RX ADMIN — Medication 3 ML: at 12:11

## 2020-11-17 RX ADMIN — MYCOPHENOLATE MOFETIL 1000 MG: 250 CAPSULE ORAL at 08:11

## 2020-11-17 RX ADMIN — INSULIN HUMAN 1.3 UNITS/HR: 100 INJECTION, SOLUTION PARENTERAL at 09:11

## 2020-11-17 RX ADMIN — NYSTATIN 500000 UNITS: 100000 SUSPENSION ORAL at 01:11

## 2020-11-17 RX ADMIN — AMLODIPINE BESYLATE 10 MG: 10 TABLET ORAL at 08:11

## 2020-11-17 RX ADMIN — HEPARIN SODIUM 5000 UNITS: 5000 INJECTION INTRAVENOUS; SUBCUTANEOUS at 02:11

## 2020-11-17 RX ADMIN — OXYCODONE HYDROCHLORIDE 5 MG: 5 TABLET ORAL at 01:11

## 2020-11-17 RX ADMIN — INSULIN ASPART 1 UNITS: 100 INJECTION, SOLUTION INTRAVENOUS; SUBCUTANEOUS at 02:11

## 2020-11-17 NOTE — CONSULTS
Please see progress note by Dr. Farr cosigned by Dr. Ocasio on 11/16/20 for this consult.  Phillip Delacruz

## 2020-11-17 NOTE — CONSULTS
[facility]  Endocrinology  Progress Note    Admit Date: 11/15/2020     Reason for Consult: Management of T2DM, Hyperglycemia     Surgical Procedure and Date: N/A    Diabetes diagnosis year:  > 10 years ago.     Home Diabetes Medications:   - Novolog 70/30  15 units AM, Skips lunch, 15 units in PM  - Januvia 25 mg daily.     How often checking glucose at home? 1-3 x day   BG readings on regimen: 150'2  Hypoglycemia on the regimen?  No  Missed doses on regimen?  No    Diabetes Complications include:     Hyperglycemia and Diabetic retinopathy     Complicating diabetes co morbidities:   Glucocorticoid use       HPI:   Patient is a 65 y.o. female with a diagnosis of ESRD secondary to diabetic nephropathy. She has been on the wait list for a kidney transplant at [facility] since 2018. Patient is currently on peritoneal dialysis started in mid 2018. Endocrinology consulted to manage DM2/glcyemic control during [place] [facility]     Lab Results   Component Value Date    HGBA1C 9.0 (H) 11/15/2020       Interval HPI:   Overnight events:  BG is above goal at time of consult. Most likely secondary to High dose steroid therapy. Elevate Cr. Noted.     Eatin%  Nausea: No  Hypoglycemia and intervention: No  Fever: No   TPN and/or TF: No  If yes, type of TF/TPN and rate: None    PMH, PSH, FH, SH updated and reviewed     Review of Systems   Constitutional: Negative for weight changes.  Eyes: Negative for visual disturbance.  Respiratory: Negative for cough.   Cardiovascular: Negative for chest pain.  Gastrointestinal: Negative for nausea.  Endocrine: Positive for polyuria, polydipsia.  Musculoskeletal: Negative for back pain.  Skin: Negative for rash.  Neurological: Negative for syncope.  Psychiatric/Behavioral: Negative for depression.    Current Medications and/or Treatments Impacting Glycemic Control  Immunotherapy:    Immunosuppressants         Stop Route Frequency     antithymocyte  globulin (rabbit) 125 mg, hydrocortisone sodium succinate (SOLU-CORTEF) 20 mg, heparin (porcine) 1,000 Units in sodium chloride 0.9% 500 mL      11/19 0859 IV Daily     mycophenolate capsule 1,000 mg      -- Oral 2 times daily     tacrolimus capsule 3 mg      -- Oral 2 times daily        Steroids:   Hormones (From admission, onward)    Start     Stop Route Frequency Ordered    11/19/20 0900  predniSONE tablet 20 mg  (IP TXP KIDNEY POST-OP THYMO WITH PERIPHERAL PRECHECKED)      -- Oral Daily 11/16/20 0242 11/18/20 0800  methylPREDNISolone sodium succinate injection 125 mg  (IP TXP KIDNEY POST-OP THYMO WITH PERIPHERAL PRECHECKED)      -- IV Once 11/16/20 0242 11/17/20 0900  antithymocyte globulin (rabbit) 125 mg, hydrocortisone sodium succinate (SOLU-CORTEF) 20 mg, heparin (porcine) 1,000 Units in sodium chloride 0.9% 500 mL  (IP TXP KIDNEY POST-OP THYMO WITH PERIPHERAL PRECHECKED)      11/19 0859 IV Daily 11/16/20 0242 11/17/20 0800  methylPREDNISolone sodium succinate (SOLU-MEDROL) 250 mg in dextrose 5 % 100 mL IVPB  (IP TXP KIDNEY POST-OP THYMO WITH PERIPHERAL PRECHECKED)      -- IV Once 11/16/20 0242    11/16/20 0341  hydrocortisone sodium succinate injection 100 mg  (IP TXP KIDNEY POST-OP THYMO WITH PERIPHERAL PRECHECKED)      04/13 1941 IV Once as needed 11/16/20 0242    11/15/20 1715  methylPREDNISolone sodium succinate (SOLU-MEDROL) 500 mg in sodium chloride 0.9% 100 mL IVPB  (IP TXP INTRA-OP THYMO - PERIPHERAL THYMO PRECHECKED)      -- IV Once 11/15/20 1706        Pressors:    Autonomic Drugs (From admission, onward)    Start     Stop Route Frequency Ordered    11/16/20 0341  EPINEPHrine injection 1 mg  (IP TXP KIDNEY POST-OP THYMO WITH PERIPHERAL PRECHECKED)      04/13 1941 SubQ Once as needed 11/16/20 0242        Hyperglycemia/Diabetes Medications:   Antihyperglycemics (From admission, onward)    Start     Stop Route Frequency Ordered    11/16/20 1645  insulin aspart U-100 pen 10 Units      -- SubQ  3 times daily with meals 11/16/20 1504    11/16/20 1615  insulin regular in 0.9 % NaCl 100 unit/100 mL (1 unit/mL) infusion     Question:  Enter initial dose (Units/hr):  Answer:  1.3    -- IV Continuous 11/16/20 1504    11/16/20 1603  insulin aspart U-100 pen 0-5 Units      -- SubQ As needed (PRN) 11/16/20 1504             PHYSICAL EXAMINATION:  Vitals:    11/16/20 2128   BP: (!) 118/57   Pulse: 71   Resp: 18   Temp: 98.2 °F (36.8 °C)     Body mass index is 27.55 kg/m².    Physical Exam   Constitutional: Well developed, well nourished, NAD.  ENT:  normal hearing.  Neck: Supple; trachea midline;   Cardiovascular: Normal heart sounds, no LE edema. DP +2 bilaterally.  Lungs: Normal effort; lungs anterior bilaterally clear to auscultation.  Abdomen: Soft, obese, no masses, no hernias.  MS: No clubbing or cyanosis of nails noted;  Skin: No rashes, lesions, or ulcers; no nodules. Injection sites are ok. No lipo hypertropthy or atrophy.  Psychiatric: Good judgement and insight; normal mood and affect.  Neurological: Cranial nerves are grossly intact.  Foot: nails in good condition, no amputations noted.        ASSESSMENT and PLAN    Type 2 diabetes mellitus  BG goal 140 - 180     - Start  transition IV insulin infusion with stepdown parameters. Initial rate starting at 1.3 units/hr. 0.8 u/kg/d dosing. Expect significant BG excursions with high dose steroid therapy.   - Start novolog 10 units TIDWM. Basal/Prandial physiologic matching.    - Low Dose SQ Insulin Correction Scale.  -  BG Monitoring /HS/0200    9:35 PM  BG continues to trend above goal on current IV/SQ insulin regimen.   - Increase  transition IV insulin infusion with stepdown parameters. Initial rate starting at 2 units/hr.   - Increase Novolog to 14 units TIDWM.   - Continue Low Dose SQ Insulin Correction Scale. Considering kidney function.     ** Please call Endocrine for any BG related issues **  ** Please notify Endocrine for any change and/or advance  in diet**    Discharge Planning:   TBD. Please notify endocrinology prior to discharge.     Lab Results   Component Value Date    HGBA1C 9.0 (H) 11/15/2020             ESRD on dialysis  Titrate insulin slowly to avoid hypoglycemia as the risk of hypoglycemia increases with decreased creatinine clearance.          Adverse effect of adrenal cortical steroids, sequela  On steroid therapy per primary team; may elevate BG readings          Tree Solis NP  Endocrinology  [facility]

## 2020-11-17 NOTE — PROGRESS NOTES
[facility]  Kidney Transplant  Progress Note      Reason for Follow-up: Reassessment of Kidney Transplant - 2020  (#1) recipient and management of immunosuppression.    ORGAN: LEFT KIDNEY    Donor Type: Donation after Brain Death    Donor CMV Status: Positive  Donor HBcAB:Negative  Donor HCV Status:Negative  Donor HBV MELINDA: Negative  Donor HCV MELINDA: Negative    No new subjective & objective note has been filed under this hospital service since the last note was generated.    Assessment/Plan:     At risk for opportunistic infections  -Nystatin [antifungal] and Bactrim [PJP] already started.  Will begin Valcyte postop day 10 for antiviral prophylaxis    Prophylactic immunotherapy  -Continue Thymoglobulin induction as per written guidelines and monitor for toxicities or side effects (monitor plt count).  -tacrolimus, mycophenolate ordered for maintenance immunosuppression  -Solu-Medrol IV x 3 days to be followed by Prednisone taper        Anemia of renal disease        Status post -donor kidney transplantation  -KTx from DBD donor on 20, KDPI 29%, CIT 13 hrs, Thymoglobulin induction  -Cr is improving, satisfactory UOP        ESRD on dialysis  -ESRD from [facility] since 2018  -Peritoneal dialysis with PD catheter tunneling into chest -  will need to schedule removal once verify adequate function of allograft          Discharge Planning:  Likely tomorrow    Shanon Farr MD  Kidney Transplant  [facility]

## 2020-11-17 NOTE — ASSESSMENT & PLAN NOTE
-KTx from DBD donor on 11/16/20, KDPI 29%, CIT 13 hrs, Thymoglobulin induction  -Cr is improving, satisfactory UOP  -final dose of Thymo

## 2020-11-17 NOTE — PROGRESS NOTES
EDUCATION NOTE:    Met with Kendra RICO Malik and her caregivers to provide teaching re: immunosuppressant medications.  Reviewed medication section of the Kidney Transplant Education book that was provided.  Emphasized the importance of compliance, role of the blue medication card, concerns for drug interactions, and process of obtaining refills.  Counseled regarding Prograf, Cellcept , prednisone, including directions for use, monitoring, how to handle missed doses, and side effects.  Patient verbalized understanding and had the opportunity to ask questions. Expressed that we will teach caregiver whenever she comes in since patient expressed that the caregiver will not be here for another day or two.

## 2020-11-17 NOTE — PLAN OF CARE
Pt is AAOX4. Progressing well. OOB and up ad eh. Performing self care.    -thymo #2/3 given today  -no longer on IVF, good UOP (see flowsheets) I/O in chart  -reg diet, passing gas, no N/V  -2 RUDDY to LLQ, with serous draiange  -shaffer in place, clear yellow urine  -chevron with [facility] drsg, no drainage noted  -TEDs SCDs in place  -IS at bedside  -pt continues to be on transitional insulin gtt at 2u/hr (2cc/hr)  -VISI on, vSS  -Cr trending from 4.3 to 3.7  -Denied pain  -call bell in reach, fall precautions in place

## 2020-11-17 NOTE — TELEPHONE ENCOUNTER
Dialysis unit notified of transplant. Spoke to [facility]rap[facility] nurse. Several attempts made unsuccessfully to notify them on 11/16/20.

## 2020-11-17 NOTE — ASSESSMENT & PLAN NOTE
-ESRD from DM since 9/2018  -Peritoneal dialysis with PD catheter tunneling into chest -  will need to schedule removal once verify adequate function of allograft

## 2020-11-17 NOTE — PROGRESS NOTES
Patient admitted for Kidney transplant.Transplant coordinator met with the patient on rounds to introduce self and explain the coordinator role. The post-transplant teaching book was given. Transplant Coordinator explained that she will follow the patient while in the hospital and assist with discharge.

## 2020-11-17 NOTE — SUBJECTIVE & OBJECTIVE
Subjective:   History of Present Illness:  Ms. Kendra Malik is a 64 year old female with PMH of ESRD secondary to diabetic nephropathy. She has been on the wait list for a kidney transplant at [facility] since 9/24/2018. Patient is currently on peritoneal dialysis started in mid September 2018. She has a peritoneal dialysis catheter for dialysis access. She reports that she is tolerating dialysis well. Patient denies any recent hospitalizations or ED visits.  Native UOP > 1000ml/d.           Ms. Malik is a 65 y.o. year old female who is status post Kidney Transplant - 11/16/2020  (#1).  Her maintenance immunosuppression consists of:   Immunosuppressants (From admission, onward)    Start     Stop Route Frequency Ordered    11/17/20 1800  tacrolimus capsule 5 mg  (IP TXP KIDNEY POST-OP THYMO WITH PERIPHERAL PRECHECKED)      -- Oral 2 times daily 11/17/20 1114    11/17/20 0900  antithymocyte globulin (rabbit) 125 mg, hydrocortisone sodium succinate (SOLU-CORTEF) 20 mg, heparin (porcine) 1,000 Units in sodium chloride 0.9% 500 mL  (IP TXP KIDNEY POST-OP THYMO WITH PERIPHERAL PRECHECKED)      11/19 0859 IV Daily 11/16/20 0242    11/16/20 1000  mycophenolate capsule 1,000 mg  (IP TXP KIDNEY POST-OP THYMO WITH PERIPHERAL PRECHECKED)      -- Oral 2 times daily 11/16/20 0242          Her post transplant course has been uncomplicated to date.    Hospital Course:  S/p KTx from a DBD 11/16/20 (Thymo, CIT ~13 hrs min, WIT 42 min, KDPI 29%). x2 Jeff drains. 1 artery, 1 vein, 1 ureter. Sebastian for 5 days. Satisfactory UOP postop. No blood through drains.     Int Hx:   AAOx3, afebrile. Not complaining of pain today. No BM or gas passing. No N/V/D. Satisfactory UOP (2 L/d) with clear yellow color. Sebastian in place. Drains are light ss (300 ml/d). Tomorrow will take out the superficial drain. She is on insulin gtt increased to 2 units/hr. Yesterday ? indigestion and was given Tums. Had one episode of hyperglycemia. Is on glucose  sliding scale. Shes receiving 2/3 Thymo today.     /105        WBC 18.7    Hb 8.6     Plt 87 trending down        K 3.7     Cr 4.3 trending down from 4.9  Ca 7.1 down from 8.9 and 7.4    Alb 2.4            Interval History:      Past Medical, Surgical, Family, and Social History:   Unchanged from H&P.    Scheduled Meds:   acetaminophen  1,000 mg Oral BID    acetaminophen  650 mg Oral Q24H    amLODIPine  10 mg Oral Daily    Thymoglobulin 1.5mg/kg, hydrocortisone 20mg, heparin 1000 units in NS 500ml (Peripheral line)  1.5 mg/kg Intravenous Daily    bisacodyL  10 mg Oral QHS    diphenhydrAMINE  25 mg Oral Q24H    diphenhydrAMINE  50 mg Intravenous Once    docusate sodium  100 mg Oral TID    ergocalciferol  50,000 Units Oral Q7 Days    famotidine  20 mg Oral QHS    heparin (porcine)  5,000 Units Subcutaneous Q8H    insulin aspart U-100  14 Units Subcutaneous TIDWM    labetaloL  300 mg Oral BID    methylPREDNISolone (SOLU-Medrol) IVPB (doses > 250 mg)  500 mg Intravenous Once    [START ON 11/18/2020] methylPREDNISolone sodium succinate  125 mg Intravenous Once    multivitamin  1 tablet Oral Daily    mupirocin  1 g Nasal BID    mycophenolate  1,000 mg Oral BID    nystatin  500,000 Units Mouth/Throat QID (PC + HS)    [START ON 11/19/2020] predniSONE  20 mg Oral Daily    sodium bicarbonate  1,300 mg Oral BID    sodium chloride 0.9%  3 mL Intravenous Q6H    sulfamethoxazole-trimethoprim 400-80mg  1 tablet Oral Daily AM    tacrolimus  5 mg Oral BID    [START ON 11/26/2020] valGANciclovir  450 mg Oral Daily AM     Continuous Infusions:   insulin regular 1 units/mL infusion orderable (TRANSFER) 2 Units/hr (11/16/20 2147)     PRN Meds:calcium carbonate, dextrose 50%, dextrose 50%, diphenhydrAMINE, EPINEPHrine, glucagon (human recombinant), glucose, glucose, hydrocortisone sodium succinate, insulin aspart U-100, mupirocin, ondansetron, oxyCODONE, oxyCODONE, pregabalin, traMADoL    Intake/Output -  "Last 3 Shifts       11/15 0700 - 11/16 0659 11/16 0700 - 11/17 0659 11/17 0700 - 11/18 0659    P.O. 600 4840 720    I.V. (mL/kg) 4122.5 (53.3) 2688.3 (32) 233.3 (2.8)    Other  0     IV Piggyback 700      Total Intake(mL/kg) 5422.5 (70.1) 7528.3 (89.5) 953.3 (11.3)    Urine (mL/kg/hr) 1350 2050 (1) 400 (0.8)    Drains 90 310     Blood 150      Total Output 1590 2360 400    Net +3832.5 +5168.3 +553.3           Urine Occurrence  150 x            Review of Systems   Constitutional: Negative.    HENT: Negative.    Eyes: Negative.    Respiratory: Negative.    Cardiovascular: Negative.    Gastrointestinal: Positive for nausea and vomiting.   Musculoskeletal: Negative.    Skin: Negative.    Allergic/Immunologic: Negative.    Neurological: Negative.    Hematological: Negative.    Psychiatric/Behavioral: Negative.       Objective:     Vital Signs (Most Recent):  Temp: 97.9 °F (36.6 °C) (11/17/20 1123)  Pulse: 81 (11/17/20 1123)  Resp: 16 (11/17/20 1123)  BP: 132/74 (11/17/20 1123)  SpO2: 99 % (11/17/20 1123) Vital Signs (24h Range):  Temp:  [97.9 °F (36.6 °C)-98.5 °F (36.9 °C)] 97.9 °F (36.6 °C)  Pulse:  [69-84] 81  Resp:  [12-18] 16  SpO2:  [93 %-99 %] 99 %  BP: (104-150)/(57-74) 132/74     Weight: 84.1 kg (185 lb 6.5 oz)  Height: 5' 5.98" (167.6 cm)  Body mass index is 29.94 kg/m².    Physical Exam    Laboratory:  CBC:   Recent Labs   Lab 11/15/20  1735  11/16/20  0242 11/16/20  1900 11/17/20  0658   WBC 8.72  --   --  18.71* 17.85*   RBC 3.39*  --   --  2.71* 2.75*   HGB 10.8*  --   --  8.6* 8.8*   HCT 34.1*   < > 29.3* 28.4* 27.4*     --   --  100* 87*   *  --   --  105* 100*   MCH 31.9*  --   --  31.7* 32.0*   MCHC 31.7*  --   --  30.3* 32.1    < > = values in this interval not displayed.     BMP:   Recent Labs   Lab 11/16/20  0242 11/16/20  1900 11/17/20  0658   * 397* 151*    133* 132*   K 3.0* 3.7 3.9    101 101   CO2 22* 18* 18*   BUN 41* 44* 47*   CREATININE 4.9* 4.3* 3.7*   CALCIUM " 7.4* 7.1* 7.8*     CMP:   Recent Labs   Lab 11/15/20  1735 11/16/20  0242 11/16/20  1900 11/17/20  0658   * 189* 397* 151*   CALCIUM 8.9 7.4* 7.1* 7.8*   ALBUMIN 3.2* 2.4* 2.4* 2.6*   PROT 7.5  --   --   --     144 133* 132*   K 3.5 3.0* 3.7 3.9   CO2 25 22* 18* 18*    106 101 101   BUN 46* 41* 44* 47*   CREATININE 5.9* 4.9* 4.3* 3.7*   ALKPHOS 101  --   --   --    ALT 12  --   --   --    AST 18  --   --   --        Diagnostic Results:  US - Kidney:   Results for orders placed during the hospital encounter of 11/15/20   US Transplant Kidney With Doppler    Narrative EXAMINATION:  US TRANSPLANT KIDNEY WITH DOPPLER    CLINICAL HISTORY:  Assess perfusion;    TECHNIQUE:  Transplant renal ultrasound of the left lower quadrant with color flow doppler and duplex analysis.    COMPARISON:  None.    FINDINGS:  Patient is status post left lower quadrant renal transplantation day 1.  The renal transplant demonstrates no focal parenchymal abnormality. No transplant hydronephrosis. Peritransplant fluid collection measuring approximately 4.0 x 1.0 x 2.5 cm.    Renal arterial resistive indices are as follows: Interlobar 0.70, segmental Up 0.77, segmental Mid 0.75, segmental Low 0.71.  There is no evidence of a tardus parvus waveform. The maximum velocity in the main renal artery is 134 cm/sec.  The renal artery to iliac ratio 0.91.    The renal transplant venous system is unremarkable.  Partially visualized ureteral stent.      Impression Satisfactory Doppler and sonographic evaluation of the renal allograft.    Small Peritransplant fluid collection, likely hematoma.  No significant mass effect.    Electronically signed by resident: Prosper Hogan  Date:    11/16/2020  Time:    14:42    Electronically signed by: Edis Dover MD  Date:    11/16/2020  Time:    15:05

## 2020-11-17 NOTE — PROGRESS NOTES
[facility]  Endocrinology  Progress Note    Admit Date: 11/15/2020     Reason for Consult: Management of T2DM, Hyperglycemia     Surgical Procedure and Date: N/A    Diabetes diagnosis year:  > 10 years ago.     Home Diabetes Medications:   - Novolog 70/30  15 units AM, Skips lunch, 15 units in PM  - Januvia 25 mg daily.     How often checking glucose at home? 1-3 x day   BG readings on regimen: 150'2  Hypoglycemia on the regimen?  No  Missed doses on regimen?  No    Diabetes Complications include:     Hyperglycemia and Diabetic retinopathy     Complicating diabetes co morbidities:   Glucocorticoid use       HPI:   Patient is a 65 y.o. female with a diagnosis of ESRD secondary to diabetic nephropathy. She has been on the wait list for a kidney transplant at [facility] since 2018. Patient is currently on peritoneal dialysis started in mid 2018. Endocrinology consulted to manage DM2/glcyemic control during current admission to [facility].     Lab Results   Component Value Date    HGBA1C 9.0 (H) 11/15/2020       Interval HPI:   Overnight events:  BG is above goal at time of consult. Most likely secondary to High dose steroid therapy. Elevate Cr. Noted.     Eatin%  Nausea: No  Hypoglycemia and intervention: No  Fever: No   TPN and/or TF: No  If yes, type of TF/TPN and rate: None    PMH, PSH, FH, SH updated and reviewed     Review of Systems   Constitutional: Negative for weight changes.  Eyes: Negative for visual disturbance.  Respiratory: Negative for cough.   Cardiovascular: Negative for chest pain.  Gastrointestinal: Negative for nausea.  Endocrine: Positive for polyuria, polydipsia.  Musculoskeletal: Negative for back pain.  Skin: Negative for rash.  Neurological: Negative for syncope.  Psychiatric/Behavioral: Negative for depression.    Current Medications and/or Treatments Impacting Glycemic Control  Immunotherapy:    Immunosuppressants         Stop Route Frequency     antithymocyte  globulin (rabbit) 125 mg, hydrocortisone sodium succinate (SOLU-CORTEF) 20 mg, heparin (porcine) 1,000 Units in sodium chloride 0.9% 500 mL      11/19 0859 IV Daily     mycophenolate capsule 1,000 mg      -- Oral 2 times daily     tacrolimus capsule 3 mg      -- Oral 2 times daily        Steroids:   Hormones (From admission, onward)    Start     Stop Route Frequency Ordered    11/19/20 0900  predniSONE tablet 20 mg  (IP TXP KIDNEY POST-OP THYMO WITH PERIPHERAL PRECHECKED)      -- Oral Daily 11/16/20 0242 11/18/20 0800  methylPREDNISolone sodium succinate injection 125 mg  (IP TXP KIDNEY POST-OP THYMO WITH PERIPHERAL PRECHECKED)      -- IV Once 11/16/20 0242 11/17/20 0900  antithymocyte globulin (rabbit) 125 mg, hydrocortisone sodium succinate (SOLU-CORTEF) 20 mg, heparin (porcine) 1,000 Units in sodium chloride 0.9% 500 mL  (IP TXP KIDNEY POST-OP THYMO WITH PERIPHERAL PRECHECKED)      11/19 0859 IV Daily 11/16/20 0242 11/17/20 0800  methylPREDNISolone sodium succinate (SOLU-MEDROL) 250 mg in dextrose 5 % 100 mL IVPB  (IP TXP KIDNEY POST-OP THYMO WITH PERIPHERAL PRECHECKED)      -- IV Once 11/16/20 0242    11/16/20 0341  hydrocortisone sodium succinate injection 100 mg  (IP TXP KIDNEY POST-OP THYMO WITH PERIPHERAL PRECHECKED)      04/13 1941 IV Once as needed 11/16/20 0242    11/15/20 1715  methylPREDNISolone sodium succinate (SOLU-MEDROL) 500 mg in sodium chloride 0.9% 100 mL IVPB  (IP TXP INTRA-OP THYMO - PERIPHERAL THYMO PRECHECKED)      -- IV Once 11/15/20 1706        Pressors:    Autonomic Drugs (From admission, onward)    Start     Stop Route Frequency Ordered    11/16/20 0341  EPINEPHrine injection 1 mg  (IP TXP KIDNEY POST-OP THYMO WITH PERIPHERAL PRECHECKED)      04/13 1941 SubQ Once as needed 11/16/20 0242        Hyperglycemia/Diabetes Medications:   Antihyperglycemics (From admission, onward)    Start     Stop Route Frequency Ordered    11/16/20 1645  insulin aspart U-100 pen 10 Units      -- SubQ  3 times daily with meals 11/16/20 1504    11/16/20 1615  insulin regular in 0.9 % NaCl 100 unit/100 mL (1 unit/mL) infusion     Question:  Enter initial dose (Units/hr):  Answer:  1.3    -- IV Continuous 11/16/20 1504    11/16/20 1603  insulin aspart U-100 pen 0-5 Units      -- SubQ As needed (PRN) 11/16/20 1504             PHYSICAL EXAMINATION:  Vitals:    11/16/20 2128   BP: (!) 118/57   Pulse: 71   Resp: 18   Temp: 98.2 °F (36.8 °C)     Body mass index is 27.55 kg/m².    Physical Exam   Constitutional: Well developed, well nourished, NAD.  ENT:  normal hearing.  Neck: Supple; trachea midline;   Cardiovascular: Normal heart sounds, no LE edema. DP +2 bilaterally.  Lungs: Normal effort; lungs anterior bilaterally clear to auscultation.  Abdomen: Soft, obese, no masses, no hernias.  MS: No clubbing or cyanosis of nails noted;  Skin: No rashes, lesions, or ulcers; no nodules. Injection sites are ok. No lipo hypertropthy or atrophy.  Psychiatric: Good judgement and insight; normal mood and affect.  Neurological: Cranial nerves are grossly intact.  Foot: nails in good condition, no amputations noted.        ASSESSMENT and PLAN    Type 2 diabetes mellitus  BG goal 140 - 180     - Start  transition IV insulin infusion with stepdown parameters. Initial rate starting at 1.3 units/hr. 0.8 u/kg/d dosing. Expect significant BG excursions with high dose steroid therapy.   - Start novolog 10 units TIDWM. Basal/Prandial physiologic matching.    - Low Dose SQ Insulin Correction Scale.  -  BG Monitoring /HS/0200    9:35 PM  BG continues to trend above goal on current IV/SQ insulin regimen.   - Increase  transition IV insulin infusion with stepdown parameters. Initial rate starting at 2 units/hr.   - Increase Novolog to 14 units TIDWM.   - Continue Low Dose SQ Insulin Correction Scale. Considering kidney function.     ** Please call Endocrine for any BG related issues **  ** Please notify Endocrine for any change and/or advance  in diet**    Discharge Planning:   TBD. Please notify endocrinology prior to discharge.     Lab Results   Component Value Date    HGBA1C 9.0 (H) 11/15/2020             ESRD on dialysis  Titrate insulin slowly to avoid hypoglycemia as the risk of hypoglycemia increases with decreased creatinine clearance.          Adverse effect of adrenal cortical steroids, sequela  On steroid therapy per primary team; may elevate BG readings            Tree Solis NP  Endocrinology  [facility]

## 2020-11-17 NOTE — PROGRESS NOTES
"[facility]  Endocrinology  Progress Note    Admit Date: 11/15/2020     Reason for Consult: Management of T2DM, Hyperglycemia     Surgical Procedure and Date: N/A    Diabetes diagnosis year:  > 10 years ago.     Home Diabetes Medications:   - Novolog 70/30  15 units AM, Skips lunch, 15 units in PM  - Januvia 25 mg daily.     How often checking glucose at home? 1-3 x day   BG readings on regimen: 150'2  Hypoglycemia on the regimen?  No  Missed doses on regimen?  No    Diabetes Complications include:     Hyperglycemia and Diabetic retinopathy     Complicating diabetes co morbidities:   Glucocorticoid use       HPI:   Patient is a 65 y.o. female with a diagnosis of ESRD secondary to diabetic nephropathy. She has been on the wait list for a kidney transplant at [facility] since 2018. Patient is currently on peritoneal dialysis started in mid 2018. Endocrinology consulted to manage DM2/glcyemic control during current admission to [facility] [place] [place].     Lab Results   Component Value Date    HGBA1C 9.0 (H) 11/15/2020       Interval HPI:   Overnight events:   BG is within goal on current IV/SQ insulin regimen.   Diet consistent carbohydrate  2 Days Post-Op    Eatin% - 100%  Nausea: No  Hypoglycemia and intervention: No  Fever: No  TPN and/or TF: No  If yes, type of TF/TPN and rate: None    /74   Pulse 81   Temp 97.9 °F (36.6 °C) (Oral)   Resp 16   Ht 5' 5.98" (1.676 m)   Wt 84.1 kg (185 lb 6.5 oz)   SpO2 99%   Breastfeeding No   BMI 29.94 kg/m²     Labs Reviewed and Include    Recent Labs   Lab 20  0658   *   CALCIUM 7.8*   ALBUMIN 2.6*   *   K 3.9   CO2 18*      BUN 47*   CREATININE 3.7*     Lab Results   Component Value Date    WBC 17.85 (H) 2020    HGB 8.8 (L) 2020    HCT 27.4 (L) 2020     (H) 2020    PLT 87 (L) 2020     No results for input(s): TSH, FREET4 in the last 168 hours.  Lab Results   Component Value Date    HGBA1C " 9.0 (H) 11/15/2020       Nutritional status:   Body mass index is 29.94 kg/m².  Lab Results   Component Value Date    ALBUMIN 2.6 (L) 11/17/2020    ALBUMIN 2.4 (L) 11/16/2020    ALBUMIN 2.4 (L) 11/16/2020     No results found for: PREALBUMIN    Estimated Creatinine Clearance: 16.8 mL/min (A) (based on SCr of 3.7 mg/dL (H)).    Accu-Checks  Recent Labs     11/16/20  0314 11/16/20  1254 11/16/20  1303 11/16/20  1520 11/16/20  1736 11/16/20  2131 11/17/20  0203 11/17/20  0843 11/17/20  1203   POCTGLUCOSE 182* 445* 416* 420* 406* 306* 192* 158* 152*       Current Medications and/or Treatments Impacting Glycemic Control  Immunotherapy:    Immunosuppressants         Stop Route Frequency     tacrolimus capsule 5 mg      -- Oral 2 times daily     antithymocyte globulin (rabbit) 125 mg, hydrocortisone sodium succinate (SOLU-CORTEF) 20 mg, heparin (porcine) 1,000 Units in sodium chloride 0.9% 500 mL      11/19 0859 IV Daily     mycophenolate capsule 1,000 mg      -- Oral 2 times daily        Steroids:   Hormones (From admission, onward)    Start     Stop Route Frequency Ordered    11/19/20 0900  predniSONE tablet 20 mg  (IP TXP KIDNEY POST-OP THYMO WITH PERIPHERAL PRECHECKED)      -- Oral Daily 11/16/20 0242    11/18/20 0800  methylPREDNISolone sodium succinate injection 125 mg  (IP TXP KIDNEY POST-OP THYMO WITH PERIPHERAL PRECHECKED)      -- IV Once 11/16/20 0242 11/17/20 0900  antithymocyte globulin (rabbit) 125 mg, hydrocortisone sodium succinate (SOLU-CORTEF) 20 mg, heparin (porcine) 1,000 Units in sodium chloride 0.9% 500 mL  (IP TXP KIDNEY POST-OP THYMO WITH PERIPHERAL PRECHECKED)      11/19 0859 IV Daily 11/16/20 0242    11/16/20 0341  hydrocortisone sodium succinate injection 100 mg  (IP TXP KIDNEY POST-OP THYMO WITH PERIPHERAL PRECHECKED)      04/13 1941 IV Once as needed 11/16/20 0242    11/15/20 1715  methylPREDNISolone sodium succinate (SOLU-MEDROL) 500 mg in sodium chloride 0.9% 100 mL IVPB  (IP TXP INTRA-OP  THYMO - PERIPHERAL THYMO PRECHECKED)      -- IV Once 11/15/20 1706        Pressors:    Autonomic Drugs (From admission, onward)    Start     Stop Route Frequency Ordered    11/16/20 0341  EPINEPHrine injection 1 mg  (IP TXP KIDNEY POST-OP THYMO WITH PERIPHERAL PRECHECKED)      04/13 1941 SubQ Once as needed 11/16/20 0242        Hyperglycemia/Diabetes Medications:   Antihyperglycemics (From admission, onward)    Start     Stop Route Frequency Ordered    11/17/20 0715  insulin aspart U-100 pen 14 Units      -- SubQ 3 times daily with meals 11/16/20 2135    11/16/20 1615  insulin regular in 0.9 % NaCl 100 unit/100 mL (1 unit/mL) infusion     Question:  Enter initial dose (Units/hr):  Answer:  1.3    -- IV Continuous 11/16/20 1504    11/16/20 1603  insulin aspart U-100 pen 0-5 Units      -- SubQ As needed (PRN) 11/16/20 1504          ASSESSMENT and PLAN    Type 2 diabetes mellitus  BG goal 140 - 180     - Continue transition IV insulin infusion with stepdown parameters. Initial rate starting at 2 units/hr.   - Continue Novolog to 14 units TIDWM.   - Continue Low Dose SQ Insulin Correction Scale. Considering kidney function.   - BG Monitoring /HS/0200    ** Please call Endocrine for any BG related issues **  ** Please notify Endocrine for any change and/or advance in diet**    Discharge Planning:   TBD. Please notify endocrinology prior to discharge.     Lab Results   Component Value Date    HGBA1C 9.0 (H) 11/15/2020             ESRD on dialysis  Titrate insulin slowly to avoid hypoglycemia as the risk of hypoglycemia increases with decreased creatinine clearance.          Adverse effect of adrenal cortical steroids, sequela  On steroid therapy per primary team; may elevate BG readings            Tree Solis NP  Endocrinology  [facility]

## 2020-11-17 NOTE — PLAN OF CARE
AAOx3, afebrile, c/o ingestion. PRN tums given with full relief. BG monitored achs and 2am- tx per orders. Transitional insulin gtt increased to 2 units/hr. Repeat labs reviewed. Cr trending down. I=O. D51/2 @ 50cc/hr. Urine output between 50-100cc per hour thus far this shift. Sebastian in place with light pink urine. 100mg IV lasix given. X2 Parvez drains on LLQ. Drains output with ss output. OR dressing still in place. Plan for 2/3 Thymo today. Self meds set up and ready to be taught. Pt able to position self independently in bed. Pt in lowest position, side rails up x2, non-skid foot wear in place, call light within reach, pt verbalized understanding to call RN when needed.  Hand hygiene practiced per protocol.  Will continue to monitor.

## 2020-11-17 NOTE — SUBJECTIVE & OBJECTIVE
Interval HPI:   Overnight events:  BG is above goal at time of consult. Most likely secondary to High dose steroid therapy. Elevate Cr. Noted.     Eatin%  Nausea: No  Hypoglycemia and intervention: No  Fever: No   TPN and/or TF: No  If yes, type of TF/TPN and rate: None    PMH, PSH, FH, SH updated and reviewed     Review of Systems   Constitutional: Negative for weight changes.  Eyes: Negative for visual disturbance.  Respiratory: Negative for cough.   Cardiovascular: Negative for chest pain.  Gastrointestinal: Negative for nausea.  Endocrine: Positive for polyuria, polydipsia.  Musculoskeletal: Negative for back pain.  Skin: Negative for rash.  Neurological: Negative for syncope.  Psychiatric/Behavioral: Negative for depression.    Current Medications and/or Treatments Impacting Glycemic Control  Immunotherapy:    Immunosuppressants         Stop Route Frequency     antithymocyte globulin (rabbit) 125 mg, hydrocortisone sodium succinate (SOLU-CORTEF) 20 mg, heparin (porcine) 1,000 Units in sodium chloride 0.9% 500 mL       08 IV Daily     mycophenolate capsule 1,000 mg      -- Oral 2 times daily     tacrolimus capsule 3 mg      -- Oral 2 times daily        Steroids:   Hormones (From admission, onward)    Start     Stop Route Frequency Ordered    20 0900  predniSONE tablet 20 mg  (IP TXP KIDNEY POST-OP THYMO WITH PERIPHERAL PRECHECKED)      -- Oral Daily 20 0800  methylPREDNISolone sodium succinate injection 125 mg  (IP TXP KIDNEY POST-OP THYMO WITH PERIPHERAL PRECHECKED)      -- IV Once 20 0900  antithymocyte globulin (rabbit) 125 mg, hydrocortisone sodium succinate (SOLU-CORTEF) 20 mg, heparin (porcine) 1,000 Units in sodium chloride 0.9% 500 mL  (IP TXP KIDNEY POST-OP THYMO WITH PERIPHERAL PRECHECKED)       0859 IV Daily 20 0800  methylPREDNISolone sodium succinate (SOLU-MEDROL) 250 mg in dextrose 5 % 100 mL IVPB  (IP TXP  KIDNEY POST-OP THYMO WITH PERIPHERAL PRECHECKED)      -- IV Once 11/16/20 0242    11/16/20 0341  hydrocortisone sodium succinate injection 100 mg  (IP TXP KIDNEY POST-OP THYMO WITH PERIPHERAL PRECHECKED)      04/13 1941 IV Once as needed 11/16/20 0242    11/15/20 1715  methylPREDNISolone sodium succinate (SOLU-MEDROL) 500 mg in sodium chloride 0.9% 100 mL IVPB  (IP TXP INTRA-OP THYMO - PERIPHERAL THYMO PRECHECKED)      -- IV Once 11/15/20 1706        Pressors:    Autonomic Drugs (From admission, onward)    Start     Stop Route Frequency Ordered    11/16/20 0341  EPINEPHrine injection 1 mg  (IP TXP KIDNEY POST-OP THYMO WITH PERIPHERAL PRECHECKED)      04/13 1941 SubQ Once as needed 11/16/20 0242        Hyperglycemia/Diabetes Medications:   Antihyperglycemics (From admission, onward)    Start     Stop Route Frequency Ordered    11/16/20 1645  insulin aspart U-100 pen 10 Units      -- SubQ 3 times daily with meals 11/16/20 1504    11/16/20 1615  insulin regular in 0.9 % NaCl 100 unit/100 mL (1 unit/mL) infusion     Question:  Enter initial dose (Units/hr):  Answer:  1.3    -- IV Continuous 11/16/20 1504    11/16/20 1603  insulin aspart U-100 pen 0-5 Units      -- SubQ As needed (PRN) 11/16/20 1504             PHYSICAL EXAMINATION:  Vitals:    11/16/20 2128   BP: (!) 118/57   Pulse: 71   Resp: 18   Temp: 98.2 °F (36.8 °C)     Body mass index is 27.55 kg/m².    Physical Exam   Constitutional: Well developed, well nourished, NAD.  ENT:  normal hearing.  Neck: Supple; trachea midline;   Cardiovascular: Normal heart sounds, no LE edema. DP +2 bilaterally.  Lungs: Normal effort; lungs anterior bilaterally clear to auscultation.  Abdomen: Soft, obese, no masses, no hernias.  MS: No clubbing or cyanosis of nails noted;  Skin: No rashes, lesions, or ulcers; no nodules. Injection sites are ok. No lipo hypertropthy or atrophy.  Psychiatric: Good judgement and insight; normal mood and affect.  Neurological: Cranial nerves are  grossly intact.  Foot: nails in good condition, no amputations noted.

## 2020-11-17 NOTE — ASSESSMENT & PLAN NOTE
-Continue Thymoglobulin induction as per written guidelines and monitor for toxicities or side effects (monitor plt count).  -tacrolimus, mycophenolate ordered for maintenance immunosuppression  -Solu-Medrol IV x 3 days to be followed by Prednisone taper

## 2020-11-17 NOTE — ASSESSMENT & PLAN NOTE
-Nystatin [antifungal] and Bactrim [PJP] already started.  Will begin Valcyte postop day 10 for antiviral prophylaxis

## 2020-11-17 NOTE — SUBJECTIVE & OBJECTIVE
"Interval HPI:   Overnight events:   BG is within goal on current IV/SQ insulin regimen.   Diet consistent carbohydrate  2 Days Post-Op    Eatin% - 100%  Nausea: No  Hypoglycemia and intervention: No  Fever: No  TPN and/or TF: No  If yes, type of TF/TPN and rate: None    /74   Pulse 81   Temp 97.9 °F (36.6 °C) (Oral)   Resp 16   Ht 5' 5.98" (1.676 m)   Wt 84.1 kg (185 lb 6.5 oz)   SpO2 99%   Breastfeeding No   BMI 29.94 kg/m²     Labs Reviewed and Include    Recent Labs   Lab 20  0658   *   CALCIUM 7.8*   ALBUMIN 2.6*   *   K 3.9   CO2 18*      BUN 47*   CREATININE 3.7*     Lab Results   Component Value Date    WBC 17.85 (H) 2020    HGB 8.8 (L) 2020    HCT 27.4 (L) 2020     (H) 2020    PLT 87 (L) 2020     No results for input(s): TSH, FREET4 in the last 168 hours.  Lab Results   Component Value Date    HGBA1C 9.0 (H) 11/15/2020       Nutritional status:   Body mass index is 29.94 kg/m².  Lab Results   Component Value Date    ALBUMIN 2.6 (L) 2020    ALBUMIN 2.4 (L) 2020    ALBUMIN 2.4 (L) 2020     No results found for: PREALBUMIN    Estimated Creatinine Clearance: 16.8 mL/min (A) (based on SCr of 3.7 mg/dL (H)).    Accu-Checks  Recent Labs     20  0314 20  1254 20  1303 20  1520 20  1736 20  2131 20  0203 20  0843 20  1203   POCTGLUCOSE 182* 445* 416* 420* 406* 306* 192* 158* 152*       Current Medications and/or Treatments Impacting Glycemic Control  Immunotherapy:    Immunosuppressants         Stop Route Frequency     tacrolimus capsule 5 mg      -- Oral 2 times daily     antithymocyte globulin (rabbit) 125 mg, hydrocortisone sodium succinate (SOLU-CORTEF) 20 mg, heparin (porcine) 1,000 Units in sodium chloride 0.9% 500 mL       08 IV Daily     mycophenolate capsule 1,000 mg      -- Oral 2 times daily        Steroids:   Hormones (From admission, onward)    " Start     Stop Route Frequency Ordered    11/19/20 0900  predniSONE tablet 20 mg  (IP TXP KIDNEY POST-OP THYMO WITH PERIPHERAL PRECHECKED)      -- Oral Daily 11/16/20 0242    11/18/20 0800  methylPREDNISolone sodium succinate injection 125 mg  (IP TXP KIDNEY POST-OP THYMO WITH PERIPHERAL PRECHECKED)      -- IV Once 11/16/20 0242    11/17/20 0900  antithymocyte globulin (rabbit) 125 mg, hydrocortisone sodium succinate (SOLU-CORTEF) 20 mg, heparin (porcine) 1,000 Units in sodium chloride 0.9% 500 mL  (IP TXP KIDNEY POST-OP THYMO WITH PERIPHERAL PRECHECKED)      11/19 0859 IV Daily 11/16/20 0242 11/16/20 0341  hydrocortisone sodium succinate injection 100 mg  (IP TXP KIDNEY POST-OP THYMO WITH PERIPHERAL PRECHECKED)      04/13 1941 IV Once as needed 11/16/20 0242    11/15/20 1715  methylPREDNISolone sodium succinate (SOLU-MEDROL) 500 mg in sodium chloride 0.9% 100 mL IVPB  (IP TXP INTRA-OP THYMO - PERIPHERAL THYMO PRECHECKED)      -- IV Once 11/15/20 1706        Pressors:    Autonomic Drugs (From admission, onward)    Start     Stop Route Frequency Ordered    11/16/20 0341  EPINEPHrine injection 1 mg  (IP TXP KIDNEY POST-OP THYMO WITH PERIPHERAL PRECHECKED)      04/13 1941 SubQ Once as needed 11/16/20 0242        Hyperglycemia/Diabetes Medications:   Antihyperglycemics (From admission, onward)    Start     Stop Route Frequency Ordered    11/17/20 0715  insulin aspart U-100 pen 14 Units      -- SubQ 3 times daily with meals 11/16/20 2135    11/16/20 1615  insulin regular in 0.9 % NaCl 100 unit/100 mL (1 unit/mL) infusion     Question:  Enter initial dose (Units/hr):  Answer:  1.3    -- IV Continuous 11/16/20 1504    11/16/20 1603  insulin aspart U-100 pen 0-5 Units      -- SubQ As needed (PRN) 11/16/20 1504

## 2020-11-17 NOTE — PROGRESS NOTES
[facility]  Kidney Transplant  Progress Note      Reason for Follow-up: Reassessment of Kidney Transplant - 11/16/2020  (#1) recipient and management of immunosuppression.    ORGAN: LEFT KIDNEY    Donor Type: Donation after Brain Death    Donor CMV Status: Positive  Donor HBcAB:Negative  Donor HCV Status:Negative  Donor HBV MELINDA: Negative  Donor HCV MELINDA: Negative      Subjective:   History of Present Illness:  Ms. Kendra Malik is a 64 year old female with PMH of ESRD secondary to diabetic nephropathy. She has been on the wait list for a kidney transplant at [facility] since 9/24/2018. Patient is currently on peritoneal dialysis started in mid September 2018. She has a peritoneal dialysis catheter for dialysis access. She reports that she is tolerating dialysis well. Patient denies any recent hospitalizations or ED visits.  Native UOP > 1000ml/d.           Ms. Malik is a 65 y.o. year old female who is status post Kidney Transplant - 11/16/2020  (#1).  Her maintenance immunosuppression consists of:   Immunosuppressants (From admission, onward)    Start     Stop Route Frequency Ordered    11/17/20 1800  tacrolimus capsule 5 mg  (IP TXP KIDNEY POST-OP THYMO WITH PERIPHERAL PRECHECKED)      -- Oral 2 times daily 11/17/20 1114    11/17/20 0900  antithymocyte globulin (rabbit) 125 mg, hydrocortisone sodium succinate (SOLU-CORTEF) 20 mg, heparin (porcine) 1,000 Units in sodium chloride 0.9% 500 mL  (IP TXP KIDNEY POST-OP THYMO WITH PERIPHERAL PRECHECKED)      11/19 0859 IV Daily 11/16/20 0242    11/16/20 1000  mycophenolate capsule 1,000 mg  (IP TXP KIDNEY POST-OP THYMO WITH PERIPHERAL PRECHECKED)      -- Oral 2 times daily 11/16/20 0242          Her post transplant course has been uncomplicated to date.    Hospital Course:  S/p KTx from a DBD 11/16/20 (Thymo, CIT ~13 hrs min, WIT 42 min, KDPI 29%). x2 Jeff drains. 1 artery, 1 vein, 1 ureter. Sebastian for 5 days. Satisfactory UOP postop. No blood through  drains.     Int Hx:   AAOx3, afebrile. Not complaining of pain today. No BM or gas passing. No N/V/D. Satisfactory UOP (2 L/d) with clear yellow color. Sebastian in place. Drains are light ss (300 ml/d). Tomorrow will take out the superficial drain. She is on insulin gtt increased to 2 units/hr. Yesterday ? indigestion and was given Tums. Had one episode of hyperglycemia. Is on glucose sliding scale. Shes receiving 2/3 Thymo today.     /105        WBC 18.7    Hb 8.6     Plt 87 trending down        K 3.7     Cr 4.3 trending down from 4.9  Ca 7.1 down from 8.9 and 7.4    Alb 2.4            Interval History:      Past Medical, Surgical, Family, and Social History:   Unchanged from H&P.    Scheduled Meds:   acetaminophen  1,000 mg Oral BID    acetaminophen  650 mg Oral Q24H    amLODIPine  10 mg Oral Daily    Thymoglobulin 1.5mg/kg, hydrocortisone 20mg, heparin 1000 units in NS 500ml (Peripheral line)  1.5 mg/kg Intravenous Daily    bisacodyL  10 mg Oral QHS    diphenhydrAMINE  25 mg Oral Q24H    diphenhydrAMINE  50 mg Intravenous Once    docusate sodium  100 mg Oral TID    ergocalciferol  50,000 Units Oral Q7 Days    famotidine  20 mg Oral QHS    heparin (porcine)  5,000 Units Subcutaneous Q8H    insulin aspart U-100  14 Units Subcutaneous TIDWM    labetaloL  300 mg Oral BID    methylPREDNISolone (SOLU-Medrol) IVPB (doses > 250 mg)  500 mg Intravenous Once    [START ON 11/18/2020] methylPREDNISolone sodium succinate  125 mg Intravenous Once    multivitamin  1 tablet Oral Daily    mupirocin  1 g Nasal BID    mycophenolate  1,000 mg Oral BID    nystatin  500,000 Units Mouth/Throat QID (PC + HS)    [START ON 11/19/2020] predniSONE  20 mg Oral Daily    sodium bicarbonate  1,300 mg Oral BID    sodium chloride 0.9%  3 mL Intravenous Q6H    sulfamethoxazole-trimethoprim 400-80mg  1 tablet Oral Daily AM    tacrolimus  5 mg Oral BID    [START ON 11/26/2020] valGANciclovir  450 mg Oral Daily AM  "    Continuous Infusions:   insulin regular 1 units/mL infusion orderable (TRANSFER) 2 Units/hr (11/16/20 2147)     PRN Meds:calcium carbonate, dextrose 50%, dextrose 50%, diphenhydrAMINE, EPINEPHrine, glucagon (human recombinant), glucose, glucose, hydrocortisone sodium succinate, insulin aspart U-100, mupirocin, ondansetron, oxyCODONE, oxyCODONE, pregabalin, traMADoL    Intake/Output - Last 3 Shifts       11/15 0700 - 11/16 0659 11/16 0700 - 11/17 0659 11/17 0700 - 11/18 0659    P.O. 600 4840 720    I.V. (mL/kg) 4122.5 (53.3) 2688.3 (32) 233.3 (2.8)    Other  0     IV Piggyback 700      Total Intake(mL/kg) 5422.5 (70.1) 7528.3 (89.5) 953.3 (11.3)    Urine (mL/kg/hr) 1350 2050 (1) 400 (0.8)    Drains 90 310     Blood 150      Total Output 1590 2360 400    Net +3832.5 +5168.3 +553.3           Urine Occurrence  150 x            Review of Systems   Constitutional: Negative.    HENT: Negative.    Eyes: Negative.    Respiratory: Negative.    Cardiovascular: Negative.    Gastrointestinal: Negative.    Musculoskeletal: Negative.    Skin: Negative.    Allergic/Immunologic: Negative.    Neurological: Negative.    Hematological: Negative.    Psychiatric/Behavioral: Negative.       Objective:     Vital Signs (Most Recent):  Temp: 97.9 °F (36.6 °C) (11/17/20 1123)  Pulse: 81 (11/17/20 1123)  Resp: 16 (11/17/20 1123)  BP: 132/74 (11/17/20 1123)  SpO2: 99 % (11/17/20 1123) Vital Signs (24h Range):  Temp:  [97.9 °F (36.6 °C)-98.5 °F (36.9 °C)] 97.9 °F (36.6 °C)  Pulse:  [69-84] 81  Resp:  [12-18] 16  SpO2:  [93 %-99 %] 99 %  BP: (104-150)/(57-74) 132/74     Weight: 84.1 kg (185 lb 6.5 oz)  Height: 5' 5.98" (167.6 cm)  Body mass index is 29.94 kg/m².    Physical Exam    Laboratory:  CBC:   Recent Labs   Lab 11/15/20  1735  11/16/20  0242 11/16/20  1900 11/17/20  0658   WBC 8.72  --   --  18.71* 17.85*   RBC 3.39*  --   --  2.71* 2.75*   HGB 10.8*  --   --  8.6* 8.8*   HCT 34.1*   < > 29.3* 28.4* 27.4*     --   --  100* 87* "   *  --   --  105* 100*   MCH 31.9*  --   --  31.7* 32.0*   MCHC 31.7*  --   --  30.3* 32.1    < > = values in this interval not displayed.     BMP:   Recent Labs   Lab 11/16/20  0242 11/16/20  1900 11/17/20  0658   * 397* 151*    133* 132*   K 3.0* 3.7 3.9    101 101   CO2 22* 18* 18*   BUN 41* 44* 47*   CREATININE 4.9* 4.3* 3.7*   CALCIUM 7.4* 7.1* 7.8*     CMP:   Recent Labs   Lab 11/15/20  1735 11/16/20  0242 11/16/20  1900 11/17/20  0658   * 189* 397* 151*   CALCIUM 8.9 7.4* 7.1* 7.8*   ALBUMIN 3.2* 2.4* 2.4* 2.6*   PROT 7.5  --   --   --     144 133* 132*   K 3.5 3.0* 3.7 3.9   CO2 25 22* 18* 18*    106 101 101   BUN 46* 41* 44* 47*   CREATININE 5.9* 4.9* 4.3* 3.7*   ALKPHOS 101  --   --   --    ALT 12  --   --   --    AST 18  --   --   --        Diagnostic Results:  US - Kidney:   Results for orders placed during the hospital encounter of 11/15/20   US Transplant Kidney With Doppler    Narrative EXAMINATION:  US TRANSPLANT KIDNEY WITH DOPPLER    CLINICAL HISTORY:  Assess perfusion;    TECHNIQUE:  Transplant renal ultrasound of the left lower quadrant with color flow doppler and duplex analysis.    COMPARISON:  None.    FINDINGS:  Patient is status post left lower quadrant renal transplantation day 1.  The renal transplant demonstrates no focal parenchymal abnormality. No transplant hydronephrosis. Peritransplant fluid collection measuring approximately 4.0 x 1.0 x 2.5 cm.    Renal arterial resistive indices are as follows: Interlobar 0.70, segmental Up 0.77, segmental Mid 0.75, segmental Low 0.71.  There is no evidence of a tardus parvus waveform. The maximum velocity in the main renal artery is 134 cm/sec.  The renal artery to iliac ratio 0.91.    The renal transplant venous system is unremarkable.  Partially visualized ureteral stent.      Impression Satisfactory Doppler and sonographic evaluation of the renal allograft.    Small Peritransplant fluid collection,  likely hematoma.  No significant mass effect.    Electronically signed by resident: Prosper Hogan  Date:    2020  Time:    14:42    Electronically signed by: Edis Dover MD  Date:    2020  Time:    15:05     Assessment/Plan:     At risk for opportunistic infections  -Nystatin [antifungal] and Bactrim [PJP] already started.  Will begin Valcyte postop day 10 for antiviral prophylaxis    Prophylactic immunotherapy  -Continue Thymoglobulin induction as per written guidelines and monitor for toxicities or side effects (monitor plt count).  -tacrolimus, mycophenolate ordered for maintenance immunosuppression  -Solu-Medrol IV x 3 days to be followed by Prednisone taper        Anemia of renal disease        Status post -donor kidney transplantation  -KTx from DBD donor on 20, KDPI 29%, CIT 13 hrs, Thymoglobulin induction  -Cr is improving, satisfactory UOP        ESRD on dialysis  -ESRD from DM since 2018  -Peritoneal dialysis with PD catheter tunneling into chest -  will need to schedule removal once verify adequate function of allograft          Discharge Planning:  Not clear yet.    Shanon Farr MD  Kidney Transplant  [facility]

## 2020-11-18 DIAGNOSIS — Z94.0 KIDNEY REPLACED BY TRANSPLANT: Primary | ICD-10-CM

## 2020-11-18 PROBLEM — Z99.2 ESRD ON DIALYSIS: Status: RESOLVED | Noted: 2018-10-11 | Resolved: 2020-11-18

## 2020-11-18 PROBLEM — N18.6 ESRD ON DIALYSIS: Status: RESOLVED | Noted: 2018-10-11 | Resolved: 2020-11-18

## 2020-11-18 LAB
ALBUMIN SERPL BCP-MCNC: 2.6 G/DL (ref 3.5–5.2)
ANION GAP SERPL CALC-SCNC: 10 MMOL/L (ref 8–16)
ANISOCYTOSIS BLD QL SMEAR: SLIGHT
BACTERIA FLD AEROBE CULT: NO GROWTH
BASOPHILS # BLD AUTO: 0.01 K/UL (ref 0–0.2)
BASOPHILS NFR BLD: 0.1 % (ref 0–1.9)
BUN SERPL-MCNC: 36 MG/DL (ref 8–23)
CALCIUM SERPL-MCNC: 9 MG/DL (ref 8.7–10.5)
CHLORIDE SERPL-SCNC: 104 MMOL/L (ref 95–110)
CLASS I ANTIBODIES - LUMINEX: NORMAL
CLASS I ANTIBODY COMMENTS - LUMINEX: NORMAL
CLASS II ANTIBODIES - LUMINEX: NORMAL
CLASS II ANTIBODY COMMENTS - LUMINEX: NORMAL
CO2 SERPL-SCNC: 20 MMOL/L (ref 23–29)
CPRA %: 87
CREAT SERPL-MCNC: 2.3 MG/DL (ref 0.5–1.4)
DIFFERENTIAL METHOD: ABNORMAL
EOSINOPHIL # BLD AUTO: 0 K/UL (ref 0–0.5)
EOSINOPHIL NFR BLD: 0.1 % (ref 0–8)
ERYTHROCYTE [DISTWIDTH] IN BLOOD BY AUTOMATED COUNT: 13.4 % (ref 11.5–14.5)
EST. GFR  (AFRICAN AMERICAN): 25.1 ML/MIN/1.73 M^2
EST. GFR  (NON AFRICAN AMERICAN): 21.8 ML/MIN/1.73 M^2
GLUCOSE SERPL-MCNC: 122 MG/DL (ref 70–110)
GRAM STN SPEC: NORMAL
GRAM STN SPEC: NORMAL
HCT VFR BLD AUTO: 26.2 % (ref 37–48.5)
HCV RNA SERPL NAA+PROBE-LOG IU: <1.08 LOG (10) IU/ML
HCV RNA SERPL QL NAA+PROBE: NOT DETECTED IU/ML
HCV RNA SPEC NAA+PROBE-ACNC: <12 IU/ML
HGB BLD-MCNC: 8.2 G/DL (ref 12–16)
IMM GRANULOCYTES # BLD AUTO: 0.04 K/UL (ref 0–0.04)
IMM GRANULOCYTES NFR BLD AUTO: 0.4 % (ref 0–0.5)
LYMPHOCYTES # BLD AUTO: 0.1 K/UL (ref 1–4.8)
LYMPHOCYTES NFR BLD: 1.2 % (ref 18–48)
MAGNESIUM SERPL-MCNC: 2.1 MG/DL (ref 1.6–2.6)
MCH RBC QN AUTO: 31.1 PG (ref 27–31)
MCHC RBC AUTO-ENTMCNC: 31.3 G/DL (ref 32–36)
MCV RBC AUTO: 99 FL (ref 82–98)
MONOCYTES # BLD AUTO: 0.4 K/UL (ref 0.3–1)
MONOCYTES NFR BLD: 3.6 % (ref 4–15)
NEUTROPHILS # BLD AUTO: 10.2 K/UL (ref 1.8–7.7)
NEUTROPHILS NFR BLD: 94.6 % (ref 38–73)
NRBC BLD-RTO: 0 /100 WBC
OVALOCYTES BLD QL SMEAR: ABNORMAL
PHOSPHATE SERPL-MCNC: 3.8 MG/DL (ref 2.7–4.5)
PLATELET # BLD AUTO: 76 K/UL (ref 150–350)
PLATELET BLD QL SMEAR: ABNORMAL
PMV BLD AUTO: 12.5 FL (ref 9.2–12.9)
POCT GLUCOSE: 109 MG/DL (ref 70–110)
POCT GLUCOSE: 112 MG/DL (ref 70–110)
POCT GLUCOSE: 123 MG/DL (ref 70–110)
POCT GLUCOSE: 125 MG/DL (ref 70–110)
POCT GLUCOSE: 155 MG/DL (ref 70–110)
POCT GLUCOSE: 163 MG/DL (ref 70–110)
POCT GLUCOSE: 218 MG/DL (ref 70–110)
POIKILOCYTOSIS BLD QL SMEAR: SLIGHT
POTASSIUM SERPL-SCNC: 4.2 MMOL/L (ref 3.5–5.1)
RBC # BLD AUTO: 2.64 M/UL (ref 4–5.4)
SERUM COLLECTION DT - LUMINEX CLASS I: NORMAL
SERUM COLLECTION DT - LUMINEX CLASS II: NORMAL
SODIUM SERPL-SCNC: 134 MMOL/L (ref 136–145)
SPCL1 TESTING DATE: NORMAL
SPCL2 TESTING DATE: NORMAL
SPLUA TESTING DATE: NORMAL
TACROLIMUS BLD-MCNC: 5 NG/ML (ref 5–15)
WBC # BLD AUTO: 10.78 K/UL (ref 3.9–12.7)

## 2020-11-18 PROCEDURE — 97803 MED NUTRITION INDIV SUBSEQ: CPT

## 2020-11-18 PROCEDURE — A4216 STERILE WATER/SALINE, 10 ML: HCPCS | Performed by: ANESTHESIOLOGY

## 2020-11-18 PROCEDURE — 86832 HLA CLASS I HIGH DEFIN QUAL: CPT | Mod: PO,TXP

## 2020-11-18 PROCEDURE — 25000003 PHARM REV CODE 250: Performed by: PHYSICIAN ASSISTANT

## 2020-11-18 PROCEDURE — 25000003 PHARM REV CODE 250: Performed by: INTERNAL MEDICINE

## 2020-11-18 PROCEDURE — 83735 ASSAY OF MAGNESIUM: CPT

## 2020-11-18 PROCEDURE — 99233 PR SUBSEQUENT HOSPITAL CARE,LEVL III: ICD-10-PCS | Mod: GC,,, | Performed by: INTERNAL MEDICINE

## 2020-11-18 PROCEDURE — 63600175 PHARM REV CODE 636 W HCPCS: Performed by: SURGERY

## 2020-11-18 PROCEDURE — C9399 UNCLASSIFIED DRUGS OR BIOLOG: HCPCS | Performed by: NURSE PRACTITIONER

## 2020-11-18 PROCEDURE — 63600175 PHARM REV CODE 636 W HCPCS: Performed by: NURSE PRACTITIONER

## 2020-11-18 PROCEDURE — 86833 HLA CLASS II HIGH DEFIN QUAL: CPT | Mod: PO,TXP

## 2020-11-18 PROCEDURE — 20600001 HC STEP DOWN PRIVATE ROOM

## 2020-11-18 PROCEDURE — 25000003 PHARM REV CODE 250: Performed by: NURSE PRACTITIONER

## 2020-11-18 PROCEDURE — 25000003 PHARM REV CODE 250: Performed by: SURGERY

## 2020-11-18 PROCEDURE — 25000003 PHARM REV CODE 250: Performed by: ANESTHESIOLOGY

## 2020-11-18 PROCEDURE — 63600175 PHARM REV CODE 636 W HCPCS: Performed by: PHYSICIAN ASSISTANT

## 2020-11-18 PROCEDURE — 99232 PR SUBSEQUENT HOSPITAL CARE,LEVL II: ICD-10-PCS | Mod: ,,, | Performed by: NURSE PRACTITIONER

## 2020-11-18 PROCEDURE — 85025 COMPLETE CBC W/AUTO DIFF WBC: CPT

## 2020-11-18 PROCEDURE — 80069 RENAL FUNCTION PANEL: CPT

## 2020-11-18 PROCEDURE — 36415 COLL VENOUS BLD VENIPUNCTURE: CPT

## 2020-11-18 PROCEDURE — 99900035 HC TECH TIME PER 15 MIN (STAT)

## 2020-11-18 PROCEDURE — 80197 ASSAY OF TACROLIMUS: CPT

## 2020-11-18 PROCEDURE — 99233 SBSQ HOSP IP/OBS HIGH 50: CPT | Mod: GC,,, | Performed by: INTERNAL MEDICINE

## 2020-11-18 PROCEDURE — 63600175 PHARM REV CODE 636 W HCPCS: Performed by: INTERNAL MEDICINE

## 2020-11-18 PROCEDURE — 99232 SBSQ HOSP IP/OBS MODERATE 35: CPT | Mod: ,,, | Performed by: NURSE PRACTITIONER

## 2020-11-18 PROCEDURE — 94799 UNLISTED PULMONARY SVC/PX: CPT

## 2020-11-18 PROCEDURE — 94761 N-INVAS EAR/PLS OXIMETRY MLT: CPT

## 2020-11-18 PROCEDURE — 86977 RBC SERUM PRETX INCUBJ/INHIB: CPT | Mod: PO,TXP

## 2020-11-18 RX ORDER — IBUPROFEN 200 MG
16 TABLET ORAL
Status: DISCONTINUED | OUTPATIENT
Start: 2020-11-18 | End: 2020-11-19 | Stop reason: HOSPADM

## 2020-11-18 RX ORDER — GABAPENTIN 300 MG/1
300 CAPSULE ORAL 3 TIMES DAILY
Status: DISCONTINUED | OUTPATIENT
Start: 2020-11-18 | End: 2020-11-19 | Stop reason: HOSPADM

## 2020-11-18 RX ORDER — INSULIN ASPART 100 [IU]/ML
7 INJECTION, SOLUTION INTRAVENOUS; SUBCUTANEOUS
Status: DISCONTINUED | OUTPATIENT
Start: 2020-11-18 | End: 2020-11-19 | Stop reason: HOSPADM

## 2020-11-18 RX ORDER — GLUCAGON 1 MG
1 KIT INJECTION
Status: DISCONTINUED | OUTPATIENT
Start: 2020-11-18 | End: 2020-11-19 | Stop reason: HOSPADM

## 2020-11-18 RX ORDER — DIPHENHYDRAMINE HCL 12.5MG/5ML
25 ELIXIR ORAL ONCE
Status: COMPLETED | OUTPATIENT
Start: 2020-11-18 | End: 2020-11-18

## 2020-11-18 RX ORDER — CLONIDINE HYDROCHLORIDE 0.1 MG/1
0.1 TABLET ORAL EVERY 6 HOURS PRN
Status: DISCONTINUED | OUTPATIENT
Start: 2020-11-18 | End: 2020-11-19

## 2020-11-18 RX ORDER — ONDANSETRON 2 MG/ML
4 INJECTION INTRAMUSCULAR; INTRAVENOUS EVERY 6 HOURS PRN
Status: DISCONTINUED | OUTPATIENT
Start: 2020-11-18 | End: 2020-11-19 | Stop reason: HOSPADM

## 2020-11-18 RX ORDER — DIPHENHYDRAMINE HCL 25 MG
25 CAPSULE ORAL EVERY 6 HOURS PRN
Status: DISCONTINUED | OUTPATIENT
Start: 2020-11-18 | End: 2020-11-19 | Stop reason: HOSPADM

## 2020-11-18 RX ORDER — IBUPROFEN 200 MG
24 TABLET ORAL
Status: DISCONTINUED | OUTPATIENT
Start: 2020-11-18 | End: 2020-11-19 | Stop reason: HOSPADM

## 2020-11-18 RX ORDER — PROCHLORPERAZINE EDISYLATE 5 MG/ML
10 INJECTION INTRAMUSCULAR; INTRAVENOUS EVERY 6 HOURS PRN
Status: DISCONTINUED | OUTPATIENT
Start: 2020-11-18 | End: 2020-11-19 | Stop reason: HOSPADM

## 2020-11-18 RX ORDER — INSULIN ASPART 100 [IU]/ML
0-5 INJECTION, SOLUTION INTRAVENOUS; SUBCUTANEOUS
Status: DISCONTINUED | OUTPATIENT
Start: 2020-11-18 | End: 2020-11-19 | Stop reason: HOSPADM

## 2020-11-18 RX ADMIN — MUPIROCIN 1 G: 20 OINTMENT TOPICAL at 10:11

## 2020-11-18 RX ADMIN — TACROLIMUS 5 MG: 1 CAPSULE ORAL at 06:11

## 2020-11-18 RX ADMIN — LABETALOL HYDROCHLORIDE 300 MG: 100 TABLET, FILM COATED ORAL at 08:11

## 2020-11-18 RX ADMIN — HEPARIN SODIUM 125 MG: 1000 INJECTION, SOLUTION INTRAVENOUS; SUBCUTANEOUS at 03:11

## 2020-11-18 RX ADMIN — NYSTATIN 500000 UNITS: 100000 SUSPENSION ORAL at 03:11

## 2020-11-18 RX ADMIN — MYCOPHENOLATE MOFETIL 1000 MG: 250 CAPSULE ORAL at 10:11

## 2020-11-18 RX ADMIN — METHYLPREDNISOLONE SODIUM SUCCINATE 125 MG: 125 INJECTION, POWDER, FOR SOLUTION INTRAMUSCULAR; INTRAVENOUS at 11:11

## 2020-11-18 RX ADMIN — HEPARIN SODIUM 5000 UNITS: 5000 INJECTION INTRAVENOUS; SUBCUTANEOUS at 05:11

## 2020-11-18 RX ADMIN — GABAPENTIN 300 MG: 300 CAPSULE ORAL at 08:11

## 2020-11-18 RX ADMIN — OXYCODONE HYDROCHLORIDE 5 MG: 5 TABLET ORAL at 04:11

## 2020-11-18 RX ADMIN — INSULIN ASPART 2 UNITS: 100 INJECTION, SOLUTION INTRAVENOUS; SUBCUTANEOUS at 06:11

## 2020-11-18 RX ADMIN — Medication 3 ML: at 01:11

## 2020-11-18 RX ADMIN — ACETAMINOPHEN 1000 MG: 500 TABLET ORAL at 05:11

## 2020-11-18 RX ADMIN — MUPIROCIN 1 G: 20 OINTMENT TOPICAL at 08:11

## 2020-11-18 RX ADMIN — Medication 3 ML: at 06:11

## 2020-11-18 RX ADMIN — THERA TABS 1 TABLET: TAB at 10:11

## 2020-11-18 RX ADMIN — SULFAMETHOXAZOLE AND TRIMETHOPRIM 1 TABLET: 400; 80 TABLET ORAL at 10:11

## 2020-11-18 RX ADMIN — ONDANSETRON 4 MG: 4 TABLET, ORALLY DISINTEGRATING ORAL at 11:11

## 2020-11-18 RX ADMIN — DIPHENHYDRAMINE HYDROCHLORIDE 25 MG: 25 CAPSULE ORAL at 02:11

## 2020-11-18 RX ADMIN — ACETAMINOPHEN 500 MG: 500 TABLET ORAL at 08:11

## 2020-11-18 RX ADMIN — CLONIDINE HYDROCHLORIDE 0.1 MG: 0.1 TABLET ORAL at 05:11

## 2020-11-18 RX ADMIN — DIPHENHYDRAMINE HYDROCHLORIDE 25 MG: 25 CAPSULE ORAL at 11:11

## 2020-11-18 RX ADMIN — Medication 3 ML: at 05:11

## 2020-11-18 RX ADMIN — HEPARIN SODIUM 5000 UNITS: 5000 INJECTION INTRAVENOUS; SUBCUTANEOUS at 02:11

## 2020-11-18 RX ADMIN — DIPHENHYDRAMINE HYDROCHLORIDE 25 MG: 25 SOLUTION ORAL at 02:11

## 2020-11-18 RX ADMIN — MYCOPHENOLATE MOFETIL 1000 MG: 250 CAPSULE ORAL at 08:11

## 2020-11-18 RX ADMIN — FAMOTIDINE 20 MG: 20 TABLET, FILM COATED ORAL at 08:11

## 2020-11-18 RX ADMIN — TACROLIMUS 5 MG: 1 CAPSULE ORAL at 10:11

## 2020-11-18 RX ADMIN — ONDANSETRON 4 MG: 4 TABLET, ORALLY DISINTEGRATING ORAL at 03:11

## 2020-11-18 RX ADMIN — SODIUM BICARBONATE 650 MG TABLET 1300 MG: at 08:11

## 2020-11-18 RX ADMIN — SODIUM BICARBONATE 650 MG TABLET 1300 MG: at 10:11

## 2020-11-18 RX ADMIN — HEPARIN SODIUM 5000 UNITS: 5000 INJECTION INTRAVENOUS; SUBCUTANEOUS at 08:11

## 2020-11-18 RX ADMIN — ACETAMINOPHEN 650 MG: 325 TABLET ORAL at 11:11

## 2020-11-18 RX ADMIN — AMLODIPINE BESYLATE 10 MG: 10 TABLET ORAL at 08:11

## 2020-11-18 RX ADMIN — CLONIDINE HYDROCHLORIDE 0.1 MG: 0.1 TABLET ORAL at 10:11

## 2020-11-18 RX ADMIN — INSULIN ASPART 7 UNITS: 100 INJECTION, SOLUTION INTRAVENOUS; SUBCUTANEOUS at 06:11

## 2020-11-18 RX ADMIN — PROCHLORPERAZINE EDISYLATE 10 MG: 5 INJECTION INTRAMUSCULAR; INTRAVENOUS at 12:11

## 2020-11-18 RX ADMIN — NYSTATIN 500000 UNITS: 100000 SUSPENSION ORAL at 08:11

## 2020-11-18 RX ADMIN — INSULIN DETEMIR 20 UNITS: 100 INJECTION, SOLUTION SUBCUTANEOUS at 11:11

## 2020-11-18 RX ADMIN — ONDANSETRON 4 MG: 2 INJECTION INTRAMUSCULAR; INTRAVENOUS at 07:11

## 2020-11-18 NOTE — PLAN OF CARE
Recommendations    1. Continue Consistent Carb diet as tolerated, encourage PO intake   2. Add Boost Glucose Control BID    Goals: 1. Pt to meet % EEN/EPN over the course of 7 days  Nutrition Goal Status: progressing towards goal  Communication of RD Recs: (POC)

## 2020-11-18 NOTE — PLAN OF CARE
AAOx3, afebrile, w/o c/o pain. Bg monitored achs 2am. Insulin gtt lowered to 0.5 units/hr. Taught pt incision care. Self meds 100%. Cr trending down. Plan for thymo 3/3 today. Sebastian in place with light pink urine. See flow sheet for output. Parvez drains on rt side with minimal serous output. No BM since surgery but passing gas. Pt able to position self independently in bed. Stand by assist OOB. Pt in lowest position, side rails up x2, non-skid foot wear in place, call light within reach, pt verbalized understanding to call RN when needed.  Hand hygiene practiced per protocol.  Will continue to monitor.

## 2020-11-18 NOTE — PROGRESS NOTES
"[facility]  Adult Nutrition  Progress Note    SUMMARY       Recommendations    1. Continue Consistent Carb diet as tolerated, encourage PO intake   2. Add Boost Glucose Control BID    Goals: 1. Pt to meet % EEN/EPN over the course of 7 days  Nutrition Goal Status: progressing towards goal  Communication of RD Recs: (POC)    Reason for Assessment    Reason For Assessment: RD follow-up  Diagnosis: (ESRD on dialysis)  Relevant Medical History: DM2, ESRD on PD, HTN  Interdisciplinary Rounds: did not attend  General Information Comments: S/p KTx 11/16. Caregiver not present during this visit. Pt reports not feeling well. Pt reports having some nausea, appetite is poor. Current PO intake < 25%. Last couple days, PO intake varied, sometimes 75% and sometimes 0%. Pt unsure about trying Boost. Ptwith no additional questions to post-transplant nutrition diet recommendations. RD contact information provided for further questions/concerns. NFPE not warranted, pt's wt stable.    Nutrition Discharge Planning: Post transplant nutrition education provided on 11/16. Food safety/drug interactions emphasized. General healthy/low salt diet recommended. Education material left. No other needs identified. Caregiver present.    Nutrition Risk Screen    Nutrition Risk Screen: no indicators present    Nutrition/Diet History    Patient Reported Diet/Restrictions/Preferences: low salt  Spiritual, Cultural Beliefs, [employer] Practices, Values that Affect Care: no  Food Allergies: NKFA  Factors Affecting Nutritional Intake: nausea/vomiting, decreased appetite    Anthropometrics    Temp: 98.1 °F (36.7 °C)  Height Method: Stated  Height: 5' 5.98" (167.6 cm)  Height (inches): 65.98 in  Weight Method: Bed Scale  Weight: 84.5 kg (186 lb 4.6 oz)  Weight (lb): 186.29 lb  Ideal Body Weight (IBW), Female: 129.9 lb  % Ideal Body Weight, Female (lb): 131.36 %  BMI (Calculated): 30.1  BMI Grade: 30 - 34.9- obesity - grade I   "     Lab/Procedures/Meds    Pertinent Labs Reviewed: reviewed  Pertinent Labs Comments: Na 134, BUN 36, Cre 2.3, GFR 25.1, Glu 122  Pertinent Medications Reviewed: reviewed  Pertinent Medications Comments: amlodipine, docusate, famotidine, heparin, insulin, methylprednisolone, MVI, mycophenolate, prednisone, Na bicarb, tacrolimus    Estimated/Assessed Needs    Weight Used For Calorie Calculations: 77.4 kg (170 lb 10.2 oz)  Energy Calorie Requirements (kcal): 1935 kcals/day(25 kcal/kg)  Energy Need Method: Kcal/kg  Protein Requirements:  g/day(1.2-1.5 g/kg)  Weight Used For Protein Calculations: 77.4 kg (170 lb 10.2 oz)  Fluid Requirements (mL): 1 mL/kcal or per MD  Estimated Fluid Requirement Method: RDA Method  RDA Method (mL): 1935  CHO Requirement: 242 g      Nutrition Prescription Ordered    Current Diet Order: Consistent Carbohydrate    Evaluation of Received Nutrient/Fluid Intake    Energy Calories Required: not meeting needs  Protein Required: not meeting needs  Fluid Required: not meeting needs  Comments: LBM: 11/15  Tolerance: tolerating  % Intake of Estimated Energy Needs: 0 - 25 %  % Meal Intake: 0 - 25 %    Nutrition Risk    Level of Risk/Frequency of Follow-up: low     Assessment and Plan    Nutrition Problem  increased nutient needs, protein     Related to (etiology):   Physiological demands, healing     Signs and Symptoms (as evidenced by):   S/p Kidney transplant 11/16      Interventions (treatment strategy):  Collaboration with other providers  Carbohydrate modified diet  Nutrition education     Nutrition Diagnosis Status:   Continues    Monitor and Evaluation    Food and Nutrient Intake: energy intake  Food and Nutrient Adminstration: diet order  Knowledge/Beliefs/Attitudes: food and nutrition knowledge/skill  Anthropometric Measurements: weight, weight change  Biochemical Data, Medical Tests and Procedures: electrolyte and renal panel, glucose/endocrine profile  Nutrition-Focused Physical  Findings: overall appearance, skin     Malnutrition Assessment                         Edema (Fluid Accumulation): 1-->trace             Nutrition Follow-Up    RD Follow-up?: Yes

## 2020-11-18 NOTE — SUBJECTIVE & OBJECTIVE
"Interval HPI:   Overnight events:   BG is within goal on current SQ insulin regimen.   Diet consistent carbohydrate  3 Days Post-Op    Eating:   <25%  Nausea: No  Hypoglycemia and intervention: No  Fever: No  TPN and/or TF: No  If yes, type of TF/TPN and rate: None    BP (!) 154/72   Pulse 80   Temp 98.1 °F (36.7 °C) (Oral)   Resp 17   Ht 5' 5.98" (1.676 m)   Wt 84.5 kg (186 lb 4.6 oz)   SpO2 95%   Breastfeeding No   BMI 30.08 kg/m²     Labs Reviewed and Include    Recent Labs   Lab 11/18/20  0618   *   CALCIUM 9.0   ALBUMIN 2.6*   *   K 4.2   CO2 20*      BUN 36*   CREATININE 2.3*     Lab Results   Component Value Date    WBC 10.78 11/18/2020    HGB 8.2 (L) 11/18/2020    HCT 26.2 (L) 11/18/2020    MCV 99 (H) 11/18/2020    PLT 76 (L) 11/18/2020     No results for input(s): TSH, FREET4 in the last 168 hours.  Lab Results   Component Value Date    HGBA1C 9.0 (H) 11/15/2020       Nutritional status:   Body mass index is 30.08 kg/m².  Lab Results   Component Value Date    ALBUMIN 2.6 (L) 11/18/2020    ALBUMIN 2.6 (L) 11/17/2020    ALBUMIN 2.4 (L) 11/16/2020     No results found for: PREALBUMIN    Estimated Creatinine Clearance: 27.1 mL/min (A) (based on SCr of 2.3 mg/dL (H)).    Accu-Checks  Recent Labs     11/16/20  1736 11/16/20  2131 11/17/20  0203 11/17/20  0843 11/17/20  1203 11/17/20  1657 11/17/20  2114 11/18/20  0147 11/18/20  0548 11/18/20  0803   POCTGLUCOSE 406* 306* 192* 158* 152* 109 112* 112* 125* 123*       Current Medications and/or Treatments Impacting Glycemic Control  Immunotherapy:    Immunosuppressants         Stop Route Frequency     tacrolimus capsule 5 mg      -- Oral 2 times daily     antithymocyte globulin (rabbit) 125 mg, hydrocortisone sodium succinate (SOLU-CORTEF) 20 mg, heparin (porcine) 1,000 Units in sodium chloride 0.9% 500 mL      11/19 0859 IV Daily     mycophenolate capsule 1,000 mg      -- Oral 2 times daily        Steroids:   Hormones (From admission, " onward)    Start     Stop Route Frequency Ordered    11/19/20 0900  predniSONE tablet 20 mg  (IP TXP KIDNEY POST-OP THYMO WITH PERIPHERAL PRECHECKED)      -- Oral Daily 11/16/20 0242    11/18/20 0800  methylPREDNISolone sodium succinate injection 125 mg  (IP TXP KIDNEY POST-OP THYMO WITH PERIPHERAL PRECHECKED)      -- IV Once 11/16/20 0242    11/17/20 0900  antithymocyte globulin (rabbit) 125 mg, hydrocortisone sodium succinate (SOLU-CORTEF) 20 mg, heparin (porcine) 1,000 Units in sodium chloride 0.9% 500 mL  (IP TXP KIDNEY POST-OP THYMO WITH PERIPHERAL PRECHECKED)      11/19 0859 IV Daily 11/16/20 0242 11/16/20 0341  hydrocortisone sodium succinate injection 100 mg  (IP TXP KIDNEY POST-OP THYMO WITH PERIPHERAL PRECHECKED)      04/13 1941 IV Once as needed 11/16/20 0242    11/15/20 1715  methylPREDNISolone sodium succinate (SOLU-MEDROL) 500 mg in sodium chloride 0.9% 100 mL IVPB  (IP TXP INTRA-OP THYMO - PERIPHERAL THYMO PRECHECKED)      -- IV Once 11/15/20 1706        Pressors:    Autonomic Drugs (From admission, onward)    Start     Stop Route Frequency Ordered    11/16/20 0341  EPINEPHrine injection 1 mg  (IP TXP KIDNEY POST-OP THYMO WITH PERIPHERAL PRECHECKED)      04/13 1941 SubQ Once as needed 11/16/20 0242        Hyperglycemia/Diabetes Medications:   Antihyperglycemics (From admission, onward)    Start     Stop Route Frequency Ordered    11/17/20 0715  insulin aspart U-100 pen 14 Units      -- SubQ 3 times daily with meals 11/16/20 2135    11/16/20 1615  insulin regular in 0.9 % NaCl 100 unit/100 mL (1 unit/mL) infusion     Question:  Enter initial dose (Units/hr):  Answer:  1.3    -- IV Continuous 11/16/20 1504    11/16/20 1603  insulin aspart U-100 pen 0-5 Units      -- SubQ As needed (PRN) 11/16/20 1504

## 2020-11-18 NOTE — PROGRESS NOTES
[facility]  Kidney Transplant  Progress Note      Reason for Follow-up: Reassessment of Kidney Transplant - 11/16/2020  (#1) recipient and management of immunosuppression.    ORGAN: LEFT KIDNEY    Donor Type: Donation after Brain Death    Donor CMV Status: Positive  Donor HBcAB:Negative  Donor HCV Status:Negative  Donor HBV MELINDA: Negative  Donor HCV MELINDA: Negative      Subjective:   History of Present Illness:  Ms. Kendra Malik is a 64 year old female with PMH of ESRD secondary to diabetic nephropathy. She has been on the wait list for a kidney transplant at [facility] since 9/24/2018. Patient is currently on peritoneal dialysis started in mid September 2018. She has a peritoneal dialysis catheter for dialysis access. She reports that she is tolerating dialysis well. Patient denies any recent hospitalizations or ED visits.  Native UOP > 1000ml/d.           Ms. Malik is a 65 y.o. year old female who is status post Kidney Transplant - 11/16/2020  (#1).  Her maintenance immunosuppression consists of:   Immunosuppressants (From admission, onward)    Start     Stop Route Frequency Ordered    11/17/20 1800  tacrolimus capsule 5 mg  (IP TXP KIDNEY POST-OP THYMO WITH PERIPHERAL PRECHECKED)      -- Oral 2 times daily 11/17/20 1114    11/17/20 0900  antithymocyte globulin (rabbit) 125 mg, hydrocortisone sodium succinate (SOLU-CORTEF) 20 mg, heparin (porcine) 1,000 Units in sodium chloride 0.9% 500 mL  (IP TXP KIDNEY POST-OP THYMO WITH PERIPHERAL PRECHECKED)      11/19 0859 IV Daily 11/16/20 0242    11/16/20 1000  mycophenolate capsule 1,000 mg  (IP TXP KIDNEY POST-OP THYMO WITH PERIPHERAL PRECHECKED)      -- Oral 2 times daily 11/16/20 0242          Her post transplant course has been uncomplicated to date.    Hospital Course:  S/p KTx from a DBD 11/16/20 (Thymo, CIT ~13 hrs min, WIT 42 min, KDPI 29%). x2 Jeff drains. 1 artery, 1 vein, 1 ureter. Sebastian for 5 days. Satisfactory UOP postop. No blood through  drains.     Int Hx:   AAOx3, afebrile. Not complaining of pain today. No BM or gas passing. No N/V/D. Satisfactory UOP (2 L/d) with clear yellow color. Sebastian in place. Drains are light ss (300 ml/d). Tomorrow will take out the superficial drain. She is on insulin gtt increased to 2 units/hr. Yesterday ? indigestion and was given Tums. Had one episode of hyperglycemia. Is on glucose sliding scale. Shes receiving 2/3 Thymo today.     /105        WBC 18.7    Hb 8.6     Plt 87 trending down        K 3.7     Cr 4.3 trending down from 4.9  Ca 7.1 down from 8.9 and 7.4    Alb 2.4            Interval History:      Past Medical, Surgical, Family, and Social History:   Unchanged from H&P.    Scheduled Meds:   acetaminophen  1,000 mg Oral BID    acetaminophen  650 mg Oral Q24H    amLODIPine  10 mg Oral Daily    Thymoglobulin 1.5mg/kg, hydrocortisone 20mg, heparin 1000 units in NS 500ml (Peripheral line)  1.5 mg/kg Intravenous Daily    bisacodyL  10 mg Oral QHS    diphenhydrAMINE  25 mg Oral Q24H    diphenhydrAMINE  50 mg Intravenous Once    docusate sodium  100 mg Oral TID    ergocalciferol  50,000 Units Oral Q7 Days    famotidine  20 mg Oral QHS    heparin (porcine)  5,000 Units Subcutaneous Q8H    insulin aspart U-100  14 Units Subcutaneous TIDWM    labetaloL  300 mg Oral BID    methylPREDNISolone (SOLU-Medrol) IVPB (doses > 250 mg)  500 mg Intravenous Once    [START ON 11/18/2020] methylPREDNISolone sodium succinate  125 mg Intravenous Once    multivitamin  1 tablet Oral Daily    mupirocin  1 g Nasal BID    mycophenolate  1,000 mg Oral BID    nystatin  500,000 Units Mouth/Throat QID (PC + HS)    [START ON 11/19/2020] predniSONE  20 mg Oral Daily    sodium bicarbonate  1,300 mg Oral BID    sodium chloride 0.9%  3 mL Intravenous Q6H    sulfamethoxazole-trimethoprim 400-80mg  1 tablet Oral Daily AM    tacrolimus  5 mg Oral BID    [START ON 11/26/2020] valGANciclovir  450 mg Oral Daily AM  "    Continuous Infusions:   insulin regular 1 units/mL infusion orderable (TRANSFER) 2 Units/hr (11/16/20 2147)     PRN Meds:calcium carbonate, dextrose 50%, dextrose 50%, diphenhydrAMINE, EPINEPHrine, glucagon (human recombinant), glucose, glucose, hydrocortisone sodium succinate, insulin aspart U-100, mupirocin, ondansetron, oxyCODONE, oxyCODONE, pregabalin, traMADoL    Intake/Output - Last 3 Shifts       11/15 0700 - 11/16 0659 11/16 0700 - 11/17 0659 11/17 0700 - 11/18 0659    P.O. 600 4840 720    I.V. (mL/kg) 4122.5 (53.3) 2688.3 (32) 233.3 (2.8)    Other  0     IV Piggyback 700      Total Intake(mL/kg) 5422.5 (70.1) 7528.3 (89.5) 953.3 (11.3)    Urine (mL/kg/hr) 1350 2050 (1) 400 (0.8)    Drains 90 310     Blood 150      Total Output 1590 2360 400    Net +3832.5 +5168.3 +553.3           Urine Occurrence  150 x            Review of Systems   Constitutional: Negative.    HENT: Negative.    Eyes: Negative.    Respiratory: Negative.    Cardiovascular: Negative.    Gastrointestinal: Positive for nausea and vomiting.   Musculoskeletal: Negative.    Skin: Negative.    Allergic/Immunologic: Negative.    Neurological: Negative.    Hematological: Negative.    Psychiatric/Behavioral: Negative.       Objective:     Vital Signs (Most Recent):  Temp: 97.9 °F (36.6 °C) (11/17/20 1123)  Pulse: 81 (11/17/20 1123)  Resp: 16 (11/17/20 1123)  BP: 132/74 (11/17/20 1123)  SpO2: 99 % (11/17/20 1123) Vital Signs (24h Range):  Temp:  [97.9 °F (36.6 °C)-98.5 °F (36.9 °C)] 97.9 °F (36.6 °C)  Pulse:  [69-84] 81  Resp:  [12-18] 16  SpO2:  [93 %-99 %] 99 %  BP: (104-150)/(57-74) 132/74     Weight: 84.1 kg (185 lb 6.5 oz)  Height: 5' 5.98" (167.6 cm)  Body mass index is 29.94 kg/m².    Physical Exam    Laboratory:  CBC:   Recent Labs   Lab 11/15/20  1735  11/16/20  0242 11/16/20  1900 11/17/20  0658   WBC 8.72  --   --  18.71* 17.85*   RBC 3.39*  --   --  2.71* 2.75*   HGB 10.8*  --   --  8.6* 8.8*   HCT 34.1*   < > 29.3* 28.4* 27.4*   PLT " 152  --   --  100* 87*   *  --   --  105* 100*   MCH 31.9*  --   --  31.7* 32.0*   MCHC 31.7*  --   --  30.3* 32.1    < > = values in this interval not displayed.     BMP:   Recent Labs   Lab 11/16/20  0242 11/16/20  1900 11/17/20  0658   * 397* 151*    133* 132*   K 3.0* 3.7 3.9    101 101   CO2 22* 18* 18*   BUN 41* 44* 47*   CREATININE 4.9* 4.3* 3.7*   CALCIUM 7.4* 7.1* 7.8*     CMP:   Recent Labs   Lab 11/15/20  1735 11/16/20  0242 11/16/20  1900 11/17/20  0658   * 189* 397* 151*   CALCIUM 8.9 7.4* 7.1* 7.8*   ALBUMIN 3.2* 2.4* 2.4* 2.6*   PROT 7.5  --   --   --     144 133* 132*   K 3.5 3.0* 3.7 3.9   CO2 25 22* 18* 18*    106 101 101   BUN 46* 41* 44* 47*   CREATININE 5.9* 4.9* 4.3* 3.7*   ALKPHOS 101  --   --   --    ALT 12  --   --   --    AST 18  --   --   --        Diagnostic Results:  US - Kidney:   Results for orders placed during the hospital encounter of 11/15/20   US Transplant Kidney With Doppler    Narrative EXAMINATION:  US TRANSPLANT KIDNEY WITH DOPPLER    CLINICAL HISTORY:  Assess perfusion;    TECHNIQUE:  Transplant renal ultrasound of the left lower quadrant with color flow doppler and duplex analysis.    COMPARISON:  None.    FINDINGS:  Patient is status post left lower quadrant renal transplantation day 1.  The renal transplant demonstrates no focal parenchymal abnormality. No transplant hydronephrosis. Peritransplant fluid collection measuring approximately 4.0 x 1.0 x 2.5 cm.    Renal arterial resistive indices are as follows: Interlobar 0.70, segmental Up 0.77, segmental Mid 0.75, segmental Low 0.71.  There is no evidence of a tardus parvus waveform. The maximum velocity in the main renal artery is 134 cm/sec.  The renal artery to iliac ratio 0.91.    The renal transplant venous system is unremarkable.  Partially visualized ureteral stent.      Impression Satisfactory Doppler and sonographic evaluation of the renal allograft.    Small  Peritransplant fluid collection, likely hematoma.  No significant mass effect.    Electronically signed by resident: Prosper Hogan  Date:    2020  Time:    14:42    Electronically signed by: Edis Dover MD  Date:    2020  Time:    15:05     Assessment/Plan:     At risk for opportunistic infections  -Nystatin [antifungal] and Bactrim [PJP] already started.  Will begin Valcyte postop day 10 for antiviral prophylaxis    Prophylactic immunotherapy  -Continue Thymoglobulin induction as per written guidelines and monitor for toxicities or side effects (monitor plt count).  -tacrolimus, mycophenolate ordered for maintenance immunosuppression  -Solu-Medrol IV x 3 days to be followed by Prednisone taper        Anemia of renal disease        Status post -donor kidney transplantation  -KTx from DBD donor on 20, KDPI 29%, CIT 13 hrs, Thymoglobulin induction  -Cr is improving, satisfactory UOP  -final dose of Thymo            Discharge Planning:  Likely tomorrow    Shanon Farr MD  Kidney Transplant  [facility]

## 2020-11-18 NOTE — PLAN OF CARE
Pt is AAOX4. Was Progressing well, but has had severe GI upset today of unknown origin, could be food or meds. Pt is having some bowel incontinence due to urgency. Bowel movements are loose with solid pieces of undigested feed. Accompanying alongside of emesis x 3, also undigested food. Pt therefore has been napping all day in between episodes of emesis. Sat up OOB x 2 hours but then was put back to bed to promote comfort, cool towel applied to head, lights off, low stimuli.  Pt had a very hard time stomaching pills this shift. Po  benadryl was changed to elixir so pre meds could be given before thymo. Accuchecks wnl. Insulin gtt has been d/c'd. Pt has had 0% intake besides a few small bites of broth and a saltine cracker. Zofran changed to IV route and Compazine 10mg IV given which pt had some relief from. Dr. Shanon MCKEON and LAMONT CALVILLO notified of pts nausea and vomitting. wctm     -thymo #3/3 given today  -no longer on IVF, good UOP (see flowsheets) I/O in chart  -2 JPs to LLQ, with serosanguinous drainage (minimal)  -shaffer in place, clear yellow urine with some pink tinge  -incision CDI with staples  -TEDs SCDs in place  -IS at bedside but too nauseated to perform  -pt continues to be on transitional insulin gtt at 2u/hr (2cc/hr)  -VISI on, vSS  -Cr trending from 3.7 to 2.3  -Denied pain  -call bell in reach, fall precautions in place  -accuchecks AC/HS

## 2020-11-18 NOTE — ASSESSMENT & PLAN NOTE
BG goal 140 - 180     - Discontiniue transition IV insulin infusion with stepdown parameters. Initial rate starting at 2 units/hr.   - Start levemir 20 units daily. First dose this AM. 0.5 u/kg/d dosing.  - Rewrite Novolog to 7 units TIDWM. Basal/Prandial physiologic matching.    - Continue Low Dose SQ Insulin Correction Scale. Considering kidney function.   - BG Monitoring //0200    ** Please call Endocrine for any BG related issues **  ** Please notify Endocrine for any change and/or advance in diet**    Discharge Planning:   TBD. Please notify endocrinology prior to discharge.     Lab Results   Component Value Date    HGBA1C 9.0 (H) 11/15/2020

## 2020-11-18 NOTE — NURSING
Pt complaining of ALCAZAR. Per NP TOREY Santana stated okay to give scheduled tylenol early for HA. Notified KARLI Mobley that pt's blood pressure did not improve after tylenol (201/87). PRN clonidine ordered. Will continue to monitor.

## 2020-11-19 ENCOUNTER — TELEPHONE (OUTPATIENT)
Dept: ENDOCRINOLOGY | Facility: HOSPITAL | Age: 65
End: 2020-11-19

## 2020-11-19 VITALS
HEIGHT: 66 IN | BODY MASS INDEX: 28.6 KG/M2 | DIASTOLIC BLOOD PRESSURE: 62 MMHG | WEIGHT: 177.94 LBS | RESPIRATION RATE: 17 BRPM | HEART RATE: 68 BPM | TEMPERATURE: 98 F | OXYGEN SATURATION: 95 % | SYSTOLIC BLOOD PRESSURE: 146 MMHG

## 2020-11-19 DIAGNOSIS — Z94.0 KIDNEY REPLACED BY TRANSPLANT: Primary | ICD-10-CM

## 2020-11-19 DIAGNOSIS — N25.81 SECONDARY HYPERPARATHYROIDISM: ICD-10-CM

## 2020-11-19 PROBLEM — Z79.899 LONG TERM CURRENT USE OF IMMUNOSUPPRESSIVE DRUG: Status: RESOLVED | Noted: 2020-11-16 | Resolved: 2020-11-19

## 2020-11-19 PROBLEM — N18.9 ANEMIA OF RENAL DISEASE: Status: RESOLVED | Noted: 2020-11-16 | Resolved: 2020-11-19

## 2020-11-19 PROBLEM — D63.1 ANEMIA OF RENAL DISEASE: Status: RESOLVED | Noted: 2020-11-16 | Resolved: 2020-11-19

## 2020-11-19 PROBLEM — Z79.60 LONG TERM CURRENT USE OF IMMUNOSUPPRESSIVE DRUG: Status: RESOLVED | Noted: 2020-11-16 | Resolved: 2020-11-19

## 2020-11-19 LAB
ALBUMIN SERPL BCP-MCNC: 2.6 G/DL (ref 3.5–5.2)
ANION GAP SERPL CALC-SCNC: 7 MMOL/L (ref 8–16)
BASOPHILS # BLD AUTO: 0.01 K/UL (ref 0–0.2)
BASOPHILS NFR BLD: 0.1 % (ref 0–1.9)
BLD PROD TYP BPU: NORMAL
BLD PROD TYP BPU: NORMAL
BLOOD UNIT EXPIRATION DATE: NORMAL
BLOOD UNIT EXPIRATION DATE: NORMAL
BLOOD UNIT TYPE CODE: 5100
BLOOD UNIT TYPE CODE: 5100
BLOOD UNIT TYPE: NORMAL
BLOOD UNIT TYPE: NORMAL
BUN SERPL-MCNC: 21 MG/DL (ref 8–23)
CALCIUM SERPL-MCNC: 9.5 MG/DL (ref 8.7–10.5)
CHLORIDE SERPL-SCNC: 108 MMOL/L (ref 95–110)
CO2 SERPL-SCNC: 25 MMOL/L (ref 23–29)
CODING SYSTEM: NORMAL
CODING SYSTEM: NORMAL
CREAT SERPL-MCNC: 1.2 MG/DL (ref 0.5–1.4)
DIFFERENTIAL METHOD: ABNORMAL
DISPENSE STATUS: NORMAL
DISPENSE STATUS: NORMAL
EOSINOPHIL # BLD AUTO: 0 K/UL (ref 0–0.5)
EOSINOPHIL NFR BLD: 0 % (ref 0–8)
ERYTHROCYTE [DISTWIDTH] IN BLOOD BY AUTOMATED COUNT: 13.6 % (ref 11.5–14.5)
EST. GFR  (AFRICAN AMERICAN): 55.2 ML/MIN/1.73 M^2
EST. GFR  (NON AFRICAN AMERICAN): 47.9 ML/MIN/1.73 M^2
GLUCOSE SERPL-MCNC: 179 MG/DL (ref 70–110)
HCT VFR BLD AUTO: 25.6 % (ref 37–48.5)
HGB BLD-MCNC: 8.1 G/DL (ref 12–16)
IMM GRANULOCYTES # BLD AUTO: 0.11 K/UL (ref 0–0.04)
IMM GRANULOCYTES NFR BLD AUTO: 1.2 % (ref 0–0.5)
LYMPHOCYTES # BLD AUTO: 0.2 K/UL (ref 1–4.8)
LYMPHOCYTES NFR BLD: 2.4 % (ref 18–48)
MAGNESIUM SERPL-MCNC: 2 MG/DL (ref 1.6–2.6)
MCH RBC QN AUTO: 31.3 PG (ref 27–31)
MCHC RBC AUTO-ENTMCNC: 31.6 G/DL (ref 32–36)
MCV RBC AUTO: 99 FL (ref 82–98)
MONOCYTES # BLD AUTO: 0.4 K/UL (ref 0.3–1)
MONOCYTES NFR BLD: 4.6 % (ref 4–15)
NEUTROPHILS # BLD AUTO: 8.3 K/UL (ref 1.8–7.7)
NEUTROPHILS NFR BLD: 91.7 % (ref 38–73)
NRBC BLD-RTO: 0 /100 WBC
NUM UNITS TRANS PACKED RBC: NORMAL
NUM UNITS TRANS PACKED RBC: NORMAL
PHOSPHATE SERPL-MCNC: 2.2 MG/DL (ref 2.7–4.5)
PLATELET # BLD AUTO: 70 K/UL (ref 150–350)
PMV BLD AUTO: 12.1 FL (ref 9.2–12.9)
POCT GLUCOSE: 145 MG/DL (ref 70–110)
POCT GLUCOSE: 161 MG/DL (ref 70–110)
POCT GLUCOSE: 181 MG/DL (ref 70–110)
POTASSIUM SERPL-SCNC: 4 MMOL/L (ref 3.5–5.1)
RBC # BLD AUTO: 2.59 M/UL (ref 4–5.4)
SODIUM SERPL-SCNC: 140 MMOL/L (ref 136–145)
TACROLIMUS BLD-MCNC: 5.3 NG/ML (ref 5–15)
WBC # BLD AUTO: 9.04 K/UL (ref 3.9–12.7)

## 2020-11-19 PROCEDURE — 36415 COLL VENOUS BLD VENIPUNCTURE: CPT

## 2020-11-19 PROCEDURE — 63600175 PHARM REV CODE 636 W HCPCS: Performed by: SURGERY

## 2020-11-19 PROCEDURE — 99232 PR SUBSEQUENT HOSPITAL CARE,LEVL II: ICD-10-PCS | Mod: ,,, | Performed by: NURSE PRACTITIONER

## 2020-11-19 PROCEDURE — 25000003 PHARM REV CODE 250: Performed by: PHYSICIAN ASSISTANT

## 2020-11-19 PROCEDURE — 94761 N-INVAS EAR/PLS OXIMETRY MLT: CPT

## 2020-11-19 PROCEDURE — 25000003 PHARM REV CODE 250: Performed by: NURSE PRACTITIONER

## 2020-11-19 PROCEDURE — 83735 ASSAY OF MAGNESIUM: CPT

## 2020-11-19 PROCEDURE — 99900035 HC TECH TIME PER 15 MIN (STAT)

## 2020-11-19 PROCEDURE — A4216 STERILE WATER/SALINE, 10 ML: HCPCS | Performed by: ANESTHESIOLOGY

## 2020-11-19 PROCEDURE — 25000003 PHARM REV CODE 250: Performed by: SURGERY

## 2020-11-19 PROCEDURE — 25000003 PHARM REV CODE 250: Performed by: INTERNAL MEDICINE

## 2020-11-19 PROCEDURE — C9399 UNCLASSIFIED DRUGS OR BIOLOG: HCPCS | Performed by: NURSE PRACTITIONER

## 2020-11-19 PROCEDURE — 25000003 PHARM REV CODE 250: Performed by: ANESTHESIOLOGY

## 2020-11-19 PROCEDURE — 99232 SBSQ HOSP IP/OBS MODERATE 35: CPT | Mod: ,,, | Performed by: NURSE PRACTITIONER

## 2020-11-19 PROCEDURE — 85025 COMPLETE CBC W/AUTO DIFF WBC: CPT

## 2020-11-19 PROCEDURE — 80197 ASSAY OF TACROLIMUS: CPT

## 2020-11-19 PROCEDURE — 80069 RENAL FUNCTION PANEL: CPT

## 2020-11-19 PROCEDURE — 63600175 PHARM REV CODE 636 W HCPCS: Performed by: INTERNAL MEDICINE

## 2020-11-19 RX ORDER — SODIUM BICARBONATE 650 MG/1
650 TABLET ORAL 2 TIMES DAILY
Status: DISCONTINUED | OUTPATIENT
Start: 2020-11-19 | End: 2020-11-19 | Stop reason: HOSPADM

## 2020-11-19 RX ORDER — LABETALOL 100 MG/1
200 TABLET, FILM COATED ORAL 2 TIMES DAILY
Status: DISCONTINUED | OUTPATIENT
Start: 2020-11-19 | End: 2020-11-19 | Stop reason: HOSPADM

## 2020-11-19 RX ORDER — INSULIN ASPART 100 [IU]/ML
INJECTION, SOLUTION INTRAVENOUS; SUBCUTANEOUS
Qty: 15 ML | Refills: 0 | Status: SHIPPED | OUTPATIENT
Start: 2020-11-19 | End: 2020-12-07

## 2020-11-19 RX ORDER — AMLODIPINE BESYLATE 10 MG/1
10 TABLET ORAL DAILY
Qty: 30 TABLET | Refills: 11 | Status: SHIPPED | OUTPATIENT
Start: 2020-11-19 | End: 2020-12-18

## 2020-11-19 RX ORDER — INSULIN PUMP SYRINGE, 3 ML
EACH MISCELLANEOUS
Qty: 1 EACH | Refills: 0 | Status: SHIPPED | OUTPATIENT
Start: 2020-11-19 | End: 2020-11-19 | Stop reason: SDUPTHER

## 2020-11-19 RX ORDER — LANCETS
1 EACH MISCELLANEOUS 3 TIMES DAILY
Qty: 100 EACH | Refills: 11 | Status: SHIPPED | OUTPATIENT
Start: 2020-11-19 | End: 2020-11-19 | Stop reason: SDUPTHER

## 2020-11-19 RX ORDER — TACROLIMUS 1 MG/1
1 CAPSULE ORAL ONCE
Status: COMPLETED | OUTPATIENT
Start: 2020-11-19 | End: 2020-11-19

## 2020-11-19 RX ORDER — GABAPENTIN 300 MG/1
300 CAPSULE ORAL 3 TIMES DAILY
Qty: 90 CAPSULE | Refills: 11 | Status: SHIPPED | OUTPATIENT
Start: 2020-11-19 | End: 2021-12-30 | Stop reason: SDUPTHER

## 2020-11-19 RX ORDER — OXYCODONE HYDROCHLORIDE 5 MG/1
5 TABLET ORAL EVERY 4 HOURS PRN
Qty: 40 TABLET | Refills: 0 | Status: SHIPPED | OUTPATIENT
Start: 2020-11-19 | End: 2020-12-30

## 2020-11-19 RX ORDER — LABETALOL 100 MG/1
200 TABLET, FILM COATED ORAL 2 TIMES DAILY
Qty: 120 TABLET | Refills: 11 | Status: SHIPPED | OUTPATIENT
Start: 2020-11-19 | End: 2020-12-30

## 2020-11-19 RX ORDER — SODIUM BICARBONATE 650 MG/1
650 TABLET ORAL 2 TIMES DAILY
Qty: 60 TABLET | Refills: 11 | Status: SHIPPED | OUTPATIENT
Start: 2020-11-19 | End: 2020-12-18

## 2020-11-19 RX ORDER — TACROLIMUS 1 MG/1
6 CAPSULE ORAL 2 TIMES DAILY
Status: DISCONTINUED | OUTPATIENT
Start: 2020-11-19 | End: 2020-11-19 | Stop reason: HOSPADM

## 2020-11-19 RX ORDER — CLONIDINE HYDROCHLORIDE 0.1 MG/1
0.2 TABLET ORAL EVERY 6 HOURS PRN
Status: DISCONTINUED | OUTPATIENT
Start: 2020-11-19 | End: 2020-11-19 | Stop reason: HOSPADM

## 2020-11-19 RX ORDER — INSULIN PUMP SYRINGE, 3 ML
EACH MISCELLANEOUS
Qty: 1 EACH | Refills: 0 | Status: SHIPPED | OUTPATIENT
Start: 2020-11-19

## 2020-11-19 RX ORDER — BISACODYL 5 MG
10 TABLET, DELAYED RELEASE (ENTERIC COATED) ORAL NIGHTLY
Refills: 0 | COMMUNITY
Start: 2020-11-19 | End: 2021-01-15

## 2020-11-19 RX ORDER — PEN NEEDLE, DIABETIC 30 GX3/16"
1 NEEDLE, DISPOSABLE MISCELLANEOUS 3 TIMES DAILY
Qty: 100 EACH | Refills: 11 | Status: SHIPPED | OUTPATIENT
Start: 2020-11-19 | End: 2020-12-14 | Stop reason: SDUPTHER

## 2020-11-19 RX ORDER — LANCETS
1 EACH MISCELLANEOUS 3 TIMES DAILY
Qty: 100 EACH | Refills: 11 | Status: SHIPPED | OUTPATIENT
Start: 2020-11-19 | End: 2020-12-22 | Stop reason: SDUPTHER

## 2020-11-19 RX ADMIN — INSULIN DETEMIR 20 UNITS: 100 INJECTION, SOLUTION SUBCUTANEOUS at 09:11

## 2020-11-19 RX ADMIN — Medication 3 ML: at 05:11

## 2020-11-19 RX ADMIN — DIBASIC SODIUM PHOSPHATE, MONOBASIC POTASSIUM PHOSPHATE AND MONOBASIC SODIUM PHOSPHATE 1 TABLET: 852; 155; 130 TABLET ORAL at 11:11

## 2020-11-19 RX ADMIN — MUPIROCIN 1 G: 20 OINTMENT TOPICAL at 08:11

## 2020-11-19 RX ADMIN — Medication 3 ML: at 12:11

## 2020-11-19 RX ADMIN — CLONIDINE HYDROCHLORIDE 0.2 MG: 0.1 TABLET ORAL at 04:11

## 2020-11-19 RX ADMIN — GABAPENTIN 300 MG: 300 CAPSULE ORAL at 09:11

## 2020-11-19 RX ADMIN — ACETAMINOPHEN 500 MG: 500 TABLET ORAL at 09:11

## 2020-11-19 RX ADMIN — THERA TABS 1 TABLET: TAB at 09:11

## 2020-11-19 RX ADMIN — PREDNISONE 20 MG: 20 TABLET ORAL at 09:11

## 2020-11-19 RX ADMIN — MYCOPHENOLATE MOFETIL 1000 MG: 250 CAPSULE ORAL at 09:11

## 2020-11-19 RX ADMIN — AMLODIPINE BESYLATE 10 MG: 10 TABLET ORAL at 09:11

## 2020-11-19 RX ADMIN — NYSTATIN 500000 UNITS: 100000 SUSPENSION ORAL at 09:11

## 2020-11-19 RX ADMIN — LABETALOL HYDROCHLORIDE 300 MG: 100 TABLET, FILM COATED ORAL at 09:11

## 2020-11-19 RX ADMIN — INSULIN ASPART 7 UNITS: 100 INJECTION, SOLUTION INTRAVENOUS; SUBCUTANEOUS at 09:11

## 2020-11-19 RX ADMIN — TACROLIMUS 5 MG: 1 CAPSULE ORAL at 08:11

## 2020-11-19 RX ADMIN — TACROLIMUS 1 MG: 1 CAPSULE ORAL at 11:11

## 2020-11-19 RX ADMIN — SULFAMETHOXAZOLE AND TRIMETHOPRIM 1 TABLET: 400; 80 TABLET ORAL at 09:11

## 2020-11-19 RX ADMIN — GABAPENTIN 300 MG: 300 CAPSULE ORAL at 02:11

## 2020-11-19 RX ADMIN — SODIUM BICARBONATE 650 MG TABLET 1300 MG: at 09:11

## 2020-11-19 RX ADMIN — HEPARIN SODIUM 5000 UNITS: 5000 INJECTION INTRAVENOUS; SUBCUTANEOUS at 05:11

## 2020-11-19 RX ADMIN — INSULIN ASPART 7 UNITS: 100 INJECTION, SOLUTION INTRAVENOUS; SUBCUTANEOUS at 12:11

## 2020-11-19 RX ADMIN — NYSTATIN 500000 UNITS: 100000 SUSPENSION ORAL at 03:11

## 2020-11-19 NOTE — PLAN OF CARE
AAOx3, afebrile, c/o pain. Scheduled tylenol given. Bg monitored achs. Pt with very little appetite. IV zofran given before meds. BP elevated. BP better with pt standing. PRN clonidine given. 3/3 Thymo completed. Sebastian with great urine output- light pink. X2 fermin drains on rt side with minimal ss output. Incision care and self meds 100%. Pt able to position self independently in bed. Stand by assist OOB. Pt in lowest position, side rails up x2, non-skid foot wear in place, call light within reach, pt verbalized understanding to call RN when needed.  Hand hygiene practiced per protocol.  Will continue to monitor.

## 2020-11-19 NOTE — NURSING
Notified KARLI Dodd that pt's bp 165/70 (previous 173/75) standing after a hour of given clonidine. No new orders regarding bp. Will continue to monitor.

## 2020-11-19 NOTE — PROGRESS NOTES
"[facility]  Endocrinology  Progress Note    Admit Date: 11/15/2020     Reason for Consult: Management of T2DM, Hyperglycemia     Surgical Procedure and Date: N/A    Diabetes diagnosis year:  > 10 years ago.     Home Diabetes Medications:   - Novolog 70/30  15 units AM, Skips lunch, 15 units in PM  - Januvia 25 mg daily.     How often checking glucose at home? 1-3 x day   BG readings on regimen: 150'2  Hypoglycemia on the regimen?  No  Missed doses on regimen?  No    Diabetes Complications include:     Hyperglycemia and Diabetic retinopathy     Complicating diabetes co morbidities:   Glucocorticoid use       HPI:   Patient is a 65 y.o. female with a diagnosis of ESRD secondary to diabetic nephropathy. She has been on the wait list for a kidney transplant at [facility] since 9/24/2018. Patient is currently on peritoneal dialysis started in mid September 2018. Endocrinology consulted to manage DM2/glcyemic control during [place] [facility]     Lab Results   Component Value Date    HGBA1C 9.0 (H) 11/15/2020       Interval HPI:   Overnight events:   BG is within goal on current SQ insulin regimen.   Diet consistent carbohydrate  3 Days Post-Op    Eating:   <25%  Nausea: No  Hypoglycemia and intervention: No  Fever: No  TPN and/or TF: No  If yes, type of TF/TPN and rate: None    BP (!) 154/72   Pulse 80   Temp 98.1 °F (36.7 °C) (Oral)   Resp 17   Ht 5' 5.98" (1.676 m)   Wt 84.5 kg (186 lb 4.6 oz)   SpO2 95%   Breastfeeding No   BMI 30.08 kg/m²     Labs Reviewed and Include    Recent Labs   Lab 11/18/20  0618   *   CALCIUM 9.0   ALBUMIN 2.6*   *   K 4.2   CO2 20*      BUN 36*   CREATININE 2.3*     Lab Results   Component Value Date    WBC 10.78 11/18/2020    HGB 8.2 (L) 11/18/2020    HCT 26.2 (L) 11/18/2020    MCV 99 (H) 11/18/2020    PLT 76 (L) 11/18/2020     No results for input(s): TSH, FREET4 in the last 168 hours.  Lab Results   Component Value Date    HGBA1C 9.0 (H) " 11/15/2020       Nutritional status:   Body mass index is 30.08 kg/m².  Lab Results   Component Value Date    ALBUMIN 2.6 (L) 11/18/2020    ALBUMIN 2.6 (L) 11/17/2020    ALBUMIN 2.4 (L) 11/16/2020     No results found for: PREALBUMIN    Estimated Creatinine Clearance: 27.1 mL/min (A) (based on SCr of 2.3 mg/dL (H)).    Accu-Checks  Recent Labs     11/16/20  1736 11/16/20  2131 11/17/20  0203 11/17/20  0843 11/17/20  1203 11/17/20  1657 11/17/20  2114 11/18/20  0147 11/18/20  0548 11/18/20  0803   POCTGLUCOSE 406* 306* 192* 158* 152* 109 112* 112* 125* 123*       Current Medications and/or Treatments Impacting Glycemic Control  Immunotherapy:    Immunosuppressants         Stop Route Frequency     tacrolimus capsule 5 mg      -- Oral 2 times daily     antithymocyte globulin (rabbit) 125 mg, hydrocortisone sodium succinate (SOLU-CORTEF) 20 mg, heparin (porcine) 1,000 Units in sodium chloride 0.9% 500 mL      11/19 0859 IV Daily     mycophenolate capsule 1,000 mg      -- Oral 2 times daily        Steroids:   Hormones (From admission, onward)    Start     Stop Route Frequency Ordered    11/19/20 0900  predniSONE tablet 20 mg  (IP TXP KIDNEY POST-OP THYMO WITH PERIPHERAL PRECHECKED)      -- Oral Daily 11/16/20 0242 11/18/20 0800  methylPREDNISolone sodium succinate injection 125 mg  (IP TXP KIDNEY POST-OP THYMO WITH PERIPHERAL PRECHECKED)      -- IV Once 11/16/20 0242 11/17/20 0900  antithymocyte globulin (rabbit) 125 mg, hydrocortisone sodium succinate (SOLU-CORTEF) 20 mg, heparin (porcine) 1,000 Units in sodium chloride 0.9% 500 mL  (IP TXP KIDNEY POST-OP THYMO WITH PERIPHERAL PRECHECKED)      11/19 0859 IV Daily 11/16/20 0242    11/16/20 0341  hydrocortisone sodium succinate injection 100 mg  (IP TXP KIDNEY POST-OP THYMO WITH PERIPHERAL PRECHECKED)      04/13 1941 IV Once as needed 11/16/20 0242    11/15/20 1715  methylPREDNISolone sodium succinate (SOLU-MEDROL) 500 mg in sodium chloride 0.9% 100 mL IVPB  (IP TXP  INTRA-OP THYMO - PERIPHERAL THYMO PRECHECKED)      -- IV Once 11/15/20 1706        Pressors:    Autonomic Drugs (From admission, onward)    Start     Stop Route Frequency Ordered    11/16/20 0341  EPINEPHrine injection 1 mg  (IP TXP KIDNEY POST-OP THYMO WITH PERIPHERAL PRECHECKED)      04/13 1941 SubQ Once as needed 11/16/20 0242        Hyperglycemia/Diabetes Medications:   Antihyperglycemics (From admission, onward)    Start     Stop Route Frequency Ordered    11/17/20 0715  insulin aspart U-100 pen 14 Units      -- SubQ 3 times daily with meals 11/16/20 2135    11/16/20 1615  insulin regular in 0.9 % NaCl 100 unit/100 mL (1 unit/mL) infusion     Question:  Enter initial dose (Units/hr):  Answer:  1.3    -- IV Continuous 11/16/20 1504    11/16/20 1603  insulin aspart U-100 pen 0-5 Units      -- SubQ As needed (PRN) 11/16/20 1504          ASSESSMENT and PLAN    Type 2 diabetes mellitus  BG goal 140 - 180     - Discontiniue transition IV insulin infusion with stepdown parameters. Initial rate starting at 2 units/hr.   - Start levemir 20 units daily. First dose this AM. 0.5 u/kg/d dosing.  - Rewrite Novolog to 7 units TIDWM. Basal/Prandial physiologic matching.    - Continue Low Dose SQ Insulin Correction Scale. Considering kidney function.   - BG Monitoring AC/HS/0200    ** Please call Endocrine for any BG related issues **  ** Please notify Endocrine for any change and/or advance in diet**    Discharge Planning:   TBD. Please notify endocrinology prior to discharge.     Lab Results   Component Value Date    HGBA1C 9.0 (H) 11/15/2020             ESRD on dialysis-resolved as of 11/18/2020  Titrate insulin slowly to avoid hypoglycemia as the risk of hypoglycemia increases with decreased creatinine clearance.          Adverse effect of adrenal cortical steroids, sequela  On steroid therapy per primary team; may elevate BG readings            Tree Solis NP  Endocrinology  [facility]

## 2020-11-19 NOTE — SUBJECTIVE & OBJECTIVE
"Interval HPI:   Overnight events: Remains in TSU. POD # 4. BG reasonably well controlled on current SQ insulin regimen. Receiving Prednisone 20 mg daily.   Eatin%  Nausea: No  Hypoglycemia and intervention: No  Fever: No  TPN and/or TF: No  If yes, type of TF/TPN and rate: none    BP (!) 136/58   Pulse 70   Temp 97.9 °F (36.6 °C) (Oral)   Resp 18   Ht 5' 5.98" (1.676 m)   Wt 80.7 kg (177 lb 14.6 oz)   SpO2 97%   Breastfeeding No   BMI 28.73 kg/m²     Labs Reviewed and Include    Recent Labs   Lab 20  0630   *   CALCIUM 9.5   ALBUMIN 2.6*      K 4.0   CO2 25      BUN 21   CREATININE 1.2     Lab Results   Component Value Date    WBC 9.04 2020    HGB 8.1 (L) 2020    HCT 25.6 (L) 2020    MCV 99 (H) 2020    PLT 70 (L) 2020     No results for input(s): TSH, FREET4 in the last 168 hours.  Lab Results   Component Value Date    HGBA1C 9.0 (H) 11/15/2020       Nutritional status:   Body mass index is 28.73 kg/m².  Lab Results   Component Value Date    ALBUMIN 2.6 (L) 2020    ALBUMIN 2.6 (L) 2020    ALBUMIN 2.6 (L) 2020     No results found for: PREALBUMIN    Estimated Creatinine Clearance: 50.8 mL/min (based on SCr of 1.2 mg/dL).    Accu-Checks  Recent Labs     20  1203 20  1657 20  2114 20  0147 20  0548 20  0803 20  1316 20  1807 20  2144 20  0750   POCTGLUCOSE 152* 109 112* 112* 125* 123* 163* 218* 155* 181*       Current Medications and/or Treatments Impacting Glycemic Control  Immunotherapy:    Immunosuppressants         Stop Route Frequency     tacrolimus capsule 5 mg      -- Oral 2 times daily     mycophenolate capsule 1,000 mg      -- Oral 2 times daily        Steroids:   Hormones (From admission, onward)    Start     Stop Route Frequency Ordered    20 0900  predniSONE tablet 20 mg  (IP TXP KIDNEY POST-OP THYMO WITH PERIPHERAL PRECHECKED)      -- Oral Daily 20 " 0242    11/16/20 0341  hydrocortisone sodium succinate injection 100 mg  (IP TXP KIDNEY POST-OP THYMO WITH PERIPHERAL PRECHECKED)      04/13 1941 IV Once as needed 11/16/20 0242    11/15/20 1715  methylPREDNISolone sodium succinate (SOLU-MEDROL) 500 mg in sodium chloride 0.9% 100 mL IVPB  (IP TXP INTRA-OP THYMO - PERIPHERAL THYMO PRECHECKED)      -- IV Once 11/15/20 1706        Pressors:    Autonomic Drugs (From admission, onward)    Start     Stop Route Frequency Ordered    11/16/20 0341  EPINEPHrine injection 1 mg  (IP TXP KIDNEY POST-OP THYMO WITH PERIPHERAL PRECHECKED)      04/13 1941 SubQ Once as needed 11/16/20 0242        Hyperglycemia/Diabetes Medications:   Antihyperglycemics (From admission, onward)    Start     Stop Route Frequency Ordered    11/18/20 1200  insulin aspart U-100 pen 7 Units      -- SubQ 3 times daily with meals 11/18/20 1056    11/18/20 1155  insulin aspart U-100 pen 0-5 Units      -- SubQ Before meals & nightly PRN 11/18/20 1056    11/18/20 1100  insulin detemir U-100 pen 20 Units      -- SubQ Daily 11/18/20 1056

## 2020-11-19 NOTE — DISCHARGE SUMMARY
[facility]  Kidney Transplant  Discharge Summary    Patient Name: Kendra Malik  MRN: [id number]  Admission Date: 11/15/2020  Hospital Length of Stay: 4 days  Discharge Date and Time:  2020 1:28 PM  Attending Physician: Scott Ann MD   Discharging Provider: Shanon Farr MD  Primary Care Provider: Primary Doctor No    HPI:   Ms. Kendra Malik is a 64 year old female with PMH of ESRD secondary to diabetic nephropathy. She has been on the wait list for a kidney transplant at [facility] since 2018. Patient is currently on peritoneal dialysis started in mid 2018. She has a peritoneal dialysis catheter for dialysis access coming out of the skin in right chest. She reports that she is tolerating dialysis well. Patient denies any recent hospitalizations or ED visits.  Native UOP > 1000ml/d.       Procedure(s) (LRB):  TRANSPLANT, KIDNEY (N/A)     Hospital Course:    S/p KTx from a DBD on 20 (Thymo, CIT ~13 hrs min, WIT 42 min, KDPI 29%). 1 artery, 1 vein, 1 ureter. Satisfactory UOP postop with clear yellow color. Cr trending down to 1.2 smoothly postop. x2 Jeff drains. Drains ss. Completed her 3 doses of Thymo.   On the first post op day had pain, episodes of hyperglycemia, nausea, which with medical treatment improved.  No fever postop. Had BM multiple times. Tolerated solid diabetic diet well.    She is discharged on POD3.  Per Dr Seth shaffer with be removed on 20 and the two drains will be taken out on 20. Labs and additional appointments scheduled per coordinator.      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] 11/16/2020 Not Applicable    Type 2 diabetes mellitus [E11.9] 10/11/2018 Yes      Problems Resolved During this Admission:    Diagnosis Date Noted Date Resolved POA    ESRD on dialysis [N18.6, Z99.2] 10/11/2018 11/18/2020 Not Applicable       Treatments: surgery: Kidney Transplant    Consults (From admission, onward)        Status Ordering Provider     Inpatient consult to Endocrinology  Once     Provider:  (Not yet assigned)    Completed STACEY SWARTZ     Inpatient consult to Registered Dietitian/Nutritionist  Once     Provider:  (Not yet assigned)    Completed NHAN CARRION     Inpatient consult to Transplant Nephrology (KTM)  Once     Provider:  (Not yet assigned)    Completed NHAN CARRION          Pending Diagnostic Studies:     None        Significant Diagnostic Studies: Labs:   BMP:   Recent Labs   Lab 11/18/20  0618 11/19/20  0630   * 179*   * 140   K 4.2 4.0    108   CO2 20* 25   BUN 36* 21   CREATININE 2.3* 1.2   CALCIUM 9.0 9.5   MG 2.1 2.0    and CBC   Recent Labs   Lab 11/18/20  0618 11/19/20  0630   WBC 10.78 9.04   HGB 8.2* 8.1*   HCT 26.2* 25.6*   PLT 76* 70*       Discharged Condition: good    Disposition: Home or Self Care    Follow Up:    Patient Instructions:      Diet diabetic     Notify your health care provider if you experience any of the following:  temperature >100.4     Notify your health care provider if you experience any of the following:  persistent nausea and vomiting or diarrhea     Notify your health care provider if you experience any of the following:  severe uncontrolled pain     Notify your health care provider if you experience any of the following:  redness, tenderness, or signs of infection (pain, swelling, redness, odor or green/yellow discharge around incision site)     Notify your health care provider  if you experience any of the following:  difficulty breathing or increased cough     Notify your health care provider if you experience any of the following:  severe persistent headache     Notify your health care provider if you experience any of the following:  worsening rash     Notify your health care provider if you experience any of the following:  persistent dizziness, light-headedness, or visual disturbances     Notify your health care provider if you experience any of the following:  increased confusion or weakness     Notify your health care provider if you experience any of the following:     No dressing needed     Activity as tolerated     Medications:  Reconciled Home Medications:      Medication List      START taking these medications    bisacodyL 5 mg EC tablet  Commonly known as: DULCOLAX  Take 2 tablets (10 mg total) by mouth every evening.     blood-glucose meter kit  Use as instructed; E11.3513 (DM)     famotidine 20 MG tablet  Commonly known as: PEPCID  Take 1 tablet (20 mg total) by mouth every evening.     insulin aspart U-100 100 unit/mL (3 mL) Inpn pen  Commonly known as: NovoLOG  Inject 7 units three times daily with meals plus sliding scale (TDD 36 units)     insulin detemir U-100 100 unit/mL (3 mL) Inpn pen  Commonly known as: LEVEMIR FLEXTOUCH  Inject 20 Units into the skin once daily.  Start taking on: November 20, 2020     k phos di & mono-sod phos mono 250 mg Tab  Commonly known as: K-PHOS-NEUTRAL  Take 1 tablet by mouth 2 (two) times a day.     lancets Misc  1 each by Misc.(Non-Drug; Combo Route) route 3 (three) times daily. E11.3513     multivitamin Tab  Take 1 tablet by mouth once daily.     mycophenolate 250 mg Cap  Commonly known as: CELLCEPT  Take 4 capsules (1,000 mg total) by mouth 2 (two) times daily.     nystatin 100,000 unit/mL suspension  Commonly known as: MYCOSTATIN  Take 5 mLs (500,000 Units total) by mouth 3 (three) times daily after meals. STOP 12/16/20     oxyCODONE  "5 MG immediate release tablet  Commonly known as: ROXICODONE  Take 1 tablet (5 mg total) by mouth every 4 (four) hours as needed.     pen needle, diabetic 32 gauge x 5/32" Ndle  Commonly known as: EASY COMFORT PEN NEEDLES  Inject 1 each into the skin 3 (three) times daily.     predniSONE 5 MG tablet  Commonly known as: DELTASONE  Take by mouth once daily:  20mg 11/19-12/18;   15mg 12/19-1/18/21;   10mg 1/19/21-2/18/21;   5 mg thereafter     sodium bicarbonate 650 MG tablet  Take 1 tablet (650 mg total) by mouth 2 (two) times daily.     sulfamethoxazole-trimethoprim 400-80mg 400-80 mg per tablet  Commonly known as: BACTRIM,SEPTRA  Take 1 tablet by mouth once daily. STOP 11/16/21     tacrolimus 1 MG Cap  Commonly known as: PROGRAF  Take 6 capsules (6 mg total) by mouth every 12 (twelve) hours.     valGANciclovir 450 mg Tab  Commonly known as: VALCYTE  Take 1 tablet (450 mg total) by mouth once daily. STOP 2/14/21        CHANGE how you take these medications    gabapentin 300 MG capsule  Commonly known as: NEURONTIN  Take 1 capsule (300 mg total) by mouth 3 (three) times daily.  What changed: how much to take     labetaloL 100 MG tablet  Commonly known as: NORMODYNE  Take 2 tablets (200 mg total) by mouth 2 (two) times daily.  What changed:   · medication strength  · how much to take     * TRUE METRIX GLUCOSE TEST STRIP Strp  Generic drug: blood sugar diagnostic  CHECK BLOOD SUGAR TWICE DAILY  What changed: Another medication with the same name was added. Make sure you understand how and when to take each.     * blood sugar diagnostic Strp  1 each by Misc.(Non-Drug; Combo Route) route 3 (three) times daily. E11.3513 (DM)  What changed: You were already taking a medication with the same name, and this prescription was added. Make sure you understand how and when to take each.         * This list has 2 medication(s) that are the same as other medications prescribed for you. Read the directions carefully, and ask your " doctor or other care provider to review them with you.            CONTINUE taking these medications    amLODIPine 10 MG tablet  Commonly known as: NORVASC  Take 1 tablet (10 mg total) by mouth once daily.     atorvastatin 20 MG tablet  Commonly known as: LIPITOR  Take 20 mg by mouth once daily.     ergocalciferol 50,000 unit Cap  Commonly known as: ERGOCALCIFEROL  Take 50,000 Units by mouth every 7 days.        STOP taking these medications    ascorbic acid (vitamin C) 500 MG tablet  Commonly known as: VITAMIN C     aspirin 81 MG EC tablet  Commonly known as: ECOTRIN     calcitRIOL 0.25 MCG Cap  Commonly known as: ROCALTROL     cinacalcet 30 MG Tab  Commonly known as: SENSIPAR     cloNIDine 0.1 MG tablet  Commonly known as: CATAPRES     DICLOFENAC-CAPSAICIN TOP     fluticasone propionate 50 mcg/actuation nasal spray  Commonly known as: FLONASE     FOLBEE PLUS ORAL     furosemide 20 MG tablet  Commonly known as: LASIX     hydrALAZINE 100 MG tablet  Commonly known as: APRESOLINE     JANUVIA 25 MG Tab  Generic drug: SITagliptin     loratadine 10 mg tablet  Commonly known as: CLARITIN     NovoLOG Mix 70-30FlexPen U-100 100 unit/mL (70-30) Inpn pen  Generic drug: insulin aspart protamine-insulin aspart     ondansetron 4 MG Tbdl  Commonly known as: ZOFRAN-ODT     prednisoLONE acetate 1 % Drps  Commonly known as: PRED FORTE     traMADoL 50 mg tablet  Commonly known as: ULTRAM          Time spent caring for patient (Greater than 1/2 spent in direct face-to-face contact): > 30 minutes    Shanon Farr MD  Kidney Transplant  [facility]

## 2020-11-19 NOTE — ASSESSMENT & PLAN NOTE
BG goal 140 - 180     - Continue Levemir 20 units daily.  (0.5 u/kg/d dosing)  - Continue Novolog 7 units TIDWM. Basal/Prandial physiologic matching.    - Continue Low Dose SQ Insulin Correction Scale. Considering kidney function.   - BG Monitoring AC/HS/0200    ** Please call Endocrine for any BG related issues **  ** Please notify Endocrine for any change and/or advance in diet**    Discharge Planning:  Recommend patient continue current hospital MDI regimen at discharge:  - Levemir 20 units daily  - Novolog 7 units TID with meals  - Novolog Correction Scale (150-200/+1) prn ac/hs    Will need glucometer, testing supplies, and insulin pen needles at discharge. Patient provided with blood sugar logs and instructions for monitoring BG 4x daily at discharge. [facility] [place] contact provided with instructions to call with any BG < 80 or consistently > 200. Will schedule patient for follow up at [facility].     Discharge Teaching:    Reviewed topics related to DM including: the need for insulin, how insulin works, what makes it a high risk medication, the importance of immediate follow up with either PCP or endocrine provider, importance of and how to check BG, how to record BG on logs, how to administer insulin, appropriate insulin administration sites, importance of rotating injection sites, hyper/hypoglycemia, how and when to treat hypoglycemia, when to hold insulin, how the correction scale works, and when to seek medical attention.  Patient verbalized understanding, answered all questions to patient's satisfaction.

## 2020-11-19 NOTE — NURSING
Notified KARLI Beaulieu of pt's bp 184/90 standing. PA ordered increased PRN clonidine. Pt's most recent /65 standing after PRN medication was given. Will continue to monitor.

## 2020-11-19 NOTE — PROGRESS NOTES
Discharge Note:    Pt was alert and oriented x4, sitting up in chair at bedside and communicative. Patients primary caregiver is Kelley Corral (Jackie), patients significant other, phone number [phone] and pt's daughter, Yue Malik ph# [phone]. SW met with pt and Gladys to assess discharge plan and any concerns or needs.    Pt reports agreeing with the discharge plan and has no psychosocial concerns. Pt will discharge today to [facility] [address] [facility] at $0 fee with no needs. Pt's significant other will transport patient.  Patients caretakers verbalize understanding and are involved in treatment planning and discharge process. Pt nor caregiver had concerns or questions.    SW remains available at [phone].

## 2020-11-19 NOTE — PROGRESS NOTES
"[facility]  Endocrinology  Progress Note    Admit Date: 11/15/2020     Reason for Consult: Management of T2DM, Hyperglycemia     Surgical Procedure and Date: N/A    Diabetes diagnosis year:  > 10 years ago.     Home Diabetes Medications:   - Novolog 70/30  15 units AM, Skips lunch, 15 units in PM  - Januvia 25 mg daily.     How often checking glucose at home? 1-3 x day   BG readings on regimen: 150'2  Hypoglycemia on the regimen?  No  Missed doses on regimen?  No    Diabetes Complications include:     Hyperglycemia and Diabetic retinopathy     Complicating diabetes co morbidities:   Glucocorticoid use       HPI:   Patient is a 65 y.o. female with a diagnosis of ESRD secondary to diabetic nephropathy. She has been on the wait list for a kidney transplant at [facility] since 2018. Patient is currently on peritoneal dialysis started in mid 2018. Endocrinology consulted to manage DM2/glcyemic control during current admission to [facility].     Lab Results   Component Value Date    HGBA1C 9.0 (H) 11/15/2020       Interval HPI:   Overnight events: Remains in TSU. POD # 4. BG reasonably well controlled on current SQ insulin regimen. Receiving Prednisone 20 mg daily.   Eatin%  Nausea: No  Hypoglycemia and intervention: No  Fever: No  TPN and/or TF: No  If yes, type of TF/TPN and rate: none    BP (!) 136/58   Pulse 70   Temp 97.9 °F (36.6 °C) (Oral)   Resp 18   Ht 5' 5.98" (1.676 m)   Wt 80.7 kg (177 lb 14.6 oz)   SpO2 97%   Breastfeeding No   BMI 28.73 kg/m²     Labs Reviewed and Include    Recent Labs   Lab 20  0630   *   CALCIUM 9.5   ALBUMIN 2.6*      K 4.0   CO2 25      BUN 21   CREATININE 1.2     Lab Results   Component Value Date    WBC 9.04 2020    HGB 8.1 (L) 2020    HCT 25.6 (L) 2020    MCV 99 (H) 2020    PLT 70 (L) 2020     No results for input(s): TSH, FREET4 in the last 168 hours.  Lab Results   Component Value Date    " HGBA1C 9.0 (H) 11/15/2020       Nutritional status:   Body mass index is 28.73 kg/m².  Lab Results   Component Value Date    ALBUMIN 2.6 (L) 11/19/2020    ALBUMIN 2.6 (L) 11/18/2020    ALBUMIN 2.6 (L) 11/17/2020     No results found for: PREALBUMIN    Estimated Creatinine Clearance: 50.8 mL/min (based on SCr of 1.2 mg/dL).    Accu-Checks  Recent Labs     11/17/20  1203 11/17/20  1657 11/17/20  2114 11/18/20  0147 11/18/20  0548 11/18/20  0803 11/18/20  1316 11/18/20  1807 11/18/20  2144 11/19/20  0750   POCTGLUCOSE 152* 109 112* 112* 125* 123* 163* 218* 155* 181*       Current Medications and/or Treatments Impacting Glycemic Control  Immunotherapy:    Immunosuppressants         Stop Route Frequency     tacrolimus capsule 5 mg      -- Oral 2 times daily     mycophenolate capsule 1,000 mg      -- Oral 2 times daily        Steroids:   Hormones (From admission, onward)    Start     Stop Route Frequency Ordered    11/19/20 0900  predniSONE tablet 20 mg  (IP TXP KIDNEY POST-OP THYMO WITH PERIPHERAL PRECHECKED)      -- Oral Daily 11/16/20 0242    11/16/20 0341  hydrocortisone sodium succinate injection 100 mg  (IP TXP KIDNEY POST-OP THYMO WITH PERIPHERAL PRECHECKED)      04/13 1941 IV Once as needed 11/16/20 0242    11/15/20 1715  methylPREDNISolone sodium succinate (SOLU-MEDROL) 500 mg in sodium chloride 0.9% 100 mL IVPB  (IP TXP INTRA-OP THYMO - PERIPHERAL THYMO PRECHECKED)      -- IV Once 11/15/20 1706        Pressors:    Autonomic Drugs (From admission, onward)    Start     Stop Route Frequency Ordered    11/16/20 0341  EPINEPHrine injection 1 mg  (IP TXP KIDNEY POST-OP THYMO WITH PERIPHERAL PRECHECKED)      04/13 1941 SubQ Once as needed 11/16/20 0242        Hyperglycemia/Diabetes Medications:   Antihyperglycemics (From admission, onward)    Start     Stop Route Frequency Ordered    11/18/20 1200  insulin aspart U-100 pen 7 Units      -- SubQ 3 times daily with meals 11/18/20 1056    11/18/20 1158  insulin aspart  U-100 pen 0-5 Units      -- SubQ Before meals & nightly PRN 20 1056    20 1100  insulin detemir U-100 pen 20 Units      -- SubQ Daily 20 1056          ASSESSMENT and PLAN    * Status post -donor kidney transplantation  Managed per primary team  Avoid hypoglycemia      Type 2 diabetes mellitus  BG goal 140 - 180     - Continue Levemir 20 units daily.  (0.5 u/kg/d dosing)  - Continue Novolog 7 units TIDWM. Basal/Prandial physiologic matching.    - Continue Low Dose SQ Insulin Correction Scale. Considering kidney function.   - BG Monitoring AC/HS/0200    ** Please call Endocrine for any BG related issues **  ** Please notify Endocrine for any change and/or advance in diet**    Discharge Planning:  Recommend patient continue current hospital MDI regimen at discharge:  - Levemir 20 units daily  - Novolog 7 units TID with meals  - Novolog Correction Scale (150-200/+1) prn ac/hs    Will need glucometer, testing supplies, and insulin pen needles at discharge. Patient provided with blood sugar logs and instructions for monitoring BG 4x daily at discharge. [facility] [place] with instructions to call with any BG < 80 or consistently > 200. Will schedule patient for follow up [place] [facility] [place].     Discharge Teaching:    Reviewed topics related to DM including: the need for insulin, how insulin works, what makes it a high risk medication, the importance of immediate follow up with either PCP or endocrine provider, importance of and how to check BG, how to record BG on logs, how to administer insulin, appropriate insulin administration sites, importance of rotating injection sites, hyper/hypoglycemia, how and when to treat hypoglycemia, when to hold insulin, how the correction scale works, and when to seek medical attention.  Patient verbalized understanding, answered all questions to patient's satisfaction.        Adverse effect of adrenal cortical steroids, sequela  On  steroid therapy per primary team; may elevate BG readings            Luz Kaur NP  Endocrinology  [facility]

## 2020-11-19 NOTE — PROGRESS NOTES
DISCHARGE NOTE:    Kendra Malik is a 65 y.o. 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, without long-term current use of insulin 10/11/2018    ESRD on dialysis 10/11/2018    Peritoneal dialysis initiated mid September 2018    Renal hypertension 10/11/2018       Hospital Course: 4 days  - Operation was complicated but no issues with reperfusion after surgery.  - Consulted endocrine for management of hyperglycemia  - Patient had some difficulties with pain and nausea control post-op, which resolved over the course of the admission.  - Adjustments made to blood pressure medications due concerns for orthostatic hypotension.     Allergies: Review of patient's allergies indicates:  No Known Allergies    Patient Pharmacy: [facility] for now    Discharge Medications:   Kendra Malik   Home Medication Instructions NADINE:[id number]    Printed on:11/19/20 5909   Medication Information                      amLODIPine (NORVASC) 10 MG tablet  Take 1 tablet (10 mg total) by mouth once daily.             atorvastatin (LIPITOR) 20 MG tablet  Take 20 mg by mouth once daily.             bisacodyL (DULCOLAX) 5 mg EC tablet  Take 2 tablets (10 mg total) by mouth every evening.             blood sugar diagnostic Strp  1 each by Misc.(Non-Drug; Combo Route) route 3 (three) times daily. E11.3513 (DM)             blood-glucose meter kit  Use as instructed; E11.3513 (DM)             ergocalciferol (ERGOCALCIFEROL) 50,000 unit Cap  Take 50,000 Units by mouth every 7 days.             famotidine (PEPCID) 20 MG tablet  Take 1 tablet (20 mg total) by mouth every evening.             gabapentin (NEURONTIN) 300 MG capsule  Take 1 capsule (300 mg total) by mouth 3 (three) times daily.             insulin aspart U-100 (NOVOLOG) 100  "unit/mL (3 mL) InPn pen  Inject 7 units three times daily with meals plus sliding scale (TDD 36 units)             insulin detemir U-100 (LEVEMIR FLEXTOUCH) 100 unit/mL (3 mL) SubQ InPn pen  Inject 20 Units into the skin once daily.             k phos di & mono-sod phos mono (K-PHOS-NEUTRAL) 250 mg Tab  Take 1 tablet by mouth 2 (two) times a day.             labetaloL (NORMODYNE) 100 MG tablet  Take 2 tablets (200 mg total) by mouth 2 (two) times daily.             lancets Misc  1 each by Misc.(Non-Drug; Combo Route) route 3 (three) times daily. E11.3513             multivitamin Tab  Take 1 tablet by mouth once daily.             mycophenolate (CELLCEPT) 250 mg Cap  Take 4 capsules (1,000 mg total) by mouth 2 (two) times daily.             nystatin (MYCOSTATIN) 100,000 unit/mL suspension  Take 5 mLs (500,000 Units total) by mouth 3 (three) times daily after meals. STOP 12/16/20             oxyCODONE (ROXICODONE) 5 MG immediate release tablet  Take 1 tablet (5 mg total) by mouth every 4 (four) hours as needed.             pen needle, diabetic (EASY COMFORT PEN NEEDLES) 32 gauge x 5/32" Ndle  Inject 1 each into the skin 3 (three) times daily.             predniSONE (DELTASONE) 5 MG tablet  Take by mouth once daily:  20mg 11/19-12/18;   15mg 12/19-1/18/21;   10mg 1/19/21-2/18/21;   5 mg thereafter             sodium bicarbonate 650 MG tablet  Take 1 tablet (650 mg total) by mouth 2 (two) times daily.             sulfamethoxazole-trimethoprim 400-80mg (BACTRIM,SEPTRA) 400-80 mg per tablet  Take 1 tablet by mouth once daily. STOP 11/16/21             tacrolimus (PROGRAF) 1 MG Cap  Take 6 capsules (6 mg total) by mouth every 12 (twelve) hours.             TRUE METRIX GLUCOSE TEST STRIP Strp  CHECK BLOOD SUGAR TWICE DAILY             valGANciclovir (VALCYTE) 450 mg Tab  Take 1 tablet (450 mg total) by mouth once daily. STOP 2/14/21                 Pharmacy Interventions/Recommendations:  1) Transplant Immunosuppression: " Induction thymoglobulin, and maintenance Prograf, Cellcept, prednisone    2) Opportunistic Infection prophylaxis: Bactrim, Valcyte, nystatin    3) Osteoporosis Prevention measures (liver txp): N/A    4) Patient Counseling/Education: Demonstrated the use of the BP cuff, thermometer.    5) Follow-Up/Discharge Needs:    - May need adjustments to insulin. Needed diabetic testing supplies. Since she has Medicare, had to send Rx's to [facility] due to Part B filling requirements.   - May need adjustments to blood pressure medications. Did not have home amlodipine. Sent Rx to [facility] pharmacy but RTS until 12/25; caretaker notified and it will be brought from home tomorrow. Otherwise, [facility] be applied for $14    6) Patient Assistance Information: N/A    7) The following medications have been placed on HOLD and should be restarted in the outpatient setting (when appropriate): None    Kendra and her caregiver verbalized their understanding and had the opportunity to ask questions.

## 2020-11-20 ENCOUNTER — TELEPHONE (OUTPATIENT)
Dept: TRANSPLANT | Facility: CLINIC | Age: 65
End: 2020-11-20

## 2020-11-20 ENCOUNTER — DOCUMENTATION ONLY (OUTPATIENT)
Dept: TRANSPLANT | Facility: CLINIC | Age: 65
End: 2020-11-20

## 2020-11-20 ENCOUNTER — LAB VISIT (OUTPATIENT)
Dept: LAB | Facility: HOSPITAL | Age: 65
End: 2020-11-20
Attending: INTERNAL MEDICINE
Payer: MEDICARE

## 2020-11-20 ENCOUNTER — CLINICAL SUPPORT (OUTPATIENT)
Dept: TRANSPLANT | Facility: CLINIC | Age: 65
End: 2020-11-20
Payer: MEDICARE

## 2020-11-20 VITALS
TEMPERATURE: 96 F | OXYGEN SATURATION: 96 % | BODY MASS INDEX: 28.88 KG/M2 | RESPIRATION RATE: 18 BRPM | DIASTOLIC BLOOD PRESSURE: 82 MMHG | HEART RATE: 82 BPM | WEIGHT: 179.69 LBS | HEIGHT: 66 IN | SYSTOLIC BLOOD PRESSURE: 201 MMHG

## 2020-11-20 DIAGNOSIS — Z94.0 KIDNEY REPLACED BY TRANSPLANT: ICD-10-CM

## 2020-11-20 DIAGNOSIS — E11.3513 TYPE 2 DIABETES MELLITUS WITH PROLIFERATIVE RETINOPATHY OF BOTH EYES AND MACULAR EDEMA, UNSPECIFIED WHETHER LONG TERM INSULIN USE: Primary | ICD-10-CM

## 2020-11-20 DIAGNOSIS — Z94.0 KIDNEY REPLACED BY TRANSPLANT: Primary | ICD-10-CM

## 2020-11-20 LAB
ALBUMIN SERPL BCP-MCNC: 2.7 G/DL (ref 3.5–5.2)
ANION GAP SERPL CALC-SCNC: 10 MMOL/L (ref 8–16)
ANISOCYTOSIS BLD QL SMEAR: SLIGHT
BACTERIA SPEC ANAEROBE CULT: NORMAL
BASOPHILS # BLD AUTO: 0.01 K/UL (ref 0–0.2)
BASOPHILS NFR BLD: 0.1 % (ref 0–1.9)
BUN SERPL-MCNC: 17 MG/DL (ref 8–23)
CALCIUM SERPL-MCNC: 9.6 MG/DL (ref 8.7–10.5)
CHLORIDE SERPL-SCNC: 108 MMOL/L (ref 95–110)
CO2 SERPL-SCNC: 26 MMOL/L (ref 23–29)
CREAT SERPL-MCNC: 0.9 MG/DL (ref 0.5–1.4)
DIFFERENTIAL METHOD: ABNORMAL
EOSINOPHIL # BLD AUTO: 0.2 K/UL (ref 0–0.5)
EOSINOPHIL NFR BLD: 2.7 % (ref 0–8)
ERYTHROCYTE [DISTWIDTH] IN BLOOD BY AUTOMATED COUNT: 13.6 % (ref 11.5–14.5)
EST. GFR  (AFRICAN AMERICAN): >60 ML/MIN/1.73 M^2
EST. GFR  (NON AFRICAN AMERICAN): >60 ML/MIN/1.73 M^2
GLUCOSE SERPL-MCNC: 49 MG/DL (ref 70–110)
HCT VFR BLD AUTO: 25.6 % (ref 37–48.5)
HGB BLD-MCNC: 8.2 G/DL (ref 12–16)
HYPOCHROMIA BLD QL SMEAR: ABNORMAL
IMM GRANULOCYTES # BLD AUTO: 0.32 K/UL (ref 0–0.04)
IMM GRANULOCYTES NFR BLD AUTO: 4.6 % (ref 0–0.5)
LYMPHOCYTES # BLD AUTO: 0.4 K/UL (ref 1–4.8)
LYMPHOCYTES NFR BLD: 6.1 % (ref 18–48)
MAGNESIUM SERPL-MCNC: 1.7 MG/DL (ref 1.6–2.6)
MCH RBC QN AUTO: 31.9 PG (ref 27–31)
MCHC RBC AUTO-ENTMCNC: 32 G/DL (ref 32–36)
MCV RBC AUTO: 100 FL (ref 82–98)
MONOCYTES # BLD AUTO: 0.4 K/UL (ref 0.3–1)
MONOCYTES NFR BLD: 6 % (ref 4–15)
NEUTROPHILS # BLD AUTO: 5.7 K/UL (ref 1.8–7.7)
NEUTROPHILS NFR BLD: 80.5 % (ref 38–73)
NRBC BLD-RTO: 0 /100 WBC
OVALOCYTES BLD QL SMEAR: ABNORMAL
PHOSPHATE SERPL-MCNC: 1.4 MG/DL (ref 2.7–4.5)
PLATELET # BLD AUTO: 93 K/UL (ref 150–350)
PMV BLD AUTO: 11.5 FL (ref 9.2–12.9)
POIKILOCYTOSIS BLD QL SMEAR: SLIGHT
POLYCHROMASIA BLD QL SMEAR: ABNORMAL
POTASSIUM SERPL-SCNC: 3.3 MMOL/L (ref 3.5–5.1)
RBC # BLD AUTO: 2.57 M/UL (ref 4–5.4)
SODIUM SERPL-SCNC: 144 MMOL/L (ref 136–145)
TACROLIMUS BLD-MCNC: 3.9 NG/ML (ref 5–15)
WBC # BLD AUTO: 7.03 K/UL (ref 3.9–12.7)

## 2020-11-20 PROCEDURE — 99214 OFFICE O/P EST MOD 30 MIN: CPT | Mod: PBBFAC

## 2020-11-20 PROCEDURE — 85025 COMPLETE CBC W/AUTO DIFF WBC: CPT

## 2020-11-20 PROCEDURE — 80069 RENAL FUNCTION PANEL: CPT

## 2020-11-20 PROCEDURE — 36415 COLL VENOUS BLD VENIPUNCTURE: CPT

## 2020-11-20 PROCEDURE — 99999 PR PBB SHADOW E&M-EST. PATIENT-LVL IV: CPT | Mod: PBBFAC,,,

## 2020-11-20 PROCEDURE — 99999 PR PBB SHADOW E&M-EST. PATIENT-LVL IV: ICD-10-PCS | Mod: PBBFAC,,,

## 2020-11-20 PROCEDURE — 80197 ASSAY OF TACROLIMUS: CPT

## 2020-11-20 PROCEDURE — 83735 ASSAY OF MAGNESIUM: CPT

## 2020-11-20 RX ORDER — TACROLIMUS 1 MG/1
7 CAPSULE ORAL EVERY 12 HOURS
Qty: 420 CAPSULE | Refills: 11 | Status: SHIPPED | OUTPATIENT
Start: 2020-11-20 | End: 2020-11-23

## 2020-11-20 NOTE — PROGRESS NOTES
Transplant Coordinator  11/15-11/19/2020     Pt admitted for a cadaveric kidney transplant. ESRD 2/2 DMII. Pt was on PD and her catheter exits in her right chest wall. Thymo induction, cPRA 90%, KDPI 29%). CMV ++     Cr trending down, 1.2 at time of d/c. Good UOP     5 day shaffer to be removed tomorrow in clinic, 11/20/20. RUDDY drains x2 will be removed in clinic Monday, 11/13 per Dr Ann.   RLQ incision with dermabond     Labs and RTC 11/20

## 2020-11-20 NOTE — TELEPHONE ENCOUNTER
Critical lab  Reported from the lab.  Mary Alice reported glucose 49.   Pt actually in transplant clinic right now.  Contacted clinic staff and asked that they recheck pt blood sugar in clinic. Mrs. Brandt states going to go check pt BG now.     Anu, Pharm-D states that she will put order in computer for accucheck.     Anu able to give order for oral glucose tablets in clinic if BG still low.     Critical BG report documented critical value in flow sheet.

## 2020-11-20 NOTE — TELEPHONE ENCOUNTER
Spoke to pt's daughter, Gladys, reviewed labs and below instructions, verbalized understanding and agreed.  confirmed labs and surgery clinic apt for drain removal on 11/23/20.    ----- Message from Mainor Richards MD sent at 11/20/2020  1:07 PM CST -----  Results ok. fk 7 bid

## 2020-11-20 NOTE — NURSING
"1ST NURSING VISIT POST DISCHARGE NOTE    1st RN appointment with Kendra Malik post discharge 11/19/20 s/p kidney transplant 11/16/20.  Patient's caregiver accompanied her.  Patient reports none.  Patient says that she that she is sleeping well.  Incision intact with dermabond.  Patient has Sebastian catheter (remove today) and RUDDY drain (removal scheduled for Monday, 11/23/20).  Patient that she is able to explain daily incision care and showering instructions.  Reviewed I&O monitoring, measuring, and recording, and the need for hydration (i.e., at least 2 liters of water daily with minimal caffeine and no grapefruit products).  Medication list and rationale were reviewed.  Patient did bring blue medication card and medication bottles for review.  Patient reports that she has stopped Dulcolax and has not continued Colace.  Patient has had a bowel movement.  Patient expressed understanding of daily care including BID VS, medications, and I&O documentation.  Patient made aware of today's creatinine level: 0.9.  Patient aware that coordinator will review today's labs with a transplant physician and call the patient with any dose changes indicated.  Next lab appointment scheduled for 11/23/2012/0.  First post-operative transplant team appointment with labs scheduled for 12/09/20, request is in for earlier apt  .    Using the Kidney Transplant Patient Reference Manual, the patient submitted her open book "Self-assessment of Kidney Transplant Patient Knowledge" test, which was completed in the transplant clinic this morning before 1st nursing visit.  This test includes questions regarding critical dose medications commonly used after kidney transplant, medication dosing and side effects, importance of timed lab draws, important signs and symptoms to report 24/7 immediately post-transplant as well as how to contact the transplant team 24/7.    Test Score: 21/25    After completing the test, the patient was given a copy of " the Self-assessment Answer Key to reinforce accurate learning of test content.  Patient expressed her understanding of the value of the information included in the self-assessment test.

## 2020-11-20 NOTE — PROGRESS NOTES
Transplant Teaching Book given to patient, Kendra Malik, on 11/18/2020.  During the course of the hospital stay the patient received information regarding kidney transplant. Teaching and instruction were completed.  Areas that were discussed included: how to contact the Transplant Team, the importance of measuring intake of fluids and urine output, and monitoring vital signs such as blood pressure, temperature, and daily weights.  Parameters for which to report abnormal findings were given.  Appointment were provided along with the rational for the importance of lab work and clinic visits.  A written medication list was provided.  The importance of immunosuppressive medications, their common side effects, and treatment to prevent or minimize side effects has been reviewed.  Signs and symptoms of rejection and infection along with various treatments were reviewed.  The need to avoid infection was discussed.  Wound care and special consideration regarding activities of daily living were explained.  Written and verbal teaching of the above information was given.

## 2020-11-23 ENCOUNTER — TELEPHONE (OUTPATIENT)
Dept: TRANSPLANT | Facility: CLINIC | Age: 65
End: 2020-11-23

## 2020-11-23 ENCOUNTER — OFFICE VISIT (OUTPATIENT)
Dept: TRANSPLANT | Facility: CLINIC | Age: 65
End: 2020-11-23
Payer: MEDICARE

## 2020-11-23 ENCOUNTER — LAB VISIT (OUTPATIENT)
Dept: LAB | Facility: HOSPITAL | Age: 65
End: 2020-11-23
Payer: MEDICARE

## 2020-11-23 VITALS
BODY MASS INDEX: 26.79 KG/M2 | SYSTOLIC BLOOD PRESSURE: 180 MMHG | HEART RATE: 70 BPM | TEMPERATURE: 98 F | OXYGEN SATURATION: 96 % | RESPIRATION RATE: 18 BRPM | DIASTOLIC BLOOD PRESSURE: 70 MMHG | HEIGHT: 66 IN | WEIGHT: 166.69 LBS

## 2020-11-23 DIAGNOSIS — Z51.81 ENCOUNTER FOR THERAPEUTIC DRUG MONITORING: Primary | ICD-10-CM

## 2020-11-23 DIAGNOSIS — Z94.0 KIDNEY REPLACED BY TRANSPLANT: ICD-10-CM

## 2020-11-23 LAB
ALBUMIN SERPL BCP-MCNC: 2.9 G/DL (ref 3.5–5.2)
ANION GAP SERPL CALC-SCNC: 8 MMOL/L (ref 8–16)
ANISOCYTOSIS BLD QL SMEAR: SLIGHT
BASOPHILS NFR BLD: 1 % (ref 0–1.9)
BUN SERPL-MCNC: 14 MG/DL (ref 8–23)
CALCIUM SERPL-MCNC: 9.4 MG/DL (ref 8.7–10.5)
CHLORIDE SERPL-SCNC: 107 MMOL/L (ref 95–110)
CO2 SERPL-SCNC: 24 MMOL/L (ref 23–29)
CREAT SERPL-MCNC: 0.9 MG/DL (ref 0.5–1.4)
DIFFERENTIAL METHOD: ABNORMAL
EOSINOPHIL NFR BLD: 2 % (ref 0–8)
ERYTHROCYTE [DISTWIDTH] IN BLOOD BY AUTOMATED COUNT: 13.7 % (ref 11.5–14.5)
EST. GFR  (AFRICAN AMERICAN): >60 ML/MIN/1.73 M^2
EST. GFR  (NON AFRICAN AMERICAN): >60 ML/MIN/1.73 M^2
GLUCOSE SERPL-MCNC: 257 MG/DL (ref 70–110)
HCT VFR BLD AUTO: 28.4 % (ref 37–48.5)
HGB BLD-MCNC: 9 G/DL (ref 12–16)
IMM GRANULOCYTES # BLD AUTO: ABNORMAL K/UL (ref 0–0.04)
IMM GRANULOCYTES NFR BLD AUTO: ABNORMAL % (ref 0–0.5)
LYMPHOCYTES NFR BLD: 3 % (ref 18–48)
MAGNESIUM SERPL-MCNC: 1.3 MG/DL (ref 1.6–2.6)
MCH RBC QN AUTO: 31.7 PG (ref 27–31)
MCHC RBC AUTO-ENTMCNC: 31.7 G/DL (ref 32–36)
MCV RBC AUTO: 100 FL (ref 82–98)
METAMYELOCYTES NFR BLD MANUAL: 1 %
MONOCYTES NFR BLD: 4 % (ref 4–15)
MYELOCYTES NFR BLD MANUAL: 1 %
NEUTROPHILS NFR BLD: 88 % (ref 38–73)
NRBC BLD-RTO: 0 /100 WBC
PHOSPHATE SERPL-MCNC: 1.1 MG/DL (ref 2.7–4.5)
PLATELET # BLD AUTO: 136 K/UL (ref 150–350)
PLATELET BLD QL SMEAR: ABNORMAL
PMV BLD AUTO: 11.2 FL (ref 9.2–12.9)
POLYCHROMASIA BLD QL SMEAR: ABNORMAL
POTASSIUM SERPL-SCNC: 4.5 MMOL/L (ref 3.5–5.1)
RBC # BLD AUTO: 2.84 M/UL (ref 4–5.4)
SODIUM SERPL-SCNC: 139 MMOL/L (ref 136–145)
TACROLIMUS BLD-MCNC: 5.3 NG/ML (ref 5–15)
WBC # BLD AUTO: 6.8 K/UL (ref 3.9–12.7)

## 2020-11-23 PROCEDURE — 83735 ASSAY OF MAGNESIUM: CPT

## 2020-11-23 PROCEDURE — 85007 BL SMEAR W/DIFF WBC COUNT: CPT

## 2020-11-23 PROCEDURE — 99999 PR PBB SHADOW E&M-EST. PATIENT-LVL IV: CPT | Mod: PBBFAC,,,

## 2020-11-23 PROCEDURE — 85027 COMPLETE CBC AUTOMATED: CPT

## 2020-11-23 PROCEDURE — 99213 PR OFFICE/OUTPT VISIT, EST, LEVL III, 20-29 MIN: ICD-10-PCS | Mod: 24,S$PBB,, | Performed by: TRANSPLANT SURGERY

## 2020-11-23 PROCEDURE — 80069 RENAL FUNCTION PANEL: CPT

## 2020-11-23 PROCEDURE — 99999 PR PBB SHADOW E&M-EST. PATIENT-LVL IV: ICD-10-PCS | Mod: PBBFAC,,,

## 2020-11-23 PROCEDURE — 99214 OFFICE O/P EST MOD 30 MIN: CPT | Mod: PBBFAC

## 2020-11-23 PROCEDURE — 99213 OFFICE O/P EST LOW 20 MIN: CPT | Mod: 24,S$PBB,, | Performed by: TRANSPLANT SURGERY

## 2020-11-23 PROCEDURE — 80197 ASSAY OF TACROLIMUS: CPT

## 2020-11-23 PROCEDURE — 36415 COLL VENOUS BLD VENIPUNCTURE: CPT

## 2020-11-23 RX ORDER — TACROLIMUS 1 MG/1
8 CAPSULE ORAL EVERY 12 HOURS
Qty: 480 CAPSULE | Refills: 11 | Status: SHIPPED | OUTPATIENT
Start: 2020-11-23 | End: 2020-11-30

## 2020-11-23 NOTE — TELEPHONE ENCOUNTER
Spoke to pt, reviewed labs and below instructions, verbalized understanding and agreed.   ----- Message from Mainor Richrads MD sent at 11/23/2020  1:05 PM CST -----  Results ok fk 8 bid

## 2020-11-23 NOTE — PROGRESS NOTES
SP kidney transplant 7 days ago.  No complaints.  Good urine output. Cr 0.9  She has two drains: Subcutaneous and deep, both producing minimal amount of clear fluid.   Incision in left LQ, good condicition. No subcutaneous collections, no sings of infection.   Plan I removed the deep drain.   Remove the subcutaneous one next Wednesday.   Same medication   Scott Ann.

## 2020-11-23 NOTE — TELEPHONE ENCOUNTER
Spoke to pt, reviewed labs and below instructions, verbalized understanding and agreed.    ----- Message from Mainor Richards MD sent at 11/23/2020  1:05 PM CST -----  Results ok fk 8 bid

## 2020-11-23 NOTE — PROGRESS NOTES
Post Clinic Note    KWABENA called pt at [phone] due to missing pt in clinic for first post clinic appointment. Pt reports staying in  apartments with significant other/primary caregiver Kelley Corral (Jackie), phone number [phone]. Pt's daughter, Yue Malik ph# [phone], will also assist as needed.  Pt denied any concerns with affording medications. Pt denied any concerns with mental health. Pt denied any needs at this time.     KWABENA remains available at [phone].

## 2020-11-25 ENCOUNTER — OFFICE VISIT (OUTPATIENT)
Dept: TRANSPLANT | Facility: CLINIC | Age: 65
End: 2020-11-25
Payer: MEDICARE

## 2020-11-25 VITALS
DIASTOLIC BLOOD PRESSURE: 58 MMHG | WEIGHT: 162.25 LBS | RESPIRATION RATE: 16 BRPM | TEMPERATURE: 99 F | BODY MASS INDEX: 26.19 KG/M2 | SYSTOLIC BLOOD PRESSURE: 127 MMHG | OXYGEN SATURATION: 96 % | HEART RATE: 64 BPM

## 2020-11-25 DIAGNOSIS — Z94.0 KIDNEY REPLACED BY TRANSPLANT: Primary | ICD-10-CM

## 2020-11-25 PROCEDURE — 99999 PR PBB SHADOW E&M-EST. PATIENT-LVL IV: CPT | Mod: PBBFAC,,,

## 2020-11-25 PROCEDURE — 99999 PR PBB SHADOW E&M-EST. PATIENT-LVL IV: ICD-10-PCS | Mod: PBBFAC,,,

## 2020-11-25 PROCEDURE — 99215 OFFICE O/P EST HI 40 MIN: CPT | Mod: 24,S$PBB,, | Performed by: TRANSPLANT SURGERY

## 2020-11-25 PROCEDURE — 99215 PR OFFICE/OUTPT VISIT, EST, LEVL V, 40-54 MIN: ICD-10-PCS | Mod: 24,S$PBB,, | Performed by: TRANSPLANT SURGERY

## 2020-11-25 PROCEDURE — 99214 OFFICE O/P EST MOD 30 MIN: CPT | Mod: PBBFAC

## 2020-11-25 RX ORDER — VALGANCICLOVIR 450 MG/1
900 TABLET, FILM COATED ORAL DAILY
Qty: 60 TABLET | Refills: 2 | Status: SHIPPED | OUTPATIENT
Start: 2020-11-25 | End: 2020-12-19

## 2020-11-25 NOTE — PROGRESS NOTES
Transplant Surgery  Kidney Transplant Recipient Follow-up    Referring Physician: Anali Moura  Current Nephrologist: Ann Marie Scruggs    Subjective:     Chief Complaint: Kendra Malik is a 65 y.o. year old Black or  female who is status post Kidney transplant performed on 11/16/2020.    ORGAN: LEFT KIDNEY   Disease Etiology: Diabetes Mellitus - Type Other / Unknown  Donor Type: Donation after Brain Death   Donor CMV Status: Positive   Donor HBcAB: Negative   Donor HCV Status: Negative     History of Present Illness: She reports no concerns.  From a transplant perspective, she reports normal urination and no incisional pain.  Kendra is here for management of her immunosuppression medication.  Kendra states that her immunosuppression is being well tolerated.  Hypertension is is reportedly well controlled.    Review of Systems   Constitutional: Negative.    Respiratory: Negative.    Cardiovascular: Negative.    Gastrointestinal: Negative.        Objective:     Physical Exam  Constitutional:       General: She is not in acute distress.     Appearance: Normal appearance.   HENT:      Head: Normocephalic and atraumatic.      Nose: Nose normal.      Mouth/Throat:      Mouth: Mucous membranes are moist.      Pharynx: Oropharynx is clear.   Eyes:      Extraocular Movements: Extraocular movements intact.      Conjunctiva/sclera: Conjunctivae normal.   Neck:      Musculoskeletal: No neck rigidity.      Vascular: No carotid bruit.   Cardiovascular:      Pulses:           Femoral pulses are 2+ on the right side and 2+ on the left side.  Pulmonary:      Effort: Pulmonary effort is normal. No respiratory distress.   Abdominal:      General: There is no distension.      Palpations: Abdomen is soft.      Tenderness: There is no abdominal tenderness. There is no guarding.       Musculoskeletal:         General: No swelling, tenderness or deformity.   Skin:     General: Skin is warm and dry.      Findings: No  lesion or rash.   Neurological:      General: No focal deficit present.      Mental Status: She is alert and oriented to person, place, and time.   Psychiatric:         Mood and Affect: Mood normal.         Thought Content: Thought content normal.       Lab Results   Component Value Date    CREATININE 0.9 11/23/2020    BUN 14 11/23/2020     Lab Results   Component Value Date    WBC 6.80 11/23/2020    HGB 9.0 (L) 11/23/2020    HCT 28.4 (L) 11/23/2020    HCT 22 (L) 11/16/2020     (L) 11/23/2020     Lab Results   Component Value Date    TACROLIMUS 5.3 11/23/2020         Assessment and Plan:        · S/P Kidney transplant.  · Chronic immunosuppressive medications for rejection prophylaxis subtherapeutic.  Dose increased 11/23. Follow-up level pending.   · Continue monitoring symptoms, labs and drug levels for drug-related toxicity and side effects.  · Renal hypertension: BP controlled today.  Needs to check orthostatics.  · Drain with scant SS output.  Drain removed.   · Follow-up with endo  · Increase valcyte dose to 900 daily    Follow-up: Patient reminded to call with any health changes, since these can be early signs of significant complications.  Also, I advised the patient to be sure any new medications or changes of old medications are discussed with either a pharmacist, or physician knowledgeable with transplant to avoid rejection/drug toxicity related to significant drug interactions.    Mark Huston MD       [facility] Patient Status  Functional Status: 80% - Normal activity with effort: some symptoms of disease  Physical Capacity: No Limitations

## 2020-11-26 ENCOUNTER — LAB VISIT (OUTPATIENT)
Dept: LAB | Facility: HOSPITAL | Age: 65
End: 2020-11-26
Payer: MEDICARE

## 2020-11-26 DIAGNOSIS — Z94.0 KIDNEY REPLACED BY TRANSPLANT: ICD-10-CM

## 2020-11-26 LAB
ALBUMIN SERPL BCP-MCNC: 3.1 G/DL (ref 3.5–5.2)
ANION GAP SERPL CALC-SCNC: 5 MMOL/L (ref 8–16)
BASOPHILS # BLD AUTO: 0.04 K/UL (ref 0–0.2)
BASOPHILS NFR BLD: 0.4 % (ref 0–1.9)
BUN SERPL-MCNC: 14 MG/DL (ref 8–23)
CALCIUM SERPL-MCNC: 9.5 MG/DL (ref 8.7–10.5)
CHLORIDE SERPL-SCNC: 106 MMOL/L (ref 95–110)
CO2 SERPL-SCNC: 28 MMOL/L (ref 23–29)
CREAT SERPL-MCNC: 0.9 MG/DL (ref 0.5–1.4)
DIFFERENTIAL METHOD: ABNORMAL
EOSINOPHIL # BLD AUTO: 0.2 K/UL (ref 0–0.5)
EOSINOPHIL NFR BLD: 1.5 % (ref 0–8)
ERYTHROCYTE [DISTWIDTH] IN BLOOD BY AUTOMATED COUNT: 14.6 % (ref 11.5–14.5)
EST. GFR  (AFRICAN AMERICAN): >60 ML/MIN/1.73 M^2
EST. GFR  (NON AFRICAN AMERICAN): >60 ML/MIN/1.73 M^2
GLUCOSE SERPL-MCNC: 184 MG/DL (ref 70–110)
HCT VFR BLD AUTO: 30 % (ref 37–48.5)
HGB BLD-MCNC: 9.2 G/DL (ref 12–16)
IMM GRANULOCYTES # BLD AUTO: 0.47 K/UL (ref 0–0.04)
IMM GRANULOCYTES NFR BLD AUTO: 4.4 % (ref 0–0.5)
LYMPHOCYTES # BLD AUTO: 1.3 K/UL (ref 1–4.8)
LYMPHOCYTES NFR BLD: 12.2 % (ref 18–48)
MAGNESIUM SERPL-MCNC: 1.3 MG/DL (ref 1.6–2.6)
MCH RBC QN AUTO: 30.7 PG (ref 27–31)
MCHC RBC AUTO-ENTMCNC: 30.7 G/DL (ref 32–36)
MCV RBC AUTO: 100 FL (ref 82–98)
MONOCYTES # BLD AUTO: 0.8 K/UL (ref 0.3–1)
MONOCYTES NFR BLD: 7.6 % (ref 4–15)
NEUTROPHILS # BLD AUTO: 7.9 K/UL (ref 1.8–7.7)
NEUTROPHILS NFR BLD: 73.9 % (ref 38–73)
NRBC BLD-RTO: 0 /100 WBC
PHOSPHATE SERPL-MCNC: 1.6 MG/DL (ref 2.7–4.5)
PLATELET # BLD AUTO: 217 K/UL (ref 150–350)
PMV BLD AUTO: 10.8 FL (ref 9.2–12.9)
POTASSIUM SERPL-SCNC: 4.6 MMOL/L (ref 3.5–5.1)
RBC # BLD AUTO: 3 M/UL (ref 4–5.4)
SODIUM SERPL-SCNC: 139 MMOL/L (ref 136–145)
TACROLIMUS BLD-MCNC: 11.2 NG/ML (ref 5–15)
WBC # BLD AUTO: 10.7 K/UL (ref 3.9–12.7)

## 2020-11-26 PROCEDURE — 83735 ASSAY OF MAGNESIUM: CPT

## 2020-11-26 PROCEDURE — 36415 COLL VENOUS BLD VENIPUNCTURE: CPT

## 2020-11-26 PROCEDURE — 80197 ASSAY OF TACROLIMUS: CPT

## 2020-11-26 PROCEDURE — 85025 COMPLETE CBC W/AUTO DIFF WBC: CPT

## 2020-11-26 PROCEDURE — 80069 RENAL FUNCTION PANEL: CPT

## 2020-11-27 ENCOUNTER — TELEPHONE (OUTPATIENT)
Dept: TRANSPLANT | Facility: CLINIC | Age: 65
End: 2020-11-27

## 2020-11-27 NOTE — TELEPHONE ENCOUNTER
Late entry:  11/27/20- spoke to pt, reviewed lab results and below instructions, pt verbalized understanding and agreed.  ----- Message from Lianet Bañuelos MD sent at 11/27/2020  1:03 PM CST -----  Please Tac to 8/7 mg . K-phos to 3 pills TID and high PO4 diet. Mg Oxide 800 mg BID

## 2020-11-30 ENCOUNTER — LAB VISIT (OUTPATIENT)
Dept: LAB | Facility: HOSPITAL | Age: 65
End: 2020-11-30
Payer: MEDICARE

## 2020-11-30 ENCOUNTER — TELEPHONE (OUTPATIENT)
Dept: TRANSPLANT | Facility: CLINIC | Age: 65
End: 2020-11-30

## 2020-11-30 DIAGNOSIS — E83.39 HYPOPHOSPHATEMIA: Primary | ICD-10-CM

## 2020-11-30 DIAGNOSIS — Z94.0 KIDNEY REPLACED BY TRANSPLANT: ICD-10-CM

## 2020-11-30 DIAGNOSIS — E83.42 HYPOMAGNESEMIA: ICD-10-CM

## 2020-11-30 LAB
ALBUMIN SERPL BCP-MCNC: 3.1 G/DL (ref 3.5–5.2)
ANION GAP SERPL CALC-SCNC: 8 MMOL/L (ref 8–16)
BASOPHILS # BLD AUTO: 0.05 K/UL (ref 0–0.2)
BASOPHILS NFR BLD: 0.4 % (ref 0–1.9)
BUN SERPL-MCNC: 16 MG/DL (ref 8–23)
CALCIUM SERPL-MCNC: 9.6 MG/DL (ref 8.7–10.5)
CHLORIDE SERPL-SCNC: 108 MMOL/L (ref 95–110)
CO2 SERPL-SCNC: 25 MMOL/L (ref 23–29)
CREAT SERPL-MCNC: 1.1 MG/DL (ref 0.5–1.4)
DIFFERENTIAL METHOD: ABNORMAL
EOSINOPHIL # BLD AUTO: 0.1 K/UL (ref 0–0.5)
EOSINOPHIL NFR BLD: 0.8 % (ref 0–8)
ERYTHROCYTE [DISTWIDTH] IN BLOOD BY AUTOMATED COUNT: 15.1 % (ref 11.5–14.5)
EST. GFR  (AFRICAN AMERICAN): >60 ML/MIN/1.73 M^2
EST. GFR  (NON AFRICAN AMERICAN): 53.2 ML/MIN/1.73 M^2
GLUCOSE SERPL-MCNC: 240 MG/DL (ref 70–110)
HCT VFR BLD AUTO: 32 % (ref 37–48.5)
HGB BLD-MCNC: 9.6 G/DL (ref 12–16)
IMM GRANULOCYTES # BLD AUTO: 0.07 K/UL (ref 0–0.04)
IMM GRANULOCYTES NFR BLD AUTO: 0.6 % (ref 0–0.5)
LYMPHOCYTES # BLD AUTO: 1.4 K/UL (ref 1–4.8)
LYMPHOCYTES NFR BLD: 12.2 % (ref 18–48)
MAGNESIUM SERPL-MCNC: 1.2 MG/DL (ref 1.6–2.6)
MCH RBC QN AUTO: 31 PG (ref 27–31)
MCHC RBC AUTO-ENTMCNC: 30 G/DL (ref 32–36)
MCV RBC AUTO: 103 FL (ref 82–98)
MONOCYTES # BLD AUTO: 0.5 K/UL (ref 0.3–1)
MONOCYTES NFR BLD: 4.8 % (ref 4–15)
NEUTROPHILS # BLD AUTO: 9.1 K/UL (ref 1.8–7.7)
NEUTROPHILS NFR BLD: 81.2 % (ref 38–73)
NRBC BLD-RTO: 0 /100 WBC
PHOSPHATE SERPL-MCNC: 3 MG/DL (ref 2.7–4.5)
PLATELET # BLD AUTO: 218 K/UL (ref 150–350)
PMV BLD AUTO: 10.7 FL (ref 9.2–12.9)
POTASSIUM SERPL-SCNC: 4.1 MMOL/L (ref 3.5–5.1)
RBC # BLD AUTO: 3.1 M/UL (ref 4–5.4)
SODIUM SERPL-SCNC: 141 MMOL/L (ref 136–145)
TACROLIMUS BLD-MCNC: 14.7 NG/ML (ref 5–15)
WBC # BLD AUTO: 11.18 K/UL (ref 3.9–12.7)

## 2020-11-30 PROCEDURE — 36415 COLL VENOUS BLD VENIPUNCTURE: CPT

## 2020-11-30 PROCEDURE — 80069 RENAL FUNCTION PANEL: CPT

## 2020-11-30 PROCEDURE — 83735 ASSAY OF MAGNESIUM: CPT

## 2020-11-30 PROCEDURE — 85025 COMPLETE CBC W/AUTO DIFF WBC: CPT

## 2020-11-30 PROCEDURE — 80197 ASSAY OF TACROLIMUS: CPT

## 2020-11-30 RX ORDER — TACROLIMUS 1 MG/1
CAPSULE ORAL
Qty: 450 CAPSULE | Refills: 11 | Status: SHIPPED | OUTPATIENT
Start: 2020-11-30 | End: 2020-11-30 | Stop reason: DRUGHIGH

## 2020-11-30 RX ORDER — LANOLIN ALCOHOL/MO/W.PET/CERES
800 CREAM (GRAM) TOPICAL 2 TIMES DAILY
Qty: 120 TABLET | Refills: 11 | Status: SHIPPED | OUTPATIENT
Start: 2020-11-30 | End: 2021-11-12 | Stop reason: SDUPTHER

## 2020-11-30 NOTE — ANESTHESIA PROCEDURE NOTES
Arterial    Diagnosis: ESRD    Patient location during procedure: done in OR  Procedure start time: 11/15/2020 10:50 PM  Timeout: 11/15/2020 10:50 PM  Procedure end time: 11/15/2020 10:55 PM    Staffing  Authorizing Provider: Josafat Lorenz MD  Performing Provider: Josafat Lorenz MD    Anesthesiologist was present at the time of the procedure.    Preanesthetic Checklist  Completed: patient identified, site marked, surgical consent, pre-op evaluation, timeout performed, IV checked, risks and benefits discussed, monitors and equipment checked and anesthesia consent givenArterial  Skin Prep: chlorhexidine gluconate  Local Infiltration: none  Orientation: left  Location: radial  Catheter Size: 20 G  Catheter placement by Ultrasound guidance. Heme positive aspiration all ports.  Vessel Caliber: medium, patent  Needle advanced into vessel with real time Ultrasound guidance.  Guidewire confirmed in vessel.Insertion Attempts: 1  Assessment  Dressing: secured with tape and tegaderm  Patient: Tolerated well

## 2020-11-30 NOTE — ADDENDUM NOTE
Addendum  created 11/30/20 0830 by Josafat Lorenz MD    Child order released for a procedure order, Clinical Note Signed, Intraprocedure Blocks edited, LDA created via procedure documentation, Pend clinical note

## 2020-11-30 NOTE — TELEPHONE ENCOUNTER
Holding fk starting tonight. Will repeat labs in AM and await dose to restart FK at.  ----- Message from Mainor Richards MD sent at 11/30/2020  1:13 PM CST -----  Hold fk   Labs tomorrow

## 2020-12-01 ENCOUNTER — LAB VISIT (OUTPATIENT)
Dept: LAB | Facility: HOSPITAL | Age: 65
End: 2020-12-01
Attending: INTERNAL MEDICINE
Payer: MEDICARE

## 2020-12-01 DIAGNOSIS — Z94.0 KIDNEY REPLACED BY TRANSPLANT: ICD-10-CM

## 2020-12-01 LAB
ALBUMIN SERPL BCP-MCNC: 3.2 G/DL (ref 3.5–5.2)
ANION GAP SERPL CALC-SCNC: 9 MMOL/L (ref 8–16)
BASOPHILS # BLD AUTO: 0.08 K/UL (ref 0–0.2)
BASOPHILS NFR BLD: 0.9 % (ref 0–1.9)
BUN SERPL-MCNC: 13 MG/DL (ref 8–23)
CALCIUM SERPL-MCNC: 9.6 MG/DL (ref 8.7–10.5)
CHLORIDE SERPL-SCNC: 110 MMOL/L (ref 95–110)
CO2 SERPL-SCNC: 25 MMOL/L (ref 23–29)
CREAT SERPL-MCNC: 0.9 MG/DL (ref 0.5–1.4)
DIFFERENTIAL METHOD: ABNORMAL
EOSINOPHIL # BLD AUTO: 0.1 K/UL (ref 0–0.5)
EOSINOPHIL NFR BLD: 1.3 % (ref 0–8)
ERYTHROCYTE [DISTWIDTH] IN BLOOD BY AUTOMATED COUNT: 14.9 % (ref 11.5–14.5)
EST. GFR  (AFRICAN AMERICAN): >60 ML/MIN/1.73 M^2
EST. GFR  (NON AFRICAN AMERICAN): >60 ML/MIN/1.73 M^2
GLUCOSE SERPL-MCNC: 211 MG/DL (ref 70–110)
HCT VFR BLD AUTO: 31.8 % (ref 37–48.5)
HGB BLD-MCNC: 9.6 G/DL (ref 12–16)
IMM GRANULOCYTES # BLD AUTO: 0.06 K/UL (ref 0–0.04)
IMM GRANULOCYTES NFR BLD AUTO: 0.7 % (ref 0–0.5)
LYMPHOCYTES # BLD AUTO: 1.1 K/UL (ref 1–4.8)
LYMPHOCYTES NFR BLD: 12.9 % (ref 18–48)
MAGNESIUM SERPL-MCNC: 1.1 MG/DL (ref 1.6–2.6)
MCH RBC QN AUTO: 31.5 PG (ref 27–31)
MCHC RBC AUTO-ENTMCNC: 30.2 G/DL (ref 32–36)
MCV RBC AUTO: 104 FL (ref 82–98)
MONOCYTES # BLD AUTO: 0.4 K/UL (ref 0.3–1)
MONOCYTES NFR BLD: 4.9 % (ref 4–15)
NEUTROPHILS # BLD AUTO: 6.7 K/UL (ref 1.8–7.7)
NEUTROPHILS NFR BLD: 79.3 % (ref 38–73)
NRBC BLD-RTO: 0 /100 WBC
PHOSPHATE SERPL-MCNC: 3 MG/DL (ref 2.7–4.5)
PLATELET # BLD AUTO: 202 K/UL (ref 150–350)
PMV BLD AUTO: 10.7 FL (ref 9.2–12.9)
POTASSIUM SERPL-SCNC: 4.7 MMOL/L (ref 3.5–5.1)
RBC # BLD AUTO: 3.05 M/UL (ref 4–5.4)
SODIUM SERPL-SCNC: 144 MMOL/L (ref 136–145)
TACROLIMUS BLD-MCNC: 10.4 NG/ML (ref 5–15)
WBC # BLD AUTO: 8.51 K/UL (ref 3.9–12.7)

## 2020-12-01 PROCEDURE — 80069 RENAL FUNCTION PANEL: CPT

## 2020-12-01 PROCEDURE — 80197 ASSAY OF TACROLIMUS: CPT

## 2020-12-01 PROCEDURE — 36415 COLL VENOUS BLD VENIPUNCTURE: CPT

## 2020-12-01 PROCEDURE — 83735 ASSAY OF MAGNESIUM: CPT

## 2020-12-01 PROCEDURE — 85025 COMPLETE CBC W/AUTO DIFF WBC: CPT

## 2020-12-01 RX ORDER — TACROLIMUS 1 MG/1
5 CAPSULE ORAL EVERY 12 HOURS
Qty: 300 CAPSULE | Refills: 11 | Status: SHIPPED | OUTPATIENT
Start: 2020-12-01 | End: 2020-12-03 | Stop reason: DRUGHIGH

## 2020-12-01 RX ORDER — TACROLIMUS 1 MG/1
5 CAPSULE ORAL EVERY 12 HOURS
Qty: 300 CAPSULE | Refills: 11 | Status: CANCELLED | OUTPATIENT
Start: 2020-12-01

## 2020-12-01 NOTE — TELEPHONE ENCOUNTER
Instructed pt to Restart tacrolimus at 5 mg ( 5 pills) twice daily with morning dose on 12/02/2020.  Do not take any tonight.    Pt verbalized understanding.    Pt asking if should be taking vitamin D since was on her blue card but she never received RX.  This RX is an old RX from 2018. Will ask MD if wants her to take it or not and let pt know.     ----- Message from Kelley Denny MD sent at 12/1/2020  1:43 PM CST -----  The following message sent to patient via [facility]:  Restart tacrolimus at 5 mg twice daily with morning dose on 12/02/2020.  Do not take any tonight.

## 2020-12-01 NOTE — TELEPHONE ENCOUNTER
----- Message from Kelley Denny MD sent at 12/1/2020  1:43 PM CST -----  The following message sent to patient via [facility]:  Restart tacrolimus at 5 mg twice daily with morning dose on 12/02/2020.  Do not take any tonight.

## 2020-12-03 ENCOUNTER — LAB VISIT (OUTPATIENT)
Dept: LAB | Facility: HOSPITAL | Age: 65
End: 2020-12-03
Payer: MEDICARE

## 2020-12-03 DIAGNOSIS — Z94.0 KIDNEY REPLACED BY TRANSPLANT: ICD-10-CM

## 2020-12-03 LAB
ALBUMIN SERPL BCP-MCNC: 3.2 G/DL (ref 3.5–5.2)
ANION GAP SERPL CALC-SCNC: 9 MMOL/L (ref 8–16)
BASOPHILS # BLD AUTO: 0.06 K/UL (ref 0–0.2)
BASOPHILS NFR BLD: 0.8 % (ref 0–1.9)
BUN SERPL-MCNC: 13 MG/DL (ref 8–23)
CALCIUM SERPL-MCNC: 9.8 MG/DL (ref 8.7–10.5)
CHLORIDE SERPL-SCNC: 106 MMOL/L (ref 95–110)
CO2 SERPL-SCNC: 23 MMOL/L (ref 23–29)
CREAT SERPL-MCNC: 1 MG/DL (ref 0.5–1.4)
DIFFERENTIAL METHOD: ABNORMAL
EOSINOPHIL # BLD AUTO: 0.1 K/UL (ref 0–0.5)
EOSINOPHIL NFR BLD: 0.9 % (ref 0–8)
ERYTHROCYTE [DISTWIDTH] IN BLOOD BY AUTOMATED COUNT: 15.1 % (ref 11.5–14.5)
EST. GFR  (AFRICAN AMERICAN): >60 ML/MIN/1.73 M^2
EST. GFR  (NON AFRICAN AMERICAN): 59.7 ML/MIN/1.73 M^2
GLUCOSE SERPL-MCNC: 272 MG/DL (ref 70–110)
HCT VFR BLD AUTO: 32.7 % (ref 37–48.5)
HGB BLD-MCNC: 10 G/DL (ref 12–16)
IMM GRANULOCYTES # BLD AUTO: 0.06 K/UL (ref 0–0.04)
IMM GRANULOCYTES NFR BLD AUTO: 0.8 % (ref 0–0.5)
LYMPHOCYTES # BLD AUTO: 1.1 K/UL (ref 1–4.8)
LYMPHOCYTES NFR BLD: 14 % (ref 18–48)
MAGNESIUM SERPL-MCNC: 1.3 MG/DL (ref 1.6–2.6)
MCH RBC QN AUTO: 31.3 PG (ref 27–31)
MCHC RBC AUTO-ENTMCNC: 30.6 G/DL (ref 32–36)
MCV RBC AUTO: 102 FL (ref 82–98)
MONOCYTES # BLD AUTO: 0.3 K/UL (ref 0.3–1)
MONOCYTES NFR BLD: 4.3 % (ref 4–15)
NEUTROPHILS # BLD AUTO: 6.2 K/UL (ref 1.8–7.7)
NEUTROPHILS NFR BLD: 79.2 % (ref 38–73)
NRBC BLD-RTO: 0 /100 WBC
PHOSPHATE SERPL-MCNC: 2.4 MG/DL (ref 2.7–4.5)
PLATELET # BLD AUTO: 182 K/UL (ref 150–350)
PMV BLD AUTO: 10.8 FL (ref 9.2–12.9)
POTASSIUM SERPL-SCNC: 4.9 MMOL/L (ref 3.5–5.1)
RBC # BLD AUTO: 3.2 M/UL (ref 4–5.4)
SODIUM SERPL-SCNC: 138 MMOL/L (ref 136–145)
TACROLIMUS BLD-MCNC: 5 NG/ML (ref 5–15)
WBC # BLD AUTO: 7.83 K/UL (ref 3.9–12.7)

## 2020-12-03 PROCEDURE — 36415 COLL VENOUS BLD VENIPUNCTURE: CPT

## 2020-12-03 PROCEDURE — 80197 ASSAY OF TACROLIMUS: CPT

## 2020-12-03 PROCEDURE — 83735 ASSAY OF MAGNESIUM: CPT

## 2020-12-03 PROCEDURE — 85025 COMPLETE CBC W/AUTO DIFF WBC: CPT

## 2020-12-03 PROCEDURE — 80069 RENAL FUNCTION PANEL: CPT

## 2020-12-03 NOTE — PROGRESS NOTES
Subjective:      Patient ID: Kendra Malik is a 65 y.o. female.    Chief Complaint:  Follow-up    History of Present Illness  Kendra Malik is here for follow up of T2DM.  Previously seen by inpatient endocrine team.  Last seen 11/2020.  This is their first visit with me.      11/16/2020 kidney transplant  Pretransplant DM medications: Januvia and Novolog 70/30  Prednisone 20mg daily -- 12/18/2020 will decrease to 15mg daily    With regards to diabetes:    Diagnosed: 1990s  Known complications:  DKA -  RN -  PN -  Nephropathy +  CAD -    Current regimen:  Levemir 20units nightly   Novolog 12units TIDAC     Reports compliance.     Other medications tried:  Januvia  Novolog 70/30    Glucose Monitor:   4 times a day testing  Log reviewed: log provided   160-400    Hypoglycemia:  Yes - right after hospital discharge. None recent.  Knows how to correct with 15 grams of carbs- juice, coke, or a peppermint.     Diet/Exercise: decreased appetite  Eats 3 meals a day.   B: oatmeal   L: mashed potato, hamburger steak, corn  D: similar to lunch  Snacks : cashews, yogurt  Drinks : water  Exercise: None     Education - last visit: None  Eye Exam: within the last year   Podiatry: within the last year     Diabetes Management Status    Hemoglobin A1C   Date Value Ref Range Status   11/15/2020 9.0 (H) 4.0 - 5.6 % Final     Comment:     ADA Screening Guidelines:  5.7-6.4%  Consistent with prediabetes  >or=6.5%  Consistent with diabetes  High levels of fetal hemoglobin interfere with the HbA1C  assay. Heterozygous hemoglobin variants (HbS, HgC, etc)do  not significantly interfere with this assay.   However, presence of multiple variants may affect accuracy.     10/11/2018 6.7 (H) 4.0 - 5.6 % Final     Comment:     ADA Screening Guidelines:  5.7-6.4%  Consistent with prediabetes  >or=6.5%  Consistent with diabetes  High levels of fetal hemoglobin interfere with the HbA1C  assay. Heterozygous hemoglobin variants (HbS, HgC,  etc)do  not significantly interfere with this assay.   However, presence of multiple variants may affect accuracy.         Statin: Taking  ACE/ARB: Not taking  Screening or Prevention Patient's value Goal Complete/Controlled?   HgA1C Testing and Control   Lab Results   Component Value Date    HGBA1C 9.0 (H) 11/15/2020      Annually/Less than 8% No   Lipid profile : 11/15/2020 Annually Yes   LDL control Lab Results   Component Value Date    LDLCALC 84.6 11/15/2020    Annually/Less than 100 mg/dl  Yes   Nephropathy screening No results found for: LABMICR  Lab Results   Component Value Date    PROTEINUA 1+ (A) 11/26/2020    Annually Yes   Blood pressure BP Readings from Last 1 Encounters:   12/07/20 (!) 142/68    Less than 140/90 Yes   Dilated retinal exam Most Recent Eye Exam Date: Not Found Annually No   Foot exam   Most Recent Foot Exam Date: Not Found Annually No     With regards to osteopenia:    BMD 4/29/2019  The L1 to L4 vertebral bone mineral density is equal to 0.957 g/cm squared with a T score of -1.9.  The left femoral neck bone mineral density is equal to 0.857 g/cm squared with a T score of -1.3.  There is a 3.6% risk of a major osteoporotic fracture and a 0.3% risk of hip fracture in the next 10 years (FRAX).  IMPRESSION:   Osteopenia    Fractures : Denies    Calcium : None  Vitamin D : None    Review of Systems   Constitutional: Negative for fatigue.   Eyes: Negative for visual disturbance.   Respiratory: Negative for shortness of breath.    Cardiovascular: Negative for chest pain.   Gastrointestinal: Negative for abdominal pain.   Musculoskeletal: Negative for arthralgias.   Skin: Positive for wound.   Neurological: Negative for headaches.   Hematological: Does not bruise/bleed easily.   Psychiatric/Behavioral: Negative for sleep disturbance.     Objective:   Physical Exam  Vitals signs reviewed.   Neck:      Thyroid: Thyromegaly (irregular shaped gland) present.   Cardiovascular:      Rate and Rhythm:  "Normal rate.      Comments: No edema present  Pulmonary:      Effort: Pulmonary effort is normal.   Abdominal:      Palpations: Abdomen is soft.          Comments: LLQ incision with staples clean and dry       Injection sites are without edema or erythema. No lipo hypertropthy or atrophy.    Visit Vitals  BP (!) 142/68   Pulse 63   Ht 5' 6" (1.676 m)   Wt 72 kg (158 lb 11.7 oz)   BMI 25.62 kg/m²       Body mass index is 25.62 kg/m².    Lab Review:   Lab Results   Component Value Date    HGBA1C 9.0 (H) 11/15/2020    HGBA1C 6.7 (H) 10/11/2018       Lab Results   Component Value Date    CHOL 171 11/15/2020    HDL 46 11/15/2020    LDLCALC 84.6 11/15/2020    TRIG 202 (H) 11/15/2020    CHOLHDL 26.9 11/15/2020     Lab Results   Component Value Date     2020    K 4.3 2020     2020    CO2 23 2020     (H) 2020    BUN 9 2020    CREATININE 0.8 2020    CALCIUM 9.5 2020    PROT 7.5 11/15/2020    ALBUMIN 3.3 (L) 2020    BILITOT 0.3 11/15/2020    ALKPHOS 101 11/15/2020    AST 18 11/15/2020    ALT 12 11/15/2020    ANIONGAP 8 2020    ESTGFRAFRICA >60.0 2020    EGFRNONAA >60.0 2020     Vit D, 25-Hydroxy   Date Value Ref Range Status   11/15/2020 20 (L) 30 - 96 ng/mL Final     Comment:     Vitamin D deficiency.........<10 ng/mL                              Vitamin D insufficiency......10-29 ng/mL       Vitamin D sufficiency........> or equal to 30 ng/mL  Vitamin D toxicity............>100 ng/mL       Assessment and Plan     1. 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 Osteopenia, unspecified location     3. Status post -donor kidney transplantation     4. Mixed hyperlipidemia     5. Thyromegaly         Type 2 diabetes mellitus  -- A1c goal <7.0%.  -- Medications discussed:  MFM   GLP1-DPP4 - good candidate  ROB   SGLT2   Insulin  -- Reviewed " logs/CGM:  Global hyperglycemia - variable.   Instructed to send glucose logs in 7 days.  Reach out to me sooner for any glucose <70 or consistently >200.  -- Medication Changes:   CHANGE:  Levemir 20units nightly   Novolog 15units plus /50 TIDAC  -- Reviewed goals of therapy are to get the best control we can without hypoglycemia.  -- Reviewed patient's current insulin regimen. Clarified proper insulin dose and timing in relation to meals, etc. Insulin injection sites and proper rotation instructed.    -- Advised frequent self blood glucose monitoring.  Patient encouraged to document glucose results and bring them to every clinic visit.  -- Hypoglycemia precautions discussed. Instructed on precautions before driving.    -- Call for Bg repeatedly < 90 or > 180.   -- Close adherence to lifestyle changes recommended.   -- Periodic follow ups for eye evaluations, foot care and dental care suggested.    Status post -donor kidney transplantation  -- Continue following with transplant.    Mixed hyperlipidemia  -- Controlled.  -- On statin per ADA recommendations.    Osteopenia  -- Reassuring she is not fracturing.    Thyromegaly  -- Irregular shaped gland palpable.Per patient, she has a history of thyroid nodules and follows with Endocrinology in [place].       Follow up in about 3 months (around 3/7/2021).    Patient to set up patient portal to send weekly logs.

## 2020-12-03 NOTE — TELEPHONE ENCOUNTER
Instructed pt need to go up slightly on her tac dose from 5mgBID to 6mgBID.    Plan to see Pao sanon tomorrow in transplant clinic  Then next week will have labs on Monday morning and will see surgeon Wednesday for incision check.  Will have stent removal on Wednesday at 11AM in urology clinic.  Reviewed location with pt for urology clinic but pt will ask clinic staff tomorrow to print up stent removal appt instructions.   ----- Message from Mainor Richards MD sent at 12/3/2020  1:08 PM CST -----  fk 6 bid

## 2020-12-04 ENCOUNTER — OFFICE VISIT (OUTPATIENT)
Dept: TRANSPLANT | Facility: CLINIC | Age: 65
End: 2020-12-04
Payer: MEDICARE

## 2020-12-04 VITALS
OXYGEN SATURATION: 100 % | RESPIRATION RATE: 18 BRPM | HEART RATE: 62 BPM | BODY MASS INDEX: 25.72 KG/M2 | HEIGHT: 66 IN | SYSTOLIC BLOOD PRESSURE: 159 MMHG | TEMPERATURE: 98 F | DIASTOLIC BLOOD PRESSURE: 65 MMHG | WEIGHT: 160.06 LBS

## 2020-12-04 DIAGNOSIS — N18.9 ANEMIA OF RENAL DISEASE: ICD-10-CM

## 2020-12-04 DIAGNOSIS — Z79.4 TYPE 2 DIABETES MELLITUS WITH STAGE 2 CHRONIC KIDNEY DISEASE, WITH LONG-TERM CURRENT USE OF INSULIN: ICD-10-CM

## 2020-12-04 DIAGNOSIS — N18.2 TYPE 2 DIABETES MELLITUS WITH STAGE 2 CHRONIC KIDNEY DISEASE, WITH LONG-TERM CURRENT USE OF INSULIN: ICD-10-CM

## 2020-12-04 DIAGNOSIS — E11.22 TYPE 2 DIABETES MELLITUS WITH STAGE 2 CHRONIC KIDNEY DISEASE, WITH LONG-TERM CURRENT USE OF INSULIN: ICD-10-CM

## 2020-12-04 DIAGNOSIS — N18.2 STAGE 2 CHRONIC KIDNEY DISEASE: ICD-10-CM

## 2020-12-04 DIAGNOSIS — Z94.0 STATUS POST DECEASED-DONOR KIDNEY TRANSPLANTATION: Primary | ICD-10-CM

## 2020-12-04 DIAGNOSIS — Z91.89 AT RISK FOR OPPORTUNISTIC INFECTIONS: ICD-10-CM

## 2020-12-04 DIAGNOSIS — I12.9 RENAL HYPERTENSION: ICD-10-CM

## 2020-12-04 DIAGNOSIS — D63.1 ANEMIA OF RENAL DISEASE: ICD-10-CM

## 2020-12-04 DIAGNOSIS — Z79.899 LONG TERM CURRENT USE OF IMMUNOSUPPRESSIVE DRUG: ICD-10-CM

## 2020-12-04 DIAGNOSIS — Z29.89 PROPHYLACTIC IMMUNOTHERAPY: ICD-10-CM

## 2020-12-04 PROCEDURE — 99999 PR PBB SHADOW E&M-EST. PATIENT-LVL V: ICD-10-PCS | Mod: PBBFAC,,, | Performed by: NURSE PRACTITIONER

## 2020-12-04 PROCEDURE — 99999 PR PBB SHADOW E&M-EST. PATIENT-LVL V: CPT | Mod: PBBFAC,,, | Performed by: NURSE PRACTITIONER

## 2020-12-04 PROCEDURE — 99215 OFFICE O/P EST HI 40 MIN: CPT | Mod: PBBFAC | Performed by: NURSE PRACTITIONER

## 2020-12-04 PROCEDURE — 99215 OFFICE O/P EST HI 40 MIN: CPT | Mod: S$PBB,,, | Performed by: NURSE PRACTITIONER

## 2020-12-04 PROCEDURE — 99215 PR OFFICE/OUTPT VISIT, EST, LEVL V, 40-54 MIN: ICD-10-PCS | Mod: S$PBB,,, | Performed by: NURSE PRACTITIONER

## 2020-12-04 RX ORDER — TACROLIMUS 1 MG/1
6 CAPSULE ORAL EVERY 12 HOURS
Qty: 360 CAPSULE | Refills: 11 | Status: SHIPPED | OUTPATIENT
Start: 2020-12-04 | End: 2020-12-24

## 2020-12-04 NOTE — PROGRESS NOTES
Kidney Post-Transplant Assessment    Referring Physician: Anali Moura  Current Nephrologist: Ann Marie Scruggs    ORGAN: LEFT KIDNEY  Donor Type: donation after brain death  PHS Increased Risk: no  Cold Ischemia: 801 mins  Induction Medications: thymoglobulin    Subjective:     CC:  Reassessment of renal allograft function and management of chronic immunosuppression.    HPI:  Ms. Malik is a 65 y.o. 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 Her most recent creatinine is 1.0. She takes mycophenolate mofetil, prednisone and tacrolimus for maintenance immunosuppression. Her post transplant course has been uncomplicated to date.    Interval HX:  Intake 2L   UOP 2L  BP  BP Readings from Last 3 Encounters:   12/04/20 (!) 159/65   11/25/20 (!) 127/58   11/23/20 (!) 180/70     Peripheral edema-no  Weight  Appetite improving   Wound-staples intact     fx assessment   Overall feels well. No health concerns today.   Denies chest pain, SOB, leg pain, abdominal pain or LUTs.    Lab /diagnostic results reviewed with patient today.   All questions answered    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, without long-term current use of insulin 10/11/2018    ESRD on dialysis 10/11/2018    Peritoneal dialysis initiated mid September 2018    Renal hypertension 10/11/2018       Review of Systems   Constitutional: Positive for fatigue. Negative for activity change, appetite change, chills, fever and unexpected weight change.   HENT: Negative for congestion, facial swelling, postnasal drip, rhinorrhea, sinus pressure, sore throat and trouble swallowing.    Eyes: Negative for pain, redness and visual disturbance.   Respiratory: Negative for cough, chest tightness, shortness of breath and wheezing.    Cardiovascular: Negative.  Negative for chest pain, palpitations and leg swelling.   Gastrointestinal: Negative  "for abdominal pain, diarrhea, nausea and vomiting.   Genitourinary: Negative for dysuria, flank pain and urgency.   Musculoskeletal: Negative for gait problem, neck pain and neck stiffness.   Skin: Negative for rash.   Allergic/Immunologic: Positive for immunocompromised state. Negative for environmental allergies and food allergies.   Neurological: Negative for dizziness, weakness, light-headedness and headaches.   Psychiatric/Behavioral: Negative for agitation and confusion. The patient is not nervous/anxious.        Objective:   Blood pressure (!) 159/65, pulse 62, temperature 97.7 °F (36.5 °C), temperature source Oral, resp. rate 18, height 5' 6" (1.676 m), weight 72.6 kg (160 lb 0.9 oz), SpO2 100 %.body mass index is 25.83 kg/m².    Physical Exam  Vitals signs reviewed.   Constitutional:       Appearance: Normal appearance. She is well-developed.   HENT:      Head: Normocephalic.   Eyes:      Pupils: Pupils are equal, round, and reactive to light.   Neck:      Musculoskeletal: Normal range of motion and neck supple.   Cardiovascular:      Rate and Rhythm: Normal rate and regular rhythm.      Heart sounds: Normal heart sounds.   Pulmonary:      Effort: Pulmonary effort is normal.      Breath sounds: Normal breath sounds.   Chest:       Abdominal:      General: Bowel sounds are normal.      Palpations: Abdomen is soft.       Musculoskeletal: Normal range of motion.   Skin:     General: Skin is warm and dry.   Neurological:      Mental Status: She is alert and oriented to person, place, and time.      Motor: No abnormal muscle tone.   Psychiatric:         Behavior: Behavior normal.         Labs:  Lab Results   Component Value Date    WBC 7.83 12/03/2020    HGB 10.0 (L) 12/03/2020    HCT 32.7 (L) 12/03/2020     12/03/2020    K 4.9 12/03/2020     12/03/2020    CO2 23 12/03/2020    BUN 13 12/03/2020    CREATININE 1.0 12/03/2020    EGFRNONAA 59.7 (A) 12/03/2020    CALCIUM 9.8 12/03/2020    PHOS 2.4 (L) " 2020    MG 1.3 (L) 2020    ALBUMIN 3.2 (L) 2020    AST 18 11/15/2020    ALT 12 11/15/2020    UTPCR 1.64 (H) 11/15/2020    .0 (H) 11/15/2020    TACROLIMUS 5.0 2020       No results found for: EXTANC, EXTWBC, EXTSEGS, EXTPLATELETS, EXTHEMOGLOBI, EXTHEMATOCRI, EXTCREATININ, EXTSODIUM, EXTPOTASSIUM, EXTBUN, EXTCO2, EXTCALCIUM, EXTPHOSPHORU, EXTGLUCOSE, EXTALBUMIN, EXTAST, EXTALT, EXTBILITOTAL, EXTLIPASE, EXTAMYLASE    No results found for: EXTCYCLOSLVL, EXTSIROLIMUS, EXTTACROLVL, EXTPROTCRE, EXTPTHINTACT, EXTPROTEINUA, EXTWBCUA, EXTRBCUA    Labs were reviewed with the patient    Assessment:     1. Status post -donor kidney transplantation    2. Long term current use of immunosuppressive drug    3. At risk for opportunistic infections    4. Stage 2 chronic kidney disease    5. Type 2 diabetes mellitus with stage 2 chronic kidney disease, with long-term current use of insulin    6. Renal hypertension    7. Prophylactic immunotherapy    8. Anemia of renal disease        Plan:    prograf trough pending  Endo f/u on  for DM mgmt  Needs PD cath removal --located Right chest area --Pt will call to make apt    txp surgery apt  And ureteral stent removal scheduled         Follow-up:   1. CKD stage: 2   2. Immunosuppression:   Prograf trough 5.0, which is sub therapeutic target 8-10. Continue Prograf 5/5--dose recently adjusted, pending trough this AM , MMF 1000  Mg BID, and Prednisone  Taper as prescribed  Bactrim 400/80 mg QD for PCP prophylaxis, Valcyte 900 mg QD for CMV prophylaxis   Will continue to monitor for drug toxicities    DM: continue insulin as prescribed, mgmt deferred to endocrinology   Lab Results   Component Value Date    HGBA1C 9.0 (H) 11/15/2020       3. Allograft Function: Stable. Continue good po hydration.       Lab Results   Component Value Date    CREATININE 1.0 2020       12/3/2020  POD 17   eGFR if African American >60 mL/min/1.73 m^2 >60.0        4.  Hypertension management: advise low salt diet and home BP monitoring      5. Metabolic Bone Disease/Secondary Hyperparathyroidism:stable  Will monitor PTH, CA and Vit D/guidelines,    Lab Results   Component Value Date    .0 (H) 11/15/2020    CALCIUM 9.8 12/03/2020    PHOS 2.4 (L) 12/03/2020       12/3/2020  POD 17   Magnesium 1.6 - 2.6 mg/dL 1.3 (A)       6. Electrolytes:  Will monitor /guidelines  Lab Results   Component Value Date     12/03/2020    K 4.9 12/03/2020     12/03/2020    CO2 23 12/03/2020       7. Anemia: stable. No need for intervention    Will monitor /guidelines  Lab Results   Component Value Date    WBC 7.83 12/03/2020    HGB 10.0 (L) 12/03/2020    HCT 32.7 (L) 12/03/2020     (H) 12/03/2020     12/03/2020         8.  Cytopenias: no significant cytopenias will monitor as per our guidelines. Medicine list reviewed including potential causes of drug-induced cytopenias    9.Proteinuria: continue p/c ratio as per guidelines       11/15/2020   Prot/Creat Ratio, Urine 0.00 - 0.20 1.64 (A)     10. BK virus infection screening:  will continue to monitor/ guidelines    11. Weight education: provided during the clinic visit   Body mass index is 25.83 kg/m².          12.Patient safety education regarding immunosuppression including prophylaxis posttransplant for CMV, PCP : Education provided about vaccination and prevention of infections            Follow-up:   Clinic: return to transplant clinic weekly for the first month after transplant; every 2 weeks during months 2-3; then at 6-, 9-, 12-, 18-, 24-, and 36- months post-transplant to reassess for complications from immunosuppression toxicity and monitor for rejection.  Annually thereafter.    Labs: since patient remains at high risk for rejection and drug-related complications that warrant close monitoring, labs will be ordered as follows: continue twice weekly CBC, renal panel, and drug level for first month; then same  labs once weekly through 3rd month post-transplant.  Urine for UA and protein/creatinine ratio monthly.  Serum BK - PCR at 1-, 3-, 6-, 9-, 12-, 18-, 24-, 36-, 48-, and 60 months post-transplant.  Hepatic panel at 1-, 2-, 3-, 6-, 9-, 12-, 18-, 24-, and 36- months post-transplant.    Pao Ramires NP       Education:   Material provided to the patient.  Patient reminded to call with any health changes since these can be early signs of significant complications.  Also, I advised the patient to be sure any new medications or changes of old medications are discussed with either a pharmacist or physician knowledgeable with transplant to avoid rejection/drug toxicity related to significant drug interactions.

## 2020-12-04 NOTE — LETTER
December 4, 2020        Ann Marie Scruggs  [address]  [place] [address]  Phone: [phone]  Fax: [phone]             [facility] 1st [facility]  [address]  [place] [address]  Phone: [phone]   Patient: Kendar Malik   MR Number: [id number]   YOB: 1955   Date of Visit: 12/4/2020       Dear Dr. Ann Marie Scruggs    Thank you for referring Kendra Malik to me for evaluation. Attached you will find relevant portions of my assessment and plan of care.    If you have questions, please do not hesitate to call me. I look forward to following Kendra Malik along with you.    Sincerely,    Pao Ramires, NP    Enclosure    If you would like to receive this communication electronically, please contact [email or web address] or [phone] to request [employer] access.    [employer] is a tool which provides read-only access to select patient information with whom you have a relationship. Its easy to use and provides real time access to review your patients record including encounter summaries, notes, results, and demographic information.    If you feel you have received this communication in error or would no longer like to receive these types of communications, please e-mail [email or web address]

## 2020-12-07 ENCOUNTER — LAB VISIT (OUTPATIENT)
Dept: LAB | Facility: HOSPITAL | Age: 65
End: 2020-12-07
Payer: MEDICARE

## 2020-12-07 ENCOUNTER — OFFICE VISIT (OUTPATIENT)
Dept: ENDOCRINOLOGY | Facility: CLINIC | Age: 65
End: 2020-12-07
Payer: MEDICARE

## 2020-12-07 VITALS
HEIGHT: 66 IN | SYSTOLIC BLOOD PRESSURE: 142 MMHG | HEART RATE: 63 BPM | DIASTOLIC BLOOD PRESSURE: 68 MMHG | BODY MASS INDEX: 25.51 KG/M2 | WEIGHT: 158.75 LBS

## 2020-12-07 DIAGNOSIS — Z94.0 KIDNEY REPLACED BY TRANSPLANT: ICD-10-CM

## 2020-12-07 DIAGNOSIS — Z94.0 STATUS POST DECEASED-DONOR KIDNEY TRANSPLANTATION: ICD-10-CM

## 2020-12-07 DIAGNOSIS — E01.0 THYROMEGALY: ICD-10-CM

## 2020-12-07 DIAGNOSIS — E11.3513 TYPE 2 DIABETES MELLITUS WITH PROLIFERATIVE RETINOPATHY OF BOTH EYES AND MACULAR EDEMA, UNSPECIFIED WHETHER LONG TERM INSULIN USE: Primary | ICD-10-CM

## 2020-12-07 DIAGNOSIS — E78.2 MIXED HYPERLIPIDEMIA: ICD-10-CM

## 2020-12-07 DIAGNOSIS — M85.80 OSTEOPENIA, UNSPECIFIED LOCATION: ICD-10-CM

## 2020-12-07 DIAGNOSIS — E83.39 HYPOPHOSPHATEMIA: ICD-10-CM

## 2020-12-07 LAB
ALBUMIN SERPL BCP-MCNC: 3.3 G/DL (ref 3.5–5.2)
ANION GAP SERPL CALC-SCNC: 8 MMOL/L (ref 8–16)
BASOPHILS # BLD AUTO: 0.04 K/UL (ref 0–0.2)
BASOPHILS NFR BLD: 0.6 % (ref 0–1.9)
BUN SERPL-MCNC: 9 MG/DL (ref 8–23)
CALCIUM SERPL-MCNC: 9.5 MG/DL (ref 8.7–10.5)
CHLORIDE SERPL-SCNC: 107 MMOL/L (ref 95–110)
CO2 SERPL-SCNC: 23 MMOL/L (ref 23–29)
CREAT SERPL-MCNC: 0.8 MG/DL (ref 0.5–1.4)
DIFFERENTIAL METHOD: ABNORMAL
EOSINOPHIL # BLD AUTO: 0 K/UL (ref 0–0.5)
EOSINOPHIL NFR BLD: 0.6 % (ref 0–8)
ERYTHROCYTE [DISTWIDTH] IN BLOOD BY AUTOMATED COUNT: 15.5 % (ref 11.5–14.5)
EST. GFR  (AFRICAN AMERICAN): >60 ML/MIN/1.73 M^2
EST. GFR  (NON AFRICAN AMERICAN): >60 ML/MIN/1.73 M^2
GLUCOSE SERPL-MCNC: 206 MG/DL (ref 70–110)
HCT VFR BLD AUTO: 32.6 % (ref 37–48.5)
HGB BLD-MCNC: 10.3 G/DL (ref 12–16)
IMM GRANULOCYTES # BLD AUTO: 0.05 K/UL (ref 0–0.04)
IMM GRANULOCYTES NFR BLD AUTO: 0.7 % (ref 0–0.5)
LYMPHOCYTES # BLD AUTO: 1.2 K/UL (ref 1–4.8)
LYMPHOCYTES NFR BLD: 17.7 % (ref 18–48)
MAGNESIUM SERPL-MCNC: 1.3 MG/DL (ref 1.6–2.6)
MCH RBC QN AUTO: 32.3 PG (ref 27–31)
MCHC RBC AUTO-ENTMCNC: 31.6 G/DL (ref 32–36)
MCV RBC AUTO: 102 FL (ref 82–98)
MONOCYTES # BLD AUTO: 0.2 K/UL (ref 0.3–1)
MONOCYTES NFR BLD: 2.2 % (ref 4–15)
NEUTROPHILS # BLD AUTO: 5.4 K/UL (ref 1.8–7.7)
NEUTROPHILS NFR BLD: 78.2 % (ref 38–73)
NRBC BLD-RTO: 0 /100 WBC
PHOSPHATE SERPL-MCNC: 1.8 MG/DL (ref 2.7–4.5)
PLATELET # BLD AUTO: 132 K/UL (ref 150–350)
PMV BLD AUTO: 11 FL (ref 9.2–12.9)
POTASSIUM SERPL-SCNC: 4.3 MMOL/L (ref 3.5–5.1)
RBC # BLD AUTO: 3.19 M/UL (ref 4–5.4)
SODIUM SERPL-SCNC: 138 MMOL/L (ref 136–145)
TACROLIMUS BLD-MCNC: 8.3 NG/ML (ref 5–15)
WBC # BLD AUTO: 6.91 K/UL (ref 3.9–12.7)

## 2020-12-07 PROCEDURE — 80069 RENAL FUNCTION PANEL: CPT

## 2020-12-07 PROCEDURE — 99215 OFFICE O/P EST HI 40 MIN: CPT | Mod: PBBFAC | Performed by: NURSE PRACTITIONER

## 2020-12-07 PROCEDURE — 83735 ASSAY OF MAGNESIUM: CPT

## 2020-12-07 PROCEDURE — 99214 OFFICE O/P EST MOD 30 MIN: CPT | Mod: S$PBB,,, | Performed by: NURSE PRACTITIONER

## 2020-12-07 PROCEDURE — 99999 PR PBB SHADOW E&M-EST. PATIENT-LVL V: CPT | Mod: PBBFAC,,, | Performed by: NURSE PRACTITIONER

## 2020-12-07 PROCEDURE — 36415 COLL VENOUS BLD VENIPUNCTURE: CPT

## 2020-12-07 PROCEDURE — 85025 COMPLETE CBC W/AUTO DIFF WBC: CPT

## 2020-12-07 PROCEDURE — 80197 ASSAY OF TACROLIMUS: CPT

## 2020-12-07 PROCEDURE — 99999 PR PBB SHADOW E&M-EST. PATIENT-LVL V: ICD-10-PCS | Mod: PBBFAC,,, | Performed by: NURSE PRACTITIONER

## 2020-12-07 PROCEDURE — 99214 PR OFFICE/OUTPT VISIT, EST, LEVL IV, 30-39 MIN: ICD-10-PCS | Mod: S$PBB,,, | Performed by: NURSE PRACTITIONER

## 2020-12-07 RX ORDER — INSULIN ASPART 100 [IU]/ML
15 INJECTION, SOLUTION INTRAVENOUS; SUBCUTANEOUS
Qty: 15 ML | Refills: 0 | Status: SHIPPED | OUTPATIENT
Start: 2020-12-07 | End: 2020-12-14 | Stop reason: SDUPTHER

## 2020-12-07 NOTE — TELEPHONE ENCOUNTER
Pt says having trouble with taking 3 phosphorus pills. Asking if there is a liquid form of oral phosphorus. Will ask pharm-D's about availability of phos suspension.     In the meantime, pt will increase phos dose to 4pills TID.  Will also start calcitriol 0.25mcg daily for increased phos absorption from GI tract.     Plan for stent removal Wednesday Am  Surgeon appt wed afternoon.  Labs Thursday AM.   * If surgeon clears pt to return home and labs on Thursday look good, then will call pt to inform her okay to leave [facility] to go back home.    ----- Message from Kelley Denny MD sent at 12/7/2020  9:26 AM CST -----  Magnesium and phosphorus are lower than usual.  Please assess if taking KPN supplements as ordered.  If she is taking KP N 3 tabs t.i.d. and can not tolerate, I recommend increasing to 4 tabs 3 times daily.  Encourage high phos diet.  Add calcitriol 0.25 mcg daily to increase phosphorus absorption in GI tract.

## 2020-12-07 NOTE — ASSESSMENT & PLAN NOTE
-- A1c goal <7.0%.  -- Medications discussed:  MFM   GLP1-DPP4 - good candidate  ROB   SGLT2   Insulin  -- Reviewed logs/CGM:  Global hyperglycemia - variable.   Instructed to send glucose logs in 7 days.  Reach out to me sooner for any glucose <70 or consistently >200.  -- Medication Changes:   CHANGE:  Levemir 20units nightly   Novolog 15units plus /50 TIDAC  -- Reviewed goals of therapy are to get the best control we can without hypoglycemia.  -- Reviewed patient's current insulin regimen. Clarified proper insulin dose and timing in relation to meals, etc. Insulin injection sites and proper rotation instructed.    -- Advised frequent self blood glucose monitoring.  Patient encouraged to document glucose results and bring them to every clinic visit.  -- Hypoglycemia precautions discussed. Instructed on precautions before driving.    -- Call for Bg repeatedly < 90 or > 180.   -- Close adherence to lifestyle changes recommended.   -- Periodic follow ups for eye evaluations, foot care and dental care suggested.

## 2020-12-07 NOTE — ASSESSMENT & PLAN NOTE
-- Irregular shaped gland palpable.Per patient, she has a history of thyroid nodules and follows with Endocrinology in [place].

## 2020-12-07 NOTE — PATIENT INSTRUCTIONS
Instructed to send glucose logs in 7 days.  Reach out to me sooner for any glucose <70 or consistently >200.    Hypoglycemia (Low Blood Glucose)  Low blood glucose occurs with the following conditions.  · Not Enough Food or Missing Meal.  · Too Much Insulin  · More Exercise Than Usual    It is best to use something that you can always carry with you. Choose a food that is all carbohydrate because it will be very fast acting. Try not to choose chocolate or other high fat foods. They will not work fast enough and you may also end up over-treating your lows. The suggested amount of carbohydrate to start with is 15 grams. Don't keep eating until you feel better. Eat the required amount and stop. The feelings will pass and you will be grateful that you did not overdo it.    Some people with diabetes know when their blood glucose is low and some do not. If you are a person who is not aware of hypoglycemia, it is important to test your blood glucose more often. Everyone with diabetes should test before driving a car to assure safety on the road. Blood glucose should be above 100 mg/dl before driving and at bedtime.     The symptoms of low blood sugars are usually heart racing, sweating, anxiety, feeling hungry, tremor, weakness or most severely loss of consciousness.     Rule of 15:    Test your blood sugar   If glucose is between 50-70 mg/dL then ingest 15 grams of fast-acting carbs   If glucose is less than 50 mg/dL then ingest 30 grams of fast-acting carbs   Ingest 15 grams of fast-acting carbohydrate - such as:   a. 3-4 glucose tablets  b. 4 oz juice  c. ½ can regular soda pop  d. 15 skittles or mini jelly beans    Re-check your blood sugar in 15 minutes. If its less than 70mg/dl, repeat steps 2 and 3.   If your next meal is more than 1 hour away, eat an additional 15 grams of carbohydrate and 1 ounce of protein (examples include crackers with cheese or one-half of a sandwich with peanut butter). It is important not  to eat too much because this can raise your blood sugar above the target level.    After your blood sugar has normalized, think about why you went low. If you notice a pattern of low blood sugars, contact your Diabetes Team. We may need to adjust your medication.    Insulin types  You are taking two types of insulin and each of them has unique properties.     1. Long acting insulin:   Levemir is the long-acting insulin. It lasts about 24 hours after injection. You need to take it once a day at the same time. Skipping a meal does not usually affect the dose of lantus.       2. Short-acting insulin  Humalog/Novolog/Apidra is the short acting insulin. It is used to correct your high blood sugars after EACH MEAL.    It should be given within 15 minutes before EACH MEAL, so the frequency of the injection of the short acting insulin is the same as how many meals you eat a day. For example, if you eat only lunch and dinner, then you just need to use the short acting insulin before lunch and before dinner.    If you happen to skip a meal, please also skip the dose of the short acting insulin for that meal. Otherwise, you may have low blood sugars.    If you miss a dose of short acting insulin, please do NOT try to make up for that dose.

## 2020-12-08 ENCOUNTER — TELEPHONE (OUTPATIENT)
Dept: TRANSPLANT | Facility: CLINIC | Age: 65
End: 2020-12-08

## 2020-12-08 RX ORDER — CALCITRIOL 0.25 UG/1
0.25 CAPSULE ORAL DAILY
Qty: 30 CAPSULE | Refills: 11 | Status: SHIPPED | OUTPATIENT
Start: 2020-12-08 | End: 2021-05-11 | Stop reason: ALTCHOICE

## 2020-12-08 NOTE — TELEPHONE ENCOUNTER
----- Message from Kelley Denny MD sent at 12/7/2020  9:26 AM CST -----  Magnesium and phosphorus are lower than usual.  Please assess if taking KPN supplements as ordered.  If she is taking KP N 3 tabs t.i.d. and can not tolerate, I recommend increasing to 4 tabs 3 times daily.  Encourage high phos diet.  Add calcitriol 0.25 mcg daily to increase phosphorus absorption in GI tract.

## 2020-12-09 ENCOUNTER — TELEPHONE (OUTPATIENT)
Dept: TRANSPLANT | Facility: CLINIC | Age: 65
End: 2020-12-09

## 2020-12-09 ENCOUNTER — OFFICE VISIT (OUTPATIENT)
Dept: TRANSPLANT | Facility: CLINIC | Age: 65
End: 2020-12-09
Payer: MEDICARE

## 2020-12-09 ENCOUNTER — PROCEDURE VISIT (OUTPATIENT)
Dept: UROLOGY | Facility: CLINIC | Age: 65
End: 2020-12-09
Payer: MEDICARE

## 2020-12-09 VITALS
DIASTOLIC BLOOD PRESSURE: 63 MMHG | BODY MASS INDEX: 25.77 KG/M2 | HEART RATE: 58 BPM | WEIGHT: 160.31 LBS | HEIGHT: 66 IN | SYSTOLIC BLOOD PRESSURE: 140 MMHG | TEMPERATURE: 99 F | RESPIRATION RATE: 16 BRPM

## 2020-12-09 VITALS
SYSTOLIC BLOOD PRESSURE: 136 MMHG | WEIGHT: 160.69 LBS | DIASTOLIC BLOOD PRESSURE: 62 MMHG | OXYGEN SATURATION: 97 % | HEART RATE: 63 BPM | BODY MASS INDEX: 25.83 KG/M2 | HEIGHT: 66 IN | TEMPERATURE: 98 F | RESPIRATION RATE: 18 BRPM

## 2020-12-09 DIAGNOSIS — Z94.0 S/P KIDNEY TRANSPLANT: Primary | ICD-10-CM

## 2020-12-09 DIAGNOSIS — Z94.0 KIDNEY REPLACED BY TRANSPLANT: ICD-10-CM

## 2020-12-09 DIAGNOSIS — Z51.81 ENCOUNTER FOR THERAPEUTIC DRUG MONITORING: ICD-10-CM

## 2020-12-09 DIAGNOSIS — Z79.60 LONG-TERM USE OF IMMUNOSUPPRESSANT MEDICATION: ICD-10-CM

## 2020-12-09 PROCEDURE — 99213 OFFICE O/P EST LOW 20 MIN: CPT | Mod: PBBFAC,25

## 2020-12-09 PROCEDURE — 99215 PR OFFICE/OUTPT VISIT, EST, LEVL V, 40-54 MIN: ICD-10-PCS | Mod: 24,S$PBB,, | Performed by: TRANSPLANT SURGERY

## 2020-12-09 PROCEDURE — 99999 PR PBB SHADOW E&M-EST. PATIENT-LVL III: CPT | Mod: PBBFAC,,,

## 2020-12-09 PROCEDURE — 99999 PR PBB SHADOW E&M-EST. PATIENT-LVL III: ICD-10-PCS | Mod: PBBFAC,,,

## 2020-12-09 PROCEDURE — 52310 PR CYSTOSCOPY,REMV CALCULUS,SIMPLE: ICD-10-PCS | Mod: S$PBB,,, | Performed by: UROLOGY

## 2020-12-09 PROCEDURE — 99215 OFFICE O/P EST HI 40 MIN: CPT | Mod: 24,S$PBB,, | Performed by: TRANSPLANT SURGERY

## 2020-12-09 PROCEDURE — 52310 CYSTOSCOPY AND TREATMENT: CPT | Mod: S$PBB,,, | Performed by: UROLOGY

## 2020-12-09 PROCEDURE — 52310 CYSTOSCOPY AND TREATMENT: CPT | Mod: PBBFAC | Performed by: UROLOGY

## 2020-12-09 RX ORDER — DOXYCYCLINE HYCLATE 100 MG
100 TABLET ORAL
Status: COMPLETED | OUTPATIENT
Start: 2020-12-09 | End: 2020-12-09

## 2020-12-09 RX ORDER — LIDOCAINE HYDROCHLORIDE 20 MG/ML
JELLY TOPICAL
Status: COMPLETED | OUTPATIENT
Start: 2020-12-09 | End: 2020-12-09

## 2020-12-09 RX ADMIN — Medication 100 MG: at 11:12

## 2020-12-09 RX ADMIN — LIDOCAINE HYDROCHLORIDE: 20 JELLY TOPICAL at 11:12

## 2020-12-09 NOTE — PROGRESS NOTES
Transplant Surgery  Kidney Transplant Recipient Follow-up    Referring Physician: Anali Moura  Current Nephrologist: Ann Marie Scruggs    Subjective:     Chief Complaint: Kendra Malik is a 65 y.o. year old Black or  female who is status post Kidney transplant performed on 11/16/2020.    ORGAN: LEFT KIDNEY   Disease Etiology: Diabetes Mellitus - Type Other / Unknown  Donor Type: Donation after Brain Death   Donor CMV Status: Positive   Donor HBcAB: Negative   Donor HCV Status: Negative     History of Present Illness: She reports no concerns.  From a transplant perspective, she reports normal urination.  Kendra is here for management of her immunosuppression medication.  Kendra states that her immunosuppression is being well tolerated.  Hypertension is not present.    Review of Systems    Objective:     Physical Exam  Constitutional:       Appearance: She is well-developed.   HENT:      Head: Normocephalic and atraumatic.   Eyes:      Pupils: Pupils are equal, round, and reactive to light.   Cardiovascular:      Rate and Rhythm: Normal rate and regular rhythm.   Pulmonary:      Effort: Pulmonary effort is normal.   Abdominal:      General: There is no distension.      Palpations: Abdomen is soft.      Tenderness: There is no abdominal tenderness. There is no guarding.      Comments: Incision c/d/i.   PD cath, tunneled to chest, still in place, needs to be removed     Musculoskeletal: Normal range of motion.   Skin:     General: Skin is warm and dry.   Neurological:      Mental Status: She is alert and oriented to person, place, and time.   Psychiatric:         Behavior: Behavior normal.       Lab Results   Component Value Date    CREATININE 0.8 12/07/2020    BUN 9 12/07/2020     Lab Results   Component Value Date    WBC 6.91 12/07/2020    HGB 10.3 (L) 12/07/2020    HCT 32.6 (L) 12/07/2020    HCT 22 (L) 11/16/2020     (L) 12/07/2020     Lab Results   Component Value Date    TACROLIMUS  8.3 12/07/2020         Assessment and Plan:        · S/P Kidney transplant.  · Ok for DrObrien to remove PD cath at any point, maintain PD cath hygeine.   · Chronic immunosuppressive medications for rejection prophylaxis at target.  Plan: no adjustment needed.  · Continue monitoring symptoms, labs and drug levels for drug-related toxicity and side effects.  · Renal hypertension at target.    Follow-up: Patient reminded to call with any health changes, since these can be early signs of significant complications.  Also, I advised the patient to be sure any new medications or changes of old medications are discussed with either a pharmacist, or physician knowledgeable with transplant to avoid rejection/drug toxicity related to significant drug interactions.    Teofilo Butt Jr, MD       [facility] Patient Status  Functional Status: 80% - Normal activity with effort: some symptoms of disease  Physical Capacity: No Limitations

## 2020-12-09 NOTE — PROCEDURES
CYSTOSCOPY W/ STENT REMOVAL    Date/Time: 12/9/2020 11:00 AM  Performed by: Karla Clements MD  Authorized by: Scott Ann MD   Preparation: Patient was prepped and draped in the usual sterile fashion.  Local anesthesia used: no    Anesthesia:  Local anesthesia used: no    Sedation:  Patient sedated: no    Patient tolerance: patient tolerated the procedure well with no immediate complications  Comments: Procedure: Flexible cysto-uretheroscopy and stent removal   Pre Procedure Diagnosis:s/p kidney transplant   Post Procedure Diagnosis:same   Surgeon: Karla Clements MD   Anesthesia: 2% uro-jet lidocaine jelly for local analgesia   Flexible cysto-urethroscopy was performed after consent was obtained. The risks and benefits were explained.   2% lidocaine urojet was used for local analgesia.   The genitalia was prepped and draped in the sterile fashion with betadine.   The flexible scope was advanced into the urethra and into the bladder. The stent was removed without difficulty.   The patient tolerated the procedure well without complication.   They will follow up with transplant.   Doxy x 1

## 2020-12-09 NOTE — PATIENT INSTRUCTIONS

## 2020-12-10 ENCOUNTER — TELEPHONE (OUTPATIENT)
Dept: TRANSPLANT | Facility: CLINIC | Age: 65
End: 2020-12-10

## 2020-12-10 ENCOUNTER — LAB VISIT (OUTPATIENT)
Dept: LAB | Facility: HOSPITAL | Age: 65
End: 2020-12-10
Payer: MEDICARE

## 2020-12-10 DIAGNOSIS — Z94.0 KIDNEY REPLACED BY TRANSPLANT: ICD-10-CM

## 2020-12-10 LAB
ALBUMIN SERPL BCP-MCNC: 3.4 G/DL (ref 3.5–5.2)
ANION GAP SERPL CALC-SCNC: 9 MMOL/L (ref 8–16)
BASOPHILS # BLD AUTO: 0.02 K/UL (ref 0–0.2)
BASOPHILS NFR BLD: 0.3 % (ref 0–1.9)
BUN SERPL-MCNC: 14 MG/DL (ref 8–23)
CALCIUM SERPL-MCNC: 9.8 MG/DL (ref 8.7–10.5)
CHLORIDE SERPL-SCNC: 107 MMOL/L (ref 95–110)
CO2 SERPL-SCNC: 24 MMOL/L (ref 23–29)
CREAT SERPL-MCNC: 0.9 MG/DL (ref 0.5–1.4)
DIFFERENTIAL METHOD: ABNORMAL
EOSINOPHIL # BLD AUTO: 0 K/UL (ref 0–0.5)
EOSINOPHIL NFR BLD: 0.6 % (ref 0–8)
ERYTHROCYTE [DISTWIDTH] IN BLOOD BY AUTOMATED COUNT: 15.9 % (ref 11.5–14.5)
EST. GFR  (AFRICAN AMERICAN): >60 ML/MIN/1.73 M^2
EST. GFR  (NON AFRICAN AMERICAN): >60 ML/MIN/1.73 M^2
GLUCOSE SERPL-MCNC: 337 MG/DL (ref 70–110)
HCT VFR BLD AUTO: 33.3 % (ref 37–48.5)
HGB BLD-MCNC: 10.3 G/DL (ref 12–16)
IMM GRANULOCYTES # BLD AUTO: 0.05 K/UL (ref 0–0.04)
IMM GRANULOCYTES NFR BLD AUTO: 0.8 % (ref 0–0.5)
LYMPHOCYTES # BLD AUTO: 1.2 K/UL (ref 1–4.8)
LYMPHOCYTES NFR BLD: 18.7 % (ref 18–48)
MAGNESIUM SERPL-MCNC: 1.3 MG/DL (ref 1.6–2.6)
MCH RBC QN AUTO: 32.2 PG (ref 27–31)
MCHC RBC AUTO-ENTMCNC: 30.9 G/DL (ref 32–36)
MCV RBC AUTO: 104 FL (ref 82–98)
MONOCYTES # BLD AUTO: 0.2 K/UL (ref 0.3–1)
MONOCYTES NFR BLD: 2.7 % (ref 4–15)
NEUTROPHILS # BLD AUTO: 5.1 K/UL (ref 1.8–7.7)
NEUTROPHILS NFR BLD: 76.9 % (ref 38–73)
NRBC BLD-RTO: 0 /100 WBC
PHOSPHATE SERPL-MCNC: 1.7 MG/DL (ref 2.7–4.5)
PLATELET # BLD AUTO: 120 K/UL (ref 150–350)
PMV BLD AUTO: 11 FL (ref 9.2–12.9)
POTASSIUM SERPL-SCNC: 4.4 MMOL/L (ref 3.5–5.1)
RBC # BLD AUTO: 3.2 M/UL (ref 4–5.4)
SODIUM SERPL-SCNC: 140 MMOL/L (ref 136–145)
TACROLIMUS BLD-MCNC: 7.3 NG/ML (ref 5–15)
WBC # BLD AUTO: 6.57 K/UL (ref 3.9–12.7)

## 2020-12-10 PROCEDURE — 36415 COLL VENOUS BLD VENIPUNCTURE: CPT

## 2020-12-10 PROCEDURE — 80197 ASSAY OF TACROLIMUS: CPT

## 2020-12-10 PROCEDURE — 80069 RENAL FUNCTION PANEL: CPT

## 2020-12-10 PROCEDURE — 85025 COMPLETE CBC W/AUTO DIFF WBC: CPT

## 2020-12-10 PROCEDURE — 83735 ASSAY OF MAGNESIUM: CPT

## 2020-12-11 ENCOUNTER — TELEPHONE (OUTPATIENT)
Dept: TRANSPLANT | Facility: CLINIC | Age: 65
End: 2020-12-11

## 2020-12-11 NOTE — TELEPHONE ENCOUNTER
----- Message from Glenna Newsome RN sent at 12/8/2020  3:47 PM CST -----  Regarding: FW: liquid phosphorus suspension?  Please let me know your thoughts on below messages between myself and Blessing regarding pt difficulty with taking Kphos pills.     Do you think we could try the equivalent of 3 pills KPN TID in Phos- NAK and see if she if her K level tolerates that much potassium and if phosphorus comes up appropriately with this?    OR do you prefer that  I recommend pt trying to crush the KPN and take with pudding or apple sauce?    Thanks,    Glenna Newsome RN  Post Kidney Transplant Coordinator    ----- Message -----  From: Blessing Hui, PharmD  Sent: 12/8/2020   7:51 AM CST  To: Glenna Newsome RN  Subject: RE: liquid phosphorus suspension?                There is a formulation called Phos-nak that is a powderpak but we dont like to use it bc it has significantly more potassium in it (basically 7x more), so given it is such a high dose for her Im afraid that would be too much potassium. The KPN tablets can be crushed if needed, but I would also make sure she is taking in enough phos containing foods in her diet as this is generally the best source of phos.   - Blessing  ----- Message -----  From: Glenna Newsome RN  Sent: 12/7/2020   5:19 PM CST  To: Abdominal Transplant Pharmacists  Subject: liquid phosphorus suspension?                    Pt having issues with phos level and having difficulty with pill burden. Now up to taking 4pills TID of KPN.    Pt asking if there is a liquid suspension phos supplement that could take in place of KPN pills?    DEANGELO Manzanares.

## 2020-12-11 NOTE — TELEPHONE ENCOUNTER
Give her the choice of both options and she can choose which she prefers to try. Hey have exactly the same amount of phsphorus per tab or packet.    It makes no sense to go up on a dose when the patient is not taking it.  In cases like these, I try to figure out how many tablets daily she was taking at time of last draw... For example if she was taking none, we can start 2 TID of either pkgs or tabs.    And reinforce high phos diet as rec'd by pharmacist. Lastly, make sure she takes calcitriol and ergo... they will help increase GI absorption of vitamins.

## 2020-12-11 NOTE — TELEPHONE ENCOUNTER
Called pt to let her know that her outside lab orders and her new kidney pillow will be waiting for her to  behind the clinic check in desk.    Reminded pt about next clinic appt next Friday 12/18 at 10:30AM

## 2020-12-14 ENCOUNTER — TELEPHONE (OUTPATIENT)
Dept: ENDOCRINOLOGY | Facility: CLINIC | Age: 65
End: 2020-12-14

## 2020-12-14 ENCOUNTER — TELEPHONE (OUTPATIENT)
Dept: TRANSPLANT | Facility: CLINIC | Age: 65
End: 2020-12-14

## 2020-12-14 RX ORDER — PEN NEEDLE, DIABETIC 30 GX3/16"
1 NEEDLE, DISPOSABLE MISCELLANEOUS 4 TIMES DAILY
Qty: 100 EACH | Refills: 11 | Status: SHIPPED | OUTPATIENT
Start: 2020-12-14 | End: 2020-12-22 | Stop reason: SDUPTHER

## 2020-12-14 RX ORDER — INSULIN ASPART 100 [IU]/ML
15 INJECTION, SOLUTION INTRAVENOUS; SUBCUTANEOUS
Qty: 2 BOX | Refills: 3 | Status: SHIPPED | OUTPATIENT
Start: 2020-12-14 | End: 2020-12-22 | Stop reason: SDUPTHER

## 2020-12-14 NOTE — TELEPHONE ENCOUNTER
----- Message from Kaylee Cabezas PharmD sent at 12/14/2020  9:44 AM CST -----  Regarding: novolog rx, update needed locally please  Adriel Hayes,  Pt called this morning to check on her insulin refills this morning, stating the dose had changed on her Novolog.  The rx we received on 12/7 does not include the correction scale that she takes in  addition to her 15 units TID.  Pt's pen needles should increase in frequency to QID please.  Pt would like to fill her insulin at her local [facility] (on her profile).   Please update Epic medcard and send new E-RXs for Novolog (incl SSI) and pen needles (QID)  to [facility] [place].  ( I just transferred her Levemir rx to that [facility] via phone)    Thank you!      Kaylee Cabezas, Pharm.D., B.C.P.S.  Clinical Pharmacist for Transplant Services  [facility].  Phone [phone] (or [phone])  Fax [phone]

## 2020-12-14 NOTE — TELEPHONE ENCOUNTER
Lab orders faxed earlier this AM after their 1st request by , Diane Ornelas.  I have faxed orders again from 2 separate fax machines to make sure that they receive the fax.    Pt was sent home last Friday with a copy of her lab orders with instructions to always keep the labs in her purse or in her car in case the lab says that they do not have the orders so that she doesn't have to wait for us to fax them again or have to go home and go back the next day. Pt informed the lab that she misplaced her lab orders.  Diane Ornelas placing new copy of lab orders in the mail to pt.    ----- Message from [place] sent at 12/14/2020  9:21 AM CST -----  Regarding: Lab work  Contact: Lab  Pt still awaiting fax for orders at [facility]    Staff states the pt is weak and needs lab asap    Fax # [phone]

## 2020-12-15 ENCOUNTER — TELEPHONE (OUTPATIENT)
Dept: TRANSPLANT | Facility: CLINIC | Age: 65
End: 2020-12-15

## 2020-12-15 NOTE — TELEPHONE ENCOUNTER
Pt requesting letter. Before sending letter I have sent a Message sent to Dr. Ocasio to make sure okay for pt to have PD cath removed now or if needs to wait a certain amount of time. Awaiting Dr. Ocasio approval for pt to have removed.    ----- Message from Ravi Mayer sent at 12/15/2020 10:53 AM CST -----  Regarding: FW: Letter    ----- Message -----  From: Joni Mo  Sent: 12/15/2020  10:13 AM CST  To: [facility] Post-Kidney Transplant Non-Clinical  Subject: Letter                                                         Pt calling to get letter stating that when she's discharged that she needs her tubes removed                              [phone] (M)

## 2020-12-16 ENCOUNTER — TELEPHONE (OUTPATIENT)
Dept: TRANSPLANT | Facility: CLINIC | Age: 65
End: 2020-12-16

## 2020-12-16 ENCOUNTER — TELEPHONE (OUTPATIENT)
Dept: TRANSPLANT | Facility: HOSPITAL | Age: 65
End: 2020-12-16

## 2020-12-16 NOTE — LETTER
December 16, 2020        Kendra Malik  [address] [facility] [place] [facility] [place] [address]                [address] [facility] [address]  Phone: [phone]   Patient: Kendra Malik   MR Number: [id number]   YOB: 1955   Date of Visit: 12/16/2020       To Whom it May Concern:     Kendra Malik underwent a successful kidney transplant November 16, 2020.  Her kidney function has been excellent since then, and she is no longer requiring dialysis.    Her PD catheter needs to be removed.  Our transplant surgeon was not familiar with the catheter placement technique, and defered to her home surgeon to remove it.  She is stable from the kidney transplant perspective for peritoneal catheter removal, and would benefit from its removal sooner than later    If you have questions, please do not hesitate to call me. I look forward to following Kendra along with you.    Sincerely,       Kelley Denny MD

## 2020-12-16 NOTE — TELEPHONE ENCOUNTER
Informed pt that Dr. Ocasio has written letter stating that pt is cleared to/ needs to have her PD catheter removed. Pt provided Dr. Jean's fax number and will leave a paper copy in clinic for pt to  when she comes on Friday.    ----- Message from Glenna Newsome RN sent at 12/15/2020  4:08 PM CST -----  Regarding: FW: Letter  Awaiting reply from Dr. Ocasio with OK for pt to have PD cath removed now or if need to wait some time before having removed.  ----- Message -----  From: Ravi Mayer  Sent: 12/15/2020  10:53 AM CST  To: Glenna Newsome RN  Subject: FW: Letter                                         ----- Message -----  From: Joni Mo  Sent: 12/15/2020  10:13 AM CST  To: [facility] Post-Kidney Transplant Non-Clinical  Subject: Letter                                                         Pt calling to get letter stating that when she's discharged that she needs her tubes removed                              [phone] (M)

## 2020-12-16 NOTE — LETTER
December 16, 2020        Kendra Malik  [address] [facility] [place] [address]                [address] [facility] [address]  Phone: [phone]   Patient: Kendra Malik   MR Number: [id number]   YOB: 1955   Date of Visit: 12/16/2020       To Whom it May Concern:     Kendra Malik underwent a successful kidney transplant November 16, 2020.  Her kidney function has been excellent since then, and she is no longer requiring dialysis.    Her PD catheter needs to be removed.  Our transplant surgeon was not familiar with the catheter placement technique, and defered to her home surgeon to remove it.  She is stable from the kidney transplant perspective for peritoneal catheter removal, and would benefit from its removal sooner than later    If you have questions, please do not hesitate to call me. I look forward to following Kendra along with you.    Sincerely,       Kelley Denny MD

## 2020-12-16 NOTE — TELEPHONE ENCOUNTER
----- Message from Glenna Newsome RN sent at 12/15/2020  4:01 PM CST -----  Regarding: OK for pt to have PD cath removed    Pt requesting letter stating ok that once she is discharged that she can have her PD cath removed by the surgeon who placed it. Wanted to make sure that pt can have removed now or if need to wait until a certain point post transplant. Creatine this week 0.84 but other labs still pending so not entered into Epic yet.    She was told before the surgery that someone would take it out during the transplant but that didn't happen. Dr. Butt referred her to contact the surgeon who placed it to request an appointment for removal.   ----- Message -----  From: Ravi Mayer  Sent: 12/15/2020  10:53 AM CST  To: Glenna Newsome RN  Subject: FW: Letter                                         ----- Message -----  From: Joni Mo  Sent: 12/15/2020  10:13 AM CST  To: [facility] Post-Kidney Transplant Non-Clinical  Subject: Letter                                                         Pt calling to get letter stating that when she's discharged that she needs her tubes removed                              [phone] (M)

## 2020-12-17 ENCOUNTER — DOCUMENTATION ONLY (OUTPATIENT)
Dept: TRANSPLANT | Facility: CLINIC | Age: 65
End: 2020-12-17

## 2020-12-17 LAB
EXT BUN: 12.84
EXT CALCIUM: 10.3
EXT CHLORIDE: 105
EXT CO2: 26
EXT CREATININE: 0.84 MG/DL
EXT EOSINOPHIL%: 0.4
EXT GFR MDRD AF AMER: >60
EXT GLUCOSE: 240
EXT HEMATOCRIT: 33.5
EXT HEMOGLOBIN: 10.9
EXT LYMPH%: 12.3
EXT MAGNESIUM: 1.5
EXT MONOCYTES%: 1.7
EXT PHOSPHORUS: 1.4
EXT PLATELETS: 149
EXT POTASSIUM: 4.9
EXT SEGS%: 79.2
EXT SODIUM: 139 MMOL/L
EXT TACROLIMUS LVL: 7.7
EXT WBC: 7.09

## 2020-12-18 ENCOUNTER — DOCUMENTATION ONLY (OUTPATIENT)
Dept: TRANSPLANT | Facility: CLINIC | Age: 65
End: 2020-12-18

## 2020-12-18 ENCOUNTER — OFFICE VISIT (OUTPATIENT)
Dept: TRANSPLANT | Facility: CLINIC | Age: 65
End: 2020-12-18
Payer: MEDICARE

## 2020-12-18 ENCOUNTER — TELEPHONE (OUTPATIENT)
Dept: TRANSPLANT | Facility: CLINIC | Age: 65
End: 2020-12-18

## 2020-12-18 ENCOUNTER — TELEPHONE (OUTPATIENT)
Dept: ENDOCRINOLOGY | Facility: CLINIC | Age: 65
End: 2020-12-18

## 2020-12-18 VITALS
RESPIRATION RATE: 18 BRPM | BODY MASS INDEX: 25.79 KG/M2 | OXYGEN SATURATION: 98 % | HEART RATE: 65 BPM | DIASTOLIC BLOOD PRESSURE: 57 MMHG | HEIGHT: 66 IN | WEIGHT: 160.5 LBS | SYSTOLIC BLOOD PRESSURE: 107 MMHG | TEMPERATURE: 98 F

## 2020-12-18 DIAGNOSIS — Z79.899 LONG TERM CURRENT USE OF IMMUNOSUPPRESSIVE DRUG: ICD-10-CM

## 2020-12-18 DIAGNOSIS — N18.2 STAGE 2 CHRONIC KIDNEY DISEASE: ICD-10-CM

## 2020-12-18 DIAGNOSIS — Z94.0 STATUS POST DECEASED-DONOR KIDNEY TRANSPLANTATION: Primary | ICD-10-CM

## 2020-12-18 DIAGNOSIS — Z79.4 TYPE 2 DIABETES MELLITUS WITH STAGE 2 CHRONIC KIDNEY DISEASE, WITH LONG-TERM CURRENT USE OF INSULIN: ICD-10-CM

## 2020-12-18 DIAGNOSIS — E78.2 MIXED HYPERLIPIDEMIA: ICD-10-CM

## 2020-12-18 DIAGNOSIS — N18.2 TYPE 2 DIABETES MELLITUS WITH STAGE 2 CHRONIC KIDNEY DISEASE, WITH LONG-TERM CURRENT USE OF INSULIN: ICD-10-CM

## 2020-12-18 DIAGNOSIS — E11.22 TYPE 2 DIABETES MELLITUS WITH STAGE 2 CHRONIC KIDNEY DISEASE, WITH LONG-TERM CURRENT USE OF INSULIN: ICD-10-CM

## 2020-12-18 DIAGNOSIS — I12.9 RENAL HYPERTENSION: ICD-10-CM

## 2020-12-18 LAB
EXT ALBUMIN: 3.5 (ref 3.4–4.8)
EXT ALKALINE PHOSPHATASE: 86 (ref 40–150)
EXT ALT: 7 (ref 0–55)
EXT AST: 11 (ref 5–34)
EXT BACTERIA UA: ABNORMAL
EXT BILIRUBIN DIRECT: 0.2 MG/DL (ref 0–1)
EXT BILIRUBIN TOTAL: 0.4 (ref 0–0.5)
EXT BUN: 17.24 (ref 9.8–20.1)
EXT CALCIUM: 10 (ref 8.4–10.2)
EXT CHLORIDE: 107 (ref 98–107)
EXT CO2: 26 (ref 23–31)
EXT CREATININE: 0.9 MG/DL (ref 0.57–1.11)
EXT EOSINOPHIL%: 0.3 (ref 0.7–5.8)
EXT GFR MDRD AF AMER: 63
EXT GLUCOSE UA: 100
EXT GLUCOSE: 196 (ref 82–115)
EXT HEMATOCRIT: 32.7 (ref 34.1–44.9)
EXT HEMOGLOBIN: 10.4 (ref 11.2–15.7)
EXT LYMPH%: 24 (ref 19.3–51.7)
EXT MAGNESIUM: 1.6 (ref 1.6–2.6)
EXT MONOCYTES%: 1.8 (ref 4.7–12.5)
EXT NITRITES UA: NEGATIVE
EXT PHOSPHORUS: 2 (ref 2.3–4.7)
EXT PLATELETS: 159 (ref 140–369)
EXT POTASSIUM: 4.4 (ref 3.5–5.1)
EXT PROT/CREAT RATIO UR: 0.07
EXT PROTEIN TOTAL: 6.3 (ref 5.8–8.1)
EXT PROTEIN UA: NEGATIVE
EXT RBC UA: ABNORMAL
EXT SEGS%: 73.1 (ref 34–71.1)
EXT SODIUM: 142 MMOL/L (ref 136–145)
EXT TACROLIMUS LVL: 7.87 (ref 5–20)
EXT URIC ACID: 6.1 (ref 2.6–6)
EXT WBC UA: ABNORMAL
EXT WBC: 6.24 (ref 3.98–10.04)

## 2020-12-18 PROCEDURE — 99215 OFFICE O/P EST HI 40 MIN: CPT | Mod: S$PBB,,, | Performed by: NURSE PRACTITIONER

## 2020-12-18 PROCEDURE — 99215 OFFICE O/P EST HI 40 MIN: CPT | Mod: PBBFAC | Performed by: NURSE PRACTITIONER

## 2020-12-18 PROCEDURE — 99999 PR PBB SHADOW E&M-EST. PATIENT-LVL V: CPT | Mod: PBBFAC,,, | Performed by: NURSE PRACTITIONER

## 2020-12-18 PROCEDURE — 99999 PR PBB SHADOW E&M-EST. PATIENT-LVL V: ICD-10-PCS | Mod: PBBFAC,,, | Performed by: NURSE PRACTITIONER

## 2020-12-18 PROCEDURE — 99215 PR OFFICE/OUTPT VISIT, EST, LEVL V, 40-54 MIN: ICD-10-PCS | Mod: S$PBB,,, | Performed by: NURSE PRACTITIONER

## 2020-12-18 RX ORDER — AMLODIPINE BESYLATE 10 MG/1
10 TABLET ORAL DAILY
Qty: 30 TABLET | Refills: 11 | Status: SHIPPED | OUTPATIENT
Start: 2020-12-18 | End: 2020-12-30 | Stop reason: SINTOL

## 2020-12-18 RX ORDER — SODIUM BICARBONATE 650 MG/1
650 TABLET ORAL DAILY
Qty: 30 TABLET | Refills: 11 | Status: SHIPPED | OUTPATIENT
Start: 2020-12-18 | End: 2021-05-21

## 2020-12-18 RX ORDER — NIFEDIPINE 30 MG/1
30 TABLET, EXTENDED RELEASE ORAL DAILY
Qty: 30 TABLET | Refills: 11 | Status: SHIPPED | OUTPATIENT
Start: 2020-12-18 | End: 2020-12-18 | Stop reason: CLARIF

## 2020-12-18 NOTE — Clinical Note
Just an update  Patient was only taking Kphos 3tabs TID. I instructed her to take 4tabs TID. She is taking the calcitriol daily. She was taking MagOX and Kphos separate about 1 hour apart. I instructed to separate 2 hours apart and not to take closely to Pepcid either

## 2020-12-18 NOTE — PROGRESS NOTES
Kidney Post-Transplant Assessment    Referring Physician: Anali Moura  Current Nephrologist: Ann Marie Scruggs    ORGAN: LEFT KIDNEY  Donor Type: donation after brain death  PHS Increased Risk: no  Cold Ischemia: 801 mins  Induction Medications: thymoglobulin    Subjective:     CC:  Reassessment of renal allograft function and management of chronic immunosuppression.    HPI:  Ms. Malik is a 65 y.o. 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 Her most recent creatinine is 0.8. She takes mycophenolate mofetil, prednisone and tacrolimus for maintenance immunosuppression. Her post transplant course has been uncomplicated to date.    Hospitalization/ ED visits  None    Interval HX:  Reports that she is doing well and progressing without difficulty. She takes one day a time. She is now doing light house work and cooking. She has not complaint today.    Book in Clinic  Intake 2L  UOP 2L  BP average 135-160s/60-70s (sitting)----when standing /80s Encouraged patient to record standing BPs in her book.  Peripheral edema: none  Weight: stable  Appetite: good and improving  Wound: steri-strip, healing well  fx assessment: has been progressing doing light house work and cooking.    Lab /diagnostic results reviewed with patient today.   All questions answered    Blood sugars range 170s-360--takes novolog 15u each meal and 20u levemir nightly--following with Endocrinology      Current Outpatient Medications:     amLODIPine (NORVASC) 10 MG tablet, Take 1 tablet (10 mg total) by mouth once daily., Disp: 30 tablet, Rfl: 11    atorvastatin (LIPITOR) 20 MG tablet, Take 20 mg by mouth once daily., Disp: , Rfl:     bisacodyL (DULCOLAX) 5 mg EC tablet, Take 2 tablets (10 mg total) by mouth every evening., Disp: , Rfl: 0    blood sugar diagnostic Strp, 1 each by Misc.(Non-Drug; Combo Route) route 3 (three) times daily. E11.3513 (DM), Disp: 100 each,  "Rfl: 11    blood-glucose meter kit, Use as instructed; E11.3513 (DM), Disp: 1 each, Rfl: 0    calcitRIOL (ROCALTROL) 0.25 MCG Cap, Take 1 capsule (0.25 mcg total) by mouth once daily., Disp: 30 capsule, Rfl: 11    famotidine (PEPCID) 20 MG tablet, Take 1 tablet (20 mg total) by mouth every evening., Disp: 30 tablet, Rfl: 0    gabapentin (NEURONTIN) 300 MG capsule, Take 1 capsule (300 mg total) by mouth 3 (three) times daily., Disp: 90 capsule, Rfl: 11    insulin aspart U-100 (NOVOLOG) 100 unit/mL (3 mL) InPn pen, Inject 15 Units into the skin 3 (three) times daily with meals. Plus correction scale. Max TDD 75units., Disp: 2 Box, Rfl: 3    insulin detemir U-100 (LEVEMIR FLEXTOUCH) 100 unit/mL (3 mL) SubQ InPn pen, Inject 20 Units into the skin once daily., Disp: 15 mL, Rfl: 11    k phos di & mono-sod phos mono (K-PHOS-NEUTRAL) 250 mg Tab, Take 4 tablets by mouth 3 (three) times daily., Disp: 360 tablet, Rfl: 11    labetaloL (NORMODYNE) 100 MG tablet, Take 2 tablets (200 mg total) by mouth 2 (two) times daily., Disp: 120 tablet, Rfl: 11    lancets Misc, 1 each by Misc.(Non-Drug; Combo Route) route 3 (three) times daily. E11.3513, Disp: 100 each, Rfl: 11    magnesium oxide (MAG-OX) 400 mg (241.3 mg magnesium) tablet, Take 2 tablets (800 mg total) by mouth 2 (two) times daily., Disp: 120 tablet, Rfl: 11    multivitamin Tab, Take 1 tablet by mouth once daily., Disp: 30 tablet, Rfl: 11    mycophenolate (CELLCEPT) 250 mg Cap, Take 4 capsules (1,000 mg total) by mouth 2 (two) times daily., Disp: 240 capsule, Rfl: 11    nystatin (MYCOSTATIN) 100,000 unit/mL suspension, Take 5 mLs (500,000 Units total) by mouth 3 (three) times daily after meals. STOP 12/16/20, Disp: 480 mL, Rfl: 0    pen needle, diabetic (EASY COMFORT PEN NEEDLES) 32 gauge x 5/32" Ndle, Inject 1 each into the skin 4 (four) times daily., Disp: 100 each, Rfl: 11    predniSONE (DELTASONE) 5 MG tablet, Take by mouth once daily:  20mg 11/19-12/18;   " "15mg 12/19-1/18/21;   10mg 1/19/21-2/18/21;   5 mg thereafter, Disp: 120 tablet, Rfl: 11    sodium bicarbonate 650 MG tablet, Take 1 tablet (650 mg total) by mouth once daily., Disp: 30 tablet, Rfl: 11    sulfamethoxazole-trimethoprim 400-80mg (BACTRIM,SEPTRA) 400-80 mg per tablet, Take 1 tablet by mouth once daily. STOP 11/16/21, Disp: 30 tablet, Rfl: 11    tacrolimus (PROGRAF) 1 MG Cap, Take 6 capsules (6 mg total) by mouth every 12 (twelve) hours., Disp: 360 capsule, Rfl: 11    TRUE METRIX GLUCOSE TEST STRIP Strp, CHECK BLOOD SUGAR TWICE DAILY, Disp: , Rfl: 6    valGANciclovir (VALCYTE) 450 mg Tab, Take 2 tablets (900 mg total) by mouth once daily. STOP 2/14/21, Disp: 60 tablet, Rfl: 2    oxyCODONE (ROXICODONE) 5 MG immediate release tablet, Take 1 tablet (5 mg total) by mouth every 4 (four) hours as needed. (Patient not taking: Reported on 12/7/2020), Disp: 40 tablet, Rfl: 0    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, without long-term current use of insulin 10/11/2018    ESRD on dialysis 10/11/2018    Peritoneal dialysis initiated mid September 2018    Renal hypertension 10/11/2018       Review of Systems   Constitutional: Negative for appetite change, chills, fatigue and fever.   HENT: Negative for trouble swallowing.    Respiratory: Negative for cough, chest tightness, shortness of breath and wheezing.    Cardiovascular: Negative for chest pain, palpitations and leg swelling.   Gastrointestinal: Positive for diarrhea ("once in a blue moon" take imodium if needed ). Negative for abdominal pain, constipation and nausea.   Genitourinary: Negative for difficulty urinating, frequency and urgency.   Musculoskeletal: Negative for arthralgias and myalgias.   Skin: Negative for rash.        abd incision   Allergic/Immunologic: Positive for immunocompromised state.   Neurological: Negative for dizziness, weakness, light-headedness and headaches. " "  Psychiatric/Behavioral: Negative for sleep disturbance.       Objective:   Blood pressure (!) 107/57, pulse 65, temperature 97.9 °F (36.6 °C), temperature source Oral, resp. rate 18, height 5' 6" (1.676 m), weight 72.8 kg (160 lb 7.9 oz), SpO2 98 %.body mass index is 25.9 kg/m².    Physical Exam  Constitutional:       General: She is not in acute distress.     Appearance: She is well-developed. She is not diaphoretic.   Cardiovascular:      Rate and Rhythm: Normal rate and regular rhythm.      Heart sounds: Normal heart sounds. No murmur. No friction rub. No gallop.    Pulmonary:      Effort: Pulmonary effort is normal. No respiratory distress.      Breath sounds: Normal breath sounds. No wheezing or rales.   Abdominal:      General: Bowel sounds are normal. There is no distension.      Palpations: Abdomen is soft.      Tenderness: There is no abdominal tenderness.   Musculoskeletal: Normal range of motion.         General: No tenderness.   Skin:     General: Skin is warm and dry.      Findings: No rash.      Nails: There is no clubbing.            Neurological:      Mental Status: She is alert and oriented to person, place, and time.   Psychiatric:         Behavior: Behavior normal.         Labs:  Lab Results   Component Value Date    WBC 6.57 12/10/2020    HGB 10.3 (L) 12/10/2020    HCT 33.3 (L) 12/10/2020     12/10/2020    K 4.4 12/10/2020     12/10/2020    CO2 24 12/10/2020    BUN 14 12/10/2020    CREATININE 0.9 12/10/2020    EGFRNONAA >60.0 12/10/2020    CALCIUM 9.8 12/10/2020    PHOS 1.7 (L) 12/10/2020    MG 1.3 (L) 12/10/2020    ALBUMIN 3.4 (L) 12/10/2020    AST 18 11/15/2020    ALT 12 11/15/2020    UTPCR 1.64 (H) 11/15/2020    .0 (H) 11/15/2020    TACROLIMUS 7.3 12/10/2020       Lab Results   Component Value Date    EXTWBC 7.09 12/14/2020    EXTSEGS 79.2 12/14/2020    EXTPLATELETS 149 12/14/2020    EXTHEMOGLOBI 10.9 12/14/2020    EXTHEMATOCRI 33.5 12/14/2020    EXTCREATININ 0.84 " 2020    EXTSODIUM 139 2020    EXTPOTASSIUM 4.9 2020    EXTBUN 12.84 2020    EXTCO2 26 2020    EXTCALCIUM 10.3 2020    EXTPHOSPHORU 1.4 2020    EXTGLUCOSE 240 2020       Lab Results   Component Value Date    EXTTACROLVL 7.7 2020       Labs were reviewed with the patient    Assessment:     1. Status post -donor kidney transplantation    2. Stage 2 chronic kidney disease    3. Renal hypertension    4. Long term current use of immunosuppressive drug    5. Mixed hyperlipidemia    6. Type 2 diabetes mellitus with stage 2 chronic kidney disease, with long-term current use of insulin        Plan:      Decrease Nabicarb to 1 daily  Continue current IS therapy regimen, Needs repeat TAC level, recent was not a true trough   No need for refills on IS therapy today.   Provide instructions on BP readings (standing) and holding parameters SBP <120  KPhos 3tabs TID--instructed to take 4 tabs TID (instructed to take separate from MagOx, 2 hours) Increase Phos and Mag in diet        1. CKD stage2: will continue follow up as per our center guidelines. patient to continue close follow up with the local General nephrologist. Education provided in appropriate fluid intake, potassium intake. Continue with oral hydration.      2. Immunosuppression: Prograf trough 7.7, which is subtherapeutic but not a true trough target 8-10. Continue Prograf  6/6, MMF 1000 Mg BID, and Prednisone 5 mg taper  Lab Results   Component Value Date    TACROLIMUS 7.3 12/10/2020    TACROLIMUS 8.3 2020    TACROLIMUS 5.0 2020   Will closely monitor for toxicities, education provided about adherence to medicines and need to communicate any side effect to the transplant nurse or physician.      3. Allograft Function:stable at baseline for the patient. Continue follow up as per our guidelines and with the local General nephrologist. Communication will be sent today.    2020  POD 28   EXT  BUN 12.84   EXT Creatinine mg/dL 0.84   EXT GFR MDRD AF AMER >60   EXT Glucose 240      Lab Results   Component Value Date    HGBA1C 9.0 (H) 11/15/2020     Patient is on Novolog 15u with each meal and Levemir 20u nightly--She is following with Endocrinology      4. Hypertension management:  Continue with home blood pressure monitoring, low salt and healthy life discussed with the patient.  BP Readings from Last 3 Encounters:   12/18/20 (!) 107/57   12/09/20 136/62   12/09/20 (!) 140/63   BP average 135-160s/60-70s (sitting)----when standing /80s Encouraged patient to record standing BPs in her book.  Continue Norvasc 10mg daily. Provided instruction on standing BPs and hold parameters hold for SBP <120.      5. Metabolic Bone Disease/Secondary Hyperparathyroidism:calcium and phosphorus level discussed with the patient, patient will continue follow up with the general nephrologist for management of metabolic bone disease  calcium and phosphorus as per our center protocol. Will monitor PTH, Vit D level, calcium.     12/14/2020  POD 28   EXT Calcium 10.3   EXT Phosphorus 1.4   Takes Calcitriol 0.25mcg daily, KPhos 3tabs TID--encourage to take 4 tabs TID (instructed to take separate from MagOx, 2 hours) Increase Phos and Mag in diet       6. Electrolytes: reviewed with the patient, essentially within the normal range no need for acute changes today, will monitor as per our center guidelines.     12/14/2020  POD 28   EXT Sodium mmol/L 139   EXT Potassium 4.9   EXT Chloride 105   EXT CO2 26   EXT Magnesium 1.5   Decrease NaBicarb to 650mg daily, Continue MagOx (instructed to take separate from Kphos, 2 hours) Increase Phos and Mag in diet       7. Anemia: will continue monitoring as per our center guidelines. No indication for acute intervention today.   12/14/2020  POD 28   EXT WBC 7.09   EXT Hemoglobin 10.9   EXT Hematocrit 33.5   EXT Platelets 149       8.Proteinuria: will continue with pr/cr ratio as per our  center guidelines  Lab Results   Component Value Date    PROTEINURINE 249 (H) 11/15/2020    CREATRANDUR 152.0 11/15/2020    UTPCR 1.64 (H) 11/15/2020        9. BK virus infection screening: will continue with urine or blood PCR as per our guidelines to prevent BK virus viremia and allograft dysfunction  No results found for: BKVIRUSDNAUR, BKQUANTURINE, BKVIRUSLOG, BKVIRUSURINE, BKVIRUSPCRQB      10. Weight education: provided during the clinic visit.   Body mass index is 25.9 kg/m².       11.Patient safety education regarding immunosuppression including prophylaxis posttransplant for CMV, PCP : Education provided about vaccination and prevention of infections.    12.  Cytopenias: no significant cytopenias will monitor as per our guidelines. Medicine list reviewed including potential causes of drug-induced cytopenias    12/14/2020  POD 28   EXT WBC 7.09   EXT Hemoglobin 10.9   EXT Hematocrit 33.5   EXT Platelets 149     13. Post-transplant Prophylaxis; CMV Infection, PJP and Candida mucosistis and other indicated for this particular patient.   Valcyte and Bactrim      Follow-up:   Clinic: return to transplant clinic weekly for the first month after transplant; every 2 weeks during months 2-3; then at 6-, 9-, 12-, 18-, 24-, and 36- months post-transplant to reassess for complications from immunosuppression toxicity and monitor for rejection.  Annually thereafter.    Labs: since patient remains at high risk for rejection and drug-related complications that warrant close monitoring, labs will be ordered as follows: continue twice weekly CBC, renal panel, and drug level for first month; then same labs once weekly through 3rd month post-transplant.  Urine for UA and protein/creatinine ratio monthly.  Serum BK - PCR at 1-, 3-, 6-, 9-, 12-, 18-, 24-, 36-, 48-, and 60 months post-transplant.  Hepatic panel at 1-, 2-, 3-, 6-, 9-, 12-, 18-, 24-, and 36- months post-transplant.    Education:   Material provided to the patient.   Patient reminded to call with any health changes since these can be early signs of significant complications.  Also, I advised the patient to be sure any new medications or changes of old medications are discussed with either a pharmacist or physician knowledgeable with transplant to avoid rejection/drug toxicity related to significant drug interactions.    Exercise: reminded Kendra of the importance of regular exercise for weight management, blood sugar and blood pressure management.  I also explained exercise has been shown to improve cardiovascular health, energy level, and sleep hygiene.  Lastly, I advised her that cardiovascular complications are leading cause of death for renal transplant recipients, and regular exercise can help lower this risk.    I spoke with the patient for 30 minutes. More than half dedicated to counseling and education. All questions answered    Rose Reynaga, NP-C  Transplant Nephrology

## 2020-12-18 NOTE — PROGRESS NOTES
REFERRING PROVIDER: Kelley Denny MD    REASON FOR VISIT: High phosphorus, low K diet education    PAST MEDICAL HX: s/p OKTx (11/2020), HLD, T2DM    LABS: Phos 1.4    NUTRITION HX: Pt reports that she has had some GI distress which has limited her intake, but feels it is improving. Confident she will be able to add in more high Phos foods as her appetite improves. Eats many foods on food list provided.      INTERVENTION/EDUCATION: Phosphorus content of food list provided & reviewed w/ pt; encouraged to increase po intake of high phos foods that are low in K and continue to monitor levels.        Patient voiced understanding of education & goals. Contact information was provided & will f/u as needed at clinic visits.     Consultation Time: 10 minutes

## 2020-12-18 NOTE — LETTER
December 18, 2020        Ann Marie Scruggs  [address]  [place] [address]  Phone: [phone]  Fax: [phone]             [facility] 1st [facility]  [address]  [place] [address]  Phone: [phone]   Patient: Kendra Malik   MR Number: [id number]   YOB: 1955   Date of Visit: 12/18/2020       Dear Dr. Ann Marie Scruggs    Thank you for referring Kendra Malik to me for evaluation. Attached you will find relevant portions of my assessment and plan of care.    If you have questions, please do not hesitate to call me. I look forward to following Kendra Malik along with you.    Sincerely,    Rose Reynaga, NP    Enclosure    If you would like to receive this communication electronically, please contact [email or web address] or [phone] to request [employer] access.    [employer] is a tool which provides read-only access to select patient information with whom you have a relationship. Its easy to use and provides real time access to review your patients record including encounter summaries, notes, results, and demographic information.    If you feel you have received this communication in error or would no longer like to receive these types of communications, please e-mail [email or web address]

## 2020-12-18 NOTE — TELEPHONE ENCOUNTER
Received and reviewed glucose log, scanned in media tab.    Current Regimen:  Levemir 20units nightly   Novolog 15units plus correction scale before meals    Hypoglycemia: None  Hyperglycemia: Evening - basal may be tapering off    Recommended Medication Changes:  Levemir 20units DAILY  Novolog 15units plus     Instructed to send glucose logs in 14 days.  Reach out to me sooner for any glucose <70 or consistently >200.    Medication list updated.

## 2020-12-18 NOTE — TELEPHONE ENCOUNTER
Pt seen in clinic today. Patient was only taking Kphos 3tabs TID. Rose instructed her to take 4tabs TID. She is taking the calcitriol daily. She was taking MagOX and Kphos separate about 1 hour apart. Rose instructed to separate 2 hours apart and not to take closely to Pepcid either.  Dietician also saw pt in clinic today for reinforcement of diet high in phos/ low in K.     ----- Message from Kelley Denny MD sent at 12/17/2020  5:22 PM CST -----  Results reviewed. Phos 1.4?  Please assess if she is taking calcitriol 0.25 mcg daily and K-Phos 4 tabs t.i.d. as ordered.  Reinforce high phosphorus diet with attention to low-potassium choices.  Dietitian will need to reinforce low K/high phos options, which are limited.

## 2020-12-19 ENCOUNTER — HISTORICAL (OUTPATIENT)
Dept: LAB | Facility: HOSPITAL | Age: 65
End: 2020-12-19

## 2020-12-19 ENCOUNTER — NURSE TRIAGE (OUTPATIENT)
Dept: ADMINISTRATIVE | Facility: CLINIC | Age: 65
End: 2020-12-19

## 2020-12-19 DIAGNOSIS — Z94.0 KIDNEY REPLACED BY TRANSPLANT: ICD-10-CM

## 2020-12-19 RX ORDER — VALGANCICLOVIR 450 MG/1
900 TABLET, FILM COATED ORAL DAILY
Qty: 60 TABLET | Refills: 1 | Status: SHIPPED | OUTPATIENT
Start: 2020-12-19 | End: 2021-01-29 | Stop reason: SINTOL

## 2020-12-19 NOTE — TELEPHONE ENCOUNTER
"  Reason for Disposition   [1] Caller has URGENT medication question about med that PCP or specialist prescribed AND [2] triager unable to answer question    Additional Information   Negative: Drug overdose and triager unable to answer question   Negative: Caller requesting information unrelated to medicine   Negative: Caller requesting a prescription for Strep throat and has a positive culture result   Negative: Rash while taking a medication or within 3 days of stopping it   Negative: Immunization reaction suspected   Negative: [1] Asthma and [2] having symptoms of asthma (cough, wheezing, etc.)   Negative: [1] Influenza symptoms AND [2] anti-viral med prescription request, such as Tamiflu   Negative: [1] Symptom of illness (e.g., headache, abdominal pain, earache, vomiting) AND [2] more than mild   Negative: MORE THAN A DOUBLE DOSE of a prescription or over-the-counter (OTC) drug   Negative: [1] DOUBLE DOSE (an extra dose or lesser amount) of over-the-counter (OTC) drug AND [2] any symptoms (e.g., dizziness, nausea, pain, sleepiness)   Negative: [1] DOUBLE DOSE (an extra dose or lesser amount) of prescription drug AND [2] any symptoms (e.g., dizziness, nausea, pain, sleepiness)   Negative: Took another person's prescription drug   Negative: [1] Pharmacy calling with prescription questions AND [2] triager unable to answer question   Negative: [1] Prescription not at pharmacy AND [2] was prescribed by PCP recently   Negative: [1] Request for URGENT new prescription or refill of "essential" medication (i.e., likelihood of harm to patient if not taken) AND [2] triager unable to fill per unit policy   Negative: Diabetes drug error or overdose (e.g., took wrong type of insulin or took extra dose)   Negative: [1] DOUBLE DOSE (an extra dose or lesser amount) of prescription drug AND [2] NO symptoms (Exception: a double dose of antibiotics)    Protocols used: MEDICATION QUESTION CALL-A-  Kidney " Transplant - 11/16/2020 (#1)     BPA 9/16  CC: rx. pt called re rx. valcyte pt take two tabs twice daily. [facility] [place] [facility] not in stock. pt states dosing is valcyte two tab twice daily since first week of surg. last dose of med last pm. pt with dr Milton  on conference call with pt. pt to be taking two tabs once a daily. pt states blue card changed. MD states dose if one tab BID or two tabs daily. Ok to hold med today and nicolette. Ok to start med monday take two tablets once daily when rx arrives at pharmacy. LM on pharm VM at 434pm

## 2020-12-20 ENCOUNTER — TELEPHONE (OUTPATIENT)
Dept: TRANSPLANT | Facility: HOSPITAL | Age: 65
End: 2020-12-20

## 2020-12-20 NOTE — TELEPHONE ENCOUNTER
Patient called transplant clinic number on 12/19/20 for valcyte prescription    - she was discharged with valcyte 450 mg once a day. Increased the dose around November 25th 2020 to bid. But she is taking 2 tablets bid ( 900 mg bid). She ran out prescription. Okay to hold this week end and she can  on Monday at her pharmacy. There are still 2 refills.     Discussed with Dr. Ocasio, please follow up.       I advised patient to take only one pill bid ( 450 mg bid) . Please follow up

## 2020-12-23 RX ORDER — PEN NEEDLE, DIABETIC 30 GX3/16"
1 NEEDLE, DISPOSABLE MISCELLANEOUS 4 TIMES DAILY
Qty: 100 EACH | Refills: 11 | Status: SHIPPED | OUTPATIENT
Start: 2020-12-23 | End: 2021-10-07

## 2020-12-23 RX ORDER — INSULIN ASPART 100 [IU]/ML
15 INJECTION, SOLUTION INTRAVENOUS; SUBCUTANEOUS
Qty: 2 BOX | Refills: 3 | Status: SHIPPED | OUTPATIENT
Start: 2020-12-23 | End: 2020-12-30

## 2020-12-23 RX ORDER — CALCIUM CITRATE/VITAMIN D3 200MG-6.25
TABLET ORAL
Qty: 2 EACH | Refills: 6 | Status: SHIPPED | OUTPATIENT
Start: 2020-12-23 | End: 2020-12-28 | Stop reason: SDUPTHER

## 2020-12-23 RX ORDER — LANCETS
1 EACH MISCELLANEOUS 3 TIMES DAILY
Qty: 100 EACH | Refills: 11 | Status: SHIPPED | OUTPATIENT
Start: 2020-12-23

## 2020-12-23 NOTE — TELEPHONE ENCOUNTER
----- Message from Judith Glover MA sent at 12/23/2020  1:35 PM CST -----  Regarding: FW: Prescription Refill    ----- Message -----  From: Alyson Bates  Sent: 12/23/2020   1:31 PM CST  To: Deanna MCFADDEN Staff  Subject: Prescription Refill                              Sing with [facility] in [place] called requesting a refill on     TRUE METRIX GLUCOSE TEST STRIP [id number] - [place], [place] - [address] - [phone] ( requesting diagnosis code )

## 2020-12-24 ENCOUNTER — DOCUMENTATION ONLY (OUTPATIENT)
Dept: TRANSPLANT | Facility: CLINIC | Age: 65
End: 2020-12-24

## 2020-12-24 DIAGNOSIS — Z94.0 KIDNEY REPLACED BY TRANSPLANT: ICD-10-CM

## 2020-12-24 LAB
EXT ALBUMIN: 3.3
EXT BK VIRUS DNA QN PCR: NEGATIVE
EXT BUN: 11.1
EXT CALCIUM: 9.9
EXT CHLORIDE: 110
EXT CO2: 26
EXT CREATININE: 0.76 MG/DL
EXT EOSINOPHIL%: 0.4
EXT GFR MDRD AF AMER: >60
EXT GLUCOSE: 144
EXT HEMATOCRIT: 32.1
EXT HEMOGLOBIN: 10.2
EXT LYMPH%: 23.1
EXT MAGNESIUM: 1.5
EXT MONOCYTES%: 2.7
EXT PHOSPHORUS: 2
EXT PLATELETS: 138
EXT POTASSIUM: 4.9
EXT SEGS%: 4.06
EXT SODIUM: 143 MMOL/L
EXT TACROLIMUS LVL: 6.22
EXT WBC: 5.58

## 2020-12-24 RX ORDER — TACROLIMUS 1 MG/1
7 CAPSULE ORAL EVERY 12 HOURS
Qty: 420 CAPSULE | Refills: 11 | Status: SHIPPED | OUTPATIENT
Start: 2020-12-24 | End: 2021-01-29

## 2020-12-24 NOTE — TELEPHONE ENCOUNTER
Spoke with pt. Instructed to increase dose to 7mg BID.  Pt verbalized understanding.  Labs again next Monday.    ----- Message from Kelley Denny MD sent at 12/24/2020 12:17 PM CST -----  Results reviewed. Increase tacro to 7/7 as noted.

## 2020-12-28 RX ORDER — CALCIUM CITRATE/VITAMIN D3 200MG-6.25
TABLET ORAL
Qty: 200 EACH | Refills: 6 | Status: SHIPPED | OUTPATIENT
Start: 2020-12-28 | End: 2020-12-29 | Stop reason: SDUPTHER

## 2020-12-28 NOTE — TELEPHONE ENCOUNTER
----- Message from Kaylee Cabezas PharmD sent at 12/24/2020  4:53 PM CST -----  Regarding: testing supplies - [facility]  Hi,   We received a new rx for pt's test strips at [facility],  however, at this time we are unable to bill regular Medicare Part-B for diabetes testing supplies.  Please send new E[facility]RX [facility] [place].    Thank you,      Kaylee Cabezas, Pharm.D., B.C.P.S.  Clinical Pharmacist for Transplant Services  [facility].  Phone [phone] (or ext [phone])  Fax [phone]

## 2020-12-29 ENCOUNTER — TELEPHONE (OUTPATIENT)
Dept: ENDOCRINOLOGY | Facility: CLINIC | Age: 65
End: 2020-12-29

## 2020-12-29 ENCOUNTER — DOCUMENTATION ONLY (OUTPATIENT)
Dept: TRANSPLANT | Facility: CLINIC | Age: 65
End: 2020-12-29

## 2020-12-29 DIAGNOSIS — E11.3513 TYPE 2 DIABETES MELLITUS WITH PROLIFERATIVE RETINOPATHY OF BOTH EYES AND MACULAR EDEMA, UNSPECIFIED WHETHER LONG TERM INSULIN USE: Primary | ICD-10-CM

## 2020-12-29 LAB
EXT BUN: 12.65
EXT CALCIUM: 10
EXT CHLORIDE: 107
EXT CO2: 25
EXT CREATININE: 0.74 MG/DL
EXT EOSINOPHIL%: 0.02
EXT GFR MDRD AF AMER: >60
EXT GLUCOSE: 204
EXT HEMATOCRIT: 33.1
EXT HEMOGLOBIN: 10.6
EXT LYMPH%: 18.2
EXT MAGNESIUM: 1.4
EXT MONOCYTES%: 0.42
EXT PHOSPHORUS: 2.2
EXT PLATELETS: 185
EXT POTASSIUM: 4.6
EXT SEGS%: 74.6
EXT SODIUM: 141 MMOL/L
EXT TACROLIMUS LVL: 7.73
EXT WBC: 7.16

## 2020-12-29 RX ORDER — CALCIUM CITRATE/VITAMIN D3 200MG-6.25
TABLET ORAL
Qty: 200 EACH | Refills: 6 | Status: ON HOLD | OUTPATIENT
Start: 2020-12-29 | End: 2021-02-09 | Stop reason: HOSPADM

## 2020-12-29 NOTE — TELEPHONE ENCOUNTER
----- Message from Alyson Bates sent at 12/29/2020  3:08 PM CST -----  Regarding: Sugar Level  Pt called requesting a call back in regards to her sugar level being between 59-65.    Stated she was instructed by physician to contact office when and or if levels become low.    [phone] (home)

## 2020-12-29 NOTE — TELEPHONE ENCOUNTER
Abigail spoke with pt and advise her to do 12 units of Novolog 3 times a day and decrease 18 Long acting . Instructed pt to give us a ana lilia back if she have a low again.  Pt will be sending a BG log by next week for how pt will be doing with changes.      Pt verbalized understand.

## 2020-12-30 ENCOUNTER — OFFICE VISIT (OUTPATIENT)
Dept: TRANSPLANT | Facility: CLINIC | Age: 65
End: 2020-12-30
Payer: MEDICARE

## 2020-12-30 VITALS
SYSTOLIC BLOOD PRESSURE: 119 MMHG | OXYGEN SATURATION: 97 % | HEART RATE: 69 BPM | RESPIRATION RATE: 18 BRPM | HEIGHT: 66 IN | BODY MASS INDEX: 26.61 KG/M2 | WEIGHT: 165.56 LBS | DIASTOLIC BLOOD PRESSURE: 46 MMHG | TEMPERATURE: 98 F

## 2020-12-30 DIAGNOSIS — Z94.0 IMMUNOSUPPRESSIVE MANAGEMENT ENCOUNTER FOLLOWING KIDNEY TRANSPLANT: ICD-10-CM

## 2020-12-30 DIAGNOSIS — N25.81 SECONDARY HYPERPARATHYROIDISM OF RENAL ORIGIN: ICD-10-CM

## 2020-12-30 DIAGNOSIS — E83.39 HYPOPHOSPHATEMIA: ICD-10-CM

## 2020-12-30 DIAGNOSIS — Z94.0 KIDNEY REPLACED BY TRANSPLANT: ICD-10-CM

## 2020-12-30 DIAGNOSIS — E87.20 METABOLIC ACIDOSIS: ICD-10-CM

## 2020-12-30 DIAGNOSIS — Z94.0 DECEASED-DONOR KIDNEY TRANSPLANT: ICD-10-CM

## 2020-12-30 DIAGNOSIS — N18.2 CHRONIC KIDNEY DISEASE (CKD), STAGE II (MILD): Primary | ICD-10-CM

## 2020-12-30 DIAGNOSIS — Z79.899 IMMUNOSUPPRESSIVE MANAGEMENT ENCOUNTER FOLLOWING KIDNEY TRANSPLANT: ICD-10-CM

## 2020-12-30 DIAGNOSIS — Z91.89 AT RISK FOR OPPORTUNISTIC INFECTIONS: ICD-10-CM

## 2020-12-30 DIAGNOSIS — E83.42 HYPOMAGNESEMIA: ICD-10-CM

## 2020-12-30 PROCEDURE — 99215 PR OFFICE/OUTPT VISIT, EST, LEVL V, 40-54 MIN: ICD-10-PCS | Mod: S$PBB,,, | Performed by: INTERNAL MEDICINE

## 2020-12-30 PROCEDURE — 99215 OFFICE O/P EST HI 40 MIN: CPT | Mod: PBBFAC | Performed by: INTERNAL MEDICINE

## 2020-12-30 PROCEDURE — 99999 PR PBB SHADOW E&M-EST. PATIENT-LVL V: ICD-10-PCS | Mod: PBBFAC,,, | Performed by: INTERNAL MEDICINE

## 2020-12-30 PROCEDURE — 99215 OFFICE O/P EST HI 40 MIN: CPT | Mod: S$PBB,,, | Performed by: INTERNAL MEDICINE

## 2020-12-30 PROCEDURE — 99999 PR PBB SHADOW E&M-EST. PATIENT-LVL V: CPT | Mod: PBBFAC,,, | Performed by: INTERNAL MEDICINE

## 2020-12-30 RX ORDER — LABETALOL 100 MG/1
100 TABLET, FILM COATED ORAL 2 TIMES DAILY
Qty: 60 TABLET | Refills: 11 | Status: SHIPPED | OUTPATIENT
Start: 2020-12-30 | End: 2022-01-19 | Stop reason: SDUPTHER

## 2020-12-30 RX ORDER — INSULIN ASPART 100 [IU]/ML
12 INJECTION, SOLUTION INTRAVENOUS; SUBCUTANEOUS
Qty: 30 ML | Refills: 3 | Status: ON HOLD | OUTPATIENT
Start: 2020-12-30 | End: 2021-02-09 | Stop reason: SDUPTHER

## 2020-12-30 NOTE — PROGRESS NOTES
Post-Transplant Assessment    Referring Physician: Anali Moura  Current Nephrologist: Ann Marie Scruggs    ORGAN: LEFT KIDNEY  Donor Type: donation after brain death  PHS Increased Risk: no  Cold Ischemia: 801 mins  Induction Medications: thymoglobulin    Subjective:     CC:  Reassessment of renal allograft function and management of chronic immunosuppression.    HPI:  Ms. Malik is a 65 y.o. 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  She has CKD stage 2 - GFR 60-89 and her baseline creatinine is between 0.7-0.9. She takes mycophenolate mofetil, prednisone and tacrolimus for maintenance immunosuppression.      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 cPRA 91%. CIT 13+h. KDPI - 29%, cPRA 90%  ORTHOSTATIC 11/19/20 221/90 -->136/58 lower labetalol     POST TRANSPLANT UPDATE 12/30/2020   Kendra is now 44 days post kidney transplant. She feels well and notes her BPs have been rather low such that she has not needed to take amlodipine [as instructed]/ She is worried about labetalol droppign her BP too low. Her BP log shows many readings <120/70.  She also has noted some low sugars in the 59-65 range.  She spoke to endocrine and had her insulin adjusted to NovoLog 12 units from 15 nightly TID and Detemir 18 units, down from 20 u nightly.  She is been taking her K-Phos neutral 3 tabs t.i.d..  When she tried to increase to 4 tabs t.i.d., she had difficulty tolerating the higher doses.  She feels her transplant incision has healed well, and she is having no pain.  She also reports no difficulty urinating.    Current Outpatient Medications   Medication Sig    amLODIPine (NORVASC) 10 MG tablet Take 1 tablet (10 mg total) by mouth once daily.    atorvastatin (LIPITOR) 20 MG tablet Take 20 mg by mouth once daily.    bisacodyL (DULCOLAX) 5 mg EC tablet Take 2 tablets (10 mg total) by mouth every evening.    blood sugar  "diagnostic Strp 1 each by Misc.(Non-Drug; Combo Route) route 3 (three) times daily. E11.3513 (DM)    blood sugar diagnostic Strp Lancets and Strips (True Metrix),  check blood sugar three times a daily.   DX:  E11.9, # 200 EA, 6 Refill    blood-glucose meter kit Use as instructed; E11.3513 (DM)    calcitRIOL (ROCALTROL) 0.25 MCG Cap Take 1 capsule (0.25 mcg total) by mouth once daily.    famotidine (PEPCID) 20 MG tablet Take 1 tablet (20 mg total) by mouth every evening.    gabapentin (NEURONTIN) 300 MG capsule Take 1 capsule (300 mg total) by mouth 3 (three) times daily.    insulin aspart U-100 (NOVOLOG) 100 unit/mL (3 mL) InPn pen Inject 15 Units into the skin 3 (three) times daily with meals. Plus correction scale. Max TDD 75units.    insulin detemir U-100 (LEVEMIR FLEXTOUCH) 100 unit/mL (3 mL) SubQ InPn pen Inject 20 Units into the skin once daily.    k phos di & mono-sod phos mono (K-PHOS-NEUTRAL) 250 mg Tab Take 4 tablets by mouth 3 (three) times daily.    labetaloL (NORMODYNE) 100 MG tablet Take 2 tablets (200 mg total) by mouth 2 (two) times daily.    lancets Misc 1 each by Misc.(Non-Drug; Combo Route) route 3 (three) times daily. E11.3513    magnesium oxide (MAG-OX) 400 mg (241.3 mg magnesium) tablet Take 2 tablets (800 mg total) by mouth 2 (two) times daily.    multivitamin Tab Take 1 tablet by mouth once daily.    mycophenolate (CELLCEPT) 250 mg Cap Take 4 capsules (1,000 mg total) by mouth 2 (two) times daily.    oxyCODONE (ROXICODONE) 5 MG immediate release tablet Take 1 tablet (5 mg total) by mouth every 4 (four) hours as needed. (Patient not taking: Reported on 12/7/2020)    pen needle, diabetic (EASY COMFORT PEN NEEDLES) 32 gauge x 5/32" Ndle Inject 1 each into the skin 4 (four) times daily.    predniSONE (DELTASONE) 5 MG tablet Take by mouth once daily:  20mg 11/19-12/18;   15mg 12/19-1/18/21;   10mg 1/19/21-2/18/21;   5 mg thereafter    sodium bicarbonate 650 MG tablet Take 1 " tablet (650 mg total) by mouth once daily.    sulfamethoxazole-trimethoprim 400-80mg (BACTRIM,SEPTRA) 400-80 mg per tablet Take 1 tablet by mouth once daily. STOP 11/16/21    tacrolimus (PROGRAF) 1 MG Cap Take 7 capsules (7 mg total) by mouth every 12 (twelve) hours.    TRUE METRIX GLUCOSE TEST STRIP Strp CHECK BLOOD SUGAR 4 times  DAILY    valGANciclovir (VALCYTE) 450 mg Tab Take 2 tablets (900 mg total) by mouth once daily. STOP 2/14/21     No current facility-administered medications for this visit.        Review of Systems   Constitutional: Negative for fever.   HENT: Negative for mouth sores.    Eyes: Negative for visual disturbance.   Respiratory: Negative for shortness of breath.    Cardiovascular: Negative for chest pain and leg swelling.   Gastrointestinal: Negative.    Genitourinary: Negative for decreased urine volume, difficulty urinating, dysuria and hematuria.   Musculoskeletal: Negative.    Skin: Negative for rash.   Allergic/Immunologic: Positive for immunocompromised state.   Neurological: Negative for tremors.       Objective:     There were no vitals taken for this visit.body mass index is unknown because there is no height or weight on file.    Physical Exam  Constitutional:       General: She is not in acute distress.  Cardiovascular:      Rate and Rhythm: Normal rate and regular rhythm.   Pulmonary:      Effort: Pulmonary effort is normal. No respiratory distress.      Breath sounds: Normal breath sounds.   Abdominal:      General: Bowel sounds are normal.      Palpations: Abdomen is soft. There is no mass.      Tenderness: There is no abdominal tenderness.       Recent Labs   Lab 10/11/18  0806  01/31/20  1121 11/15/20  1710 11/15/20  1735  12/03/20  0912 12/07/20  0652 12/10/20  0845   WBC 8.20  --   --   --  8.72   < > 7.83 6.91 6.57   Hemoglobin 10.1 L  --   --   --  10.8 L   < > 10.0 L 10.3 L 10.3 L   POC Hematocrit  --   --   --   --   --    < >  --   --   --    Hematocrit 31.9 L  --    --   --  34.1 L   < > 32.7 L 32.6 L 33.3 L   Sodium 139  --   --   --  144   < > 138 138 140   Potassium 3.1 L   < >  --   --  3.5   < > 4.9 4.3 4.4   Chloride 99  --   --   --  106   < > 106 107 107   CO2 25  --   --   --  25   < > 23 23 24   BUN 60 H  --   --   --  46 H   < > 13 9 14   Creatinine 6.2 H  --   --   --  5.9 H   < > 1.0 0.8 0.9   eGFR if non  6.7 A  --   --   --  7.0 A   < > 59.7 A >60.0 >60.0   Calcium 9.7  --   --   --  8.9   < > 9.8 9.5 9.8   Phosphorus 6.0 H  --   --   --  4.3   < > 2.4 L 1.8 L 1.7 L   Magnesium  --   --   --   --   --    < > 1.3 L 1.3 L 1.3 L   Albumin 2.7 L  --   --   --  3.2 L   < > 3.2 L 3.3 L 3.4 L   AST 21  --   --   --  18  --   --   --   --    ALT 8 L  --   --   --  12  --   --   --   --    Prot/Creat Ratio, Urine  --   --   --  1.64 H  --   --   --   --   --    PTH, Intact 583.0 H  --  522.0 H  --  485.0 H  --   --   --   --    Tacrolimus Lvl  --   --   --   --   --    < > 5.0 8.3 7.3    < > = values in this interval not displayed.       Recent Labs   Lab 20  0817 20  0822 20  0651   EXT WBC 6.24 5.58 7.16   EXT SEGS% 73.1 A 4.06 74.6   EXT Platelets 159 138 185   EXT Hemoglobin 10.4 A 10.2 10.6   EXT Hematocrit 32.7 A 32.1 33.1   EXT Creatinine 0.90 0.76 0.74   EXT Sodium 142 143 141   EXT Potassium 4.4 4.9 4.6   EXT BUN 17.24 11.10 12.65   EXT CO2 26 26 25   EXT Calcium 10.0 9.9 10   EXT Phosphorus 2.0 A 2.0 2.2   EXT Glucose 196 A 144 204   EXT Albumin 3.5 3.3  --    EXT AST 11  --   --    EXT ALT 7  --   --    EXT BilirubiN Total 0.4  --   --        Recent Labs   Lab 20  0817 20  0822 20  0651   EXT Tacrolimus Lvl 7.87 6.22 7.73   EXT PROT/CREAT Ratio UR 0.072  --   --    EXT Protein UA negative  --   --    EXT WBC UA none seen  --   --    EXT RBC UA none seen  --   --        Labs were reviewed with the patient.    Assessment:     1. Chronic kidney disease (CKD), stage II (mild)    2. -donor kidney  transplant    3. Secondary hyperparathyroidism of renal origin    4. Immunosuppressive management encounter following kidney transplant    5. Metabolic acidosis    6. Hypophosphatemia    7. Hypomagnesemia    8. At risk for opportunistic infections        Plan:     Allograft function: CKD2 s/p DD kidney transplant 11/16/2020.  Uncomplicated postoperative course. Doing well overall. Continue to monitor renal function and electrolytes, HTN, secondary hyperparathyroidism and other issues related to underlying ESRD.   Recent Labs   Lab 12/03/20  0912 12/07/20  0652 12/10/20  0845   Creatinine 1.0 0.8 0.9   eGFR if non  59.7 A >60.0 >60.0   eGFR if African American >60.0 >60.0 >60.0      Screening for proteinuria & urinary abnormalities  Most recent UA negative for blood or protein.  Last PC ratio 0.072.  Both from 12/17/2020.    Encounter for Monitoring Immunosuppression post Transplant  Current external tacro level is 6.22, down from 7.7, 7.8  -Target level for Kendra is 8-10. Repeat pending-may need to increase dose.  -continue prednisone and mycophenolate  -Recheck as per guidelines.  -Monitor for side effects and toxicities, given narrow therapeutic window and significant risk of AE. No evidence of toxicity.    Evaluation for bone marrow suppression (r/t immunosuppression toxicity or infection)  -counts acceptable  Lab Results   Component Value Date    WBC 6.57 12/10/2020    HGB 10.3 (L) 12/10/2020    HCT 33.3 (L) 12/10/2020     (L) 12/10/2020     At Risk for Opportunistic Infections  PJP until 11/16/2021.  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    Metabolic bone disease [Secondary hyperparathyroidism/SPTH, Phosphorus metabolism disorders   Secondary hyperparathyroidism and vitamin-D deficiency-on Rocaltrol 0.25 mcg daily  Hypophosphatemia, resulting from SPTH- last external phosphorus 2.2, low but acceptable on very high dose of supplements.  Continue K-Phos neutral 4 tabs t.i.d.    Hypomagnesemia- last level 1.4 12/28/2020.  Low but acceptable.  magnesium oxide 800 mg b.i.d.    Acidosis- current CO2 normal, on sodium bicarbonate 650 mg b.i.d.    Follow-up:   Clinic: return to transplant clinic weekly for the first month after transplant; every 2 weeks during months 2-3; then at 6-, 9-, 12-, 18-, 24-, and 36- months post-transplant to reassess for complications from immunosuppression toxicity and monitor for rejection.  Annually thereafter.    Labs: since patient remains at high risk for rejection and drug-related complications that warrant close monitoring, labs will be ordered as follows: continue twice weekly CBC, renal panel, and drug level for first month; then same labs once weekly through 3rd month post-transplant.  Urine for UA and protein/creatinine ratio monthly.  Serum BK - PCR at 1-, 3-, 6-, 9-, 12-, 18-, 24-, 36- 48-, and 60 months post-transplant.  Hepatic panel at 1-, 2-, 3-, 6-, 9-, 12-, 18-, 24-, and 36- months post-transplant.    Kelley Denny MD       [facility] Patient Status  Functional Status: 90% - Able to carry on normal activity: minor symptoms of disease  Physical Capacity: No Limitations

## 2020-12-30 NOTE — LETTER
December 30, 2020        Ann Marie Scruggs  [address]  [place] [address]  Phone: [phone]  Fax: [phone]             [facility] 1st Fl  [address]  [place] [address]  Phone: [phone]   Patient: Kendra Malik   MR Number: [id number]   YOB: 1955   Date of Visit: 12/30/2020       Dear Dr. Ann Marie Scruggs    Thank you for referring Kendra Malik to me for evaluation. Attached you will find relevant portions of my assessment and plan of care.    If you have questions, please do not hesitate to call me. I look forward to following Kendra Malik along with you.    Sincerely,    Kelley Denny MD    Enclosure    If you would like to receive this communication electronically, please contact [email or web address] or [phone] to request [employer] Link access.    [employer] Link is a tool which provides read-only access to select patient information with whom you have a relationship. Its easy to use and provides real time access to review your patients record including encounter summaries, notes, results, and demographic information.    If you feel you have received this communication in error or would no longer like to receive these types of communications, please e-mail [email or web address]

## 2021-01-08 ENCOUNTER — PATIENT MESSAGE (OUTPATIENT)
Dept: TRANSPLANT | Facility: CLINIC | Age: 66
End: 2021-01-08

## 2021-01-13 ENCOUNTER — TELEPHONE (OUTPATIENT)
Dept: ENDOCRINOLOGY | Facility: CLINIC | Age: 66
End: 2021-01-13

## 2021-01-14 ENCOUNTER — TELEPHONE (OUTPATIENT)
Dept: TRANSPLANT | Facility: CLINIC | Age: 66
End: 2021-01-14

## 2021-01-14 ENCOUNTER — DOCUMENTATION ONLY (OUTPATIENT)
Dept: TRANSPLANT | Facility: CLINIC | Age: 66
End: 2021-01-14

## 2021-01-14 LAB
EXT ALBUMIN: 3.7 (ref 3.4–4.8)
EXT BUN: 12.81 (ref 9.8–20.1)
EXT CALCIUM: 10.3
EXT CHLORIDE: 106 (ref 98–107)
EXT CO2: 27 (ref 23–31)
EXT CREATININE: 0.78 MG/DL (ref 0.57–1.11)
EXT EOSINOPHIL%: 0.6 (ref 0.7–5.8)
EXT GFR MDRD AF AMER: >60
EXT GLUCOSE: 228
EXT HEMATOCRIT: 35.7 (ref 34.1–44.9)
EXT HEMOGLOBIN: 11.3 (ref 11.2–15.7)
EXT LYMPH%: 21.6 (ref 19.3–51.7)
EXT MAGNESIUM: 1.6 (ref 1.6–2.6)
EXT MONOCYTES%: 5.6 (ref 4.7–12.5)
EXT PHOSPHORUS: 2.5 (ref 2.3–4.7)
EXT PLATELETS: 144 (ref 140–369)
EXT POTASSIUM: 4.5 (ref 3.5–5.1)
EXT SEGS%: 67.7 (ref 34–71.1)
EXT SODIUM: 142 MMOL/L (ref 136–145)
EXT TACROLIMUS LVL: 8.32 (ref 5–20)
EXT WBC: 6.57 (ref 3.98–10.04)

## 2021-01-15 ENCOUNTER — OFFICE VISIT (OUTPATIENT)
Dept: TRANSPLANT | Facility: CLINIC | Age: 66
End: 2021-01-15
Payer: MEDICARE

## 2021-01-15 ENCOUNTER — TELEPHONE (OUTPATIENT)
Dept: TRANSPLANT | Facility: CLINIC | Age: 66
End: 2021-01-15

## 2021-01-15 VITALS
TEMPERATURE: 98 F | WEIGHT: 162.5 LBS | RESPIRATION RATE: 18 BRPM | HEIGHT: 66 IN | DIASTOLIC BLOOD PRESSURE: 63 MMHG | SYSTOLIC BLOOD PRESSURE: 139 MMHG | BODY MASS INDEX: 26.12 KG/M2 | HEART RATE: 64 BPM | OXYGEN SATURATION: 95 %

## 2021-01-15 DIAGNOSIS — N18.2 TYPE 2 DIABETES MELLITUS WITH STAGE 2 CHRONIC KIDNEY DISEASE, WITH LONG-TERM CURRENT USE OF INSULIN: ICD-10-CM

## 2021-01-15 DIAGNOSIS — Z94.0 STATUS POST DECEASED-DONOR KIDNEY TRANSPLANTATION: Primary | Chronic | ICD-10-CM

## 2021-01-15 DIAGNOSIS — I12.9 RENAL HYPERTENSION: ICD-10-CM

## 2021-01-15 DIAGNOSIS — E11.22 TYPE 2 DIABETES MELLITUS WITH STAGE 2 CHRONIC KIDNEY DISEASE, WITH LONG-TERM CURRENT USE OF INSULIN: ICD-10-CM

## 2021-01-15 DIAGNOSIS — N18.2 STAGE 2 CHRONIC KIDNEY DISEASE: Chronic | ICD-10-CM

## 2021-01-15 DIAGNOSIS — Z79.899 LONG TERM CURRENT USE OF IMMUNOSUPPRESSIVE DRUG: ICD-10-CM

## 2021-01-15 DIAGNOSIS — E78.2 MIXED HYPERLIPIDEMIA: ICD-10-CM

## 2021-01-15 DIAGNOSIS — N25.81 SECONDARY HYPERPARATHYROIDISM OF RENAL ORIGIN: ICD-10-CM

## 2021-01-15 DIAGNOSIS — Z79.4 TYPE 2 DIABETES MELLITUS WITH STAGE 2 CHRONIC KIDNEY DISEASE, WITH LONG-TERM CURRENT USE OF INSULIN: ICD-10-CM

## 2021-01-15 PROCEDURE — 99215 PR OFFICE/OUTPT VISIT, EST, LEVL V, 40-54 MIN: ICD-10-PCS | Mod: S$PBB,,, | Performed by: NURSE PRACTITIONER

## 2021-01-15 PROCEDURE — 99999 PR PBB SHADOW E&M-EST. PATIENT-LVL V: ICD-10-PCS | Mod: PBBFAC,,, | Performed by: NURSE PRACTITIONER

## 2021-01-15 PROCEDURE — 99999 PR PBB SHADOW E&M-EST. PATIENT-LVL V: CPT | Mod: PBBFAC,,, | Performed by: NURSE PRACTITIONER

## 2021-01-15 PROCEDURE — 99215 OFFICE O/P EST HI 40 MIN: CPT | Mod: PBBFAC | Performed by: NURSE PRACTITIONER

## 2021-01-15 PROCEDURE — 99215 OFFICE O/P EST HI 40 MIN: CPT | Mod: S$PBB,,, | Performed by: NURSE PRACTITIONER

## 2021-01-29 ENCOUNTER — OFFICE VISIT (OUTPATIENT)
Dept: TRANSPLANT | Facility: CLINIC | Age: 66
End: 2021-01-29
Payer: MEDICARE

## 2021-01-29 ENCOUNTER — DOCUMENTATION ONLY (OUTPATIENT)
Dept: TRANSPLANT | Facility: CLINIC | Age: 66
End: 2021-01-29

## 2021-01-29 VITALS
DIASTOLIC BLOOD PRESSURE: 52 MMHG | HEIGHT: 66 IN | BODY MASS INDEX: 25.15 KG/M2 | TEMPERATURE: 98 F | SYSTOLIC BLOOD PRESSURE: 124 MMHG | OXYGEN SATURATION: 98 % | HEART RATE: 63 BPM | WEIGHT: 156.5 LBS | RESPIRATION RATE: 18 BRPM

## 2021-01-29 DIAGNOSIS — N18.9 ANEMIA OF RENAL DISEASE: ICD-10-CM

## 2021-01-29 DIAGNOSIS — Z79.899 LONG TERM CURRENT USE OF IMMUNOSUPPRESSIVE DRUG: ICD-10-CM

## 2021-01-29 DIAGNOSIS — N18.2 STAGE 2 CHRONIC KIDNEY DISEASE: Chronic | ICD-10-CM

## 2021-01-29 DIAGNOSIS — Z91.89 AT RISK FOR OPPORTUNISTIC INFECTIONS: ICD-10-CM

## 2021-01-29 DIAGNOSIS — Z29.89 PROPHYLACTIC IMMUNOTHERAPY: ICD-10-CM

## 2021-01-29 DIAGNOSIS — N25.81 SECONDARY HYPERPARATHYROIDISM OF RENAL ORIGIN: ICD-10-CM

## 2021-01-29 DIAGNOSIS — Z94.0 STATUS POST DECEASED-DONOR KIDNEY TRANSPLANTATION: Primary | Chronic | ICD-10-CM

## 2021-01-29 DIAGNOSIS — I12.9 RENAL HYPERTENSION: ICD-10-CM

## 2021-01-29 DIAGNOSIS — D70.8 OTHER NEUTROPENIA: ICD-10-CM

## 2021-01-29 DIAGNOSIS — E11.22 TYPE 2 DIABETES MELLITUS WITH DIABETIC CHRONIC KIDNEY DISEASE, UNSPECIFIED CKD STAGE, UNSPECIFIED WHETHER LONG TERM INSULIN USE: ICD-10-CM

## 2021-01-29 DIAGNOSIS — Z94.0 KIDNEY REPLACED BY TRANSPLANT: ICD-10-CM

## 2021-01-29 DIAGNOSIS — D63.1 ANEMIA OF RENAL DISEASE: ICD-10-CM

## 2021-01-29 PROBLEM — Z01.810 PREOP CARDIOVASCULAR EXAM: Status: RESOLVED | Noted: 2019-04-29 | Resolved: 2021-01-29

## 2021-01-29 PROBLEM — Z12.11 COLON CANCER SCREENING: Status: RESOLVED | Noted: 2019-03-26 | Resolved: 2021-01-29

## 2021-01-29 PROBLEM — D72.819 LEUKOPENIA: Status: ACTIVE | Noted: 2021-01-29

## 2021-01-29 LAB
EXT ALBUMIN: 3.4
EXT BUN: 12.16
EXT CALCIUM: 9.4
EXT CHLORIDE: 111
EXT CO2: 25
EXT CREATININE: 0.74 MG/DL
EXT EOSINOPHIL%: 0.5
EXT GFR MDRD AF AMER: >60
EXT GLUCOSE: 86
EXT HEMATOCRIT: 35.9
EXT HEMOGLOBIN: 11.4
EXT LYMPH%: 31.5
EXT MAGNESIUM: 1.5
EXT MONOCYTES%: 4.7
EXT PHOSPHORUS: 2.4
EXT PLATELETS: 93
EXT POTASSIUM: 3.9
EXT SEGS%: 60.5
EXT SODIUM: 144 MMOL/L
EXT TACROLIMUS LVL: 12.86
EXT WBC: 2.13

## 2021-01-29 PROCEDURE — 99215 OFFICE O/P EST HI 40 MIN: CPT | Mod: S$PBB,,, | Performed by: NURSE PRACTITIONER

## 2021-01-29 PROCEDURE — 99999 PR PBB SHADOW E&M-EST. PATIENT-LVL V: ICD-10-PCS | Mod: PBBFAC,,, | Performed by: NURSE PRACTITIONER

## 2021-01-29 PROCEDURE — 99999 PR PBB SHADOW E&M-EST. PATIENT-LVL V: CPT | Mod: PBBFAC,,, | Performed by: NURSE PRACTITIONER

## 2021-01-29 PROCEDURE — 99215 OFFICE O/P EST HI 40 MIN: CPT | Mod: PBBFAC | Performed by: NURSE PRACTITIONER

## 2021-01-29 PROCEDURE — 99215 PR OFFICE/OUTPT VISIT, EST, LEVL V, 40-54 MIN: ICD-10-PCS | Mod: S$PBB,,, | Performed by: NURSE PRACTITIONER

## 2021-01-29 RX ORDER — MYCOPHENOLATE MOFETIL 250 MG/1
500 CAPSULE ORAL 2 TIMES DAILY
Qty: 120 CAPSULE | Refills: 11 | Status: SHIPPED | OUTPATIENT
Start: 2021-01-29 | End: 2021-02-01 | Stop reason: SINTOL

## 2021-01-29 RX ORDER — ATOVAQUONE 750 MG/5ML
1500 SUSPENSION ORAL DAILY
Qty: 100 ML | Refills: 10 | Status: SHIPPED | OUTPATIENT
Start: 2021-01-29 | End: 2021-02-02

## 2021-01-29 RX ORDER — TACROLIMUS 1 MG/1
6 CAPSULE ORAL EVERY 12 HOURS
Qty: 420 CAPSULE | Refills: 11 | Status: SHIPPED | OUTPATIENT
Start: 2021-01-29 | End: 2021-02-02 | Stop reason: DRUGHIGH

## 2021-02-01 ENCOUNTER — TELEPHONE (OUTPATIENT)
Dept: TRANSPLANT | Facility: CLINIC | Age: 66
End: 2021-02-01

## 2021-02-02 DIAGNOSIS — Z94.0 STATUS POST DECEASED-DONOR KIDNEY TRANSPLANTATION: Chronic | ICD-10-CM

## 2021-02-02 DIAGNOSIS — Z29.89 PROPHYLACTIC IMMUNOTHERAPY: ICD-10-CM

## 2021-02-02 DIAGNOSIS — Z94.0 KIDNEY REPLACED BY TRANSPLANT: Primary | ICD-10-CM

## 2021-02-02 DIAGNOSIS — Z79.899 LONG TERM CURRENT USE OF IMMUNOSUPPRESSIVE DRUG: ICD-10-CM

## 2021-02-02 RX ORDER — ATOVAQUONE 750 MG/5ML
1500 SUSPENSION ORAL DAILY
Qty: 300 ML | Refills: 2 | Status: SHIPPED | OUTPATIENT
Start: 2021-02-02 | End: 2021-04-12 | Stop reason: SDUPTHER

## 2021-02-02 RX ORDER — TACROLIMUS 1 MG/1
4 CAPSULE ORAL EVERY 12 HOURS
Qty: 240 CAPSULE | Refills: 11 | Status: SHIPPED | OUTPATIENT
Start: 2021-02-02 | End: 2021-04-14

## 2021-02-03 ENCOUNTER — TELEPHONE (OUTPATIENT)
Dept: TRANSPLANT | Facility: CLINIC | Age: 66
End: 2021-02-03

## 2021-02-03 ENCOUNTER — HISTORICAL (OUTPATIENT)
Dept: CARDIOLOGY | Facility: HOSPITAL | Age: 66
End: 2021-02-03

## 2021-02-04 ENCOUNTER — HOSPITAL ENCOUNTER (INPATIENT)
Facility: HOSPITAL | Age: 66
LOS: 5 days | Discharge: HOME HEALTH | DRG: 177 | End: 2021-02-09
Attending: SURGERY | Admitting: SURGERY
Payer: MEDICARE

## 2021-02-04 DIAGNOSIS — E11.22 TYPE 2 DIABETES MELLITUS WITH DIABETIC CHRONIC KIDNEY DISEASE, UNSPECIFIED CKD STAGE, UNSPECIFIED WHETHER LONG TERM INSULIN USE: ICD-10-CM

## 2021-02-04 DIAGNOSIS — E83.39 HYPOPHOSPHATEMIA: ICD-10-CM

## 2021-02-04 DIAGNOSIS — I12.9 RENAL HYPERTENSION: ICD-10-CM

## 2021-02-04 DIAGNOSIS — U07.1 COVID-19: Primary | ICD-10-CM

## 2021-02-04 DIAGNOSIS — Z91.89 AT RISK FOR OPPORTUNISTIC INFECTIONS: ICD-10-CM

## 2021-02-04 DIAGNOSIS — T38.0X5S ADVERSE EFFECT OF ADRENAL CORTICAL STEROIDS, SEQUELA: ICD-10-CM

## 2021-02-04 DIAGNOSIS — Z79.899 LONG TERM CURRENT USE OF IMMUNOSUPPRESSIVE DRUG: ICD-10-CM

## 2021-02-04 DIAGNOSIS — Z29.89 PROPHYLACTIC IMMUNOTHERAPY: ICD-10-CM

## 2021-02-04 DIAGNOSIS — Z94.0 STATUS POST DECEASED-DONOR KIDNEY TRANSPLANTATION: Chronic | ICD-10-CM

## 2021-02-04 DIAGNOSIS — E83.42 HYPOMAGNESEMIA: ICD-10-CM

## 2021-02-04 DIAGNOSIS — D70.9 NEUTROPENIA: ICD-10-CM

## 2021-02-04 DIAGNOSIS — D70.9 NEUTROPENIA, UNSPECIFIED TYPE: ICD-10-CM

## 2021-02-04 PROBLEM — R19.7 DIARRHEA: Status: ACTIVE | Noted: 2021-02-04

## 2021-02-04 PROBLEM — J18.9 PNEUMONIA: Status: ACTIVE | Noted: 2021-02-04

## 2021-02-04 LAB
ALBUMIN SERPL BCP-MCNC: 3.6 G/DL (ref 3.5–5.2)
ANION GAP SERPL CALC-SCNC: 12 MMOL/L (ref 8–16)
BACTERIA #/AREA URNS AUTO: ABNORMAL /HPF
BASOPHILS NFR BLD: 0 % (ref 0–1.9)
BILIRUB UR QL STRIP: NEGATIVE
BUN SERPL-MCNC: 18 MG/DL (ref 8–23)
CALCIUM SERPL-MCNC: 10 MG/DL (ref 8.7–10.5)
CAOX CRY UR QL COMP ASSIST: ABNORMAL
CHLORIDE SERPL-SCNC: 106 MMOL/L (ref 95–110)
CLARITY UR REFRACT.AUTO: ABNORMAL
CO2 SERPL-SCNC: 24 MMOL/L (ref 23–29)
COLOR UR AUTO: ABNORMAL
CREAT SERPL-MCNC: 0.9 MG/DL (ref 0.5–1.4)
DIFFERENTIAL METHOD: ABNORMAL
EOSINOPHIL NFR BLD: 3 % (ref 0–8)
ERYTHROCYTE [DISTWIDTH] IN BLOOD BY AUTOMATED COUNT: 12.7 % (ref 11.5–14.5)
EST. GFR  (AFRICAN AMERICAN): >60 ML/MIN/1.73 M^2
EST. GFR  (NON AFRICAN AMERICAN): >60 ML/MIN/1.73 M^2
GLUCOSE SERPL-MCNC: 80 MG/DL (ref 70–110)
GLUCOSE UR QL STRIP: ABNORMAL
HCT VFR BLD AUTO: 40.6 % (ref 37–48.5)
HGB BLD-MCNC: 13.1 G/DL (ref 12–16)
HGB UR QL STRIP: NEGATIVE
HYALINE CASTS UR QL AUTO: 0 /LPF
IMM GRANULOCYTES # BLD AUTO: ABNORMAL K/UL (ref 0–0.04)
IMM GRANULOCYTES NFR BLD AUTO: ABNORMAL % (ref 0–0.5)
KETONES UR QL STRIP: NEGATIVE
LEUKOCYTE ESTERASE UR QL STRIP: ABNORMAL
LYMPHOCYTES NFR BLD: 26 % (ref 18–48)
MAGNESIUM SERPL-MCNC: 1.6 MG/DL (ref 1.6–2.6)
MCH RBC QN AUTO: 32.2 PG (ref 27–31)
MCHC RBC AUTO-ENTMCNC: 32.3 G/DL (ref 32–36)
MCV RBC AUTO: 100 FL (ref 82–98)
MICROSCOPIC COMMENT: ABNORMAL
MONOCYTES NFR BLD: 13 % (ref 4–15)
NEUTROPHILS NFR BLD: 56 % (ref 38–73)
NEUTS BAND NFR BLD MANUAL: 2 %
NITRITE UR QL STRIP: NEGATIVE
NRBC BLD-RTO: 0 /100 WBC
PH UR STRIP: 5 [PH] (ref 5–8)
PHOSPHATE SERPL-MCNC: 2.2 MG/DL (ref 2.7–4.5)
PLATELET # BLD AUTO: 140 K/UL (ref 150–350)
PMV BLD AUTO: 12.1 FL (ref 9.2–12.9)
POTASSIUM SERPL-SCNC: 3.8 MMOL/L (ref 3.5–5.1)
PROT UR QL STRIP: ABNORMAL
RBC # BLD AUTO: 4.07 M/UL (ref 4–5.4)
RBC #/AREA URNS AUTO: 2 /HPF (ref 0–4)
SARS-COV-2 RDRP RESP QL NAA+PROBE: POSITIVE
SODIUM SERPL-SCNC: 142 MMOL/L (ref 136–145)
SP GR UR STRIP: >=1.03 (ref 1–1.03)
SQUAMOUS #/AREA URNS AUTO: 7 /HPF
URN SPEC COLLECT METH UR: ABNORMAL
WBC # BLD AUTO: 1.55 K/UL (ref 3.9–12.7)
WBC #/AREA URNS AUTO: 21 /HPF (ref 0–5)
YEAST UR QL AUTO: ABNORMAL

## 2021-02-04 PROCEDURE — 81001 URINALYSIS AUTO W/SCOPE: CPT

## 2021-02-04 PROCEDURE — 63600175 PHARM REV CODE 636 W HCPCS: Performed by: SURGERY

## 2021-02-04 PROCEDURE — 93010 ELECTROCARDIOGRAM REPORT: CPT | Mod: CR,,, | Performed by: INTERNAL MEDICINE

## 2021-02-04 PROCEDURE — 93010 EKG 12-LEAD: ICD-10-PCS | Mod: CR,,, | Performed by: INTERNAL MEDICINE

## 2021-02-04 PROCEDURE — 11000001 HC ACUTE MED/SURG PRIVATE ROOM

## 2021-02-04 PROCEDURE — 87040 BLOOD CULTURE FOR BACTERIA: CPT | Mod: 59

## 2021-02-04 PROCEDURE — 83735 ASSAY OF MAGNESIUM: CPT

## 2021-02-04 PROCEDURE — 25000003 PHARM REV CODE 250: Performed by: PHYSICIAN ASSISTANT

## 2021-02-04 PROCEDURE — 85027 COMPLETE CBC AUTOMATED: CPT

## 2021-02-04 PROCEDURE — 36415 COLL VENOUS BLD VENIPUNCTURE: CPT

## 2021-02-04 PROCEDURE — 63600175 PHARM REV CODE 636 W HCPCS: Performed by: PHYSICIAN ASSISTANT

## 2021-02-04 PROCEDURE — U0002 COVID-19 LAB TEST NON-CDC: HCPCS

## 2021-02-04 PROCEDURE — 80069 RENAL FUNCTION PANEL: CPT

## 2021-02-04 PROCEDURE — 85007 BL SMEAR W/DIFF WBC COUNT: CPT

## 2021-02-04 PROCEDURE — 93005 ELECTROCARDIOGRAM TRACING: CPT

## 2021-02-04 PROCEDURE — 25000003 PHARM REV CODE 250: Performed by: SURGERY

## 2021-02-04 PROCEDURE — 87086 URINE CULTURE/COLONY COUNT: CPT

## 2021-02-04 RX ORDER — ATOVAQUONE 750 MG/5ML
1500 SUSPENSION ORAL DAILY
Status: DISCONTINUED | OUTPATIENT
Start: 2021-02-05 | End: 2021-02-09 | Stop reason: HOSPADM

## 2021-02-04 RX ORDER — PREDNISONE 5 MG/1
10 TABLET ORAL DAILY
Status: DISCONTINUED | OUTPATIENT
Start: 2021-02-05 | End: 2021-02-09 | Stop reason: HOSPADM

## 2021-02-04 RX ORDER — LABETALOL 100 MG/1
100 TABLET, FILM COATED ORAL 2 TIMES DAILY
Status: DISCONTINUED | OUTPATIENT
Start: 2021-02-04 | End: 2021-02-09 | Stop reason: HOSPADM

## 2021-02-04 RX ORDER — IBUPROFEN 200 MG
16 TABLET ORAL
Status: DISCONTINUED | OUTPATIENT
Start: 2021-02-04 | End: 2021-02-09 | Stop reason: HOSPADM

## 2021-02-04 RX ORDER — IBUPROFEN 200 MG
24 TABLET ORAL
Status: DISCONTINUED | OUTPATIENT
Start: 2021-02-04 | End: 2021-02-09 | Stop reason: HOSPADM

## 2021-02-04 RX ORDER — INSULIN ASPART 100 [IU]/ML
0-5 INJECTION, SOLUTION INTRAVENOUS; SUBCUTANEOUS
Status: DISCONTINUED | OUTPATIENT
Start: 2021-02-04 | End: 2021-02-09 | Stop reason: HOSPADM

## 2021-02-04 RX ORDER — ACETAMINOPHEN 325 MG/1
650 TABLET ORAL EVERY 8 HOURS PRN
Status: DISCONTINUED | OUTPATIENT
Start: 2021-02-04 | End: 2021-02-09 | Stop reason: HOSPADM

## 2021-02-04 RX ORDER — FLUTICASONE PROPIONATE 50 MCG
2 SPRAY, SUSPENSION (ML) NASAL 2 TIMES DAILY PRN
COMMUNITY
End: 2023-02-23

## 2021-02-04 RX ORDER — GLUCAGON 1 MG
1 KIT INJECTION
Status: DISCONTINUED | OUTPATIENT
Start: 2021-02-04 | End: 2021-02-09 | Stop reason: HOSPADM

## 2021-02-04 RX ORDER — CEFEPIME HYDROCHLORIDE 2 G/1
2 INJECTION, POWDER, FOR SOLUTION INTRAVENOUS
Status: DISCONTINUED | OUTPATIENT
Start: 2021-02-04 | End: 2021-02-09

## 2021-02-04 RX ORDER — OXYCODONE HYDROCHLORIDE 5 MG/1
5 CAPSULE ORAL EVERY 4 HOURS PRN
COMMUNITY
End: 2022-11-11

## 2021-02-04 RX ORDER — SODIUM CHLORIDE 0.9 % (FLUSH) 0.9 %
10 SYRINGE (ML) INJECTION
Status: DISCONTINUED | OUTPATIENT
Start: 2021-02-04 | End: 2021-02-09 | Stop reason: HOSPADM

## 2021-02-04 RX ORDER — MYCOPHENOLATE MOFETIL 250 MG/1
1000 CAPSULE ORAL 2 TIMES DAILY
Status: ON HOLD | COMMUNITY
End: 2021-02-09 | Stop reason: HOSPADM

## 2021-02-04 RX ORDER — VANCOMYCIN HCL IN 5 % DEXTROSE 1G/250ML
15 PLASTIC BAG, INJECTION (ML) INTRAVENOUS
Status: DISCONTINUED | OUTPATIENT
Start: 2021-02-05 | End: 2021-02-07

## 2021-02-04 RX ORDER — ONDANSETRON 2 MG/ML
4 INJECTION INTRAMUSCULAR; INTRAVENOUS EVERY 6 HOURS PRN
Status: DISCONTINUED | OUTPATIENT
Start: 2021-02-04 | End: 2021-02-09 | Stop reason: HOSPADM

## 2021-02-04 RX ORDER — CEFEPIME HYDROCHLORIDE 2 G/1
2 INJECTION, POWDER, FOR SOLUTION INTRAVENOUS ONCE
Status: DISCONTINUED | OUTPATIENT
Start: 2021-02-04 | End: 2021-02-04

## 2021-02-04 RX ORDER — CALCITRIOL 0.25 UG/1
0.25 CAPSULE ORAL DAILY
Status: DISCONTINUED | OUTPATIENT
Start: 2021-02-05 | End: 2021-02-09 | Stop reason: HOSPADM

## 2021-02-04 RX ORDER — SODIUM BICARBONATE 650 MG/1
650 TABLET ORAL DAILY
Status: DISCONTINUED | OUTPATIENT
Start: 2021-02-05 | End: 2021-02-05

## 2021-02-04 RX ORDER — AZITHROMYCIN 250 MG/1
500 TABLET, FILM COATED ORAL DAILY
Status: ON HOLD | COMMUNITY
End: 2021-02-09 | Stop reason: HOSPADM

## 2021-02-04 RX ORDER — TACROLIMUS 1 MG/1
4 CAPSULE ORAL 2 TIMES DAILY
Status: DISCONTINUED | OUTPATIENT
Start: 2021-02-04 | End: 2021-02-05

## 2021-02-04 RX ORDER — MUPIROCIN 20 MG/G
OINTMENT TOPICAL 2 TIMES DAILY
Status: COMPLETED | OUTPATIENT
Start: 2021-02-04 | End: 2021-02-09

## 2021-02-04 RX ORDER — OXYCODONE HYDROCHLORIDE 5 MG/1
5 TABLET ORAL EVERY 6 HOURS PRN
Status: DISCONTINUED | OUTPATIENT
Start: 2021-02-04 | End: 2021-02-09 | Stop reason: HOSPADM

## 2021-02-04 RX ORDER — BISACODYL 5 MG
5 TABLET, DELAYED RELEASE (ENTERIC COATED) ORAL DAILY PRN
Status: ON HOLD | COMMUNITY
End: 2021-02-09 | Stop reason: HOSPADM

## 2021-02-04 RX ORDER — GABAPENTIN 300 MG/1
300 CAPSULE ORAL 3 TIMES DAILY
Status: DISCONTINUED | OUTPATIENT
Start: 2021-02-04 | End: 2021-02-09 | Stop reason: HOSPADM

## 2021-02-04 RX ORDER — LOPERAMIDE HYDROCHLORIDE 2 MG/1
2 CAPSULE ORAL 4 TIMES DAILY PRN
COMMUNITY
End: 2021-05-21

## 2021-02-04 RX ORDER — DIPHENHYDRAMINE HCL 25 MG
25 CAPSULE ORAL EVERY 6 HOURS PRN
COMMUNITY

## 2021-02-04 RX ORDER — GUAIFENESIN/DEXTROMETHORPHAN 100-10MG/5
5 SYRUP ORAL EVERY 12 HOURS PRN
COMMUNITY
End: 2022-07-22

## 2021-02-04 RX ADMIN — LABETALOL HYDROCHLORIDE 100 MG: 100 TABLET, FILM COATED ORAL at 09:02

## 2021-02-04 RX ADMIN — MUPIROCIN: 20 OINTMENT TOPICAL at 08:02

## 2021-02-04 RX ADMIN — GABAPENTIN 300 MG: 300 CAPSULE ORAL at 08:02

## 2021-02-04 RX ADMIN — DIBASIC SODIUM PHOSPHATE, MONOBASIC POTASSIUM PHOSPHATE AND MONOBASIC SODIUM PHOSPHATE 3 TABLET: 852; 155; 130 TABLET ORAL at 08:02

## 2021-02-04 RX ADMIN — CEFEPIME 2 G: 2 INJECTION, POWDER, FOR SOLUTION INTRAVENOUS at 08:02

## 2021-02-04 RX ADMIN — TACROLIMUS 4 MG: 1 CAPSULE ORAL at 07:02

## 2021-02-04 RX ADMIN — VANCOMYCIN HYDROCHLORIDE 1500 MG: 1.5 INJECTION, POWDER, LYOPHILIZED, FOR SOLUTION INTRAVENOUS at 08:02

## 2021-02-05 PROBLEM — U07.1 COVID-19: Status: ACTIVE | Noted: 2021-02-05

## 2021-02-05 PROBLEM — E83.39 HYPOPHOSPHATEMIA: Status: ACTIVE | Noted: 2021-02-05

## 2021-02-05 LAB
25(OH)D3+25(OH)D2 SERPL-MCNC: 19 NG/ML (ref 30–96)
ADENOVIRUS: NOT DETECTED
ALBUMIN SERPL BCP-MCNC: 3.3 G/DL (ref 3.5–5.2)
ANION GAP SERPL CALC-SCNC: 14 MMOL/L (ref 8–16)
BACTERIA UR CULT: NORMAL
BASOPHILS NFR BLD: 0 % (ref 0–1.9)
BNP SERPL-MCNC: 125 PG/ML (ref 0–99)
BORDETELLA PARAPERTUSSIS (IS1001): NOT DETECTED
BORDETELLA PERTUSSIS (PTXP): NOT DETECTED
BUN SERPL-MCNC: 16 MG/DL (ref 8–23)
CALCIUM SERPL-MCNC: 9.3 MG/DL (ref 8.7–10.5)
CHLAMYDIA PNEUMONIAE: NOT DETECTED
CHLORIDE SERPL-SCNC: 103 MMOL/L (ref 95–110)
CK SERPL-CCNC: 23 U/L (ref 20–180)
CO2 SERPL-SCNC: 19 MMOL/L (ref 23–29)
CORONAVIRUS 229E, COMMON COLD VIRUS: NOT DETECTED
CORONAVIRUS HKU1, COMMON COLD VIRUS: NOT DETECTED
CORONAVIRUS NL63, COMMON COLD VIRUS: NOT DETECTED
CORONAVIRUS OC43, COMMON COLD VIRUS: NOT DETECTED
CREAT SERPL-MCNC: 1.1 MG/DL (ref 0.5–1.4)
CRP SERPL-MCNC: 10.68 MG/L (ref 0–3.19)
D DIMER PPP IA.FEU-MCNC: 7.82 MG/L FEU
DIFFERENTIAL METHOD: ABNORMAL
EOSINOPHIL NFR BLD: 2 % (ref 0–8)
ERYTHROCYTE [DISTWIDTH] IN BLOOD BY AUTOMATED COUNT: 12.4 % (ref 11.5–14.5)
EST. GFR  (AFRICAN AMERICAN): >60 ML/MIN/1.73 M^2
EST. GFR  (NON AFRICAN AMERICAN): 53.2 ML/MIN/1.73 M^2
FERRITIN SERPL-MCNC: 2785 NG/ML (ref 20–300)
FLUBV RNA NPH QL NAA+NON-PROBE: NOT DETECTED
GLUCOSE SERPL-MCNC: 289 MG/DL (ref 70–110)
HCT VFR BLD AUTO: 39.4 % (ref 37–48.5)
HGB BLD-MCNC: 12.4 G/DL (ref 12–16)
HPIV1 RNA NPH QL NAA+NON-PROBE: NOT DETECTED
HPIV2 RNA NPH QL NAA+NON-PROBE: NOT DETECTED
HPIV3 RNA NPH QL NAA+NON-PROBE: NOT DETECTED
HPIV4 RNA NPH QL NAA+NON-PROBE: NOT DETECTED
HUMAN METAPNEUMOVIRUS: NOT DETECTED
IMM GRANULOCYTES # BLD AUTO: ABNORMAL K/UL (ref 0–0.04)
IMM GRANULOCYTES NFR BLD AUTO: ABNORMAL % (ref 0–0.5)
INFLUENZA A (SUBTYPES H1,H1-2009,H3): NOT DETECTED
LDH SERPL L TO P-CCNC: 420 U/L (ref 110–260)
LYMPHOCYTES NFR BLD: 11 % (ref 18–48)
MAGNESIUM SERPL-MCNC: 1.3 MG/DL (ref 1.6–2.6)
MCH RBC QN AUTO: 32.1 PG (ref 27–31)
MCHC RBC AUTO-ENTMCNC: 31.5 G/DL (ref 32–36)
MCV RBC AUTO: 102 FL (ref 82–98)
METAMYELOCYTES NFR BLD MANUAL: 1 %
MONOCYTES NFR BLD: 8 % (ref 4–15)
MYCOPLASMA PNEUMONIAE: NOT DETECTED
NEUTROPHILS NFR BLD: 71 % (ref 38–73)
NEUTS BAND NFR BLD MANUAL: 7 %
NRBC BLD-RTO: 0 /100 WBC
PHOSPHATE SERPL-MCNC: 2.6 MG/DL (ref 2.7–4.5)
PLATELET # BLD AUTO: 127 K/UL (ref 150–350)
PMV BLD AUTO: 12.1 FL (ref 9.2–12.9)
POCT GLUCOSE: 100 MG/DL (ref 70–110)
POCT GLUCOSE: 265 MG/DL (ref 70–110)
POCT GLUCOSE: 270 MG/DL (ref 70–110)
POCT GLUCOSE: 359 MG/DL (ref 70–110)
POCT GLUCOSE: 392 MG/DL (ref 70–110)
POTASSIUM SERPL-SCNC: 4.3 MMOL/L (ref 3.5–5.1)
PROCALCITONIN SERPL IA-MCNC: 0.13 NG/ML
RBC # BLD AUTO: 3.86 M/UL (ref 4–5.4)
RESPIRATORY INFECTION PANEL SOURCE: NORMAL
RSV RNA NPH QL NAA+NON-PROBE: NOT DETECTED
RV+EV RNA NPH QL NAA+NON-PROBE: NOT DETECTED
SODIUM SERPL-SCNC: 136 MMOL/L (ref 136–145)
TACROLIMUS BLD-MCNC: 9 NG/ML (ref 5–15)
TROPONIN I SERPL DL<=0.01 NG/ML-MCNC: 0.04 NG/ML (ref 0–0.03)
WBC # BLD AUTO: 3.62 K/UL (ref 3.9–12.7)

## 2021-02-05 PROCEDURE — 85007 BL SMEAR W/DIFF WBC COUNT: CPT

## 2021-02-05 PROCEDURE — 25000003 PHARM REV CODE 250: Performed by: PHYSICIAN ASSISTANT

## 2021-02-05 PROCEDURE — 99223 1ST HOSP IP/OBS HIGH 75: CPT | Mod: CR,,, | Performed by: INTERNAL MEDICINE

## 2021-02-05 PROCEDURE — C9399 UNCLASSIFIED DRUGS OR BIOLOG: HCPCS | Performed by: NURSE PRACTITIONER

## 2021-02-05 PROCEDURE — 84145 PROCALCITONIN (PCT): CPT

## 2021-02-05 PROCEDURE — 63600175 PHARM REV CODE 636 W HCPCS: Performed by: NURSE PRACTITIONER

## 2021-02-05 PROCEDURE — 63600175 PHARM REV CODE 636 W HCPCS: Performed by: PHYSICIAN ASSISTANT

## 2021-02-05 PROCEDURE — 80197 ASSAY OF TACROLIMUS: CPT

## 2021-02-05 PROCEDURE — 25000003 PHARM REV CODE 250: Performed by: SURGERY

## 2021-02-05 PROCEDURE — 83880 ASSAY OF NATRIURETIC PEPTIDE: CPT

## 2021-02-05 PROCEDURE — 85379 FIBRIN DEGRADATION QUANT: CPT

## 2021-02-05 PROCEDURE — 85027 COMPLETE CBC AUTOMATED: CPT

## 2021-02-05 PROCEDURE — 86141 C-REACTIVE PROTEIN HS: CPT

## 2021-02-05 PROCEDURE — 63600175 PHARM REV CODE 636 W HCPCS: Performed by: STUDENT IN AN ORGANIZED HEALTH CARE EDUCATION/TRAINING PROGRAM

## 2021-02-05 PROCEDURE — 84484 ASSAY OF TROPONIN QUANT: CPT

## 2021-02-05 PROCEDURE — 97802 MEDICAL NUTRITION INDIV IN: CPT

## 2021-02-05 PROCEDURE — 99900035 HC TECH TIME PER 15 MIN (STAT)

## 2021-02-05 PROCEDURE — 36415 COLL VENOUS BLD VENIPUNCTURE: CPT

## 2021-02-05 PROCEDURE — 82728 ASSAY OF FERRITIN: CPT

## 2021-02-05 PROCEDURE — 99222 PR INITIAL HOSPITAL CARE,LEVL II: ICD-10-PCS | Mod: CR,,, | Performed by: NURSE PRACTITIONER

## 2021-02-05 PROCEDURE — 11000001 HC ACUTE MED/SURG PRIVATE ROOM

## 2021-02-05 PROCEDURE — 63600175 PHARM REV CODE 636 W HCPCS: Performed by: SURGERY

## 2021-02-05 PROCEDURE — 82550 ASSAY OF CK (CPK): CPT

## 2021-02-05 PROCEDURE — 87633 RESP VIRUS 12-25 TARGETS: CPT

## 2021-02-05 PROCEDURE — 25000003 PHARM REV CODE 250: Performed by: INTERNAL MEDICINE

## 2021-02-05 PROCEDURE — 83615 LACTATE (LD) (LDH) ENZYME: CPT

## 2021-02-05 PROCEDURE — 80069 RENAL FUNCTION PANEL: CPT

## 2021-02-05 PROCEDURE — 25000003 PHARM REV CODE 250: Performed by: NURSE PRACTITIONER

## 2021-02-05 PROCEDURE — 99223 PR INITIAL HOSPITAL CARE,LEVL III: ICD-10-PCS | Mod: CR,,, | Performed by: INTERNAL MEDICINE

## 2021-02-05 PROCEDURE — 25000003 PHARM REV CODE 250: Performed by: STUDENT IN AN ORGANIZED HEALTH CARE EDUCATION/TRAINING PROGRAM

## 2021-02-05 PROCEDURE — 83735 ASSAY OF MAGNESIUM: CPT

## 2021-02-05 PROCEDURE — 94761 N-INVAS EAR/PLS OXIMETRY MLT: CPT

## 2021-02-05 PROCEDURE — 99222 1ST HOSP IP/OBS MODERATE 55: CPT | Mod: CR,,, | Performed by: NURSE PRACTITIONER

## 2021-02-05 PROCEDURE — 82306 VITAMIN D 25 HYDROXY: CPT

## 2021-02-05 RX ORDER — SODIUM BICARBONATE 650 MG/1
1300 TABLET ORAL 2 TIMES DAILY
Status: DISCONTINUED | OUTPATIENT
Start: 2021-02-05 | End: 2021-02-09 | Stop reason: HOSPADM

## 2021-02-05 RX ORDER — ERGOCALCIFEROL 1.25 MG/1
50000 CAPSULE ORAL
Status: DISCONTINUED | OUTPATIENT
Start: 2021-02-06 | End: 2021-02-09 | Stop reason: HOSPADM

## 2021-02-05 RX ORDER — INSULIN ASPART 100 [IU]/ML
12 INJECTION, SOLUTION INTRAVENOUS; SUBCUTANEOUS
Status: DISCONTINUED | OUTPATIENT
Start: 2021-02-05 | End: 2021-02-07

## 2021-02-05 RX ORDER — HEPARIN SODIUM 5000 [USP'U]/ML
5000 INJECTION, SOLUTION INTRAVENOUS; SUBCUTANEOUS EVERY 8 HOURS
Status: DISCONTINUED | OUTPATIENT
Start: 2021-02-05 | End: 2021-02-09 | Stop reason: HOSPADM

## 2021-02-05 RX ORDER — MAGNESIUM SULFATE HEPTAHYDRATE 40 MG/ML
2 INJECTION, SOLUTION INTRAVENOUS
Status: COMPLETED | OUTPATIENT
Start: 2021-02-05 | End: 2021-02-05

## 2021-02-05 RX ORDER — TACROLIMUS 1 MG/1
3 CAPSULE ORAL 2 TIMES DAILY
Status: DISCONTINUED | OUTPATIENT
Start: 2021-02-05 | End: 2021-02-08

## 2021-02-05 RX ADMIN — CEFEPIME 2 G: 2 INJECTION, POWDER, FOR SOLUTION INTRAVENOUS at 07:02

## 2021-02-05 RX ADMIN — MUPIROCIN: 20 OINTMENT TOPICAL at 08:02

## 2021-02-05 RX ADMIN — INSULIN ASPART 5 UNITS: 100 INJECTION, SOLUTION INTRAVENOUS; SUBCUTANEOUS at 04:02

## 2021-02-05 RX ADMIN — INSULIN ASPART 12 UNITS: 100 INJECTION, SOLUTION INTRAVENOUS; SUBCUTANEOUS at 11:02

## 2021-02-05 RX ADMIN — GABAPENTIN 300 MG: 300 CAPSULE ORAL at 08:02

## 2021-02-05 RX ADMIN — HEPARIN SODIUM 5000 UNITS: 5000 INJECTION INTRAVENOUS; SUBCUTANEOUS at 01:02

## 2021-02-05 RX ADMIN — TACROLIMUS 3 MG: 1 CAPSULE ORAL at 05:02

## 2021-02-05 RX ADMIN — DIBASIC SODIUM PHOSPHATE, MONOBASIC POTASSIUM PHOSPHATE AND MONOBASIC SODIUM PHOSPHATE 3 TABLET: 852; 155; 130 TABLET ORAL at 08:02

## 2021-02-05 RX ADMIN — INSULIN DETEMIR 16 UNITS: 100 INJECTION, SOLUTION SUBCUTANEOUS at 10:02

## 2021-02-05 RX ADMIN — MAGNESIUM SULFATE 2 G: 2 INJECTION INTRAVENOUS at 10:02

## 2021-02-05 RX ADMIN — VANCOMYCIN HYDROCHLORIDE 1000 MG: 1 INJECTION, POWDER, LYOPHILIZED, FOR SOLUTION INTRAVENOUS at 08:02

## 2021-02-05 RX ADMIN — LABETALOL HYDROCHLORIDE 100 MG: 100 TABLET, FILM COATED ORAL at 08:02

## 2021-02-05 RX ADMIN — INSULIN ASPART 1 UNITS: 100 INJECTION, SOLUTION INTRAVENOUS; SUBCUTANEOUS at 08:02

## 2021-02-05 RX ADMIN — CEFEPIME 2 G: 2 INJECTION, POWDER, FOR SOLUTION INTRAVENOUS at 03:02

## 2021-02-05 RX ADMIN — HEPARIN SODIUM 5000 UNITS: 5000 INJECTION INTRAVENOUS; SUBCUTANEOUS at 10:02

## 2021-02-05 RX ADMIN — CALCITRIOL CAPSULES 0.25 MCG 0.25 MCG: 0.25 CAPSULE ORAL at 08:02

## 2021-02-05 RX ADMIN — SODIUM BICARBONATE 650 MG TABLET 650 MG: at 08:02

## 2021-02-05 RX ADMIN — GABAPENTIN 300 MG: 300 CAPSULE ORAL at 02:02

## 2021-02-05 RX ADMIN — CEFEPIME 2 G: 2 INJECTION, POWDER, FOR SOLUTION INTRAVENOUS at 10:02

## 2021-02-05 RX ADMIN — MAGNESIUM SULFATE 2 G: 2 INJECTION INTRAVENOUS at 01:02

## 2021-02-05 RX ADMIN — SODIUM BICARBONATE 650 MG TABLET 1300 MG: at 08:02

## 2021-02-05 RX ADMIN — INSULIN ASPART 12 UNITS: 100 INJECTION, SOLUTION INTRAVENOUS; SUBCUTANEOUS at 04:02

## 2021-02-05 RX ADMIN — DIBASIC SODIUM PHOSPHATE, MONOBASIC POTASSIUM PHOSPHATE AND MONOBASIC SODIUM PHOSPHATE 2 TABLET: 852; 155; 130 TABLET ORAL at 08:02

## 2021-02-05 RX ADMIN — INSULIN ASPART 5 UNITS: 100 INJECTION, SOLUTION INTRAVENOUS; SUBCUTANEOUS at 11:02

## 2021-02-05 RX ADMIN — TACROLIMUS 4 MG: 1 CAPSULE ORAL at 08:02

## 2021-02-05 RX ADMIN — ATOVAQUONE 1500 MG: 750 SUSPENSION ORAL at 09:02

## 2021-02-05 RX ADMIN — REMDESIVIR 200 MG: 100 INJECTION, POWDER, LYOPHILIZED, FOR SOLUTION INTRAVENOUS at 03:02

## 2021-02-05 RX ADMIN — INSULIN ASPART 3 UNITS: 100 INJECTION, SOLUTION INTRAVENOUS; SUBCUTANEOUS at 08:02

## 2021-02-05 RX ADMIN — PREDNISONE 10 MG: 5 TABLET ORAL at 08:02

## 2021-02-06 LAB
ALBUMIN SERPL BCP-MCNC: 2.9 G/DL (ref 3.5–5.2)
ALBUMIN SERPL BCP-MCNC: 2.9 G/DL (ref 3.5–5.2)
ALP SERPL-CCNC: 84 U/L (ref 55–135)
ALT SERPL W/O P-5'-P-CCNC: 8 U/L (ref 10–44)
ANION GAP SERPL CALC-SCNC: 11 MMOL/L (ref 8–16)
ANION GAP SERPL CALC-SCNC: 11 MMOL/L (ref 8–16)
ANISOCYTOSIS BLD QL SMEAR: SLIGHT
AST SERPL-CCNC: 27 U/L (ref 10–40)
BASOPHILS # BLD AUTO: ABNORMAL K/UL (ref 0–0.2)
BASOPHILS NFR BLD: 0 % (ref 0–1.9)
BILIRUB SERPL-MCNC: 0.2 MG/DL (ref 0.1–1)
BUN SERPL-MCNC: 13 MG/DL (ref 8–23)
BUN SERPL-MCNC: 13 MG/DL (ref 8–23)
CALCIUM SERPL-MCNC: 9.2 MG/DL (ref 8.7–10.5)
CALCIUM SERPL-MCNC: 9.2 MG/DL (ref 8.7–10.5)
CHLORIDE SERPL-SCNC: 109 MMOL/L (ref 95–110)
CHLORIDE SERPL-SCNC: 109 MMOL/L (ref 95–110)
CMV DNA SERPL NAA+PROBE-ACNC: NORMAL IU/ML
CO2 SERPL-SCNC: 23 MMOL/L (ref 23–29)
CO2 SERPL-SCNC: 24 MMOL/L (ref 23–29)
CREAT SERPL-MCNC: 0.8 MG/DL (ref 0.5–1.4)
CREAT SERPL-MCNC: 0.8 MG/DL (ref 0.5–1.4)
CRP SERPL-MCNC: 19.15 MG/L (ref 0–3.19)
D DIMER PPP IA.FEU-MCNC: 3.06 MG/L FEU
DIFFERENTIAL METHOD: ABNORMAL
EOSINOPHIL # BLD AUTO: ABNORMAL K/UL (ref 0–0.5)
EOSINOPHIL NFR BLD: 0 % (ref 0–8)
ERYTHROCYTE [DISTWIDTH] IN BLOOD BY AUTOMATED COUNT: 12.6 % (ref 11.5–14.5)
EST. GFR  (AFRICAN AMERICAN): >60 ML/MIN/1.73 M^2
EST. GFR  (AFRICAN AMERICAN): >60 ML/MIN/1.73 M^2
EST. GFR  (NON AFRICAN AMERICAN): >60 ML/MIN/1.73 M^2
EST. GFR  (NON AFRICAN AMERICAN): >60 ML/MIN/1.73 M^2
FERRITIN SERPL-MCNC: 2767 NG/ML (ref 20–300)
GLUCOSE SERPL-MCNC: 64 MG/DL (ref 70–110)
GLUCOSE SERPL-MCNC: 65 MG/DL (ref 70–110)
HCT VFR BLD AUTO: 37.1 % (ref 37–48.5)
HGB BLD-MCNC: 11.4 G/DL (ref 12–16)
HYPOCHROMIA BLD QL SMEAR: ABNORMAL
IMM GRANULOCYTES # BLD AUTO: ABNORMAL K/UL (ref 0–0.04)
IMM GRANULOCYTES NFR BLD AUTO: ABNORMAL % (ref 0–0.5)
LDH SERPL L TO P-CCNC: 387 U/L (ref 110–260)
LYMPHOCYTES # BLD AUTO: ABNORMAL K/UL (ref 1–4.8)
LYMPHOCYTES NFR BLD: 10 % (ref 18–48)
MAGNESIUM SERPL-MCNC: 2.1 MG/DL (ref 1.6–2.6)
MCH RBC QN AUTO: 31.5 PG (ref 27–31)
MCHC RBC AUTO-ENTMCNC: 30.7 G/DL (ref 32–36)
MCV RBC AUTO: 103 FL (ref 82–98)
MONOCYTES # BLD AUTO: ABNORMAL K/UL (ref 0.3–1)
MONOCYTES NFR BLD: 12 % (ref 4–15)
NEUTROPHILS NFR BLD: 78 % (ref 38–73)
NRBC BLD-RTO: 0 /100 WBC
OVALOCYTES BLD QL SMEAR: ABNORMAL
PHOSPHATE SERPL-MCNC: 3.6 MG/DL (ref 2.7–4.5)
PLATELET # BLD AUTO: 126 K/UL (ref 150–350)
PLATELET BLD QL SMEAR: ABNORMAL
PMV BLD AUTO: 12.4 FL (ref 9.2–12.9)
POCT GLUCOSE: 152 MG/DL (ref 70–110)
POCT GLUCOSE: 158 MG/DL (ref 70–110)
POCT GLUCOSE: 195 MG/DL (ref 70–110)
POCT GLUCOSE: 87 MG/DL (ref 70–110)
POIKILOCYTOSIS BLD QL SMEAR: SLIGHT
POLYCHROMASIA BLD QL SMEAR: ABNORMAL
POTASSIUM SERPL-SCNC: 3.8 MMOL/L (ref 3.5–5.1)
POTASSIUM SERPL-SCNC: 3.8 MMOL/L (ref 3.5–5.1)
PROT SERPL-MCNC: 5.7 G/DL (ref 6–8.4)
RBC # BLD AUTO: 3.62 M/UL (ref 4–5.4)
SODIUM SERPL-SCNC: 143 MMOL/L (ref 136–145)
SODIUM SERPL-SCNC: 144 MMOL/L (ref 136–145)
TACROLIMUS BLD-MCNC: 6 NG/ML (ref 5–15)
VANCOMYCIN TROUGH SERPL-MCNC: 18.9 UG/ML (ref 10–22)
WBC # BLD AUTO: 3.95 K/UL (ref 3.9–12.7)

## 2021-02-06 PROCEDURE — 80197 ASSAY OF TACROLIMUS: CPT

## 2021-02-06 PROCEDURE — 25000003 PHARM REV CODE 250: Performed by: PHYSICIAN ASSISTANT

## 2021-02-06 PROCEDURE — 25000003 PHARM REV CODE 250: Performed by: INTERNAL MEDICINE

## 2021-02-06 PROCEDURE — 36415 COLL VENOUS BLD VENIPUNCTURE: CPT

## 2021-02-06 PROCEDURE — 83615 LACTATE (LD) (LDH) ENZYME: CPT

## 2021-02-06 PROCEDURE — 85027 COMPLETE CBC AUTOMATED: CPT

## 2021-02-06 PROCEDURE — 63600175 PHARM REV CODE 636 W HCPCS: Performed by: PHYSICIAN ASSISTANT

## 2021-02-06 PROCEDURE — 94761 N-INVAS EAR/PLS OXIMETRY MLT: CPT

## 2021-02-06 PROCEDURE — 63600175 PHARM REV CODE 636 W HCPCS: Performed by: SURGERY

## 2021-02-06 PROCEDURE — 85007 BL SMEAR W/DIFF WBC COUNT: CPT

## 2021-02-06 PROCEDURE — C9399 UNCLASSIFIED DRUGS OR BIOLOG: HCPCS | Performed by: NURSE PRACTITIONER

## 2021-02-06 PROCEDURE — 25000003 PHARM REV CODE 250: Performed by: SURGERY

## 2021-02-06 PROCEDURE — 11000001 HC ACUTE MED/SURG PRIVATE ROOM

## 2021-02-06 PROCEDURE — 82728 ASSAY OF FERRITIN: CPT

## 2021-02-06 PROCEDURE — 80069 RENAL FUNCTION PANEL: CPT

## 2021-02-06 PROCEDURE — 83735 ASSAY OF MAGNESIUM: CPT

## 2021-02-06 PROCEDURE — 25000003 PHARM REV CODE 250: Performed by: NURSE PRACTITIONER

## 2021-02-06 PROCEDURE — 63600175 PHARM REV CODE 636 W HCPCS: Performed by: STUDENT IN AN ORGANIZED HEALTH CARE EDUCATION/TRAINING PROGRAM

## 2021-02-06 PROCEDURE — 86141 C-REACTIVE PROTEIN HS: CPT

## 2021-02-06 PROCEDURE — 80053 COMPREHEN METABOLIC PANEL: CPT

## 2021-02-06 PROCEDURE — 85379 FIBRIN DEGRADATION QUANT: CPT

## 2021-02-06 PROCEDURE — 80202 ASSAY OF VANCOMYCIN: CPT

## 2021-02-06 PROCEDURE — 25000003 PHARM REV CODE 250: Performed by: STUDENT IN AN ORGANIZED HEALTH CARE EDUCATION/TRAINING PROGRAM

## 2021-02-06 RX ADMIN — HEPARIN SODIUM 5000 UNITS: 5000 INJECTION INTRAVENOUS; SUBCUTANEOUS at 06:02

## 2021-02-06 RX ADMIN — TACROLIMUS 3 MG: 1 CAPSULE ORAL at 05:02

## 2021-02-06 RX ADMIN — DIBASIC SODIUM PHOSPHATE, MONOBASIC POTASSIUM PHOSPHATE AND MONOBASIC SODIUM PHOSPHATE 2 TABLET: 852; 155; 130 TABLET ORAL at 08:02

## 2021-02-06 RX ADMIN — VANCOMYCIN HYDROCHLORIDE 1000 MG: 1 INJECTION, POWDER, LYOPHILIZED, FOR SOLUTION INTRAVENOUS at 08:02

## 2021-02-06 RX ADMIN — LABETALOL HYDROCHLORIDE 100 MG: 100 TABLET, FILM COATED ORAL at 08:02

## 2021-02-06 RX ADMIN — INSULIN ASPART 12 UNITS: 100 INJECTION, SOLUTION INTRAVENOUS; SUBCUTANEOUS at 11:02

## 2021-02-06 RX ADMIN — SODIUM BICARBONATE 650 MG TABLET 1300 MG: at 08:02

## 2021-02-06 RX ADMIN — INSULIN ASPART 12 UNITS: 100 INJECTION, SOLUTION INTRAVENOUS; SUBCUTANEOUS at 08:02

## 2021-02-06 RX ADMIN — PREDNISONE 10 MG: 5 TABLET ORAL at 08:02

## 2021-02-06 RX ADMIN — CEFEPIME 2 G: 2 INJECTION, POWDER, FOR SOLUTION INTRAVENOUS at 07:02

## 2021-02-06 RX ADMIN — REMDESIVIR 100 MG: 100 INJECTION, POWDER, LYOPHILIZED, FOR SOLUTION INTRAVENOUS at 04:02

## 2021-02-06 RX ADMIN — GABAPENTIN 300 MG: 300 CAPSULE ORAL at 08:02

## 2021-02-06 RX ADMIN — HEPARIN SODIUM 5000 UNITS: 5000 INJECTION INTRAVENOUS; SUBCUTANEOUS at 09:02

## 2021-02-06 RX ADMIN — ERGOCALCIFEROL 50000 UNITS: 1.25 CAPSULE ORAL at 08:02

## 2021-02-06 RX ADMIN — CEFEPIME 2 G: 2 INJECTION, POWDER, FOR SOLUTION INTRAVENOUS at 11:02

## 2021-02-06 RX ADMIN — MUPIROCIN: 20 OINTMENT TOPICAL at 08:02

## 2021-02-06 RX ADMIN — ATOVAQUONE 1500 MG: 750 SUSPENSION ORAL at 08:02

## 2021-02-06 RX ADMIN — CEFEPIME 2 G: 2 INJECTION, POWDER, FOR SOLUTION INTRAVENOUS at 03:02

## 2021-02-06 RX ADMIN — TACROLIMUS 3 MG: 1 CAPSULE ORAL at 08:02

## 2021-02-06 RX ADMIN — INSULIN ASPART 12 UNITS: 100 INJECTION, SOLUTION INTRAVENOUS; SUBCUTANEOUS at 04:02

## 2021-02-06 RX ADMIN — CALCITRIOL CAPSULES 0.25 MCG 0.25 MCG: 0.25 CAPSULE ORAL at 08:02

## 2021-02-06 RX ADMIN — HEPARIN SODIUM 5000 UNITS: 5000 INJECTION INTRAVENOUS; SUBCUTANEOUS at 02:02

## 2021-02-06 RX ADMIN — INSULIN DETEMIR 16 UNITS: 100 INJECTION, SOLUTION SUBCUTANEOUS at 08:02

## 2021-02-06 RX ADMIN — GABAPENTIN 300 MG: 300 CAPSULE ORAL at 02:02

## 2021-02-07 LAB
ALBUMIN SERPL BCP-MCNC: 2.6 G/DL (ref 3.5–5.2)
ALBUMIN SERPL BCP-MCNC: 2.7 G/DL (ref 3.5–5.2)
ALP SERPL-CCNC: 86 U/L (ref 55–135)
ALT SERPL W/O P-5'-P-CCNC: 9 U/L (ref 10–44)
ANION GAP SERPL CALC-SCNC: 10 MMOL/L (ref 8–16)
ANION GAP SERPL CALC-SCNC: 9 MMOL/L (ref 8–16)
ANISOCYTOSIS BLD QL SMEAR: SLIGHT
AST SERPL-CCNC: 27 U/L (ref 10–40)
BASOPHILS # BLD AUTO: ABNORMAL K/UL (ref 0–0.2)
BASOPHILS NFR BLD: 0 % (ref 0–1.9)
BILIRUB SERPL-MCNC: 0.2 MG/DL (ref 0.1–1)
BUN SERPL-MCNC: 12 MG/DL (ref 8–23)
BUN SERPL-MCNC: 12 MG/DL (ref 8–23)
CALCIUM SERPL-MCNC: 9.3 MG/DL (ref 8.7–10.5)
CALCIUM SERPL-MCNC: 9.3 MG/DL (ref 8.7–10.5)
CHLORIDE SERPL-SCNC: 110 MMOL/L (ref 95–110)
CHLORIDE SERPL-SCNC: 110 MMOL/L (ref 95–110)
CO2 SERPL-SCNC: 24 MMOL/L (ref 23–29)
CO2 SERPL-SCNC: 25 MMOL/L (ref 23–29)
CREAT SERPL-MCNC: 0.7 MG/DL (ref 0.5–1.4)
CREAT SERPL-MCNC: 0.8 MG/DL (ref 0.5–1.4)
DACRYOCYTES BLD QL SMEAR: ABNORMAL
DIFFERENTIAL METHOD: ABNORMAL
EOSINOPHIL # BLD AUTO: ABNORMAL K/UL (ref 0–0.5)
EOSINOPHIL NFR BLD: 0 % (ref 0–8)
ERYTHROCYTE [DISTWIDTH] IN BLOOD BY AUTOMATED COUNT: 12.7 % (ref 11.5–14.5)
EST. GFR  (AFRICAN AMERICAN): >60 ML/MIN/1.73 M^2
EST. GFR  (AFRICAN AMERICAN): >60 ML/MIN/1.73 M^2
EST. GFR  (NON AFRICAN AMERICAN): >60 ML/MIN/1.73 M^2
EST. GFR  (NON AFRICAN AMERICAN): >60 ML/MIN/1.73 M^2
GLUCOSE SERPL-MCNC: 118 MG/DL (ref 70–110)
GLUCOSE SERPL-MCNC: 121 MG/DL (ref 70–110)
HCT VFR BLD AUTO: 35.9 % (ref 37–48.5)
HGB BLD-MCNC: 11.4 G/DL (ref 12–16)
HYPOCHROMIA BLD QL SMEAR: ABNORMAL
IMM GRANULOCYTES # BLD AUTO: ABNORMAL K/UL (ref 0–0.04)
IMM GRANULOCYTES NFR BLD AUTO: ABNORMAL % (ref 0–0.5)
LYMPHOCYTES # BLD AUTO: ABNORMAL K/UL (ref 1–4.8)
LYMPHOCYTES NFR BLD: 24 % (ref 18–48)
MAGNESIUM SERPL-MCNC: 1.7 MG/DL (ref 1.6–2.6)
MCH RBC QN AUTO: 32 PG (ref 27–31)
MCHC RBC AUTO-ENTMCNC: 31.8 G/DL (ref 32–36)
MCV RBC AUTO: 101 FL (ref 82–98)
MONOCYTES # BLD AUTO: ABNORMAL K/UL (ref 0.3–1)
MONOCYTES NFR BLD: 18 % (ref 4–15)
NEUTROPHILS NFR BLD: 57 % (ref 38–73)
NEUTS BAND NFR BLD MANUAL: 1 %
NRBC BLD-RTO: 0 /100 WBC
OVALOCYTES BLD QL SMEAR: ABNORMAL
PHOSPHATE SERPL-MCNC: 3.1 MG/DL (ref 2.7–4.5)
PLATELET # BLD AUTO: 131 K/UL (ref 150–350)
PMV BLD AUTO: 12.6 FL (ref 9.2–12.9)
POCT GLUCOSE: 206 MG/DL (ref 70–110)
POCT GLUCOSE: 250 MG/DL (ref 70–110)
POCT GLUCOSE: 271 MG/DL (ref 70–110)
POIKILOCYTOSIS BLD QL SMEAR: SLIGHT
POLYCHROMASIA BLD QL SMEAR: ABNORMAL
POTASSIUM SERPL-SCNC: 3.5 MMOL/L (ref 3.5–5.1)
POTASSIUM SERPL-SCNC: 3.5 MMOL/L (ref 3.5–5.1)
PROT SERPL-MCNC: 5.7 G/DL (ref 6–8.4)
RBC # BLD AUTO: 3.56 M/UL (ref 4–5.4)
SODIUM SERPL-SCNC: 144 MMOL/L (ref 136–145)
SODIUM SERPL-SCNC: 144 MMOL/L (ref 136–145)
TACROLIMUS BLD-MCNC: 5 NG/ML (ref 5–15)
WBC # BLD AUTO: 3.55 K/UL (ref 3.9–12.7)

## 2021-02-07 PROCEDURE — 63600175 PHARM REV CODE 636 W HCPCS: Performed by: PHYSICIAN ASSISTANT

## 2021-02-07 PROCEDURE — 11000001 HC ACUTE MED/SURG PRIVATE ROOM

## 2021-02-07 PROCEDURE — 80069 RENAL FUNCTION PANEL: CPT

## 2021-02-07 PROCEDURE — 99233 SBSQ HOSP IP/OBS HIGH 50: CPT | Mod: CR,,, | Performed by: INTERNAL MEDICINE

## 2021-02-07 PROCEDURE — 25000003 PHARM REV CODE 250: Performed by: PHYSICIAN ASSISTANT

## 2021-02-07 PROCEDURE — 25000003 PHARM REV CODE 250: Performed by: INTERNAL MEDICINE

## 2021-02-07 PROCEDURE — 25000003 PHARM REV CODE 250: Performed by: STUDENT IN AN ORGANIZED HEALTH CARE EDUCATION/TRAINING PROGRAM

## 2021-02-07 PROCEDURE — 94761 N-INVAS EAR/PLS OXIMETRY MLT: CPT

## 2021-02-07 PROCEDURE — 83735 ASSAY OF MAGNESIUM: CPT

## 2021-02-07 PROCEDURE — 80053 COMPREHEN METABOLIC PANEL: CPT

## 2021-02-07 PROCEDURE — 85027 COMPLETE CBC AUTOMATED: CPT

## 2021-02-07 PROCEDURE — 63600175 PHARM REV CODE 636 W HCPCS: Performed by: STUDENT IN AN ORGANIZED HEALTH CARE EDUCATION/TRAINING PROGRAM

## 2021-02-07 PROCEDURE — 85007 BL SMEAR W/DIFF WBC COUNT: CPT

## 2021-02-07 PROCEDURE — 80197 ASSAY OF TACROLIMUS: CPT

## 2021-02-07 PROCEDURE — 99233 PR SUBSEQUENT HOSPITAL CARE,LEVL III: ICD-10-PCS | Mod: CR,,, | Performed by: INTERNAL MEDICINE

## 2021-02-07 PROCEDURE — 63600175 PHARM REV CODE 636 W HCPCS: Performed by: NURSE PRACTITIONER

## 2021-02-07 RX ORDER — INSULIN ASPART 100 [IU]/ML
15 INJECTION, SOLUTION INTRAVENOUS; SUBCUTANEOUS
Status: DISCONTINUED | OUTPATIENT
Start: 2021-02-07 | End: 2021-02-08

## 2021-02-07 RX ADMIN — DIBASIC SODIUM PHOSPHATE, MONOBASIC POTASSIUM PHOSPHATE AND MONOBASIC SODIUM PHOSPHATE 2 TABLET: 852; 155; 130 TABLET ORAL at 08:02

## 2021-02-07 RX ADMIN — INSULIN ASPART 12 UNITS: 100 INJECTION, SOLUTION INTRAVENOUS; SUBCUTANEOUS at 09:02

## 2021-02-07 RX ADMIN — CEFEPIME 2 G: 2 INJECTION, POWDER, FOR SOLUTION INTRAVENOUS at 03:02

## 2021-02-07 RX ADMIN — ATOVAQUONE 1500 MG: 750 SUSPENSION ORAL at 09:02

## 2021-02-07 RX ADMIN — SODIUM BICARBONATE 650 MG TABLET 1300 MG: at 09:02

## 2021-02-07 RX ADMIN — REMDESIVIR 100 MG: 100 INJECTION, POWDER, LYOPHILIZED, FOR SOLUTION INTRAVENOUS at 04:02

## 2021-02-07 RX ADMIN — GABAPENTIN 300 MG: 300 CAPSULE ORAL at 02:02

## 2021-02-07 RX ADMIN — LABETALOL HYDROCHLORIDE 100 MG: 100 TABLET, FILM COATED ORAL at 08:02

## 2021-02-07 RX ADMIN — CEFEPIME 2 G: 2 INJECTION, POWDER, FOR SOLUTION INTRAVENOUS at 08:02

## 2021-02-07 RX ADMIN — CEFEPIME 2 G: 2 INJECTION, POWDER, FOR SOLUTION INTRAVENOUS at 11:02

## 2021-02-07 RX ADMIN — HEPARIN SODIUM 5000 UNITS: 5000 INJECTION INTRAVENOUS; SUBCUTANEOUS at 10:02

## 2021-02-07 RX ADMIN — TACROLIMUS 3 MG: 1 CAPSULE ORAL at 05:02

## 2021-02-07 RX ADMIN — INSULIN ASPART 2 UNITS: 100 INJECTION, SOLUTION INTRAVENOUS; SUBCUTANEOUS at 11:02

## 2021-02-07 RX ADMIN — INSULIN ASPART 15 UNITS: 100 INJECTION, SOLUTION INTRAVENOUS; SUBCUTANEOUS at 04:02

## 2021-02-07 RX ADMIN — INSULIN DETEMIR 16 UNITS: 100 INJECTION, SOLUTION SUBCUTANEOUS at 09:02

## 2021-02-07 RX ADMIN — INSULIN ASPART 3 UNITS: 100 INJECTION, SOLUTION INTRAVENOUS; SUBCUTANEOUS at 04:02

## 2021-02-07 RX ADMIN — MUPIROCIN: 20 OINTMENT TOPICAL at 09:02

## 2021-02-07 RX ADMIN — PREDNISONE 10 MG: 5 TABLET ORAL at 09:02

## 2021-02-07 RX ADMIN — HEPARIN SODIUM 5000 UNITS: 5000 INJECTION INTRAVENOUS; SUBCUTANEOUS at 05:02

## 2021-02-07 RX ADMIN — INSULIN ASPART 12 UNITS: 100 INJECTION, SOLUTION INTRAVENOUS; SUBCUTANEOUS at 11:02

## 2021-02-07 RX ADMIN — CALCITRIOL CAPSULES 0.25 MCG 0.25 MCG: 0.25 CAPSULE ORAL at 09:02

## 2021-02-07 RX ADMIN — MUPIROCIN: 20 OINTMENT TOPICAL at 08:02

## 2021-02-07 RX ADMIN — GABAPENTIN 300 MG: 300 CAPSULE ORAL at 08:02

## 2021-02-07 RX ADMIN — LABETALOL HYDROCHLORIDE 100 MG: 100 TABLET, FILM COATED ORAL at 09:02

## 2021-02-07 RX ADMIN — GABAPENTIN 300 MG: 300 CAPSULE ORAL at 09:02

## 2021-02-07 RX ADMIN — DIBASIC SODIUM PHOSPHATE, MONOBASIC POTASSIUM PHOSPHATE AND MONOBASIC SODIUM PHOSPHATE 2 TABLET: 852; 155; 130 TABLET ORAL at 09:02

## 2021-02-07 RX ADMIN — SODIUM BICARBONATE 650 MG TABLET 1300 MG: at 08:02

## 2021-02-07 RX ADMIN — TACROLIMUS 3 MG: 1 CAPSULE ORAL at 09:02

## 2021-02-07 RX ADMIN — HEPARIN SODIUM 5000 UNITS: 5000 INJECTION INTRAVENOUS; SUBCUTANEOUS at 02:02

## 2021-02-08 PROBLEM — E83.42 HYPOMAGNESEMIA: Status: ACTIVE | Noted: 2021-02-08

## 2021-02-08 LAB
ALBUMIN SERPL BCP-MCNC: 2.7 G/DL (ref 3.5–5.2)
ALBUMIN SERPL BCP-MCNC: 2.7 G/DL (ref 3.5–5.2)
ALP SERPL-CCNC: 74 U/L (ref 55–135)
ALT SERPL W/O P-5'-P-CCNC: 8 U/L (ref 10–44)
ANION GAP SERPL CALC-SCNC: 10 MMOL/L (ref 8–16)
ANION GAP SERPL CALC-SCNC: 8 MMOL/L (ref 8–16)
ANISOCYTOSIS BLD QL SMEAR: SLIGHT
AST SERPL-CCNC: 27 U/L (ref 10–40)
BASOPHILS NFR BLD: 1 % (ref 0–1.9)
BILIRUB SERPL-MCNC: 0.2 MG/DL (ref 0.1–1)
BUN SERPL-MCNC: 11 MG/DL (ref 8–23)
BUN SERPL-MCNC: 12 MG/DL (ref 8–23)
CALCIUM SERPL-MCNC: 8.7 MG/DL (ref 8.7–10.5)
CALCIUM SERPL-MCNC: 9.1 MG/DL (ref 8.7–10.5)
CHLORIDE SERPL-SCNC: 111 MMOL/L (ref 95–110)
CHLORIDE SERPL-SCNC: 111 MMOL/L (ref 95–110)
CO2 SERPL-SCNC: 24 MMOL/L (ref 23–29)
CO2 SERPL-SCNC: 26 MMOL/L (ref 23–29)
CREAT SERPL-MCNC: 0.7 MG/DL (ref 0.5–1.4)
CREAT SERPL-MCNC: 0.7 MG/DL (ref 0.5–1.4)
CRP SERPL-MCNC: 25.68 MG/L (ref 0–3.19)
D DIMER PPP IA.FEU-MCNC: 2.74 MG/L FEU
DIFFERENTIAL METHOD: ABNORMAL
EOSINOPHIL NFR BLD: 2 % (ref 0–8)
ERYTHROCYTE [DISTWIDTH] IN BLOOD BY AUTOMATED COUNT: 12.7 % (ref 11.5–14.5)
EST. GFR  (AFRICAN AMERICAN): >60 ML/MIN/1.73 M^2
EST. GFR  (AFRICAN AMERICAN): >60 ML/MIN/1.73 M^2
EST. GFR  (NON AFRICAN AMERICAN): >60 ML/MIN/1.73 M^2
EST. GFR  (NON AFRICAN AMERICAN): >60 ML/MIN/1.73 M^2
FERRITIN SERPL-MCNC: 2573 NG/ML (ref 20–300)
GLUCOSE SERPL-MCNC: 59 MG/DL (ref 70–110)
GLUCOSE SERPL-MCNC: 60 MG/DL (ref 70–110)
HCT VFR BLD AUTO: 36.3 % (ref 37–48.5)
HGB BLD-MCNC: 11.2 G/DL (ref 12–16)
IMM GRANULOCYTES # BLD AUTO: ABNORMAL K/UL (ref 0–0.04)
IMM GRANULOCYTES NFR BLD AUTO: ABNORMAL % (ref 0–0.5)
LDH SERPL L TO P-CCNC: 378 U/L (ref 110–260)
LYMPHOCYTES NFR BLD: 28 % (ref 18–48)
MAGNESIUM SERPL-MCNC: 1.5 MG/DL (ref 1.6–2.6)
MCH RBC QN AUTO: 31.9 PG (ref 27–31)
MCHC RBC AUTO-ENTMCNC: 30.9 G/DL (ref 32–36)
MCV RBC AUTO: 103 FL (ref 82–98)
MONOCYTES NFR BLD: 28 % (ref 4–15)
NEUTROPHILS NFR BLD: 41 % (ref 38–73)
NRBC BLD-RTO: 0 /100 WBC
PHOSPHATE SERPL-MCNC: 3 MG/DL (ref 2.7–4.5)
PLATELET # BLD AUTO: 140 K/UL (ref 150–350)
PLATELET BLD QL SMEAR: ABNORMAL
PMV BLD AUTO: 12.1 FL (ref 9.2–12.9)
POCT GLUCOSE: 119 MG/DL (ref 70–110)
POCT GLUCOSE: 125 MG/DL (ref 70–110)
POCT GLUCOSE: 129 MG/DL (ref 70–110)
POCT GLUCOSE: 217 MG/DL (ref 70–110)
POCT GLUCOSE: 226 MG/DL (ref 70–110)
POCT GLUCOSE: 83 MG/DL (ref 70–110)
POTASSIUM SERPL-SCNC: 3.4 MMOL/L (ref 3.5–5.1)
POTASSIUM SERPL-SCNC: 3.5 MMOL/L (ref 3.5–5.1)
PROT SERPL-MCNC: 5.5 G/DL (ref 6–8.4)
RBC # BLD AUTO: 3.51 M/UL (ref 4–5.4)
SMUDGE CELLS BLD QL SMEAR: PRESENT
SODIUM SERPL-SCNC: 145 MMOL/L (ref 136–145)
SODIUM SERPL-SCNC: 145 MMOL/L (ref 136–145)
TACROLIMUS BLD-MCNC: 4.8 NG/ML (ref 5–15)
WBC # BLD AUTO: 3.03 K/UL (ref 3.9–12.7)

## 2021-02-08 PROCEDURE — 83735 ASSAY OF MAGNESIUM: CPT

## 2021-02-08 PROCEDURE — 85007 BL SMEAR W/DIFF WBC COUNT: CPT

## 2021-02-08 PROCEDURE — 25000003 PHARM REV CODE 250: Performed by: INTERNAL MEDICINE

## 2021-02-08 PROCEDURE — 83615 LACTATE (LD) (LDH) ENZYME: CPT

## 2021-02-08 PROCEDURE — 25000003 PHARM REV CODE 250: Performed by: PHYSICIAN ASSISTANT

## 2021-02-08 PROCEDURE — 85027 COMPLETE CBC AUTOMATED: CPT

## 2021-02-08 PROCEDURE — 36415 COLL VENOUS BLD VENIPUNCTURE: CPT

## 2021-02-08 PROCEDURE — 80053 COMPREHEN METABOLIC PANEL: CPT

## 2021-02-08 PROCEDURE — 63600175 PHARM REV CODE 636 W HCPCS: Performed by: NURSE PRACTITIONER

## 2021-02-08 PROCEDURE — 63600175 PHARM REV CODE 636 W HCPCS: Performed by: STUDENT IN AN ORGANIZED HEALTH CARE EDUCATION/TRAINING PROGRAM

## 2021-02-08 PROCEDURE — 86141 C-REACTIVE PROTEIN HS: CPT

## 2021-02-08 PROCEDURE — 80069 RENAL FUNCTION PANEL: CPT

## 2021-02-08 PROCEDURE — 11000001 HC ACUTE MED/SURG PRIVATE ROOM

## 2021-02-08 PROCEDURE — 99233 SBSQ HOSP IP/OBS HIGH 50: CPT | Mod: CR,,, | Performed by: PHYSICIAN ASSISTANT

## 2021-02-08 PROCEDURE — 99232 SBSQ HOSP IP/OBS MODERATE 35: CPT | Mod: CR,,, | Performed by: NURSE PRACTITIONER

## 2021-02-08 PROCEDURE — 82728 ASSAY OF FERRITIN: CPT

## 2021-02-08 PROCEDURE — 80197 ASSAY OF TACROLIMUS: CPT

## 2021-02-08 PROCEDURE — 99232 PR SUBSEQUENT HOSPITAL CARE,LEVL II: ICD-10-PCS | Mod: CR,,, | Performed by: NURSE PRACTITIONER

## 2021-02-08 PROCEDURE — 85379 FIBRIN DEGRADATION QUANT: CPT

## 2021-02-08 PROCEDURE — 99233 PR SUBSEQUENT HOSPITAL CARE,LEVL III: ICD-10-PCS | Mod: CR,,, | Performed by: PHYSICIAN ASSISTANT

## 2021-02-08 PROCEDURE — 25000003 PHARM REV CODE 250: Performed by: STUDENT IN AN ORGANIZED HEALTH CARE EDUCATION/TRAINING PROGRAM

## 2021-02-08 PROCEDURE — 63600175 PHARM REV CODE 636 W HCPCS: Performed by: PHYSICIAN ASSISTANT

## 2021-02-08 PROCEDURE — 94761 N-INVAS EAR/PLS OXIMETRY MLT: CPT

## 2021-02-08 RX ORDER — TACROLIMUS 1 MG/1
1 CAPSULE ORAL ONCE
Status: COMPLETED | OUTPATIENT
Start: 2021-02-08 | End: 2021-02-08

## 2021-02-08 RX ORDER — MAGNESIUM SULFATE HEPTAHYDRATE 40 MG/ML
2 INJECTION, SOLUTION INTRAVENOUS ONCE
Status: COMPLETED | OUTPATIENT
Start: 2021-02-08 | End: 2021-02-08

## 2021-02-08 RX ORDER — INSULIN ASPART 100 [IU]/ML
6 INJECTION, SOLUTION INTRAVENOUS; SUBCUTANEOUS
Status: DISCONTINUED | OUTPATIENT
Start: 2021-02-08 | End: 2021-02-09

## 2021-02-08 RX ORDER — TACROLIMUS 1 MG/1
4 CAPSULE ORAL 2 TIMES DAILY
Status: DISCONTINUED | OUTPATIENT
Start: 2021-02-08 | End: 2021-02-09 | Stop reason: HOSPADM

## 2021-02-08 RX ADMIN — HEPARIN SODIUM 5000 UNITS: 5000 INJECTION INTRAVENOUS; SUBCUTANEOUS at 09:02

## 2021-02-08 RX ADMIN — INSULIN ASPART 6 UNITS: 100 INJECTION, SOLUTION INTRAVENOUS; SUBCUTANEOUS at 05:02

## 2021-02-08 RX ADMIN — ATOVAQUONE 1500 MG: 750 SUSPENSION ORAL at 09:02

## 2021-02-08 RX ADMIN — SODIUM BICARBONATE 650 MG TABLET 1300 MG: at 09:02

## 2021-02-08 RX ADMIN — INSULIN DETEMIR 16 UNITS: 100 INJECTION, SOLUTION SUBCUTANEOUS at 08:02

## 2021-02-08 RX ADMIN — MAGNESIUM SULFATE 2 G: 2 INJECTION INTRAVENOUS at 12:02

## 2021-02-08 RX ADMIN — CEFEPIME 2 G: 2 INJECTION, POWDER, FOR SOLUTION INTRAVENOUS at 03:02

## 2021-02-08 RX ADMIN — GABAPENTIN 300 MG: 300 CAPSULE ORAL at 09:02

## 2021-02-08 RX ADMIN — MUPIROCIN: 20 OINTMENT TOPICAL at 09:02

## 2021-02-08 RX ADMIN — INSULIN ASPART 15 UNITS: 100 INJECTION, SOLUTION INTRAVENOUS; SUBCUTANEOUS at 08:02

## 2021-02-08 RX ADMIN — TACROLIMUS 3 MG: 1 CAPSULE ORAL at 09:02

## 2021-02-08 RX ADMIN — CEFEPIME 2 G: 2 INJECTION, POWDER, FOR SOLUTION INTRAVENOUS at 12:02

## 2021-02-08 RX ADMIN — SODIUM BICARBONATE 650 MG TABLET 1300 MG: at 08:02

## 2021-02-08 RX ADMIN — INSULIN ASPART 1 UNITS: 100 INJECTION, SOLUTION INTRAVENOUS; SUBCUTANEOUS at 09:02

## 2021-02-08 RX ADMIN — DIBASIC SODIUM PHOSPHATE, MONOBASIC POTASSIUM PHOSPHATE AND MONOBASIC SODIUM PHOSPHATE 2 TABLET: 852; 155; 130 TABLET ORAL at 08:02

## 2021-02-08 RX ADMIN — TACROLIMUS 4 MG: 1 CAPSULE ORAL at 05:02

## 2021-02-08 RX ADMIN — REMDESIVIR 100 MG: 100 INJECTION, POWDER, LYOPHILIZED, FOR SOLUTION INTRAVENOUS at 03:02

## 2021-02-08 RX ADMIN — PREDNISONE 10 MG: 5 TABLET ORAL at 08:02

## 2021-02-08 RX ADMIN — CEFEPIME 2 G: 2 INJECTION, POWDER, FOR SOLUTION INTRAVENOUS at 06:02

## 2021-02-08 RX ADMIN — GABAPENTIN 300 MG: 300 CAPSULE ORAL at 03:02

## 2021-02-08 RX ADMIN — LABETALOL HYDROCHLORIDE 100 MG: 100 TABLET, FILM COATED ORAL at 08:02

## 2021-02-08 RX ADMIN — LABETALOL HYDROCHLORIDE 100 MG: 100 TABLET, FILM COATED ORAL at 09:02

## 2021-02-08 RX ADMIN — INSULIN ASPART 2 UNITS: 100 INJECTION, SOLUTION INTRAVENOUS; SUBCUTANEOUS at 05:02

## 2021-02-08 RX ADMIN — INSULIN ASPART 6 UNITS: 100 INJECTION, SOLUTION INTRAVENOUS; SUBCUTANEOUS at 12:02

## 2021-02-08 RX ADMIN — HEPARIN SODIUM 5000 UNITS: 5000 INJECTION INTRAVENOUS; SUBCUTANEOUS at 03:02

## 2021-02-08 RX ADMIN — CALCITRIOL CAPSULES 0.25 MCG 0.25 MCG: 0.25 CAPSULE ORAL at 08:02

## 2021-02-08 RX ADMIN — TACROLIMUS 1 MG: 1 CAPSULE ORAL at 12:02

## 2021-02-08 RX ADMIN — HEPARIN SODIUM 5000 UNITS: 5000 INJECTION INTRAVENOUS; SUBCUTANEOUS at 06:02

## 2021-02-08 RX ADMIN — GABAPENTIN 300 MG: 300 CAPSULE ORAL at 08:02

## 2021-02-08 RX ADMIN — DIBASIC SODIUM PHOSPHATE, MONOBASIC POTASSIUM PHOSPHATE AND MONOBASIC SODIUM PHOSPHATE 2 TABLET: 852; 155; 130 TABLET ORAL at 09:02

## 2021-02-09 ENCOUNTER — TELEPHONE (OUTPATIENT)
Dept: TRANSPLANT | Facility: CLINIC | Age: 66
End: 2021-02-09

## 2021-02-09 ENCOUNTER — TELEPHONE (OUTPATIENT)
Dept: ENDOCRINOLOGY | Facility: HOSPITAL | Age: 66
End: 2021-02-09

## 2021-02-09 VITALS
SYSTOLIC BLOOD PRESSURE: 154 MMHG | BODY MASS INDEX: 22.94 KG/M2 | HEIGHT: 67 IN | WEIGHT: 146.19 LBS | RESPIRATION RATE: 24 BRPM | DIASTOLIC BLOOD PRESSURE: 98 MMHG | TEMPERATURE: 98 F | OXYGEN SATURATION: 87 % | HEART RATE: 111 BPM

## 2021-02-09 PROBLEM — T38.0X5S ADVERSE EFFECT OF ADRENAL CORTICAL STEROIDS, SEQUELA: Status: RESOLVED | Noted: 2020-11-16 | Resolved: 2021-02-09

## 2021-02-09 PROBLEM — J18.9 PNEUMONIA: Status: RESOLVED | Noted: 2021-02-04 | Resolved: 2021-02-09

## 2021-02-09 PROBLEM — R19.7 DIARRHEA: Status: RESOLVED | Noted: 2021-02-04 | Resolved: 2021-02-09

## 2021-02-09 LAB
ALBUMIN SERPL BCP-MCNC: 2.8 G/DL (ref 3.5–5.2)
ALBUMIN SERPL BCP-MCNC: 2.8 G/DL (ref 3.5–5.2)
ALP SERPL-CCNC: 109 U/L (ref 55–135)
ALT SERPL W/O P-5'-P-CCNC: 10 U/L (ref 10–44)
ANION GAP SERPL CALC-SCNC: 12 MMOL/L (ref 8–16)
ANION GAP SERPL CALC-SCNC: 13 MMOL/L (ref 8–16)
ANISOCYTOSIS BLD QL SMEAR: SLIGHT
AST SERPL-CCNC: 34 U/L (ref 10–40)
BACTERIA BLD CULT: NORMAL
BACTERIA BLD CULT: NORMAL
BASOPHILS NFR BLD: 0 % (ref 0–1.9)
BILIRUB SERPL-MCNC: 0.2 MG/DL (ref 0.1–1)
BUN SERPL-MCNC: 11 MG/DL (ref 8–23)
BUN SERPL-MCNC: 11 MG/DL (ref 8–23)
CALCIUM SERPL-MCNC: 9 MG/DL (ref 8.7–10.5)
CALCIUM SERPL-MCNC: 9.1 MG/DL (ref 8.7–10.5)
CHLORIDE SERPL-SCNC: 105 MMOL/L (ref 95–110)
CHLORIDE SERPL-SCNC: 105 MMOL/L (ref 95–110)
CO2 SERPL-SCNC: 22 MMOL/L (ref 23–29)
CO2 SERPL-SCNC: 23 MMOL/L (ref 23–29)
CREAT SERPL-MCNC: 0.7 MG/DL (ref 0.5–1.4)
CREAT SERPL-MCNC: 0.7 MG/DL (ref 0.5–1.4)
DIFFERENTIAL METHOD: ABNORMAL
EOSINOPHIL NFR BLD: 1 % (ref 0–8)
ERYTHROCYTE [DISTWIDTH] IN BLOOD BY AUTOMATED COUNT: 12.5 % (ref 11.5–14.5)
EST. GFR  (AFRICAN AMERICAN): >60 ML/MIN/1.73 M^2
EST. GFR  (AFRICAN AMERICAN): >60 ML/MIN/1.73 M^2
EST. GFR  (NON AFRICAN AMERICAN): >60 ML/MIN/1.73 M^2
EST. GFR  (NON AFRICAN AMERICAN): >60 ML/MIN/1.73 M^2
GLUCOSE SERPL-MCNC: 202 MG/DL (ref 70–110)
GLUCOSE SERPL-MCNC: 202 MG/DL (ref 70–110)
HCT VFR BLD AUTO: 35.2 % (ref 37–48.5)
HGB BLD-MCNC: 11.1 G/DL (ref 12–16)
IMM GRANULOCYTES # BLD AUTO: ABNORMAL K/UL (ref 0–0.04)
IMM GRANULOCYTES NFR BLD AUTO: ABNORMAL % (ref 0–0.5)
LYMPHOCYTES NFR BLD: 41 % (ref 18–48)
MAGNESIUM SERPL-MCNC: 1.7 MG/DL (ref 1.6–2.6)
MCH RBC QN AUTO: 32 PG (ref 27–31)
MCHC RBC AUTO-ENTMCNC: 31.5 G/DL (ref 32–36)
MCV RBC AUTO: 101 FL (ref 82–98)
MONOCYTES NFR BLD: 27 % (ref 4–15)
NEUTROPHILS NFR BLD: 31 % (ref 38–73)
NRBC BLD-RTO: 0 /100 WBC
PHOSPHATE SERPL-MCNC: 2.6 MG/DL (ref 2.7–4.5)
PLATELET # BLD AUTO: 152 K/UL (ref 150–350)
PLATELET BLD QL SMEAR: ABNORMAL
PMV BLD AUTO: 12.7 FL (ref 9.2–12.9)
POCT GLUCOSE: 161 MG/DL (ref 70–110)
POCT GLUCOSE: 291 MG/DL (ref 70–110)
POTASSIUM SERPL-SCNC: 3.2 MMOL/L (ref 3.5–5.1)
POTASSIUM SERPL-SCNC: 3.2 MMOL/L (ref 3.5–5.1)
PROT SERPL-MCNC: 5.7 G/DL (ref 6–8.4)
RBC # BLD AUTO: 3.47 M/UL (ref 4–5.4)
SODIUM SERPL-SCNC: 140 MMOL/L (ref 136–145)
SODIUM SERPL-SCNC: 140 MMOL/L (ref 136–145)
TACROLIMUS BLD-MCNC: 5.1 NG/ML (ref 5–15)
WBC # BLD AUTO: 2.73 K/UL (ref 3.9–12.7)

## 2021-02-09 PROCEDURE — 94761 N-INVAS EAR/PLS OXIMETRY MLT: CPT

## 2021-02-09 PROCEDURE — 63600175 PHARM REV CODE 636 W HCPCS: Performed by: NURSE PRACTITIONER

## 2021-02-09 PROCEDURE — 36415 COLL VENOUS BLD VENIPUNCTURE: CPT

## 2021-02-09 PROCEDURE — 80069 RENAL FUNCTION PANEL: CPT

## 2021-02-09 PROCEDURE — 63600175 PHARM REV CODE 636 W HCPCS: Performed by: PHYSICIAN ASSISTANT

## 2021-02-09 PROCEDURE — 99232 SBSQ HOSP IP/OBS MODERATE 35: CPT | Mod: CR,,, | Performed by: NURSE PRACTITIONER

## 2021-02-09 PROCEDURE — 25000003 PHARM REV CODE 250: Performed by: NURSE PRACTITIONER

## 2021-02-09 PROCEDURE — 25000003 PHARM REV CODE 250: Performed by: PHYSICIAN ASSISTANT

## 2021-02-09 PROCEDURE — 80053 COMPREHEN METABOLIC PANEL: CPT

## 2021-02-09 PROCEDURE — 25000003 PHARM REV CODE 250: Performed by: STUDENT IN AN ORGANIZED HEALTH CARE EDUCATION/TRAINING PROGRAM

## 2021-02-09 PROCEDURE — 83735 ASSAY OF MAGNESIUM: CPT

## 2021-02-09 PROCEDURE — 25000003 PHARM REV CODE 250: Performed by: INTERNAL MEDICINE

## 2021-02-09 PROCEDURE — 99232 PR SUBSEQUENT HOSPITAL CARE,LEVL II: ICD-10-PCS | Mod: CR,,, | Performed by: NURSE PRACTITIONER

## 2021-02-09 PROCEDURE — 99239 HOSP IP/OBS DSCHRG MGMT >30: CPT | Mod: 24,CR,, | Performed by: PHYSICIAN ASSISTANT

## 2021-02-09 PROCEDURE — 99239 PR HOSPITAL DISCHARGE DAY,>30 MIN: ICD-10-PCS | Mod: 24,CR,, | Performed by: PHYSICIAN ASSISTANT

## 2021-02-09 PROCEDURE — 85027 COMPLETE CBC AUTOMATED: CPT

## 2021-02-09 PROCEDURE — 80197 ASSAY OF TACROLIMUS: CPT

## 2021-02-09 PROCEDURE — C9399 UNCLASSIFIED DRUGS OR BIOLOG: HCPCS | Performed by: NURSE PRACTITIONER

## 2021-02-09 PROCEDURE — 85007 BL SMEAR W/DIFF WBC COUNT: CPT

## 2021-02-09 RX ORDER — NIFEDIPINE 30 MG/1
30 TABLET, EXTENDED RELEASE ORAL DAILY
Qty: 30 TABLET | Refills: 11 | Status: SHIPPED | OUTPATIENT
Start: 2021-02-09 | End: 2021-05-21

## 2021-02-09 RX ORDER — ERGOCALCIFEROL 1.25 MG/1
50000 CAPSULE ORAL
Qty: 4 CAPSULE | Refills: 5 | Status: SHIPPED | OUTPATIENT
Start: 2021-02-13 | End: 2021-05-11 | Stop reason: ALTCHOICE

## 2021-02-09 RX ORDER — POTASSIUM CHLORIDE 20 MEQ/1
40 TABLET, EXTENDED RELEASE ORAL ONCE
Status: COMPLETED | OUTPATIENT
Start: 2021-02-09 | End: 2021-02-09

## 2021-02-09 RX ORDER — HYDRALAZINE HYDROCHLORIDE 20 MG/ML
10 INJECTION INTRAMUSCULAR; INTRAVENOUS ONCE
Status: COMPLETED | OUTPATIENT
Start: 2021-02-09 | End: 2021-02-09

## 2021-02-09 RX ORDER — INSULIN ASPART 100 [IU]/ML
10 INJECTION, SOLUTION INTRAVENOUS; SUBCUTANEOUS
Qty: 15 ML | Refills: 3 | Status: SHIPPED | OUTPATIENT
Start: 2021-02-09 | End: 2021-03-17

## 2021-02-09 RX ORDER — NIFEDIPINE 30 MG/1
30 TABLET, EXTENDED RELEASE ORAL DAILY
Status: DISCONTINUED | OUTPATIENT
Start: 2021-02-09 | End: 2021-02-09 | Stop reason: HOSPADM

## 2021-02-09 RX ORDER — INSULIN ASPART 100 [IU]/ML
10 INJECTION, SOLUTION INTRAVENOUS; SUBCUTANEOUS
Status: DISCONTINUED | OUTPATIENT
Start: 2021-02-09 | End: 2021-02-09 | Stop reason: HOSPADM

## 2021-02-09 RX ORDER — CEFEPIME HYDROCHLORIDE 2 G/1
2 INJECTION, POWDER, FOR SOLUTION INTRAVENOUS
Status: COMPLETED | OUTPATIENT
Start: 2021-02-09 | End: 2021-02-09

## 2021-02-09 RX ADMIN — HEPARIN SODIUM 5000 UNITS: 5000 INJECTION INTRAVENOUS; SUBCUTANEOUS at 06:02

## 2021-02-09 RX ADMIN — CALCITRIOL CAPSULES 0.25 MCG 0.25 MCG: 0.25 CAPSULE ORAL at 08:02

## 2021-02-09 RX ADMIN — ATOVAQUONE 1500 MG: 750 SUSPENSION ORAL at 08:02

## 2021-02-09 RX ADMIN — INSULIN DETEMIR 12 UNITS: 100 INJECTION, SOLUTION SUBCUTANEOUS at 08:02

## 2021-02-09 RX ADMIN — GABAPENTIN 300 MG: 300 CAPSULE ORAL at 02:02

## 2021-02-09 RX ADMIN — CEFEPIME 2 G: 2 INJECTION, POWDER, FOR SOLUTION INTRAVENOUS at 03:02

## 2021-02-09 RX ADMIN — INSULIN ASPART 3 UNITS: 100 INJECTION, SOLUTION INTRAVENOUS; SUBCUTANEOUS at 11:02

## 2021-02-09 RX ADMIN — GABAPENTIN 300 MG: 300 CAPSULE ORAL at 08:02

## 2021-02-09 RX ADMIN — LABETALOL HYDROCHLORIDE 100 MG: 100 TABLET, FILM COATED ORAL at 08:02

## 2021-02-09 RX ADMIN — SODIUM BICARBONATE 650 MG TABLET 1300 MG: at 08:02

## 2021-02-09 RX ADMIN — CEFEPIME 2 G: 2 INJECTION, POWDER, FOR SOLUTION INTRAVENOUS at 11:02

## 2021-02-09 RX ADMIN — PREDNISONE 10 MG: 5 TABLET ORAL at 08:02

## 2021-02-09 RX ADMIN — HEPARIN SODIUM 5000 UNITS: 5000 INJECTION INTRAVENOUS; SUBCUTANEOUS at 02:02

## 2021-02-09 RX ADMIN — NIFEDIPINE 30 MG: 30 TABLET, FILM COATED, EXTENDED RELEASE ORAL at 11:02

## 2021-02-09 RX ADMIN — MUPIROCIN: 20 OINTMENT TOPICAL at 09:02

## 2021-02-09 RX ADMIN — REMDESIVIR 100 MG: 100 INJECTION, POWDER, LYOPHILIZED, FOR SOLUTION INTRAVENOUS at 02:02

## 2021-02-09 RX ADMIN — DIBASIC SODIUM PHOSPHATE, MONOBASIC POTASSIUM PHOSPHATE AND MONOBASIC SODIUM PHOSPHATE 2 TABLET: 852; 155; 130 TABLET ORAL at 08:02

## 2021-02-09 RX ADMIN — INSULIN ASPART 6 UNITS: 100 INJECTION, SOLUTION INTRAVENOUS; SUBCUTANEOUS at 11:02

## 2021-02-09 RX ADMIN — POTASSIUM CHLORIDE 40 MEQ: 1500 TABLET, EXTENDED RELEASE ORAL at 08:02

## 2021-02-09 RX ADMIN — TACROLIMUS 4 MG: 1 CAPSULE ORAL at 08:02

## 2021-02-09 RX ADMIN — INSULIN ASPART 6 UNITS: 100 INJECTION, SOLUTION INTRAVENOUS; SUBCUTANEOUS at 07:02

## 2021-02-09 RX ADMIN — HYDRALAZINE HYDROCHLORIDE 10 MG: 20 INJECTION INTRAMUSCULAR; INTRAVENOUS at 01:02

## 2021-02-10 ENCOUNTER — DOCUMENTATION ONLY (OUTPATIENT)
Dept: TRANSPLANT | Facility: CLINIC | Age: 66
End: 2021-02-10

## 2021-02-10 ENCOUNTER — PATIENT OUTREACH (OUTPATIENT)
Dept: ADMINISTRATIVE | Facility: CLINIC | Age: 66
End: 2021-02-10

## 2021-02-10 ENCOUNTER — TELEPHONE (OUTPATIENT)
Dept: TRANSPLANT | Facility: CLINIC | Age: 66
End: 2021-02-10

## 2021-02-10 DIAGNOSIS — U07.1 COVID-19 VIRUS DETECTED: ICD-10-CM

## 2021-02-10 LAB
CYTOMEGALOVIRUS LOG (IU/ML): NOT DETECTED
CYTOMEGALOVIRUS LOG (IU/ML): NOT DETECTED

## 2021-02-11 ENCOUNTER — TELEPHONE (OUTPATIENT)
Dept: TRANSPLANT | Facility: CLINIC | Age: 66
End: 2021-02-11

## 2021-02-11 PROCEDURE — G0180 MD CERTIFICATION HHA PATIENT: HCPCS | Mod: CR,,, | Performed by: INTERNAL MEDICINE

## 2021-02-11 PROCEDURE — G0180 PR HOME HEALTH MD CERTIFICATION: ICD-10-PCS | Mod: CR,,, | Performed by: INTERNAL MEDICINE

## 2021-02-12 ENCOUNTER — DOCUMENTATION ONLY (OUTPATIENT)
Dept: TRANSPLANT | Facility: CLINIC | Age: 66
End: 2021-02-12

## 2021-02-12 LAB
EXT ALBUMIN: 3.2 (ref 3.4–4.8)
EXT ALKALINE PHOSPHATASE: 83 (ref 40–150)
EXT ALT: 20 (ref 0–55)
EXT AST: 40 (ref 5–34)
EXT BILIRUBIN TOTAL: 0.3 (ref 0.2–1.2)
EXT BUN: 13.12 (ref 9.8–20.1)
EXT CALCIUM: 9.5 (ref 8.4–10.2)
EXT CHLORIDE: 108 (ref 98–107)
EXT CO2: 27 (ref 23–31)
EXT CREATININE: 0.76 MG/DL (ref 0.57–1.11)
EXT EOSINOPHIL%: 1.8 (ref 0.7–5.8)
EXT GLUCOSE: 242 (ref 82–115)
EXT HEMATOCRIT: 34.9 (ref 34.1–44.9)
EXT HEMOGLOBIN: 11 (ref 11.2–15.7)
EXT LYMPH%: 31.9 (ref 19.3–51.7)
EXT MAGNESIUM: 1.7 (ref 1.6–2.6)
EXT MONOCYTES%: 25.8 (ref 4.7–12)
EXT PLATELETS: 186 (ref 140–369)
EXT POTASSIUM: 4.7 (ref 3.5–5.1)
EXT PROTEIN TOTAL: 6 (ref 5.8–8.1)
EXT SEGS%: 32.2 (ref 34–71.1)
EXT SODIUM: 142 MMOL/L (ref 136–145)
EXT TACROLIMUS LVL: 4.21 (ref 5–20)
EXT WBC: 3.26 (ref 3.98–10.04)

## 2021-02-19 ENCOUNTER — TELEPHONE (OUTPATIENT)
Dept: TRANSPLANT | Facility: CLINIC | Age: 66
End: 2021-02-19

## 2021-02-19 PROBLEM — B34.8 BK VIREMIA: Status: ACTIVE | Noted: 2021-02-19

## 2021-03-05 ENCOUNTER — DOCUMENT SCAN (OUTPATIENT)
Dept: HOME HEALTH SERVICES | Facility: HOSPITAL | Age: 66
End: 2021-03-05
Payer: MEDICAID

## 2021-03-08 ENCOUNTER — EXTERNAL HOME HEALTH (OUTPATIENT)
Dept: HOME HEALTH SERVICES | Facility: HOSPITAL | Age: 66
End: 2021-03-08
Payer: MEDICARE

## 2021-03-11 ENCOUNTER — DOCUMENT SCAN (OUTPATIENT)
Dept: HOME HEALTH SERVICES | Facility: HOSPITAL | Age: 66
End: 2021-03-11
Payer: MEDICAID

## 2021-03-17 ENCOUNTER — OFFICE VISIT (OUTPATIENT)
Dept: ENDOCRINOLOGY | Facility: CLINIC | Age: 66
End: 2021-03-17
Payer: MEDICARE

## 2021-03-17 VITALS
SYSTOLIC BLOOD PRESSURE: 140 MMHG | WEIGHT: 156.5 LBS | HEIGHT: 64 IN | DIASTOLIC BLOOD PRESSURE: 72 MMHG | HEART RATE: 63 BPM | BODY MASS INDEX: 26.72 KG/M2

## 2021-03-17 DIAGNOSIS — M85.80 OSTEOPENIA, UNSPECIFIED LOCATION: ICD-10-CM

## 2021-03-17 DIAGNOSIS — E78.2 MIXED HYPERLIPIDEMIA: ICD-10-CM

## 2021-03-17 DIAGNOSIS — N25.81 SECONDARY HYPERPARATHYROIDISM OF RENAL ORIGIN: ICD-10-CM

## 2021-03-17 DIAGNOSIS — E01.0 THYROMEGALY: ICD-10-CM

## 2021-03-17 DIAGNOSIS — Z94.0 STATUS POST DECEASED-DONOR KIDNEY TRANSPLANTATION: Chronic | ICD-10-CM

## 2021-03-17 DIAGNOSIS — E11.22 TYPE 2 DIABETES MELLITUS WITH DIABETIC CHRONIC KIDNEY DISEASE, UNSPECIFIED CKD STAGE, UNSPECIFIED WHETHER LONG TERM INSULIN USE: Primary | ICD-10-CM

## 2021-03-17 DIAGNOSIS — E11.22 TYPE 2 DIABETES MELLITUS WITH DIABETIC CHRONIC KIDNEY DISEASE, UNSPECIFIED CKD STAGE, UNSPECIFIED WHETHER LONG TERM INSULIN USE: ICD-10-CM

## 2021-03-17 PROCEDURE — 99999 PR PBB SHADOW E&M-EST. PATIENT-LVL V: ICD-10-PCS | Mod: PBBFAC,,, | Performed by: NURSE PRACTITIONER

## 2021-03-17 PROCEDURE — 99214 PR OFFICE/OUTPT VISIT, EST, LEVL IV, 30-39 MIN: ICD-10-PCS | Mod: S$PBB,,, | Performed by: NURSE PRACTITIONER

## 2021-03-17 PROCEDURE — 99215 OFFICE O/P EST HI 40 MIN: CPT | Mod: PBBFAC | Performed by: NURSE PRACTITIONER

## 2021-03-17 PROCEDURE — 99999 PR PBB SHADOW E&M-EST. PATIENT-LVL V: CPT | Mod: PBBFAC,,, | Performed by: NURSE PRACTITIONER

## 2021-03-17 PROCEDURE — 99214 OFFICE O/P EST MOD 30 MIN: CPT | Mod: S$PBB,,, | Performed by: NURSE PRACTITIONER

## 2021-03-17 RX ORDER — INSULIN ASPART 100 [IU]/ML
8 INJECTION, SOLUTION INTRAVENOUS; SUBCUTANEOUS
Qty: 15 ML | Refills: 3 | Status: SHIPPED | OUTPATIENT
Start: 2021-03-17 | End: 2021-04-21

## 2021-04-01 ENCOUNTER — TELEPHONE (OUTPATIENT)
Dept: TRANSPLANT | Facility: CLINIC | Age: 66
End: 2021-04-01

## 2021-04-05 LAB
EXT ALBUMIN: 3.7 (ref 3.4–4.8)
EXT ALKALINE PHOSPHATASE: 125 (ref 40–150)
EXT ALT: 15 (ref 0–55)
EXT AST: 15 (ref 5–34)
EXT BACTERIA UA: ABNORMAL
EXT BILIRUBIN DIRECT: 0.1 MG/DL (ref 0–0.5)
EXT BILIRUBIN TOTAL: 0.3 (ref 0.2–1.2)
EXT BK VIRUS DNA QN PCR: ABNORMAL
EXT BUN: 22.08 (ref 9.8–20.1)
EXT CALCIUM: 10 (ref 8.4–10.2)
EXT CHLORIDE: 107 (ref 98–107)
EXT CHOLESTEROL (LIPID PANEL): 189 (ref 0–240)
EXT CO2: 28 (ref 23–31)
EXT CREATININE: 0.92 MG/DL (ref 0.57–1.11)
EXT EOSINOPHIL%: 1.4 (ref 0.7–5.8)
EXT GFR MDRD AF AMER: >60
EXT GLUCOSE UA: <=1000
EXT HDL: 62 (ref 40–60)
EXT HEMATOCRIT: 42 (ref 34.1–44.9)
EXT HEMOGLOBIN: 13.4 (ref 11.2–15.7)
EXT LDL CHOLESTEROL: 83 (ref 1–129)
EXT LYMPH%: 36.5 (ref 19.3–51.7)
EXT MAGNESIUM: 1.7 (ref 1.6–2.6)
EXT MONOCYTES%: 10.7 (ref 4.7–12.5)
EXT PHOSPHORUS: 2.4 (ref 2.3–4.7)
EXT PLATELETS: 182 (ref 140–369)
EXT POTASSIUM: 4.2 (ref 3.5–5.1)
EXT PROT/CREAT RATIO UR: 0.09
EXT PROTEIN TOTAL: 7 (ref 5.8–8.1)
EXT PROTEIN UA: NEGATIVE
EXT PTH, INTACT: 267.5 (ref 8.7–77.1)
EXT RBC UA: ABNORMAL
EXT SEGS%: 50.8 (ref 34–71.1)
EXT SODIUM: 141 MMOL/L (ref 136–145)
EXT TACROLIMUS LVL: 5.72 (ref 5–20)
EXT TRIGLYCERIDES: 133 (ref 0–200)
EXT URIC ACID: 7.8 (ref 2.6–6)
EXT VIT D 25 HYDROXY: 26 (ref 6–49)
EXT WBC UA: ABNORMAL
EXT WBC: 4.85 (ref 3.98–10.04)

## 2021-04-06 ENCOUNTER — DOCUMENTATION ONLY (OUTPATIENT)
Dept: TRANSPLANT | Facility: CLINIC | Age: 66
End: 2021-04-06

## 2021-04-09 ENCOUNTER — TELEPHONE (OUTPATIENT)
Dept: TRANSPLANT | Facility: CLINIC | Age: 66
End: 2021-04-09

## 2021-04-12 DIAGNOSIS — Z94.0 STATUS POST DECEASED-DONOR KIDNEY TRANSPLANTATION: Chronic | ICD-10-CM

## 2021-04-12 DIAGNOSIS — Z29.89 PROPHYLACTIC IMMUNOTHERAPY: ICD-10-CM

## 2021-04-12 DIAGNOSIS — Z79.899 LONG TERM CURRENT USE OF IMMUNOSUPPRESSIVE DRUG: ICD-10-CM

## 2021-04-12 LAB
EXT ALBUMIN: 3.7
EXT BUN: 16.09
EXT CALCIUM: 10
EXT CHLORIDE: 107
EXT CO2: 28
EXT CREATININE: 0.88 MG/DL
EXT EOSINOPHIL%: 1.5
EXT GFR MDRD AF AMER: 79
EXT GLUCOSE: 235
EXT HEMATOCRIT: 41
EXT HEMOGLOBIN: 13.2
EXT LYMPH%: 28.8
EXT MAGNESIUM: 1.6
EXT MONOCYTES%: 12.6
EXT PHOSPHORUS: 2.2
EXT PLATELETS: 161
EXT POTASSIUM: 4.2
EXT SEGS%: 55.8
EXT SODIUM: 141 MMOL/L
EXT TACROLIMUS LVL: 3.16
EXT WBC: 4.68

## 2021-04-12 RX ORDER — ATOVAQUONE 750 MG/5ML
1500 SUSPENSION ORAL DAILY
Qty: 300 ML | Refills: 2 | Status: SHIPPED | OUTPATIENT
Start: 2021-04-12 | End: 2021-05-21

## 2021-04-14 ENCOUNTER — OFFICE VISIT (OUTPATIENT)
Dept: TRANSPLANT | Facility: CLINIC | Age: 66
End: 2021-04-14
Payer: MEDICARE

## 2021-04-14 ENCOUNTER — PATIENT MESSAGE (OUTPATIENT)
Dept: TRANSPLANT | Facility: CLINIC | Age: 66
End: 2021-04-14

## 2021-04-14 ENCOUNTER — DOCUMENTATION ONLY (OUTPATIENT)
Dept: TRANSPLANT | Facility: CLINIC | Age: 66
End: 2021-04-14

## 2021-04-14 DIAGNOSIS — I12.9 RENAL HYPERTENSION: ICD-10-CM

## 2021-04-14 DIAGNOSIS — Z94.0 KIDNEY REPLACED BY TRANSPLANT: ICD-10-CM

## 2021-04-14 DIAGNOSIS — Z94.0 STATUS POST DECEASED-DONOR KIDNEY TRANSPLANTATION: Primary | Chronic | ICD-10-CM

## 2021-04-14 DIAGNOSIS — E11.22 TYPE 2 DIABETES MELLITUS WITH DIABETIC CHRONIC KIDNEY DISEASE, UNSPECIFIED CKD STAGE, UNSPECIFIED WHETHER LONG TERM INSULIN USE: ICD-10-CM

## 2021-04-14 DIAGNOSIS — N18.2 STAGE 2 CHRONIC KIDNEY DISEASE: Chronic | ICD-10-CM

## 2021-04-14 DIAGNOSIS — Z79.899 LONG TERM CURRENT USE OF IMMUNOSUPPRESSIVE DRUG: ICD-10-CM

## 2021-04-14 PROCEDURE — 99214 OFFICE O/P EST MOD 30 MIN: CPT | Mod: 95,,, | Performed by: NURSE PRACTITIONER

## 2021-04-14 PROCEDURE — 99214 PR OFFICE/OUTPT VISIT, EST, LEVL IV, 30-39 MIN: ICD-10-PCS | Mod: 95,,, | Performed by: NURSE PRACTITIONER

## 2021-04-14 RX ORDER — MYCOPHENOLATE MOFETIL 250 MG/1
500 CAPSULE ORAL 2 TIMES DAILY
Qty: 120 CAPSULE | Refills: 11 | Status: SHIPPED | OUTPATIENT
Start: 2021-04-14 | End: 2021-05-21

## 2021-04-14 RX ORDER — TACROLIMUS 1 MG/1
5 CAPSULE ORAL EVERY 12 HOURS
Qty: 240 CAPSULE | Refills: 11 | Status: SHIPPED | OUTPATIENT
Start: 2021-04-14 | End: 2021-05-10 | Stop reason: SDUPTHER

## 2021-04-19 LAB
EXT ALBUMIN: 3.5 (ref 3.4–4.8)
EXT BUN: 18.7
EXT CALCIUM: 9.9 (ref 8.4–10.2)
EXT CHLORIDE: 108 (ref 98–107)
EXT CO2: 28 (ref 23–31)
EXT CREATININE: 0.9 MG/DL (ref 0.57–1.11)
EXT EOSINOPHIL%: 2 (ref 0.7–5.8)
EXT GFR MDRD AF AMER: 76
EXT GLUCOSE: 230 (ref 82–115)
EXT HEMATOCRIT: 41.6 (ref 34.1–44.9)
EXT HEMOGLOBIN: 13.2 (ref 11.2–15.7)
EXT LYMPH%: 37 (ref 19.3–51.7)
EXT MAGNESIUM: 1.8 (ref 1.6–2.6)
EXT MONOCYTES%: 14.2 (ref 4.7–12.5)
EXT PHOSPHORUS: 2.5 (ref 2.3–4.7)
EXT PLATELETS: 162 (ref 140–369)
EXT POTASSIUM: 4.6 (ref 3.5–5.1)
EXT SEGS%: 45.9 (ref 34–71.1)
EXT SODIUM: 142 MMOL/L (ref 136–145)
EXT TACROLIMUS LVL: 5.59 (ref 5–20)
EXT WBC: 4.08 (ref 3.98–10.04)

## 2021-04-21 ENCOUNTER — PATIENT MESSAGE (OUTPATIENT)
Dept: ENDOCRINOLOGY | Facility: CLINIC | Age: 66
End: 2021-04-21

## 2021-04-21 ENCOUNTER — DOCUMENTATION ONLY (OUTPATIENT)
Dept: TRANSPLANT | Facility: CLINIC | Age: 66
End: 2021-04-21

## 2021-04-21 ENCOUNTER — OFFICE VISIT (OUTPATIENT)
Dept: ENDOCRINOLOGY | Facility: CLINIC | Age: 66
End: 2021-04-21
Payer: MEDICARE

## 2021-04-21 DIAGNOSIS — E11.22 TYPE 2 DIABETES MELLITUS WITH DIABETIC CHRONIC KIDNEY DISEASE, UNSPECIFIED CKD STAGE, UNSPECIFIED WHETHER LONG TERM INSULIN USE: Primary | ICD-10-CM

## 2021-04-21 DIAGNOSIS — Z94.0 STATUS POST DECEASED-DONOR KIDNEY TRANSPLANTATION: Chronic | ICD-10-CM

## 2021-04-21 DIAGNOSIS — E78.2 MIXED HYPERLIPIDEMIA: ICD-10-CM

## 2021-04-21 DIAGNOSIS — N25.81 SECONDARY HYPERPARATHYROIDISM OF RENAL ORIGIN: ICD-10-CM

## 2021-04-21 DIAGNOSIS — M85.80 OSTEOPENIA, UNSPECIFIED LOCATION: ICD-10-CM

## 2021-04-21 PROCEDURE — 99214 PR OFFICE/OUTPT VISIT, EST, LEVL IV, 30-39 MIN: ICD-10-PCS | Mod: 95,,, | Performed by: NURSE PRACTITIONER

## 2021-04-21 PROCEDURE — 99214 OFFICE O/P EST MOD 30 MIN: CPT | Mod: 95,,, | Performed by: NURSE PRACTITIONER

## 2021-04-21 RX ORDER — INSULIN ASPART 100 [IU]/ML
6 INJECTION, SOLUTION INTRAVENOUS; SUBCUTANEOUS
Qty: 15 ML | Refills: 3
Start: 2021-04-21 | End: 2021-07-29

## 2021-04-22 ENCOUNTER — TELEPHONE (OUTPATIENT)
Dept: TRANSPLANT | Facility: CLINIC | Age: 66
End: 2021-04-22

## 2021-05-03 LAB
EXT ALBUMIN: 3.9
EXT ALKALINE PHOSPHATASE: 151
EXT ALT: 11
EXT AST: 15
EXT BILIRUBIN DIRECT: 0.2 MG/DL
EXT BILIRUBIN TOTAL: 0.3
EXT BK VIRUS DNA QN PCR: ABNORMAL
EXT BUN: 20.11
EXT CALCIUM: 10.6
EXT CHLORIDE: 109
EXT CO2: 22
EXT CREATININE: 1.04 MG/DL
EXT EOSINOPHIL%: 1.4
EXT GFR MDRD AF AMER: >60
EXT GLUCOSE UA: >1000
EXT GLUCOSE: 292
EXT HEMATOCRIT: 44.6
EXT HEMOGLOBIN: 14.3
EXT LYMPH%: 28.4
EXT MAGNESIUM: 1.7
EXT MONOCYTES%: 9.8
EXT NITRITES UA: ABNORMAL
EXT PHOSPHORUS: 2.5
EXT PLATELETS: 166
EXT POTASSIUM: 4.5
EXT PROT/CREAT RATIO UR: 0.07
EXT PROTEIN TOTAL: 7.7
EXT PROTEIN UA: ABNORMAL
EXT RBC UA: 0
EXT SEGS%: 59.6
EXT SODIUM: 140 MMOL/L
EXT TACROLIMUS LVL: 7.78
EXT WBC UA: 0
EXT WBC: 5.11

## 2021-05-04 ENCOUNTER — HISTORICAL (OUTPATIENT)
Dept: RADIOLOGY | Facility: HOSPITAL | Age: 66
End: 2021-05-04

## 2021-05-04 ENCOUNTER — DOCUMENTATION ONLY (OUTPATIENT)
Dept: TRANSPLANT | Facility: CLINIC | Age: 66
End: 2021-05-04

## 2021-05-10 DIAGNOSIS — Z94.0 KIDNEY REPLACED BY TRANSPLANT: ICD-10-CM

## 2021-05-10 RX ORDER — TACROLIMUS 1 MG/1
5 CAPSULE ORAL EVERY 12 HOURS
Qty: 300 CAPSULE | Refills: 11 | Status: SHIPPED | OUTPATIENT
Start: 2021-05-10 | End: 2021-05-27

## 2021-05-10 RX ORDER — TACROLIMUS 1 MG/1
5 CAPSULE ORAL EVERY 12 HOURS
Qty: 300 CAPSULE | Refills: 11 | Status: SHIPPED | OUTPATIENT
Start: 2021-05-10 | End: 2021-05-10 | Stop reason: SDUPTHER

## 2021-05-11 ENCOUNTER — TELEPHONE (OUTPATIENT)
Dept: TRANSPLANT | Facility: CLINIC | Age: 66
End: 2021-05-11

## 2021-05-12 ENCOUNTER — HISTORICAL (OUTPATIENT)
Dept: INTERNAL MEDICINE | Facility: CLINIC | Age: 66
End: 2021-05-12

## 2021-05-12 ENCOUNTER — PATIENT MESSAGE (OUTPATIENT)
Dept: RESEARCH | Facility: HOSPITAL | Age: 66
End: 2021-05-12

## 2021-05-17 LAB
EXT ALBUMIN: 4
EXT BUN: 10.5
EXT CALCIUM: 10.9
EXT CHLORIDE: 106
EXT CO2: 30
EXT CREATININE: 0.87 MG/DL
EXT EOSINOPHIL%: 2.7
EXT GFR MDRD AF AMER: 80
EXT GLUCOSE: 256
EXT HEMATOCRIT: 44.5
EXT HEMOGLOBIN: 14.4
EXT LYMPH%: 26.2
EXT MAGNESIUM: 1.7
EXT MONOCYTES%: 15.8
EXT PLATELETS: 142
EXT POTASSIUM: 4.5
EXT SEGS%: 54.4
EXT SODIUM: 143 MMOL/L
EXT TACROLIMUS LVL: 6.92
EXT WBC: 3.36

## 2021-05-19 ENCOUNTER — DOCUMENTATION ONLY (OUTPATIENT)
Dept: TRANSPLANT | Facility: CLINIC | Age: 66
End: 2021-05-19

## 2021-05-21 ENCOUNTER — TELEPHONE (OUTPATIENT)
Dept: TRANSPLANT | Facility: CLINIC | Age: 66
End: 2021-05-21

## 2021-05-21 ENCOUNTER — OFFICE VISIT (OUTPATIENT)
Dept: TRANSPLANT | Facility: CLINIC | Age: 66
End: 2021-05-21
Payer: MEDICARE

## 2021-05-21 ENCOUNTER — PATIENT MESSAGE (OUTPATIENT)
Dept: TRANSPLANT | Facility: CLINIC | Age: 66
End: 2021-05-21

## 2021-05-21 DIAGNOSIS — N18.2 STAGE 2 CHRONIC KIDNEY DISEASE: Chronic | ICD-10-CM

## 2021-05-21 DIAGNOSIS — Z79.899 LONG TERM CURRENT USE OF IMMUNOSUPPRESSIVE DRUG: ICD-10-CM

## 2021-05-21 DIAGNOSIS — Z94.0 STATUS POST DECEASED-DONOR KIDNEY TRANSPLANTATION: Primary | Chronic | ICD-10-CM

## 2021-05-21 DIAGNOSIS — E11.22 TYPE 2 DIABETES MELLITUS WITH STAGE 2 CHRONIC KIDNEY DISEASE, UNSPECIFIED WHETHER LONG TERM INSULIN USE: ICD-10-CM

## 2021-05-21 DIAGNOSIS — I12.9 RENAL HYPERTENSION: ICD-10-CM

## 2021-05-21 DIAGNOSIS — N18.2 TYPE 2 DIABETES MELLITUS WITH STAGE 2 CHRONIC KIDNEY DISEASE, UNSPECIFIED WHETHER LONG TERM INSULIN USE: ICD-10-CM

## 2021-05-21 PROCEDURE — 99215 PR OFFICE/OUTPT VISIT, EST, LEVL V, 40-54 MIN: ICD-10-PCS | Mod: 95,,, | Performed by: NURSE PRACTITIONER

## 2021-05-21 PROCEDURE — 99215 OFFICE O/P EST HI 40 MIN: CPT | Mod: 95,,, | Performed by: NURSE PRACTITIONER

## 2021-05-21 RX ORDER — NIFEDIPINE 30 MG/1
30 TABLET, EXTENDED RELEASE ORAL 2 TIMES DAILY
Qty: 30 TABLET | Refills: 11 | Status: SHIPPED | OUTPATIENT
Start: 2021-05-21 | End: 2022-05-06 | Stop reason: SDUPTHER

## 2021-05-21 RX ORDER — CINACALCET 30 MG/1
30 TABLET, FILM COATED ORAL
Qty: 30 TABLET | Refills: 11 | Status: SHIPPED | OUTPATIENT
Start: 2021-05-21 | End: 2022-06-15

## 2021-05-24 ENCOUNTER — TELEPHONE (OUTPATIENT)
Dept: ENDOCRINOLOGY | Facility: CLINIC | Age: 66
End: 2021-05-24

## 2021-05-25 ENCOUNTER — TELEPHONE (OUTPATIENT)
Dept: TRANSPLANT | Facility: CLINIC | Age: 66
End: 2021-05-25

## 2021-05-25 LAB
CMV DNA PCR QUANT: ABNORMAL
EXT ALBUMIN: 3.5
EXT BUN: 21.62
EXT CALCIUM: 10.4
EXT CHLORIDE: 107
EXT CO2: 25
EXT CREATININE: 0.92 MG/DL
EXT EOSINOPHIL%: 1
EXT GFR MDRD AF AMER: >60
EXT GLUCOSE: 269
EXT HEMATOCRIT: 41.6
EXT HEMOGLOBIN: 13.6
EXT LYMPH%: 32.5
EXT MONOCYTES%: 10.7
EXT PHOSPHORUS: 2.9
EXT PLATELETS: 152
EXT POTASSIUM: 4.4
EXT SEGS%: 55
EXT SODIUM: 140 MMOL/L
EXT TACROLIMUS LVL: 5.29
EXT WBC: 5.05

## 2021-05-26 ENCOUNTER — DOCUMENTATION ONLY (OUTPATIENT)
Dept: TRANSPLANT | Facility: CLINIC | Age: 66
End: 2021-05-26

## 2021-05-27 ENCOUNTER — PATIENT MESSAGE (OUTPATIENT)
Dept: TRANSPLANT | Facility: CLINIC | Age: 66
End: 2021-05-27

## 2021-05-27 ENCOUNTER — TELEPHONE (OUTPATIENT)
Dept: TRANSPLANT | Facility: CLINIC | Age: 66
End: 2021-05-27

## 2021-05-27 DIAGNOSIS — Z94.0 KIDNEY REPLACED BY TRANSPLANT: ICD-10-CM

## 2021-05-27 RX ORDER — TACROLIMUS 1 MG/1
6 CAPSULE ORAL EVERY 12 HOURS
Qty: 360 CAPSULE | Refills: 11 | Status: SHIPPED | OUTPATIENT
Start: 2021-05-27 | End: 2021-08-11

## 2021-06-07 LAB
EXT ALBUMIN: 3.6
EXT BUN: 17.95
EXT CALCIUM: 9.2
EXT CHLORIDE: 105
EXT CO2: 27
EXT CREATININE: 0.86 MG/DL
EXT EOSINOPHIL%: 1.3
EXT GFR MDRD AF AMER: 80
EXT GLUCOSE: 320
EXT HEMATOCRIT: 42.4
EXT HEMOGLOBIN: 14
EXT LYMPH%: 31.5
EXT MAGNESIUM: 1.3
EXT MONOCYTES%: 11.5
EXT PHOSPHORUS: 2.7
EXT PLATELETS: 158
EXT POTASSIUM: 4.2
EXT SEGS%: 54.7
EXT SODIUM: 140 MMOL/L
EXT TACROLIMUS LVL: 8.46
EXT WBC: 5.3

## 2021-06-10 ENCOUNTER — DOCUMENTATION ONLY (OUTPATIENT)
Dept: TRANSPLANT | Facility: CLINIC | Age: 66
End: 2021-06-10

## 2021-07-07 ENCOUNTER — TELEPHONE (OUTPATIENT)
Dept: ENDOCRINOLOGY | Facility: CLINIC | Age: 66
End: 2021-07-07

## 2021-07-08 ENCOUNTER — TELEPHONE (OUTPATIENT)
Dept: ENDOCRINOLOGY | Facility: CLINIC | Age: 66
End: 2021-07-08

## 2021-07-12 ENCOUNTER — DOCUMENTATION ONLY (OUTPATIENT)
Dept: TRANSPLANT | Facility: CLINIC | Age: 66
End: 2021-07-12

## 2021-07-12 LAB
EXT ALBUMIN: 3.6
EXT BANDS%: 45.4
EXT BUN: 15.86
EXT CALCIUM: 10.4
EXT CHLORIDE: 108
EXT CO2: 26
EXT CREATININE: 0.83 MG/DL
EXT EOSINOPHIL%: 2
EXT GFR MDRD AF AMER: 83
EXT GLUCOSE: 197
EXT HEMATOCRIT: 41.1
EXT HEMOGLOBIN: 13.6
EXT LYMPH%: 40.3
EXT MAGNESIUM: 1.6
EXT MONOCYTES%: 11.5
EXT PHOSPHORUS: 2.8
EXT PLATELETS: 173
EXT POTASSIUM: 4
EXT SODIUM: 143 MMOL/L
EXT TACROLIMUS LVL: 10.25
EXT WBC: 5.11

## 2021-07-13 ENCOUNTER — TELEPHONE (OUTPATIENT)
Dept: TRANSPLANT | Facility: CLINIC | Age: 66
End: 2021-07-13

## 2021-07-15 ENCOUNTER — TELEPHONE (OUTPATIENT)
Dept: TRANSPLANT | Facility: CLINIC | Age: 66
End: 2021-07-15

## 2021-07-29 ENCOUNTER — PATIENT MESSAGE (OUTPATIENT)
Dept: ENDOCRINOLOGY | Facility: CLINIC | Age: 66
End: 2021-07-29

## 2021-07-29 ENCOUNTER — OFFICE VISIT (OUTPATIENT)
Dept: ENDOCRINOLOGY | Facility: CLINIC | Age: 66
End: 2021-07-29
Payer: MEDICARE

## 2021-07-29 DIAGNOSIS — N25.81 SECONDARY HYPERPARATHYROIDISM OF RENAL ORIGIN: ICD-10-CM

## 2021-07-29 DIAGNOSIS — E78.2 MIXED HYPERLIPIDEMIA: ICD-10-CM

## 2021-07-29 DIAGNOSIS — E11.22 TYPE 2 DIABETES MELLITUS WITH DIABETIC CHRONIC KIDNEY DISEASE, UNSPECIFIED CKD STAGE, UNSPECIFIED WHETHER LONG TERM INSULIN USE: ICD-10-CM

## 2021-07-29 DIAGNOSIS — Z94.0 STATUS POST DECEASED-DONOR KIDNEY TRANSPLANTATION: Chronic | ICD-10-CM

## 2021-07-29 DIAGNOSIS — N18.2 TYPE 2 DIABETES MELLITUS WITH STAGE 2 CHRONIC KIDNEY DISEASE, UNSPECIFIED WHETHER LONG TERM INSULIN USE: Primary | ICD-10-CM

## 2021-07-29 DIAGNOSIS — E11.22 TYPE 2 DIABETES MELLITUS WITH STAGE 2 CHRONIC KIDNEY DISEASE, UNSPECIFIED WHETHER LONG TERM INSULIN USE: Primary | ICD-10-CM

## 2021-07-29 DIAGNOSIS — M85.80 OSTEOPENIA, UNSPECIFIED LOCATION: ICD-10-CM

## 2021-07-29 PROCEDURE — 99214 OFFICE O/P EST MOD 30 MIN: CPT | Mod: 95,,, | Performed by: NURSE PRACTITIONER

## 2021-07-29 PROCEDURE — 99214 PR OFFICE/OUTPT VISIT, EST, LEVL IV, 30-39 MIN: ICD-10-PCS | Mod: 95,,, | Performed by: NURSE PRACTITIONER

## 2021-07-29 RX ORDER — INSULIN ASPART 100 [IU]/ML
INJECTION, SOLUTION INTRAVENOUS; SUBCUTANEOUS
Qty: 15 ML | Refills: 3 | Status: SHIPPED | OUTPATIENT
Start: 2021-07-29 | End: 2021-08-13

## 2021-08-02 ENCOUNTER — TELEPHONE (OUTPATIENT)
Dept: TRANSPLANT | Facility: CLINIC | Age: 66
End: 2021-08-02

## 2021-08-02 DIAGNOSIS — Z94.0 KIDNEY REPLACED BY TRANSPLANT: Primary | ICD-10-CM

## 2021-08-02 RX ORDER — MYCOPHENOLATE MOFETIL 250 MG/1
500 CAPSULE ORAL 2 TIMES DAILY
Qty: 120 CAPSULE | Refills: 11 | Status: SHIPPED | OUTPATIENT
Start: 2021-08-02 | End: 2021-08-13

## 2021-08-09 LAB
EXT ALBUMIN: 3.7
EXT ALKALINE PHOSPHATASE: 114
EXT ALT: 7
EXT AST: 10
EXT BACTERIA UA: ABNORMAL
EXT BILIRUBIN DIRECT: 0.2 MG/DL
EXT BILIRUBIN TOTAL: 0.5
EXT BK VIRUS DNA QN PCR: <390
EXT BUN: 14.69
EXT CALCIUM: 10.3
EXT CHLORIDE: 107
EXT CO2: 26
EXT CREATININE: 0.89 MG/DL
EXT EOSINOPHIL%: 1.6
EXT GFR MDRD AF AMER: >60
EXT GLUCOSE UA: >1000
EXT GLUCOSE: 250
EXT HEMATOCRIT: 42.1
EXT HEMOGLOBIN: 13.7
EXT LYMPH%: 30.6
EXT MAGNESIUM: 1.8
EXT MONOCYTES%: 9.3
EXT NITRITES UA: NEGATIVE
EXT PHOSPHORUS: 2.1
EXT PLATELETS: 157
EXT POTASSIUM: 4.3
EXT PROT/CREAT RATIO UR: 0.26
EXT PROTEIN TOTAL: 7
EXT PROTEIN UA: NEGATIVE
EXT RBC UA: ABNORMAL
EXT SEGS%: 57.7
EXT SODIUM: 142 MMOL/L
EXT TACROLIMUS LVL: 9.27
EXT WBC UA: ABNORMAL
EXT WBC: 5.68

## 2021-08-10 ENCOUNTER — DOCUMENTATION ONLY (OUTPATIENT)
Dept: TRANSPLANT | Facility: CLINIC | Age: 66
End: 2021-08-10

## 2021-08-11 ENCOUNTER — OFFICE VISIT (OUTPATIENT)
Dept: TRANSPLANT | Facility: CLINIC | Age: 66
End: 2021-08-11
Payer: MEDICARE

## 2021-08-11 ENCOUNTER — PATIENT MESSAGE (OUTPATIENT)
Dept: TRANSPLANT | Facility: CLINIC | Age: 66
End: 2021-08-11

## 2021-08-11 DIAGNOSIS — E78.2 MIXED HYPERLIPIDEMIA: ICD-10-CM

## 2021-08-11 DIAGNOSIS — Z94.0 KIDNEY REPLACED BY TRANSPLANT: ICD-10-CM

## 2021-08-11 DIAGNOSIS — Z79.899 LONG TERM CURRENT USE OF IMMUNOSUPPRESSIVE DRUG: ICD-10-CM

## 2021-08-11 DIAGNOSIS — I12.9 RENAL HYPERTENSION: ICD-10-CM

## 2021-08-11 DIAGNOSIS — N18.2 TYPE 2 DIABETES MELLITUS WITH STAGE 2 CHRONIC KIDNEY DISEASE, UNSPECIFIED WHETHER LONG TERM INSULIN USE: ICD-10-CM

## 2021-08-11 DIAGNOSIS — N25.81 SECONDARY HYPERPARATHYROIDISM OF RENAL ORIGIN: ICD-10-CM

## 2021-08-11 DIAGNOSIS — Z94.0 STATUS POST DECEASED-DONOR KIDNEY TRANSPLANTATION: Primary | Chronic | ICD-10-CM

## 2021-08-11 DIAGNOSIS — E11.22 TYPE 2 DIABETES MELLITUS WITH STAGE 2 CHRONIC KIDNEY DISEASE, UNSPECIFIED WHETHER LONG TERM INSULIN USE: ICD-10-CM

## 2021-08-11 DIAGNOSIS — N18.2 STAGE 2 CHRONIC KIDNEY DISEASE: Chronic | ICD-10-CM

## 2021-08-11 PROCEDURE — 99215 OFFICE O/P EST HI 40 MIN: CPT | Mod: 95,,, | Performed by: NURSE PRACTITIONER

## 2021-08-11 PROCEDURE — 99215 PR OFFICE/OUTPT VISIT, EST, LEVL V, 40-54 MIN: ICD-10-PCS | Mod: 95,,, | Performed by: NURSE PRACTITIONER

## 2021-08-11 RX ORDER — TACROLIMUS 1 MG/1
CAPSULE ORAL
Qty: 360 CAPSULE | Refills: 11 | Status: SHIPPED | OUTPATIENT
Start: 2021-08-11 | End: 2021-11-12

## 2021-08-12 ENCOUNTER — DOCUMENTATION ONLY (OUTPATIENT)
Dept: TRANSPLANT | Facility: CLINIC | Age: 66
End: 2021-08-12

## 2021-08-13 ENCOUNTER — DOCUMENTATION ONLY (OUTPATIENT)
Dept: TRANSPLANT | Facility: CLINIC | Age: 66
End: 2021-08-13

## 2021-08-13 DIAGNOSIS — Z94.0 KIDNEY REPLACED BY TRANSPLANT: ICD-10-CM

## 2021-08-13 LAB — EXT BK VIRUS DNA QN PCR: <390

## 2021-08-13 RX ORDER — MYCOPHENOLATE MOFETIL 250 MG/1
750 CAPSULE ORAL 2 TIMES DAILY
Qty: 180 CAPSULE | Refills: 11 | Status: SHIPPED | OUTPATIENT
Start: 2021-08-13 | End: 2021-11-17

## 2021-09-09 ENCOUNTER — PATIENT MESSAGE (OUTPATIENT)
Dept: TRANSPLANT | Facility: CLINIC | Age: 66
End: 2021-09-09

## 2021-09-13 ENCOUNTER — TELEPHONE (OUTPATIENT)
Dept: TRANSPLANT | Facility: CLINIC | Age: 66
End: 2021-09-13

## 2021-09-14 ENCOUNTER — TELEPHONE (OUTPATIENT)
Dept: TRANSPLANT | Facility: CLINIC | Age: 66
End: 2021-09-14

## 2021-09-14 LAB
EXT ALBUMIN: 3.6
EXT BK VIRUS DNA QN PCR: <390 COPY/ML
EXT BUN: 18.62
EXT CALCIUM: 10.2
EXT CHLORIDE: 107
EXT CO2: 28
EXT CREATININE: 1.01 MG/DL
EXT EOSINOPHIL%: 1.7
EXT GFR MDRD AF AMER: >60
EXT GFR MDRD NON AF AMER: 55
EXT GLUCOSE: 221
EXT HEMATOCRIT: 41.2
EXT HEMOGLOBIN: 13.5
EXT LYMPH%: 28.2
EXT MAGNESIUM: 1.7
EXT MONOCYTES%: 10.6
EXT PHOSPHORUS: 2.6
EXT PLATELETS: 158
EXT POTASSIUM: 4.2
EXT SEGS%: 5.89
EXT SODIUM: 142 MMOL/L
EXT TACROLIMUS LVL: 6.88
EXT WBC: 5.96

## 2021-09-15 ENCOUNTER — DOCUMENTATION ONLY (OUTPATIENT)
Dept: TRANSPLANT | Facility: CLINIC | Age: 66
End: 2021-09-15

## 2021-09-24 ENCOUNTER — TELEPHONE (OUTPATIENT)
Dept: TRANSPLANT | Facility: CLINIC | Age: 66
End: 2021-09-24

## 2021-10-11 LAB
EXT BUN: 13.57
EXT CALCIUM: 10.2
EXT CHLORIDE: 108
EXT CO2: 26
EXT CREATININE: 0.92 MG/DL
EXT EOSINOPHIL%: 1.5
EXT GFR MDRD AF AMER: >60
EXT GFR MDRD NON AF AMER: >60
EXT GLUCOSE: 293
EXT HEMATOCRIT: 41.1
EXT HEMOGLOBIN: 13.4
EXT LYMPH%: 25.8
EXT MAGNESIUM: 1.5
EXT MONOCYTES%: 12
EXT PHOSPHORUS: 2.7
EXT PLATELETS: 178
EXT POTASSIUM: 4.4
EXT SEGS%: 59.9
EXT SODIUM: 140 MMOL/L
EXT TACROLIMUS LVL: 6.61
EXT WBC: 5.23

## 2021-10-12 ENCOUNTER — DOCUMENTATION ONLY (OUTPATIENT)
Dept: TRANSPLANT | Facility: CLINIC | Age: 66
End: 2021-10-12

## 2021-11-01 ENCOUNTER — TELEPHONE (OUTPATIENT)
Dept: ENDOCRINOLOGY | Facility: CLINIC | Age: 66
End: 2021-11-01
Payer: MEDICAID

## 2021-11-02 ENCOUNTER — TELEPHONE (OUTPATIENT)
Dept: TRANSPLANT | Facility: CLINIC | Age: 66
End: 2021-11-02
Payer: MEDICAID

## 2021-11-03 ENCOUNTER — TELEPHONE (OUTPATIENT)
Dept: TRANSPLANT | Facility: CLINIC | Age: 66
End: 2021-11-03
Payer: MEDICAID

## 2021-11-08 ENCOUNTER — TELEPHONE (OUTPATIENT)
Dept: TRANSPLANT | Facility: CLINIC | Age: 66
End: 2021-11-08
Payer: MEDICAID

## 2021-11-09 LAB
EXT ALBUMIN: 3.9
EXT ALKALINE PHOSPHATASE: 109
EXT ALT: 6
EXT AST: 14
EXT BILIRUBIN DIRECT: 0.2 MG/DL
EXT BILIRUBIN TOTAL: 0.5
EXT BK VIRUS DNA QN PCR: <390
EXT BUN: 16.62
EXT CALCIUM: 10.1
EXT CHLORIDE: 109
EXT CO2: 26
EXT CREATININE: 0.88 MG/DL
EXT EOSINOPHIL%: 0.4
EXT GFR MDRD AF AMER: >60
EXT GFR MDRD NON AF AMER: >60
EXT GLUCOSE: 273
EXT HEMATOCRIT: 44.2
EXT HEMOGLOBIN: 14.2
EXT LYMPH%: 10.2
EXT MAGNESIUM: 1.6
EXT MONOCYTES%: 4.9
EXT PHOSPHORUS: 2.7
EXT PLATELETS: 161
EXT POTASSIUM: 4.5
EXT PROT/CREAT RATIO UR: 0.09
EXT PROTEIN TOTAL: 7
EXT SEGS%: 84.1
EXT SODIUM: 142 MMOL/L
EXT TACROLIMUS LVL: 7.48
EXT WBC: 4.53

## 2021-11-11 ENCOUNTER — DOCUMENTATION ONLY (OUTPATIENT)
Dept: TRANSPLANT | Facility: CLINIC | Age: 66
End: 2021-11-11
Payer: MEDICAID

## 2021-11-12 DIAGNOSIS — E83.42 HYPOMAGNESEMIA: ICD-10-CM

## 2021-11-12 DIAGNOSIS — Z94.0 KIDNEY REPLACED BY TRANSPLANT: ICD-10-CM

## 2021-11-12 RX ORDER — LANOLIN ALCOHOL/MO/W.PET/CERES
800 CREAM (GRAM) TOPICAL 2 TIMES DAILY
Qty: 120 TABLET | Refills: 11 | Status: SHIPPED | OUTPATIENT
Start: 2021-11-12 | End: 2022-11-11

## 2021-11-12 RX ORDER — TACROLIMUS 1 MG/1
CAPSULE ORAL
Qty: 360 CAPSULE | Refills: 11 | Status: SHIPPED | OUTPATIENT
Start: 2021-11-12 | End: 2022-10-19 | Stop reason: SDUPTHER

## 2021-11-16 ENCOUNTER — DOCUMENTATION ONLY (OUTPATIENT)
Dept: TRANSPLANT | Facility: CLINIC | Age: 66
End: 2021-11-16
Payer: MEDICAID

## 2021-11-17 ENCOUNTER — OFFICE VISIT (OUTPATIENT)
Dept: TRANSPLANT | Facility: CLINIC | Age: 66
End: 2021-11-17
Payer: MEDICARE

## 2021-11-17 VITALS
SYSTOLIC BLOOD PRESSURE: 145 MMHG | OXYGEN SATURATION: 93 % | WEIGHT: 157.88 LBS | BODY MASS INDEX: 25.37 KG/M2 | RESPIRATION RATE: 18 BRPM | HEART RATE: 66 BPM | DIASTOLIC BLOOD PRESSURE: 68 MMHG | HEIGHT: 66 IN | TEMPERATURE: 97 F

## 2021-11-17 DIAGNOSIS — Z94.0 KIDNEY REPLACED BY TRANSPLANT: ICD-10-CM

## 2021-11-17 DIAGNOSIS — I12.9 RENAL HYPERTENSION: ICD-10-CM

## 2021-11-17 DIAGNOSIS — Z79.899 LONG TERM CURRENT USE OF IMMUNOSUPPRESSIVE DRUG: ICD-10-CM

## 2021-11-17 DIAGNOSIS — N18.2 STAGE 2 CHRONIC KIDNEY DISEASE: Chronic | ICD-10-CM

## 2021-11-17 DIAGNOSIS — Z94.0 STATUS POST DECEASED-DONOR KIDNEY TRANSPLANTATION: Primary | Chronic | ICD-10-CM

## 2021-11-17 PROCEDURE — 99999 PR PBB SHADOW E&M-EST. PATIENT-LVL V: ICD-10-PCS | Mod: PBBFAC,,, | Performed by: NURSE PRACTITIONER

## 2021-11-17 PROCEDURE — 99215 PR OFFICE/OUTPT VISIT, EST, LEVL V, 40-54 MIN: ICD-10-PCS | Mod: S$PBB,,, | Performed by: NURSE PRACTITIONER

## 2021-11-17 PROCEDURE — 99215 OFFICE O/P EST HI 40 MIN: CPT | Mod: PBBFAC | Performed by: NURSE PRACTITIONER

## 2021-11-17 PROCEDURE — 99999 PR PBB SHADOW E&M-EST. PATIENT-LVL V: CPT | Mod: PBBFAC,,, | Performed by: NURSE PRACTITIONER

## 2021-11-17 PROCEDURE — 99215 OFFICE O/P EST HI 40 MIN: CPT | Mod: S$PBB,,, | Performed by: NURSE PRACTITIONER

## 2021-11-17 RX ORDER — ROSUVASTATIN CALCIUM 20 MG/1
20 TABLET, COATED ORAL DAILY
COMMUNITY
End: 2022-05-06 | Stop reason: SDUPTHER

## 2021-11-17 RX ORDER — MYCOPHENOLATE MOFETIL 250 MG/1
1000 CAPSULE ORAL 2 TIMES DAILY
Qty: 240 CAPSULE | Refills: 11 | Status: SHIPPED | OUTPATIENT
Start: 2021-11-17 | End: 2022-10-19 | Stop reason: SDUPTHER

## 2021-11-17 RX ORDER — NIFEDIPINE 30 MG/1
TABLET, EXTENDED RELEASE ORAL
Qty: 180 TABLET | Refills: 1 | OUTPATIENT
Start: 2021-11-17

## 2021-11-17 RX ORDER — HYDROXYZINE PAMOATE 25 MG/1
25 CAPSULE ORAL EVERY 8 HOURS PRN
COMMUNITY
End: 2022-06-07

## 2021-12-15 RX ORDER — PREDNISONE 5 MG/1
5 TABLET ORAL DAILY
Qty: 91 TABLET | Refills: 3 | Status: SHIPPED | OUTPATIENT
Start: 2021-12-15 | End: 2022-10-19 | Stop reason: SDUPTHER

## 2021-12-30 ENCOUNTER — PATIENT MESSAGE (OUTPATIENT)
Dept: TRANSPLANT | Facility: CLINIC | Age: 66
End: 2021-12-30
Payer: MEDICAID

## 2021-12-30 RX ORDER — GABAPENTIN 300 MG/1
300 CAPSULE ORAL 3 TIMES DAILY
Qty: 90 CAPSULE | Refills: 3 | Status: SHIPPED | OUTPATIENT
Start: 2021-12-30 | End: 2022-10-19 | Stop reason: SDUPTHER

## 2022-01-19 DIAGNOSIS — Z94.0 KIDNEY REPLACED BY TRANSPLANT: ICD-10-CM

## 2022-01-19 DIAGNOSIS — E83.39 HYPOPHOSPHATEMIA: ICD-10-CM

## 2022-01-20 ENCOUNTER — TELEPHONE (OUTPATIENT)
Dept: TRANSPLANT | Facility: CLINIC | Age: 67
End: 2022-01-20
Payer: MEDICAID

## 2022-01-20 RX ORDER — LABETALOL 100 MG/1
100 TABLET, FILM COATED ORAL 2 TIMES DAILY
Qty: 60 TABLET | Refills: 3 | Status: SHIPPED | OUTPATIENT
Start: 2022-01-20 | End: 2022-05-06 | Stop reason: SDUPTHER

## 2022-01-20 NOTE — TELEPHONE ENCOUNTER
Pt > 1 year post transplant.  I will authorize labetalol x 4 mos.  Please emphasize to patient need to transfer rx's unrelated to IS to her PCp or local nephrologist.    Regarding KPN. I will also authorize 4 mos, AND decresae dose to 3 tab BID from TID

## 2022-01-20 NOTE — TELEPHONE ENCOUNTER
Left detailed message with information below.         Pt > 1 year post transplant.  I will authorize labetalol x 4 mos.  Please emphasize to patient need to transfer rx's unrelated to IS to her PCp or local nephrologist.     Regarding KPN. I will also authorize 4 mos, AND decresae dose to 3 tab BID from TID

## 2022-01-25 ENCOUNTER — HISTORICAL (OUTPATIENT)
Dept: NEPHROLOGY | Facility: CLINIC | Age: 67
End: 2022-01-25

## 2022-01-25 LAB
ALBUMIN SERPL-MCNC: 3.7 GM/DL (ref 3.4–4.8)
ALBUMIN/GLOB SERPL: 1.1 RATIO (ref 1.1–2)
ALP SERPL-CCNC: 77 UNIT/L (ref 40–150)
ALT SERPL-CCNC: 8 UNIT/L (ref 0–55)
APPEARANCE, UA: CLEAR
AST SERPL-CCNC: 15 UNIT/L (ref 5–34)
BACTERIA SPEC CULT: ABNORMAL /HPF
BILIRUB SERPL-MCNC: 0.4 MG/DL
BILIRUB UR QL STRIP: NEGATIVE
BILIRUBIN DIRECT+TOT PNL SERPL-MCNC: 0.2 MG/DL (ref 0–0.5)
BILIRUBIN DIRECT+TOT PNL SERPL-MCNC: 0.2 MG/DL (ref 0–0.8)
BUN SERPL-MCNC: 12 MG/DL (ref 9.8–20.1)
CALCIUM SERPL-MCNC: 10.7 MG/DL (ref 8.7–10.5)
CHLORIDE SERPL-SCNC: 104 MMOL/L (ref 98–107)
CO2 SERPL-SCNC: 27 MMOL/L (ref 23–31)
COLOR UR: ABNORMAL
CREAT SERPL-MCNC: 0.97 MG/DL (ref 0.55–1.02)
CREAT UR-MCNC: 129.4 MG/DL (ref 45–106)
GLOBULIN SER-MCNC: 3.3 GM/DL (ref 2.4–3.5)
GLUCOSE (UA): ABNORMAL /UL
GLUCOSE SERPL-MCNC: 304 MG/DL (ref 82–115)
HGB UR QL STRIP: NEGATIVE /HPF
HYALINE CASTS #/AREA URNS LPF: ABNORMAL /LPF
KETONES UR QL STRIP: ABNORMAL /UL
LEUKOCYTE ESTERASE UR QL STRIP: NEGATIVE
MUCOUS THREADS URNS QL MICRO: ABNORMAL /LPF
NITRITE UR QL STRIP: NEGATIVE
PH UR STRIP: 5.5 /UL (ref 4.5–8)
POTASSIUM SERPL-SCNC: 4.6 MMOL/L (ref 3.5–5.1)
PROT SERPL-MCNC: 7 GM/DL (ref 5.8–7.6)
PROT UR QL STRIP: NEGATIVE /UL
PROT UR STRIP-MCNC: 13.2 MG/DL
PROT/CREAT UR-RTO: 102 MG/GM CR
RBC #/AREA URNS HPF: ABNORMAL /HPF
SODIUM SERPL-SCNC: 139 MMOL/L (ref 136–145)
SP GR UR STRIP: 1.03 (ref 1–1.03)
SQUAMOUS EPITHELIAL, UA: ABNORMAL /HPF
UROBILINOGEN UR STRIP-ACNC: NORMAL /HPF
WBC #/AREA URNS HPF: ABNORMAL /HPF

## 2022-03-18 ENCOUNTER — HISTORICAL (OUTPATIENT)
Dept: INTERNAL MEDICINE | Facility: CLINIC | Age: 67
End: 2022-03-18

## 2022-03-18 LAB
ABS NEUT (OLG): 2.18 (ref 2.1–9.2)
ALBUMIN SERPL-MCNC: 3.9 G/DL (ref 3.4–4.8)
ALBUMIN/GLOB SERPL: 1.3 {RATIO} (ref 1.1–2)
ALP SERPL-CCNC: 82 U/L (ref 40–150)
ALT SERPL-CCNC: 7 U/L (ref 0–55)
AST SERPL-CCNC: 13 U/L (ref 5–34)
BASOPHILS # BLD AUTO: 0 10*3/UL (ref 0–0.2)
BASOPHILS NFR BLD AUTO: 1 %
BILIRUB SERPL-MCNC: 0.4 MG/DL
BILIRUBIN DIRECT+TOT PNL SERPL-MCNC: 0.2 (ref 0–0.5)
BILIRUBIN DIRECT+TOT PNL SERPL-MCNC: 0.2 (ref 0–0.8)
BUN SERPL-MCNC: 16 MG/DL (ref 9.8–20.1)
CALCIUM SERPL-MCNC: 11.1 MG/DL (ref 8.7–10.5)
CHLORIDE SERPL-SCNC: 108 MMOL/L (ref 98–107)
CO2 SERPL-SCNC: 29 MMOL/L (ref 23–31)
CREAT SERPL-MCNC: 0.82 MG/DL (ref 0.55–1.02)
EOSINOPHIL # BLD AUTO: 0.1 10*3/UL (ref 0–0.9)
EOSINOPHIL NFR BLD AUTO: 2 %
ERYTHROCYTE [DISTWIDTH] IN BLOOD BY AUTOMATED COUNT: 14.3 % (ref 11.5–14.5)
EST. AVERAGE GLUCOSE BLD GHB EST-MCNC: 217.3 MG/DL
FLAG2 (OHS): 70
FLAG3 (OHS): 80
FLAGS (OHS): 70
GLOBULIN SER-MCNC: 3 G/DL (ref 2.4–3.5)
GLUCOSE SERPL-MCNC: 89 MG/DL (ref 82–115)
HBA1C MFR BLD: 9.2 %
HCT VFR BLD AUTO: 39.8 % (ref 35–46)
HGB BLD-MCNC: 12.6 G/DL (ref 12–16)
ICTERIC INTERF INDEX SERPL-ACNC: 0
IMM GRANULOCYTES # BLD AUTO: 0.01 10*3/UL
IMM GRANULOCYTES NFR BLD AUTO: 0 %
IMM. NE 2 SUSPECT FLAG (OHS): 10
LIPEMIC INTERF INDEX SERPL-ACNC: 5
LOW EVENT # SUSPECT FLAG (OHS): 70
LYMPHOCYTES # BLD AUTO: 1.8 10*3/UL (ref 0.6–4.6)
LYMPHOCYTES NFR BLD AUTO: 37 %
MANUAL DIFF? (OHS): NO
MCH RBC QN AUTO: 30.3 PG (ref 26–34)
MCHC RBC AUTO-ENTMCNC: 31.7 G/DL (ref 31–37)
MCV RBC AUTO: 95.7 FL (ref 80–100)
MO BLASTS SUSPECT FLAG (OHS): 70
MONOCYTES # BLD AUTO: 0.7 10*3/UL (ref 0.1–1.3)
MONOCYTES NFR BLD AUTO: 15 %
NEUTROPHILS # BLD AUTO: 2.18 10*3/UL (ref 2.1–9.2)
NEUTROPHILS NFR BLD AUTO: 45 %
NRBC BLD AUTO-RTO: 0 % (ref 0–0.2)
PLATELET # BLD AUTO: 171 10*3/UL (ref 130–400)
PLATELET CLUMPS SUSPECT FLAG (OHS): 80
PMV BLD AUTO: 11.7 FL (ref 7.4–10.4)
POTASSIUM SERPL-SCNC: 3.8 MMOL/L (ref 3.5–5.1)
PROT SERPL-MCNC: 6.9 G/DL (ref 5.8–7.6)
RBC # BLD AUTO: 4.16 10*6/UL (ref 4–5.2)
SODIUM SERPL-SCNC: 142 MMOL/L (ref 136–145)
WBC # SPEC AUTO: 4.9 10*3/UL (ref 4.5–11)

## 2022-04-07 ENCOUNTER — HISTORICAL (OUTPATIENT)
Dept: ADMINISTRATIVE | Facility: HOSPITAL | Age: 67
End: 2022-04-07
Payer: MEDICAID

## 2022-04-22 ENCOUNTER — TELEPHONE (OUTPATIENT)
Dept: TRANSPLANT | Facility: CLINIC | Age: 67
End: 2022-04-22
Payer: MEDICAID

## 2022-04-23 VITALS
SYSTOLIC BLOOD PRESSURE: 160 MMHG | BODY MASS INDEX: 27.52 KG/M2 | OXYGEN SATURATION: 98 % | WEIGHT: 161.19 LBS | DIASTOLIC BLOOD PRESSURE: 72 MMHG | HEIGHT: 64 IN

## 2022-04-28 DIAGNOSIS — E13.649 UNCONTROLLED DIABETES MELLITUS OF OTHER TYPE WITH HYPOGLYCEMIA, UNSPECIFIED HYPOGLYCEMIA COMA STATUS: Primary | ICD-10-CM

## 2022-04-28 NOTE — H&P
Patient:   Kendra Malik            MRN: [id number]            FIN: [id number]               Age:   63 years     Sex:  Female     :  1955   Associated Diagnoses:   None   Author:   Marce Bauer MD      Chief complaint: Blurry vision  History of Present Illness: Patient is currently feeling well, able to lie flat without having shortness of breath, has no current complaints of pain, headache, dizziness, fever, or chills, and feels well enough to undergo eye surgery. Pt has history of increasingly poor vision, glare, and difficulty seeing out of the affected eye  PMHx/Psurg: see chart  Medications: see chart  Allergies: NKDA  Social: non-contributory  Family Hx: no ocular problems  ROS: Negative x 10 except for eye complaints pre-operatively; negative for fever, chills, weight loss, nausea, vomiting, diarrhea, shortness of breath, nasal discharge, cough, abdominal pain, difficulty moving arms and legs, confusion, dysuria, palpitations, or chest pain  Physical Examination: Patient appears well developed and well nourished and is in no acute distress, is normocephalic and atraumatic. Pt is resting comfortably and breathing at a normal rate. Pulses are intact and heart is beating at a normal rate. Abdomen is not distended. Pt is able to ambulate and move arms and legs appropriately. Pt is awake, alert, and oriented x3. See ophthalmology clinic note for full ocular details of examination findings.   Assessment/Plan: Patient will undergo pre-operative CBC, BMP, CXR, and EKG prior to eye surgery and clearance by medical personnel/anesthesia if necessary. Otherwise we will proceed with surgery. Discussed patient's history, physical, assessment and plan with the attending who agrees and wishes to proceed with surgery as described in ophthalmology note previously.

## 2022-04-28 NOTE — DISCHARGE SUMMARY
Patient:   Kendra Malik            MRN: [id number]            FIN: [id number]               Age:   63 years     Sex:  Female     :  1955   Associated Diagnoses:   None   Author:   Lizette MCKEON, Marce Robins        DISCHARGE SUMMARY    Date Admitted: ___3/21/19    Date Discharged: Same    Chief Complaint: Blurred vision OS__    Significant findings:   Physical Examination: Cataract _OS_   Laboratory: None   X-ray: None   Other Diagnostic Procedures: None    Principal Diagnosis: Cataract _OS_    Other Significant Diagnosis: None    Operations/Procedures: Cataract Extraction OS    Complications: None    Plan:   Follow-up: Next day at [facility] ophthalmology clinic   Medications: Resume home medications   Diet: Resume home diet   Level of Activity: No strenous activity    Discharge to: Home    Condition at Discharge: Stable

## 2022-04-28 NOTE — OP NOTE
Patient:   Kendra Malik            MRN: [id number]            FIN: [id number]               Age:   63 years     Sex:  Female     :  1955   Associated Diagnoses:   None   Author:   Marce Bauer MD      CATARACT EXTRACTION WITH INTRAOCULAR LENS PLACEMENT   SURGEON: SILVER Bauer MD  ATTENDING: Paddy Ramos MD  PRE OPERATIVE DIAGNOSIS: Cataract _OD__  POST OPERATIVE DIAGNOSIS: Cataract _OD  DATE OF SURGERY: 19  PROCEDURES: Phacoemulsification with intraocular lens, __OD_  IMPLANT: MX60E, _+25.0__D lens   ANESTHESIA: MAC with topical tetracaine and intracameral lidocaine   COMPLICATIONS: None   ESTIMATED BLOOD LOSS: None   INDICATIONS:   The patient has a history of painless progressive visual loss and difficulty with activities of daily living secondary to cataract formation. After a thorough discussion of the risks, benefits and alternatives to cataract surgery, including, but not limited to, the rare risks of infection, retinal detachment, need for additional surgery, loss of vision and even loss of the eye, the patient voices good understanding and desires to proceed.       DESCRIPTION OF PROCEDURE:   The patients IOL calculations and lens selection were reviewed and confirmed. After verification and marking of the proper eye in the preop holding area, several sets of 1% Mydriacil, 2.5% phenylephrine, and 1% Cyclogyl drops were then placed. The patient was brought to the operating room in supine position where the eye was prepped and draped in standard sterile fashion using 5% betadine and a lid speculum placed. Topical tetracaine was used in addition to the preoperative anesthesia. A paracentesis incision was created through which intracameral lidocaine and Amvisc were injected. A 2.65 mm keratome blade was used to create a triplanar temporal clear corneal incision. A cystotome and Utrata forceps was then used to fashion a continuous curvilinear capsulorrhexis.  Hydrodissection and hydrodelineation with BSS was performed using a hydrodissection cannula. Phacoemulsification of the nucleus was carried out with a divide and conquer technique, and all remaining epinuclear and cortical material was removed. The eye was reformed using viscoelastic and the intraocular lens was implanted into the capsular bag. All remaining viscoelastic was removed from the eye. At the end of the case, the lens was well-centered and all wounds were found to be watertight. Drops of vigamox and predforte and were instilled and an eye shield placed over the eye. The patient tolerated the procedure well and was taken to the recovery area in stable condition. The patient was instructed to follow-up for routine post-operative care the following morning.

## 2022-04-28 NOTE — DISCHARGE SUMMARY
Patient:   Kendra Malik            MRN: [id number]            FIN: [id number]               Age:   63 years     Sex:  Female     :  1955   Associated Diagnoses:   None   Author:   Lizette MCKEON, Marce Robins        DISCHARGE SUMMARY    Date Admitted: _19__    Date Discharged: Same    Chief Complaint: Blurred vision _OD_    Significant findings:   Physical Examination: Cataract OD__   Laboratory: None   X-ray: None   Other Diagnostic Procedures: None    Principal Diagnosis: Cataract _OD_    Other Significant Diagnosis: None    Operations/Procedures: Cataract Extraction OD__    Complications: None    Plan:   Follow-up: Next day at [facility] ophthalmology clinic   Medications: Resume home medications   Diet: Resume home diet   Level of Activity: No strenous activity    Discharge to: Home    Condition at Discharge: Stable

## 2022-04-28 NOTE — OP NOTE
Patient:   Kendra Malik            MRN: [id number]            FIN: [id number]               Age:   63 years     Sex:  Female     :  1955   Associated Diagnoses:   None   Author:   Marce Bauer MD      CATARACT EXTRACTION WITH INTRAOCULAR LENS PLACEMENT   SURGEON: SILVER Bauer MD  ATTENDING: __Paddy Ramos MD _  PRE OPERATIVE DIAGNOSIS: Cataract _OS__  POST OPERATIVE DIAGNOSIS: Cataract OS  DATE OF SURGERY:***  PROCEDURES: Phacoemulsification with intraocular lens, __OS_  IMPLANT: MX60E, _+26.5D__D lens   ANESTHESIA: MAC with topical tetracaine and intracameral lidocaine   COMPLICATIONS: None   ESTIMATED BLOOD LOSS: None   INDICATIONS:   The patient has a history of painless progressive visual loss and difficulty with activities of daily living secondary to cataract formation. After a thorough discussion of the risks, benefits and alternatives to cataract surgery, including, but not limited to, the rare risks of infection, retinal detachment, need for additional surgery, loss of vision and even loss of the eye, the patient voices good understanding and desires to proceed.     DESCRIPTION OF PROCEDURE:   The patients IOL calculations and lens selection were reviewed and confirmed. After verification and marking of the proper eye in the preop holding area, several sets of 1% Mydriacil, 2.5% phenylephrine, and 1% Cyclogyl drops were then placed. The patient was brought to the operating room in supine position where the eye was prepped and draped in standard sterile fashion using 5% betadine and a lid speculum placed. Topical tetracaine was used in addition to the preoperative anesthesia. A paracentesis incision was created through which intracameral lidocaine and Amvisc were injected. A 2.65 mm keratome blade was used to create a triplanar temporal clear corneal incision. A cystotome and Utrata forceps was then used to fashion a continuous curvilinear capsulorrhexis. Hydrodissection  and hydrodelineation with BSS was performed using a hydrodissection cannula. Phacoemulsification of the nucleus was carried out with a divide and conquer technique, and all remaining epinuclear and cortical material was removed. The eye was reformed using viscoelastic and the intraocular lens was implanted into the capsular bag. All remaining viscoelastic was removed from the eye. At the end of the case, the lens was well-centered and all wounds were found to be watertight. Drops of vigamox and predforte and were instilled and an eye shield placed over the eye. The patient tolerated the procedure well and was taken to the recovery area in stable condition. The patient was instructed to follow-up for routine post-operative care the following morning.

## 2022-04-28 NOTE — H&P
Patient:   Kendra Malik            MRN: [id number]            FIN: [id number]               Age:   63 years     Sex:  Female     :  1955   Associated Diagnoses:   None   Author:   Marce Bauer MD      Chief complaint: Blurry vision  History of Present Illness: Patient is currently feeling well, able to lie flat without having shortness of breath, has no current complaints of pain, headache, dizziness, fever, or chills, and feels well enough to undergo eye surgery. Pt has history of increasingly poor vision, glare, and difficulty seeing out of the affected eye  PMHx/Psurg: see chart  Medications: see chart  Allergies: NKDA  Social: non-contributory  Family Hx: no ocular problems  ROS: Negative x 10 except for eye complaints pre-operatively; negative for fever, chills, weight loss, nausea, vomiting, diarrhea, shortness of breath, nasal discharge, cough, abdominal pain, difficulty moving arms and legs, confusion, dysuria, palpitations, or chest pain  Physical Examination: Patient appears well developed and well nourished and is in no acute distress, is normocephalic and atraumatic. Pt is resting comfortably and breathing at a normal rate. Pulses are intact and heart is beating at a normal rate. Abdomen is not distended. Pt is able to ambulate and move arms and legs appropriately. Pt is awake, alert, and oriented x3. See ophthalmology clinic note for full ocular details of examination findings.   Assessment/Plan: Patient will undergo pre-operative CBC, BMP, CXR, and EKG prior to eye surgery and clearance by medical personnel/anesthesia if necessary. Otherwise we will proceed with surgery. Discussed patient's history, physical, assessment and plan with the attending who agrees and wishes to proceed with surgery as described in ophthalmology note previously.

## 2022-04-29 ENCOUNTER — TELEPHONE (OUTPATIENT)
Dept: NEPHROLOGY | Facility: CLINIC | Age: 67
End: 2022-04-29
Payer: MEDICAID

## 2022-05-01 NOTE — HISTORICAL [facility] CERNER
This is a historical note converted from [place]. Formatting and pictures may have been removed.  Please reference [place] for original formatting and attached multimedia. Chief Complaint  follow up  History of Present Illness  Pt is a?63 Years?old?AA Female here for f/u visit. PMH DM, HTN, CKD st V, anemia of chronic disease, HLD, neuropathy, AR, and retinopathy. Currently followed by Optho clinic here at [facility] and Dr. Karla Scruggs, nephrologist, at private office/dialysis center. Pt states BP meds were restarted last week per Dr. Scruggs; will monitor. S/P peritoneal dialysis cath placement 8/28/18; receiving Procrit injections at [facility].?Does PD at?home nightly and prn wt gain; maybe once a week. ?In the process of getting placed on [facility] list;?attends appts in [place].?Reports compliance with meds. Follows renal/ADA diet. States had MMG and bone scan done at [facility] in [place]; will sign release for records. Has callus to right plantar foot; states needs new pair of diabetic shoes. Denies chest pain, shortness of breath,?cough, fever, headache,?dizziness, weakness, abdominal pain, nausea,?vomiting, diarrhea, constipation, dysuria, depression, anxiety, SI/HI.  Review of Systems  Constitutional: negative except as stated in HPI  Eye: negative except as stated in HPI  ENT: negative except as stated in HPI  Respiratory: negative except as stated in HPI  Cardiovascular: negative except as stated in HPI  Gastrointestinal: negative except as stated in HPI  Genitourinary: negative except as stated in HPI  Heme/Lymph: negative except as stated in HPI  Endocrine: negative except as stated in HPI  Immunologic: negative except as stated in HPI  Musculoskeletal: negative except as stated in HPI  Integumentary: negative except as stated in HPI  Neurologic: negative except as stated in HPI  ?   All Other ROS_ negative except as stated in HPI  Physical Exam  Vitals &  Measurements  T:?36.7? ?C (Oral)? HR:?76(Peripheral)? RR:?18? BP:?160/68?  HT:?167?cm? WT:?72.0?kg? BMI:?25.82?  General: Alert and oriented, No acute distress.  Head: Normocephalic, Atraumatic.  Eye: Pupils are equal, round and reactive to light, Extraocular movements are intact, Normal conjunctiva, Sclera non-icteric.  Ears/Nose/Throat: Normal hearing, Oral mucosa is moist, No pharyngeal erythema.  Neck/Thyroid: Supple, Non-tender, No lymphadenopathy, No thyromegaly, No carotid bruit, No JVD, Full range of motion.  Respiratory: Clear to auscultation bilaterally, Non-labored Respirations, Breath sounds are equal, Symmetrical chest wall expansion, No wheezes, rales or rhonchi.  Cardiovascular: Regular rate and rhythm, Normal S1/S2, No murmurs, rubs or gallops.?Normal peripheral perfusion, No edema. PD cath to?right chest wall?C/D/I.  Gastrointestinal: Soft, Non-tender, Non-distended, Normal bowel sounds, No palpable organomegaly.  Genitourinary: No costovertebral angle tenderness, No inguinal tenderness.  Musculoskeletal: Normal range of motion, Normal strength, No tenderness, Normal gait.  Integumentary: Warm, Dry, Intact, No suspicious lesions or rashes.  Neurologic: No focal deficits, Cranial Nerves II-XII are grossly intact, Motor strength normal upper and lower extremities, Sensory exam intact.  Psychiatric: Normal interaction, Coherent speech, Euthymic mood, Appropriate affect.  Feet:? 2+ pedal pulses bilaterally.  Assessment/Plan  1.?Diabetes?E11.9  Refilled januvia.?  A1C 6.4 at goal. Previous A1C 8.9 on 3/21/19.  Continue medications as prescribed.  Follow ADA diet.  Avoid soda, simple sweets, and limit rice/pasta/bread/starches and consume brown options when possible.?  Maintain healthy weight with BMI goal <30.?  Perform aerobic exercise for 150 minutes per week (or 5 days a week for 30 minutes each day).?  Examine feet daily.?  Obtain annual dilated eye exam.  Eye exam: 12/14/18  Foot exam:  today  Ordered:  Misc Prescription, Lancets and Strips (True Metrix), See Instructions, check blood sugar three times a daily. DX: E11.9, # 100 EA, 6 Refill(s), Pharmacy: [facility]  1160F- Medication reconciliation completed during visit, Diabetes  HLD (hyperlipidemia)  Hypertension  AR (allergic rhinitis)  Anemia in chronic kidney disease (CKD)  Chronic kidney disease (CKD), stage V  PDR (proliferative diabetic retinopathy)  Neuropathy, [facility], 06/21/19 10:07:00 CDT  Clinic Follow up, *Est. 10/21/19 3:00:00 CDT, Order for future visit, Diabetes  HLD (hyperlipidemia)  Hypertension  AR (allergic rhinitis)  Anemia in chronic kidney disease (CKD)  Chronic kidney disease (CKD), stage V  PDR (proliferative diabetic retinopathy)  N...  Hemoglobin A1C [facility], Routine collect, *Est. 10/21/19 3:00:00 CDT, Blood, Order for future visit, *Est. Stop date 10/21/19 3:00:00 CDT, Lab Collect, Diabetes, 06/21/19 10:17:00 CDT  Microalbum/Creatinine Ratio Urine (Microalb/Creat), Routine collect, Urine, Order for future visit, *Est. 06/21/19 3:00:00 CDT, *Est. Stop date 06/21/19 3:00:00 CDT, Nurse collect, Diabetes  Office/Outpatient Visit Level 4 Established [id number] PC, Diabetes  HLD (hyperlipidemia)  Hypertension  AR (allergic rhinitis)  Anemia in chronic kidney disease (CKD)  Chronic kidney disease (CKD), stage V  PDR (proliferative diabetic retinopathy)  Neuropathy, [facility], 06/21/19 10:07:00 CDT  ?  2.?HLD (hyperlipidemia)?E78.5  ?LDL?84?/ Trig?144 / HDL - 59.  Continue statin as prescribed.?  Follow a low cholesterol, low saturated fat diet with less than 200 mg of cholesterol a day.?  Avoid fried foods and high saturated fats (bonilla, sausage, cookies, cakes, chips, cheese, whole milk, butter, mayonnaise, creamy dressings, gravy and cream sauces).  Add flax seed or fish oil supplements to diet.?  Increase dietary fiber.?  Regular exercise improves cholesterol levels.  Physical  activity 5 times a week for 30 minutes per day (or 150 minutes per week).?  Stressed importance of dietary modifications.  Ordered:  1160F- Medication reconciliation completed during visit, Diabetes  HLD (hyperlipidemia)  Hypertension  AR (allergic rhinitis)  Anemia in chronic kidney disease (CKD)  Chronic kidney disease (CKD), stage V  PDR (proliferative diabetic retinopathy)  Neuropathy, [facility], 06/21/19 10:07:00 CDT  Clinic Follow up, *Est. 10/21/19 3:00:00 CDT, Order for future visit, Diabetes  HLD (hyperlipidemia)  Hypertension  AR (allergic rhinitis)  Anemia in chronic kidney disease (CKD)  Chronic kidney disease (CKD), stage V  PDR (proliferative diabetic retinopathy)  N...  Office/Outpatient Visit Level 4 Established [id number] PC, Diabetes  HLD (hyperlipidemia)  Hypertension  AR (allergic rhinitis)  Anemia in chronic kidney disease (CKD)  Chronic kidney disease (CKD), stage V  PDR (proliferative diabetic retinopathy)  Neuropathy, [facility], 06/21/19 10:07:00 CDT  ?  3.?Hypertension?I10  ?Elevated.  Meds adjusted last week per renal.  Nurse visit in 4 weeks for bp check.  Follow a low sodium (less than 2 grams of sodium per day), DASH diet.?  Continue medications as prescribed.  Monitor blood pressure and report any consistent values greater than 140/90 and keep a log.  Maintain healthy weight with a BMI goal of <30.?  Aerobic exercise for 150 minutes per week (or 5 days a week for 30 minutes each day).  Ordered:  1160F- Medication reconciliation completed during visit, Diabetes  HLD (hyperlipidemia)  Hypertension  AR (allergic rhinitis)  Anemia in chronic kidney disease (CKD)  Chronic kidney disease (CKD), stage V  PDR (proliferative diabetic retinopathy)  Neuropathy, [facility], 06/21/19 10:07:00 CDT  Clinic Follow up, *Est. 07/19/19 3:00:00 CDT, Order for future visit, Hypertension, [facility]  Clinic Follow up, *Est. 10/21/19 3:00:00 CDT, Order for future visit,  Diabetes  HLD (hyperlipidemia)  Hypertension  AR (allergic rhinitis)  Anemia in chronic kidney disease (CKD)  Chronic kidney disease (CKD), stage V  PDR (proliferative diabetic retinopathy)  N...  Office/Outpatient Visit Level 4 Established [id number] , Diabetes  HLD (hyperlipidemia)  Hypertension  AR (allergic rhinitis)  Anemia in chronic kidney disease (CKD)  Chronic kidney disease (CKD), stage V  PDR (proliferative diabetic retinopathy)  Neuropathy, [facility], 06/21/19 10:07:00 CDT  ?  4.?AR (allergic rhinitis)?J30.9  Refilled loratadine and flonase.  Avoid/limit triggers such as pollen, dust, molds, and pet dander.?  Avoid smoke, strong chemicals, cleaning products, perfumes/colognes.  Ordered:  1160F- Medication reconciliation completed during visit, Diabetes  HLD (hyperlipidemia)  Hypertension  AR (allergic rhinitis)  Anemia in chronic kidney disease (CKD)  Chronic kidney disease (CKD), stage V  PDR (proliferative diabetic retinopathy)  Neuropathy, [facility], 06/21/19 10:07:00 CDT  Clinic Follow up, *Est. 10/21/19 3:00:00 CDT, Order for future visit, Diabetes  HLD (hyperlipidemia)  Hypertension  AR (allergic rhinitis)  Anemia in chronic kidney disease (CKD)  Chronic kidney disease (CKD), stage V  PDR (proliferative diabetic retinopathy)  N...  Office/Outpatient Visit Level 4 Established [id number] , Diabetes  HLD (hyperlipidemia)  Hypertension  AR (allergic rhinitis)  Anemia in chronic kidney disease (CKD)  Chronic kidney disease (CKD), stage V  PDR (proliferative diabetic retinopathy)  Neuropathy, [facility], 06/21/19 10:07:00 CDT  ?  5.?Anemia in chronic kidney disease (CKD)?N18.9  Continue procrit injections as scheduled per renal.  Ordered:  1160F- Medication reconciliation completed during visit, Diabetes  HLD (hyperlipidemia)  Hypertension  AR (allergic rhinitis)  Anemia in chronic kidney disease (CKD)  Chronic kidney disease (CKD), stage V  PDR  (proliferative diabetic retinopathy)  Neuropathy, [facility], 06/21/19 10:07:00 CDT  Clinic Follow up, *Est. 10/21/19 3:00:00 CDT, Order for future visit, Diabetes  HLD (hyperlipidemia)  Hypertension  AR (allergic rhinitis)  Anemia in chronic kidney disease (CKD)  Chronic kidney disease (CKD), stage V  PDR (proliferative diabetic retinopathy)  N...  Office/Outpatient Visit Level 4 Established 21064 PC, Diabetes  HLD (hyperlipidemia)  Hypertension  AR (allergic rhinitis)  Anemia in chronic kidney disease (CKD)  Chronic kidney disease (CKD), stage V  PDR (proliferative diabetic retinopathy)  Neuropathy, [facility], 06/21/19 10:07:00 CDT  ?  6.?Chronic kidney disease (CKD), stage V?N18.5  Continue dialysis/labs as scheduled.  Keep appts/diagnostics per [facility] as ordered/scheduled.  Ordered:  1160F- Medication reconciliation completed during visit, Diabetes  HLD (hyperlipidemia)  Hypertension  AR (allergic rhinitis)  Anemia in chronic kidney disease (CKD)  Chronic kidney disease (CKD), stage V  PDR (proliferative diabetic retinopathy)  Neuropathy, [facility], 06/21/19 10:07:00 CDT  Clinic Follow up, *Est. 10/21/19 3:00:00 CDT, Order for future visit, Diabetes  HLD (hyperlipidemia)  Hypertension  AR (allergic rhinitis)  Anemia in chronic kidney disease (CKD)  Chronic kidney disease (CKD), stage V  PDR (proliferative diabetic retinopathy)  N...  Office/Outpatient Visit Level 4 Established 27308 PC, Diabetes  HLD (hyperlipidemia)  Hypertension  AR (allergic rhinitis)  Anemia in chronic kidney disease (CKD)  Chronic kidney disease (CKD), stage V  PDR (proliferative diabetic retinopathy)  Neuropathy, [facility], 06/21/19 10:07:00 CDT  ?  7.?PDR (proliferative diabetic retinopathy)?E11.3599  Keep optho appts/diagnostics as scheduled.  Ordered:  1160F- Medication reconciliation completed during visit, Diabetes  HLD (hyperlipidemia)  Hypertension  AR (allergic rhinitis)   Anemia in chronic kidney disease (CKD)  Chronic kidney disease (CKD), stage V  PDR (proliferative diabetic retinopathy)  Neuropathy, [facility], 06/21/19 10:07:00 CDT  Clinic Follow up, *Est. 10/21/19 3:00:00 CDT, Order for future visit, Diabetes  HLD (hyperlipidemia)  Hypertension  AR (allergic rhinitis)  Anemia in chronic kidney disease (CKD)  Chronic kidney disease (CKD), stage V  PDR (proliferative diabetic retinopathy)  N...  Office/Outpatient Visit Level 4 Established [id number] PC, Diabetes  HLD (hyperlipidemia)  Hypertension  AR (allergic rhinitis)  Anemia in chronic kidney disease (CKD)  Chronic kidney disease (CKD), stage V  PDR (proliferative diabetic retinopathy)  Neuropathy, [facility], 06/21/19 10:07:00 CDT  ?  8.?Neuropathy?G62.9  Continue?gabapentin to 400 mg TID.  Take meds as prescribed.  Control blood glucose levels.  Encouraged aerobic exercise 30 minutes per day 5 times a week.  Protect feet; check feet daily, wear proper footwear and padded socks.  Ordered:  1160F- Medication reconciliation completed during visit, Diabetes  HLD (hyperlipidemia)  Hypertension  AR (allergic rhinitis)  Anemia in chronic kidney disease (CKD)  Chronic kidney disease (CKD), stage V  PDR (proliferative diabetic retinopathy)  Neuropathy, [facility], 06/21/19 10:07:00 CDT  Clinic Follow up, *Est. 10/21/19 3:00:00 CDT, Order for future visit, Diabetes  HLD (hyperlipidemia)  Hypertension  AR (allergic rhinitis)  Anemia in chronic kidney disease (CKD)  Chronic kidney disease (CKD), stage V  PDR (proliferative diabetic retinopathy)  N...  Office/Outpatient Visit Level 4 Established [id number] PC, Diabetes  HLD (hyperlipidemia)  Hypertension  AR (allergic rhinitis)  Anemia in chronic kidney disease (CKD)  Chronic kidney disease (CKD), stage V  PDR (proliferative diabetic retinopathy)  Neuropathy, [facility], 06/21/19 10:07:00 CDT  ?  9.?Wellness examination?Z00.00  ?Cervical Cancer  Screening?-?Last Pap/pelvic on 12/24/18.??Follow up?with GYN Clinic for annual?Pap/Pelvic.  Breast Cancer?Screening -?Last Mammogram?on 11/7/17.?Birads 2. MMG?results resulted.?Follow up annually.  Colon Cancer Screening -?FOB x 3?negative on 3/18/18. FIT ordered. Follow up annually.  Osteoporosis Screening?-?DEXA results requested. Follow up in?2 years.  Vaccinations:??Pneumonia 13-?UTD?/ Tetanus?- UTD (2/10/17)  Labwork -?UTD  ?  10.?Foot callus?L84  ?Referral to podiatry.  Keep apt when scheduled.  Encouraged to wear diabetic shoes/or shoes with proper soles.  Ordered:  External Referral, right foot callus, Podiatry, Covered on insurance plan, 06/21/19 10:33:00 CDT, Foot callus  ?  Orders:  atorvastatin, 20 mg = 1 tab(s), Oral, Daily, # 90 tab(s), 1 Refill(s), Pharmacy: [facility]  fluticasone nasal, 2 spray(s), Nasal, BID, # 1 bottle(s), 6 Refill(s), Pharmacy: [facility]  gabapentin, 400 mg = 1 cap(s), Oral, TID, # 270 cap(s), 1 Refill(s), Pharmacy: [facility]  insulin aspart-insulin aspart protamine, See Instructions, Take 12 units before breakfast, lunch and supper if CBG > 145 rotate injection sites, # 15 mL, 4 Refill(s), Pharmacy: [facility]  loratadine, 10 mg = 1 tab(s), Oral, Daily, # 90 tab(s), 1 Refill(s), Pharmacy: [facility]  meclizine, 12.5 mg = 1 tab(s), Oral, q6hr, # 100 tab(s), 1 Refill(s), Pharmacy: [facility]  Misc Prescription, Insulin Pen needles, See Instructions, check blood sugar three times a day. DX: E11.9, # 100 EA, 6 Refill(s), Pharmacy: [facility]  ondansetron, 4 mg = 1 tab(s), Oral, q8hr, # 90 tab(s), 1 Refill(s), Pharmacy: [facility]  sitaGLIPtin, 25 mg = 1 tab(s), Oral, Daily, # 90 tab(s), 1 Refill(s), Pharmacy: [facility]  Occult Blood Fecal Immunoassay, Routine collect, Stool, Order for  future visit, *Est. 06/21/19 3:00:00 CDT, *Est. Stop date 06/21/19 3:00:00 CDT, Nurse collect, Screening for malignant neoplasm of colon  RTC 4 months and prn. Labs one week prior to appt.  Referrals  External Referral, right foot callus, Podiatry, Covered on insurance plan, 06/21/19 10:33:00 CDT, Foot callus  Clinic Follow up, *Est. 10/21/19 3:00:00 CDT, Order for future visit, Diabetes  HLD (hyperlipidemia)  Hypertension  AR (allergic rhinitis)  Anemia in chronic kidney disease (CKD)  Chronic kidney disease (CKD), stage V  PDR (proliferative diabetic retinopathy)  N...  Clinic Follow up, *Est. 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  Historical  Cataract  CKD (chronic kidney disease) stage 3, GFR 30-59 ml/min  Procedure/Surgical History  Cataract Extraction Phacoemulsification (None) (05/02/2019)  Extracapsular cataract removal with insertion of intraocular lens prosthesis (1 stage procedure), manual or mechanical technique (eg, irrigation and aspiration or phacoemulsification) (05/02/2019)  Replacement of Right Lens with Synthetic Substitute, Percutaneous Approach (05/02/2019)  Cataract Extraction Phacoemulsification (Left) (03/21/2019)  Extracapsular cataract removal with insertion of intraocular lens prosthesis (1 stage procedure), manual or mechanical technique (eg, irrigation and aspiration or phacoemulsification) (03/21/2019)  IOL Placement (Left) (03/21/2019)  Replacement of Left Lens with Synthetic Substitute, Percutaneous Approach (03/21/2019)  AV fistula recirculation (08/30/2018)  Transfusion of Nonautologous Red Blood  Cells into Peripheral Vein, Percutaneous Approach (2017)  Total hysterectomy ()   Section ()   section ()   section ()  eye surgery  Hysterectomy  port for peritoneal dialysis   Medications  aspirin 81 mg oral tablet, 81 mg= 1 tab(s), Oral, Daily  atorvastatin 20 mg oral tablet, 20 mg= 1 tab(s), Oral, Daily, 1 refills  calcitriol 0.25 mcg oral capsule, 0.25 mcg= 1 cap(s), Oral, qMWF, 3 refills  cinacalcet 30 mg oral tablet, 30 mg, Oral, M,W,F  cloniDINE 0.1 mg oral tablet, 0.1 mg= 1 tab(s), Oral, BID, 2 refills  DM shoes, See Instructions  DOCUSATE SODIUM 100 MG SOFTGEL, 100 mg= 1 cap(s), Oral, Daily  Flonase 50 mcg/inh nasal spray, 2 spray(s), Nasal, BID, 6 refills  FOLBEE PLUS TABLET, 1 tab(s), Oral, Daily  gabapentin 400 mg oral capsule, 400 mg= 1 cap(s), Oral, TID, 1 refills  gentamicin 0.1% topical cream  Glucometer (True Metrix), See Instructions  Heparin 1,000 Units/mL for Indwel.  hydrALAZINE 100 mg oral tablet, 100 mg= 1 tab(s), Oral, TID, 2 refills  hydrOXYzine pamoate 50 mg oral capsule, 50 mg= 1 cap(s), Oral, q6hr  Insulin Pen needles, See Instructions, 6 refills  iron dextran, See Instructions  Januvia 25 mg oral tablet, 25 mg= 1 tab(s), Oral, Daily, 1 refills  labetalol 300 mg oral tablet, 300 mg= 1 tab(s), Oral, BID, 2 refills  Lancets and Strips (True Metrix), See Instructions, 6 refills  Lasix 40 mg oral tablet, 40 mg= 1 tab(s), Oral, Daily, 2 refills  loratadine 10 mg oral tablet, 10 mg= 1 tab(s), Oral, Daily, 1 refills  meclizine 12.5 mg oral tablet, 12.5 mg= 1 tab(s), Oral, q6hr, 1 refills  Norvasc 10 mg oral tablet, 10 mg= 1 tab(s), Oral, Daily, 2 refills  NovoLOG Mix 70/30 FlexPen subcutaneous suspension, See Instructions, 4 refills  ondansetron 4 mg oral tablet, disintegrating, 4 mg= 1 tab(s), Oral, q8hr, 1 refills  POTASSIUM CL ER 10 MEQ TABLET  TRAMADOL HCL 50 MG TABLET  Vitamin B12, 1 tab, Oral, Daily  Vitamin C 500 mg oral tablet, 500 mg= 1  tab(s), Oral, Daily  VITAMIN D2 1.25MG(50,000 UNIT), 02322 IntUnit= 1 cap(s), Oral, qWeek  Allergies  No Known Allergies  Social History  Abuse/Neglect  No, No, Yes, 06/21/2019  Alcohol - Denies Alcohol Use, 10/13/2014  Never, 03/15/2019  Employment/School  Unemployed, Activity level: Occasional physical work. Highest education level: High school., 04/12/2018  Exercise  Self assessment: Fair condition., 02/10/2017  Home/Environment  Lives with Alone. Living situation: Home/Independent. Home equipment: Glucose monitoring. Alcohol abuse in household: No. Substance abuse in household: No. Smoker in household: No. Injuries/Abuse/Neglect in household: No. Feels unsafe at home: No. Safe place to go: Yes. Family/Friends available for support: Yes. Concern for family members at home: No. Major illness in household: No., 03/27/2015  Nutrition/Health  Regular, Diabetic, Caffeine intake amount: 1 CUP OF COFFEE 3 TIMES A WEEK. Wants to lose weight: Yes. Sleeping concerns: Yes. Feels highly stressed: Yes., 02/26/2016  Sexual  Gender Identity Identifies as female., 05/31/2019  Spiritual/Cultural  nondenonation, Yes, 06/21/2019  Substance Use - Denies Substance Abuse, 10/13/2014  Never, 01/05/2018  Tobacco - Denies Tobacco Use, 10/13/2014  Never (less than 100 in lifetime), No, Smokeless Tobacco Use: Never., 06/21/2019  Family History  Congestive heart disease.: Mother.  Diabetes mellitus type 2: Mother, Father and Brother.  Gout: Brother.  HIV infection: Sister.  Hypertension.: Mother and Father.  Stents: Father.  Immunizations  Vaccine Date Status Comments   pneumococcal 13-valent conjugate vaccine 10/11/2018 Recorded    influenza virus vaccine, inactivated 10/04/2017 Given    tetanus/diphtheria/pertussis, acel(Tdap) 02/10/2017 Given    influenza virus vaccine, inactivated 09/28/2016 Given : seqirus   influenza virus vaccine, inactivated 10/30/2015 Given tolerated well   Health Maintenance  Health  Maintenance  ???Pending?(in the next year)  ??? ??OverDue  ??? ? ? ?Alcohol Misuse Screening due??01/01/19??and every 1??year(s)  ??? ? ? ?Diabetes Maintenance-Microalbumin due??03/09/19??and every 1??year(s)  ??? ? ? ?Colorectal Screening due??03/18/19??and every 1??year(s)  ??? ? ? ?Diabetes Maintenance-Foot Exam due??06/14/19??and every 1??year(s)  ??? ??Refused?  ??? ? ? ?Zoster Vaccine due??06/21/19??and every 100??year(s)  ??? ??Due In Future?  ??? ? ? ?Breast Cancer Screening not due until??11/07/19??and every 2??year(s)  ??? ? ? ?Aspirin Therapy for CVD Prevention not due until??12/17/19??and every 1??year(s)  ??? ? ? ?Obesity Screening not due until??01/01/20??and every 1??year(s)  ??? ? ? ?HF-Heart Failure Education not due until??04/20/20??and every 1??year(s)  ??? ? ? ?Diabetes Maintenance-Eye Exam not due until??05/30/20??and every 1??year(s)  ??? ? ? ?Diabetes Maintenance-Fasting Lipid Profile not due until??06/16/20??and every 1??year(s)  ??? ? ? ?Diabetes Maintenance-HgbA1c not due until??06/16/20??and every 1??year(s)  ??? ? ? ?Diabetes Maintenance-Serum Creatinine not due until??06/17/20??and every 1??year(s)  ??? ? ? ?Diabetes Maintenance-Urine Dipstick not due until??06/17/20??and every 1??year(s)  ??? ? ? ?Blood Pressure Screening not due until??06/20/20??and every 1??year(s)  ??? ? ? ?Body Mass Index Check not due until??06/20/20??and every 1??year(s)  ??? ? ? ?Depression Screening not due until??06/20/20??and every 1??year(s)  ???Satisfied?(in the past 1 year)  ??? ??Satisfied?  ??? ? ? ?ADL Screening on??06/21/19.??Satisfied by Day Aislinn NOYOLA  ??? ? ? ?Alcohol Misuse Screening on??12/17/18.??Satisfied by Kaitlynn Beck NP  ??? ? ? ?Aspirin Therapy for CVD Prevention on??12/17/18.??Satisfied by Kaitlynn Beck NP  ??? ? ? ?Blood Pressure Screening on??06/21/19.??Satisfied by Aislinn Mullen LPN  ??? ? ? ?Body Mass Index Check on??06/21/19.??Satisfied by Aislinn Mullen LPN  S.  ??? ? ? ?Depression Screening on??06/21/19.??Satisfied by Day Aislinn NOYOLA S.  ??? ? ? ?Diabetes Maintenance-Foot Exam on??06/21/19.??Satisfied by Kaitlynn Beck NP  ??? ? ? ?Diabetes Maintenance-Fasting Lipid Profile on??06/17/19.??Satisfied by Lilli Hi  ??? ? ? ?Diabetes Maintenance-HgbA1c on??06/17/19.??Satisfied by Lilli Hi  ??? ? ? ?Diabetes Maintenance-Serum Creatinine on??06/17/19.??Satisfied by Lilli Hi  ??? ? ? ?Diabetes Maintenance-Urine Dipstick on??06/17/19.??Satisfied by Jessica Hussein  ??? ? ? ?Diabetes Maintenance-Eye Exam on??05/31/19.??Satisfied by Mariah Vazquez  ??? ? ? ?Diabetes Screening on??06/17/19.??Satisfied by Lilli Hi  ??? ? ? ?Hypertension Management-Blood Pressure on??06/21/19.??Satisfied by Aislinn Mullen LPN S.  ??? ? ? ?Hypertension Management-BMP on??06/17/19.??Satisfied by Lilli Hi  ??? ? ? ?Lipid Screening on??06/17/19.??Satisfied by Lilli Hi  ??? ? ? ?Obesity Screening on??06/21/19.??Satisfied by Aislinn Mullen LPN S.  ??? ? ? ?Smoking Cessation (Coronary Artery Disease) on??08/08/18.??Satisfied by Jana Purdy RN  ??? ? ? ?Smoking Cessation (Diabetes) on??08/08/18.??Satisfied by Jana Purdy RN  ?  ?  Lab Results  Test Name Test Result Date/Time   Sodium Lvl 146 mmol/L (High) 06/17/2019 09:00 CDT   Potassium Lvl 4.0 mmol/L 06/17/2019 09:00 CDT   Chloride 110 mmol/L (High) 06/17/2019 09:00 CDT   CO2 29 mmol/L 06/17/2019 09:00 CDT   Calcium Lvl 9.2 mg/dL 06/17/2019 09:00 CDT   Glucose Lvl 124 mg/dL (High) 06/17/2019 09:00 CDT    mg/dL (High) 06/17/2019 09:00 CDT   BUN 38 mg/dL (High) 06/17/2019 09:00 CDT   Creatinine 4.90 mg/dL (High) 06/17/2019 09:00 CDT   BUN/Creat Ratio 8 06/17/2019 09:00 CDT   eGFR-AA 12 mL/min (Low) 06/17/2019 09:00 CDT   Hgb A1c 6.4 % (High) 06/17/2019 09:00 CDT   Chol 172 mg/dL 06/17/2019 09:00 CDT   HDL 59 mg/dL 06/17/2019 09:00 CDT   Trig 144 mg/dL 06/17/2019 09:00 CDT   LDL 84  mg/dL 06/17/2019 09:00 CDT   Chol/HDL 2.9 06/17/2019 09:00 CDT   VLDL 29 mg/dL 06/17/2019 09:00 CDT   WBC 7.2 x10(3)/mcL 05/02/2019 10:00 CDT   RBC 3.00 x10(6)/mcL (Low) 05/02/2019 10:00 CDT   Hgb 9.8 gm/dL (Low) 05/02/2019 10:00 CDT   Hct 30.3 % (Low) 05/02/2019 10:00 CDT   Platelet 163 x10(3)/mcL 05/02/2019 10:00 CDT   .0 fL (High) 05/02/2019 10:00 CDT   MCH 32.7 pg 05/02/2019 10:00 CDT   MCHC 32.3 gm/dL 05/02/2019 10:00 CDT   RDW 13.8 % 05/02/2019 10:00 CDT   MPV 10.8 fL (High) 05/02/2019 10:00 CDT   UA Appear Clear 06/17/2019 09:00 CDT   UA Color YELLOW 06/17/2019 09:00 CDT   UA Spec Grav 1.012 06/17/2019 09:00 CDT   UA Bili Negative 06/17/2019 09:00 CDT   UA pH 7.0 06/17/2019 09:00 CDT   UA Urobilinogen Normal 06/17/2019 09:00 CDT   UA Blood Negative 06/17/2019 09:00 CDT   UA Glucose Normal 06/17/2019 09:00 CDT   UA Ketones Negative 06/17/2019 09:00 CDT   UA Protein 300 (Abnormal) 06/17/2019 09:00 CDT   UA Nitrite Negative 06/17/2019 09:00 CDT   UA Leuk Est Negative 06/17/2019 09:00 CDT   UA WBC Interp 0-2 06/17/2019 09:00 CDT   UA RBC Interp 0-2 06/17/2019 09:00 CDT   UA Bact Interp None Seen 06/17/2019 09:00 CDT   UA Squam Epi Interp  (Abnormal) 06/17/2019 09:00 CDT   UA Hyal Cast Interp 3-5 (Abnormal) 06/17/2019 09:00 CDT

## 2022-05-05 ENCOUNTER — DOCUMENTATION ONLY (OUTPATIENT)
Dept: TRANSPLANT | Facility: CLINIC | Age: 67
End: 2022-05-05
Payer: MEDICAID

## 2022-05-05 ENCOUNTER — TELEPHONE (OUTPATIENT)
Dept: NEPHROLOGY | Facility: CLINIC | Age: 67
End: 2022-05-05
Payer: MEDICAID

## 2022-05-05 LAB
EXT BACTERIA UA: ABNORMAL
EXT BUN: 15.08
EXT CALCIUM: 10.3
EXT CHLORIDE: 106
EXT CO2: 23
EXT CREATININE: 0.74 MG/DL
EXT EOSINOPHIL%: 1.5
EXT GLUCOSE UA: >=1000
EXT GLUCOSE: 155
EXT HEMATOCRIT: 41.1
EXT HEMOGLOBIN: 13
EXT LYMPH%: 35.2
EXT MAGNESIUM: 1.7
EXT MONOCYTES%: 12.4
EXT NITRITES UA: NEGATIVE
EXT PHOSPHORUS: 2.2
EXT PLATELETS: 157
EXT POTASSIUM: 3.9
EXT PROT/CREAT RATIO UR: 0.09
EXT PROTEIN UA: NEGATIVE
EXT RBC UA: ABNORMAL
EXT SEGS%: 49.5
EXT SODIUM: 142 MMOL/L
EXT TACROLIMUS LVL: 7.56
EXT WBC UA: ABNORMAL
EXT WBC: 5.23

## 2022-05-06 ENCOUNTER — OFFICE VISIT (OUTPATIENT)
Dept: NEPHROLOGY | Facility: CLINIC | Age: 67
End: 2022-05-06
Payer: MEDICARE

## 2022-05-06 ENCOUNTER — LAB VISIT (OUTPATIENT)
Dept: LAB | Facility: HOSPITAL | Age: 67
End: 2022-05-06
Attending: NURSE PRACTITIONER
Payer: MEDICARE

## 2022-05-06 VITALS
SYSTOLIC BLOOD PRESSURE: 170 MMHG | HEIGHT: 67 IN | DIASTOLIC BLOOD PRESSURE: 80 MMHG | BODY MASS INDEX: 24.01 KG/M2 | OXYGEN SATURATION: 100 % | RESPIRATION RATE: 20 BRPM | WEIGHT: 153 LBS | TEMPERATURE: 98 F | HEART RATE: 55 BPM

## 2022-05-06 DIAGNOSIS — E11.22 TYPE 2 DIABETES MELLITUS WITH DIABETIC CHRONIC KIDNEY DISEASE, UNSPECIFIED CKD STAGE, UNSPECIFIED WHETHER LONG TERM INSULIN USE: ICD-10-CM

## 2022-05-06 DIAGNOSIS — N18.9 CHRONIC KIDNEY DISEASE, UNSPECIFIED CKD STAGE: ICD-10-CM

## 2022-05-06 DIAGNOSIS — D63.8 ANEMIA OF CHRONIC DISEASE: ICD-10-CM

## 2022-05-06 DIAGNOSIS — I10 HYPERTENSION, UNSPECIFIED TYPE: ICD-10-CM

## 2022-05-06 DIAGNOSIS — E78.5 HYPERLIPIDEMIA, UNSPECIFIED HYPERLIPIDEMIA TYPE: ICD-10-CM

## 2022-05-06 DIAGNOSIS — E83.52 HYPERCALCEMIA: ICD-10-CM

## 2022-05-06 DIAGNOSIS — Z94.0 RENAL TRANSPLANT RECIPIENT: ICD-10-CM

## 2022-05-06 DIAGNOSIS — N25.81 SECONDARY HYPERPARATHYROIDISM OF RENAL ORIGIN: ICD-10-CM

## 2022-05-06 DIAGNOSIS — E13.649 UNCONTROLLED DIABETES MELLITUS OF OTHER TYPE WITH HYPOGLYCEMIA, UNSPECIFIED HYPOGLYCEMIA COMA STATUS: ICD-10-CM

## 2022-05-06 LAB
ALBUMIN SERPL-MCNC: 3.8 GM/DL (ref 3.4–4.8)
ALBUMIN/GLOB SERPL: 1.2 RATIO (ref 1.1–2)
ALP SERPL-CCNC: 80 UNIT/L (ref 40–150)
ALT SERPL-CCNC: 8 UNIT/L (ref 0–55)
AST SERPL-CCNC: 16 UNIT/L (ref 5–34)
BASOPHILS # BLD AUTO: 0.03 X10(3)/MCL (ref 0–0.2)
BASOPHILS NFR BLD AUTO: 0.5 %
BILIRUBIN DIRECT+TOT PNL SERPL-MCNC: 0.2 MG/DL (ref 0–0.5)
BILIRUBIN DIRECT+TOT PNL SERPL-MCNC: 0.2 MG/DL (ref 0–0.8)
BILIRUBIN DIRECT+TOT PNL SERPL-MCNC: 0.4 MG/DL
BUN SERPL-MCNC: 15.8 MG/DL (ref 9.8–20.1)
CALCIUM SERPL-MCNC: 10.4 MG/DL (ref 8.4–10.2)
CHLORIDE SERPL-SCNC: 106 MMOL/L (ref 98–107)
CHOLEST SERPL-MCNC: 167 MG/DL
CHOLEST/HDLC SERPL: 2 {RATIO} (ref 0–5)
CO2 SERPL-SCNC: 30 MMOL/L (ref 23–31)
CREAT SERPL-MCNC: 0.83 MG/DL (ref 0.55–1.02)
DEPRECATED CALCIDIOL+CALCIFEROL SERPL-MC: 47.7 NG/ML (ref 30–80)
EOSINOPHIL # BLD AUTO: 0.11 X10(3)/MCL (ref 0–0.9)
EOSINOPHIL NFR BLD AUTO: 1.8 %
ERYTHROCYTE [DISTWIDTH] IN BLOOD BY AUTOMATED COUNT: 13.9 % (ref 11.5–17)
EST. AVERAGE GLUCOSE BLD GHB EST-MCNC: 188.6 MG/DL
GLOBULIN SER-MCNC: 3.1 GM/DL (ref 2.4–3.5)
GLUCOSE SERPL-MCNC: 122 MG/DL (ref 82–115)
HBA1C MFR BLD: 8.2 %
HCT VFR BLD AUTO: 42.6 % (ref 37–47)
HDLC SERPL-MCNC: 81 MG/DL (ref 35–60)
HGB BLD-MCNC: 13.3 GM/DL (ref 12–16)
IMM GRANULOCYTES # BLD AUTO: 0.02 X10(3)/MCL (ref 0–0.02)
IMM GRANULOCYTES NFR BLD AUTO: 0.3 % (ref 0–0.43)
LDLC SERPL CALC-MCNC: 69 MG/DL (ref 50–140)
LYMPHOCYTES # BLD AUTO: 1.44 X10(3)/MCL (ref 0.6–4.6)
LYMPHOCYTES NFR BLD AUTO: 23.8 %
MAGNESIUM SERPL-MCNC: 1.5 MG/DL (ref 1.6–2.6)
MCH RBC QN AUTO: 29.2 PG (ref 27–31)
MCHC RBC AUTO-ENTMCNC: 31.2 MG/DL (ref 33–36)
MCV RBC AUTO: 93.6 FL (ref 80–94)
MONOCYTES # BLD AUTO: 0.69 X10(3)/MCL (ref 0.1–1.3)
MONOCYTES NFR BLD AUTO: 11.4 %
NEUTROPHILS # BLD AUTO: 3.8 X10(3)/MCL (ref 2.1–9.2)
NEUTROPHILS NFR BLD AUTO: 62.2 %
NRBC BLD AUTO-RTO: 0 %
PHOSPHATE SERPL-MCNC: 3.5 MG/DL (ref 2.3–4.7)
PLATELET # BLD AUTO: 157 X10(3)/MCL (ref 130–400)
PMV BLD AUTO: 11.5 FL (ref 9.4–12.4)
POTASSIUM SERPL-SCNC: 3.9 MMOL/L (ref 3.5–5.1)
PROT SERPL-MCNC: 6.9 GM/DL (ref 5.8–7.6)
PTH-INTACT SERPL-MCNC: 272.9 PG/ML (ref 8.7–77)
RBC # BLD AUTO: 4.55 X10(6)/MCL (ref 4.2–5.4)
SODIUM SERPL-SCNC: 141 MMOL/L (ref 136–145)
TRIGL SERPL-MCNC: 85 MG/DL (ref 37–140)
VLDLC SERPL CALC-MCNC: 17 MG/DL
WBC # SPEC AUTO: 6 X10(3)/MCL (ref 4.5–11.5)

## 2022-05-06 PROCEDURE — 83735 ASSAY OF MAGNESIUM: CPT

## 2022-05-06 PROCEDURE — 36415 COLL VENOUS BLD VENIPUNCTURE: CPT

## 2022-05-06 PROCEDURE — 80053 COMPREHEN METABOLIC PANEL: CPT

## 2022-05-06 PROCEDURE — 84478 ASSAY OF TRIGLYCERIDES: CPT

## 2022-05-06 PROCEDURE — 85025 COMPLETE CBC W/AUTO DIFF WBC: CPT

## 2022-05-06 PROCEDURE — 82306 VITAMIN D 25 HYDROXY: CPT

## 2022-05-06 PROCEDURE — 99215 OFFICE O/P EST HI 40 MIN: CPT | Mod: PBBFAC | Performed by: INTERNAL MEDICINE

## 2022-05-06 PROCEDURE — 84100 ASSAY OF PHOSPHORUS: CPT

## 2022-05-06 PROCEDURE — 83718 ASSAY OF LIPOPROTEIN: CPT

## 2022-05-06 PROCEDURE — 83036 HEMOGLOBIN GLYCOSYLATED A1C: CPT

## 2022-05-06 PROCEDURE — 80197 ASSAY OF TACROLIMUS: CPT

## 2022-05-06 PROCEDURE — 83970 ASSAY OF PARATHORMONE: CPT

## 2022-05-06 RX ORDER — NIFEDIPINE 30 MG/1
30 TABLET, EXTENDED RELEASE ORAL 2 TIMES DAILY
Qty: 30 TABLET | Refills: 11 | Status: SHIPPED | OUTPATIENT
Start: 2022-05-06 | End: 2022-10-19 | Stop reason: SDUPTHER

## 2022-05-06 RX ORDER — LABETALOL 100 MG/1
100 TABLET, FILM COATED ORAL 2 TIMES DAILY
Qty: 60 TABLET | Refills: 3 | Status: SHIPPED | OUTPATIENT
Start: 2022-05-06 | End: 2022-10-20

## 2022-05-06 RX ORDER — ROSUVASTATIN CALCIUM 20 MG/1
20 TABLET, COATED ORAL DAILY
Qty: 90 TABLET | Refills: 0 | Status: SHIPPED | OUTPATIENT
Start: 2022-05-06 | End: 2022-10-20

## 2022-05-06 NOTE — PROGRESS NOTES
[facility] Nephrology Clinic Note    Chief Complaint   Patient presents with    Follow-up     ckd        History of Present Illness  Kendra Malik is a 66 y.o.female with history of uncontrolled type 2 diabetes, hypertension, hyperlipidemia, AOCD, CKD V s/p renal transplant in Nov 2020 (on immunosuppression), vitamin D toxicity, hypercalcemia and hyperparathyroidism presenting to nephrology clinic today for  F/U visit. Patient received her renal transplant in [place] in November 2020. Patient continues on immunosuppression with tacrolimus, mycophenolate, daily prednisolone 5 mg oral tablet. Patient reports compliance with all medications and denies any drug/tobacco use.      Patient's blood pressure elevated at 170/80 in clinic today. However, patient reports that she did not take her blood pressure until after arriving to appointment. Patient takes labetalol 100 twice daily as well as nifedipine 30 BID. However, states she has only been taking nifedipine once per day. She takes her bp BID and states that SBPs are normally 140-150s. Patient was also placed on cinicalcet for hypercalcemia at previous visit. However, she admits she has not been consistent with taking this medication 2/2 nausea.  Repeat labs show improved hypercalcemia of 10.4 (previously 11.1). However, patient continues with elevated Vit D level (57) despite discontinuation of OTC supplement.  Labs also show elevated , hypomagnesemia 1.7 and hypophosphatemia 2.1. Patient with normal renal function s/p transplant BUN/Cr 15.23/0.74 (GRF 96.1).      Patient has no acute complaints today. She denies any CP, SOB, Fever, Chills, Cough, dysuria, hematuria, HA, nausea, vomiting, diarrhea or any other complaints at this time.      Review of Systems  Twelve point review of systems conducted, negative except as stated in history of present illness.    Review of patient's allergies indicates:  No Known Allergies    Past Medical History:   Past  "Medical History:   Diagnosis Date    Cataract     DM2 w CKD on chronic dialysis, without long-term current use of insulin 10/11/2018    ESRD on dialysis 10/11/2018    Peritoneal dialysis initiated mid 2018    Pneumonia 2021    Renal hypertension 10/11/2018    Secondary hyperparathyroidism of renal origin 2020       Procedure History:   Past Surgical History:   Procedure Laterality Date    cataract surgery       SECTION      x3    COLONOSCOPY N/A 3/27/2019    Procedure: COLONOSCOPY;  Surgeon: Arianna Srinivasan MD;  Location: [facility];  Service: Endoscopy;  Laterality: N/A;    HYSTERECTOMY      KIDNEY TRANSPLANT N/A 11/15/2020    Procedure: TRANSPLANT, KIDNEY;  Surgeon: Scott Ann MD;  Location: [facility] OR [address] [place] [address];  Service: Transplant;  Laterality: N/A;    KIDNEY TRANSPLANT Left 2020    OOPHORECTOMY      PERITONEAL CATHETER INSERTION      RETINAL DETACHMENT SURGERY         Family History: family history includes Cancer in her paternal uncle; Diabetes in her brother, brother, father, mother, paternal aunt, and paternal grandmother; Gout in her brother; HIV in her sister; Heart disease in her mother; Hypertension in her father and mother.    Social History:  reports that she has never smoked. She has never used smokeless tobacco. She reports previous alcohol use. She reports that she does not use drugs.    Physical Exam:   BP (!) 170/80 (BP Location: Right arm, Patient Position: Sitting, BP Method: Medium (Manual))   Pulse (!) 55   Temp 97.7 °F (36.5 °C) (Oral)   Resp 20   Ht 5' 6.54" (1.69 m)   Wt 69.4 kg (153 lb)   SpO2 100%   BMI 24.30 kg/m²  Body mass index is 24.3 kg/m².  General appearance: Patient is in no acute distress.  Skin: No rashes or wounds.  HEENT: PERRLA, EOMI, no scleral icterus, no JVD. Neck is supple.  Chest: Respirations are unlabored. Lungs sounds are clear.   Heart: S1, S2.   Abdomen: Benign.  : Deferred.  Extremities: No edema, " peripheral pulses are palpable.   Neuro: No focal deficits.     Home Medications:  Prior to Admission medications    Medication Sig Start Date End Date Taking? Authorizing Provider   acetaminophen (TYLENOL EXTRA STRENGTH ORAL) Take 500 mg by mouth every 6 (six) hours as needed.   Yes Historical Provider   blood sugar diagnostic Strp 1 each by Misc.(Non-Drug; Combo Route) route 3 (three) times daily. E11.3513 (DM) 1/15/21  Yes Rose Reynaga NP   blood-glucose meter kit Use as instructed; E11.3513 (DM) 11/19/20  Yes Kelley Denny MD   cinacalcet (SENSIPAR) 30 MG Tab Take 1 tablet (30 mg total) by mouth daily with breakfast. 5/21/21 5/21/22 Yes Rose Reynaga NP   famotidine/Ca carb/mag hydrox (PEPCID COMPLETE ORAL) Take 1 tablet by mouth daily as needed.   Yes Historical Provider   fluticasone propionate (FLONASE) 50 mcg/actuation nasal spray 2 sprays by Each Nostril route 2 (two) times daily as needed for Rhinitis.   Yes Historical Provider   gabapentin (NEURONTIN) 300 MG capsule Take 1 capsule (300 mg total) by mouth 3 (three) times daily. 12/30/21 12/30/22 Yes Kelley Denny MD   hydrOXYzine pamoate (VISTARIL) 25 MG Cap Take 25 mg by mouth every 8 (eight) hours as needed. ONCE AT BEDTIME   Yes Historical Provider   insulin detemir U-100 (LEVEMIR FLEXTOUCH) 100 unit/mL (3 mL) SubQ InPn pen Inject 8 Units into the skin 2 (two) times daily. Dose adjustment only  Patient taking differently: Inject 25 Units into the skin once daily. Dose adjustment only 7/29/21 7/29/22 Yes Abigail Tanner NP   k phos di & mono-sod phos mono (PHOSPHA 250 NEUTRAL) 250 mg Tab Take 3 tablets by mouth 2 (two) times a day. 1/20/22  Yes Kelley Denny MD   lancets Misc 1 each by Misc.(Non-Drug; Combo Route) route 3 (three) times daily. E11.3513 12/23/20  Yes Catia Huerta MD   magnesium oxide (MAG-OX) 400 mg (241.3 mg magnesium) tablet Take 2 tablets (800 mg total) by mouth 2 (two) times daily.  "11/12/21  Yes Kelley Denny MD   mycophenolate (CELLCEPT) 250 mg Cap Take 4 capsules (1,000 mg total) by mouth 2 (two) times daily. 11/17/21 11/17/22 Yes Rose Reynaga NP   pen needle, diabetic (BD RIO 2ND GEN PEN NEEDLE) 32 gauge x 5/32" Ndle To use with insulin 4 times daily. 10/7/21  Yes Abigail Tanner NP   predniSONE (DELTASONE) 5 MG tablet Take 1 tablet (5 mg total) by mouth once daily. 12/15/21  Yes Kelley Denny MD   tacrolimus (PROGRAF) 1 MG Cap Take 5 capsules (5 mg total) by mouth every morning AND 5 capsules (5 mg total) every evening. 11/12/21  Yes Kelley Denny MD   atorvastatin (LIPITOR) 20 MG tablet Take 20 mg by mouth once daily.  5/6/22 Yes Historical Provider   labetaloL (NORMODYNE) 100 MG tablet Take 1 tablet (100 mg total) by mouth 2 (two) times daily. HOLD if BP <120 systolic 1/20/22 5/6/22 Yes Kelley Denny MD   NIFEdipine (PROCARDIA-XL) 30 MG (OSM) 24 hr tablet Take 1 tablet (30 mg total) by mouth 2 (two) times a day. 5/21/21 5/6/22 Yes Rose Reynaga NP   rosuvastatin (CRESTOR) 20 MG tablet Take 20 mg by mouth once daily. ONCE AT BEDTIME  5/6/22 Yes Historical Provider   dextromethorphan-guaifenesin  mg/5 ml (ROBITUSSIN-DM)  mg/5 mL liquid Take 5 mLs by mouth every 12 (twelve) hours as needed.    Historical Provider   diphenhydrAMINE (BENADRYL) 25 mg capsule Take 25 mg by mouth every 6 (six) hours as needed for Itching.    Historical Provider   labetaloL (NORMODYNE) 100 MG tablet Take 1 tablet (100 mg total) by mouth 2 (two) times daily. HOLD if BP <120 systolic 5/6/22 5/6/23  Kennedy Lopez MD   NIFEdipine (PROCARDIA-XL) 30 MG (OSM) 24 hr tablet Take 1 tablet (30 mg total) by mouth 2 (two) times a day. 5/6/22   Kennedy Lopez MD   NOVOLOG FLEXPEN U-100 INSULIN 100 unit/mL (3 mL) InPn pen INJECT 15 UNITS INTO THE SKIN 3 (THREE) TIMES DAILY WITH MEALS. PLUS CORRECTION SCALE. MAX 75UNITS  Patient not taking: " Reported on 5/6/2022 8/13/21   Maggy Goldstein NP   oxyCODONE (OXY-IR) 5 mg Cap Take 5 mg by mouth every 4 (four) hours as needed for Pain.    Historical Provider   rosuvastatin (CRESTOR) 20 MG tablet Take 1 tablet (20 mg total) by mouth once daily. ONCE AT BEDTIME 5/6/22 8/4/22  Kennedy Lopez MD   SITagliptin (JANUVIA) 100 MG Tab Take 1 tablet (100 mg total) by mouth once daily. 3/17/21 3/17/22  Abigail Tanner NP          Impression:   Problem List Items Addressed This Visit        Cardiac/Vascular    Hyperlipidemia    Relevant Medications    rosuvastatin (CRESTOR) 20 MG tablet    Hypertension    Relevant Medications    labetaloL (NORMODYNE) 100 MG tablet    NIFEdipine (PROCARDIA-XL) 30 MG (OSM) 24 hr tablet       Renal/    Renal transplant recipient    Relevant Medications    labetaloL (NORMODYNE) 100 MG tablet    NIFEdipine (PROCARDIA-XL) 30 MG (OSM) 24 hr tablet    rosuvastatin (CRESTOR) 20 MG tablet    Other Relevant Orders    CBC Auto Differential    Comprehensive Metabolic Panel    Tacrolimus Level    PTH, intact    Phosphorus    Lipid Panel    Protein / creatinine ratio, urine    Urinalysis    Creatinine, urine, random    Magnesium    CBC Auto Differential    Comprehensive Metabolic Panel    Vitamin D    PTH, intact    Magnesium    Phosphorus    Urinalysis    Protein / creatinine ratio, urine    Chronic kidney disease    Relevant Medications    labetaloL (NORMODYNE) 100 MG tablet    NIFEdipine (PROCARDIA-XL) 30 MG (OSM) 24 hr tablet    rosuvastatin (CRESTOR) 20 MG tablet    Other Relevant Orders    CBC Auto Differential    Comprehensive Metabolic Panel    Vitamin D    PTH, intact    Magnesium    Phosphorus    Urinalysis    Protein / creatinine ratio, urine       Oncology    Anemia of chronic disease    Relevant Orders    CBC Auto Differential       Endocrine    Type 2 diabetes mellitus    Secondary hyperparathyroidism of renal origin    Relevant Orders    PTH, intact      Other Visit Diagnoses      Hypercalcemia         Relevant Orders    Vitamin D         Recent Results (from the past 168 hour(s))   KIDNEY TRANSPLANT LABS (EXTERNAL)    Collection Time: 05/03/22  8:31 AM   Result Value Ref Range    EXT WBC 5.23     EXT SEGS% 49.5     EXT Lymph% 35.2     EXT MONOCYTES% 12.4     EXT Eosinophil% 1.5     EXT Platelets 157     EXT Hemoglobin 13.0     EXT Hematocrit 41.1     EXT Sodium 142 mmol/L    EXT Potassium 3.9     EXT Chloride 106     EXT CO2 23     EXT BUN 15.08     EXT Creatinine 0.74 mg/dL    EXT PROT/CREAT Ratio UR 0.09     EXT Calcium 10.3 (H)     EXT Phosphorus 2.2 (L)     EXT Glucose 155 (H)     EXT Magnesium 1.7     EXT Tacrolimus Lvl 7.56     EXT Glucose UA >=1,000     EXT Protein UA negative     EXT Nitrites UA negative     EXT WBC UA 3-5 hpf    EXT RBC UA none seen     EXT Bacteria UA rare        Plan:       H/O CKD V s/p Renal Transplant 2020  Chronic Immunosuppression Therapy   - received renal transplant in Nov 2020 in [place], history of uncontrolled type 2 diabetes and hypertension  - Immunosuppression on Tacrolimus 5mg BID, Mycophenolate 1000 BID , and Prednisolone 5 mg once daily  -Currently GFR calculated 96.1 (Stage I)  -Continue recommendations post renal transplant per guidelines, labs required every visit include CMP/magnesium/phosphorus/CBC with differential/tacrolimus level/urinalysis/UPCR/fasting blood glucose  - recommendations post renal transplant per guidelines, labs required every 3 months or every visit include hemoglobin A1c, fasting lipid profile  - recommendations post renal transplant per guidelines, labs required at least every 6 to 12 months include PTH and 25-OH Vit D  - recommendations post renal transplant per guidelines, labs required at 12, 18, and 24 months post transplant include BK virus blood and/or urine PCR testing   - today ordered the following labs to be reviewed at next visit in 2 months: CBC, CMP, urine creatinine, urine protein, UA, magnesium  level, phosphorus level, PTH intact, vitamin d      Hypercalcemia  Vitamin D Toxicity  - Improved 10.4 on 5/3/22 (previously 11.1)  - suspecting secondary to Vit D toxicity (level 57)  - Reports nausea with cinalcalcet, therefore, recommend taking qhs  - Continues off Vitamin D supplementation  - ordered PTH intact, CMP, Phos, Vit D to be followed up next visit    Type 2 Diabetes, uncontrolled  - last A1c 9.2  - continue diabetic mgmt per PCP which include Lantus 25 units at bedtime, metformin 500 BID, and Sitagliptin 100  - discussed lifestyle and diet modifications     Uncontrolled Hypertension  - BP in clinic today 170/80, however, patient had not taken medications this am.  - Patient reports -150s normally  - Okay to continue labetalol 100 bid  - Increase nifedipine to 30 BID (patient was only taking 30 daily). However, advised to continue to monitor BP as well as HR. Today in clinic HR 55. Advised that if HR <55 once resumes nifedipine BID, to discontinue and only take once per day.     Post-Renal Transplant Vaccine Recommendations:  - Flu Vaccine: provided today 3/18/2022  - Pneumovax: provided today 3/18/2022  - Hep A vaccine: received 2018  - Hep B vaccine: no official record available in chart however patient did receive vaccination when dialysis was initiated  - TDap vaccine: received 2017  - Shingrix vaccine: will provide next visit  - Polio vaccine: received in 1962  - Meningococcal vaccine: will discuss next visit  - COVID vaccine: completed 2 dose series in May 2021, June 2021, completed booster in December 2021       Continue following measures:  -follow 2 gram a day dietary sodium restriction  -control diabetes (goal A1c less than 7%)  -control high blood pressure (goal blood pressure is less than 130/80, please check blood pressure twice a week and bring blood pressure logs to office visit)  -exercise at least 30 minutes a day, 5 days a week  -maintain healthy weight  -decrease or stop  alcohol use  -do not smoke  -stay well hydrated (drink water only, avoid juices, sweet tea, and sodas)  -ask about staying up-to-date on vaccinations (flu vaccine, pneumonia vaccine, hepatitis B vaccine)  -avoid excessive use of NSAIDs (ibuprofen, naproxen, Aleve, Advil, Toradol, Mobic), take Tylenol as needed for headache or mild pain  -take cholesterol lowering medications if prescribed (LDL goal less than 100)    Follow-up with the primary care provider as scheduled.  Return to [facility] Nephrology (kidney) clinic with routine labs in 2 months               Kennedy Lopez MD  [facility] Internal Medicine, PGY-3

## 2022-05-09 LAB — EXT BK VIRUS DNA QN PCR: NOT DETECTED

## 2022-05-11 ENCOUNTER — PATIENT MESSAGE (OUTPATIENT)
Dept: RESEARCH | Facility: CLINIC | Age: 67
End: 2022-05-11
Payer: MEDICAID

## 2022-05-11 LAB — TACROLIMUS TROUGH BLD-MCNC: 5.1 NG/ML

## 2022-05-11 NOTE — PROGRESS NOTES
Kidney Post-Transplant Assessment    Referring Physician: Anali Moura  Current Nephrologist: Ann Marie Scruggs    ORGAN: LEFT KIDNEY  Donor Type: donation after brain death  PHS Increased Risk: no  Cold Ischemia: 801 mins  Induction Medications: thymoglobulin    Subjective:   The patient location is: home  The chief complaint leading to consultation is: Kidney transplant    Visit type: audiovisual    Face to Face time with patient: 20 minutes of total time spent on the encounter, which includes face to face time and non-face to face time preparing to see the patient (eg, review of tests), Obtaining and/or reviewing separately obtained history, Documenting clinical information in the electronic or other health record, Independently interpreting results (not separately reported) and communicating results to the patient/family/caregiver, or Care coordination (not separately reported).     Each patient to whom he or she provides medical services by telemedicine is:  (1) informed of the relationship between the physician and patient and the respective role of any other health care provider with respect to management of the patient; and (2) notified that he or she may decline to receive medical services by telemedicine and may withdraw from such care at any time.      CC:  Reassessment of renal allograft function and management of chronic immunosuppression.    HPI:  Ms. Malik is a 66 y.o. 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 Her most recent creatinine is 0.8. She takes mycophenolate mofetil, prednisone and tacrolimus for maintenance immunosuppression. Her post transplant course has been uncomplicated to date.    2/4-2/9 COVID, PNA, neutropenia     Hospitalization/ ED visits  ER visit last week for N/V    Interval HX:  Reports has been working with hier PCP and General nephrologist on blood control. Last seen General Nephrologist on  Friday. She denies abd pain over allograft, swelling, fevers, or chills. She has been drinking 2L of water.         Current Outpatient Medications:     acetaminophen (TYLENOL EXTRA STRENGTH ORAL), Take 500 mg by mouth every 6 (six) hours as needed., Disp: , Rfl:     blood sugar diagnostic Strp, 1 each by Misc.(Non-Drug; Combo Route) route 3 (three) times daily. E11.3513 (DM), Disp: 100 each, Rfl: 11    blood-glucose meter kit, Use as instructed; E11.3513 (DM), Disp: 1 each, Rfl: 0    cinacalcet (SENSIPAR) 30 MG Tab, Take 1 tablet (30 mg total) by mouth daily with breakfast., Disp: 30 tablet, Rfl: 11    dextromethorphan-guaifenesin  mg/5 ml (ROBITUSSIN-DM)  mg/5 mL liquid, Take 5 mLs by mouth every 12 (twelve) hours as needed., Disp: , Rfl:     diphenhydrAMINE (BENADRYL) 25 mg capsule, Take 25 mg by mouth every 6 (six) hours as needed for Itching., Disp: , Rfl:     famotidine/Ca carb/mag hydrox (PEPCID COMPLETE ORAL), Take 1 tablet by mouth daily as needed., Disp: , Rfl:     fluticasone propionate (FLONASE) 50 mcg/actuation nasal spray, 2 sprays by Each Nostril route 2 (two) times daily as needed for Rhinitis., Disp: , Rfl:     gabapentin (NEURONTIN) 300 MG capsule, Take 1 capsule (300 mg total) by mouth 3 (three) times daily., Disp: 90 capsule, Rfl: 3    hydrOXYzine pamoate (VISTARIL) 25 MG Cap, Take 25 mg by mouth every 8 (eight) hours as needed. ONCE AT BEDTIME, Disp: , Rfl:     insulin detemir U-100 (LEVEMIR FLEXTOUCH) 100 unit/mL (3 mL) SubQ InPn pen, Inject 8 Units into the skin 2 (two) times daily. Dose adjustment only (Patient taking differently: Inject 25 Units into the skin once daily. Dose adjustment only), Disp: 15 mL, Rfl: 3    k phos di & mono-sod phos mono (PHOSPHA 250 NEUTRAL) 250 mg Tab, Take 3 tablets by mouth 2 (two) times a day., Disp: 180 tablet, Rfl: 3    labetaloL (NORMODYNE) 100 MG tablet, Take 1 tablet (100 mg total) by mouth 2 (two) times daily. HOLD if BP <120  "systolic, Disp: 60 tablet, Rfl: 3    lancets Misc, 1 each by Misc.(Non-Drug; Combo Route) route 3 (three) times daily. E11.3513, Disp: 100 each, Rfl: 11    magnesium oxide (MAG-OX) 400 mg (241.3 mg magnesium) tablet, Take 2 tablets (800 mg total) by mouth 2 (two) times daily., Disp: 120 tablet, Rfl: 11    mycophenolate (CELLCEPT) 250 mg Cap, Take 4 capsules (1,000 mg total) by mouth 2 (two) times daily., Disp: 240 capsule, Rfl: 11    NIFEdipine (PROCARDIA-XL) 30 MG (OSM) 24 hr tablet, Take 1 tablet (30 mg total) by mouth 2 (two) times a day., Disp: 30 tablet, Rfl: 11    NOVOLOG FLEXPEN U-100 INSULIN 100 unit/mL (3 mL) InPn pen, INJECT 15 UNITS INTO THE SKIN 3 (THREE) TIMES DAILY WITH MEALS. PLUS CORRECTION SCALE. MAX 75UNITS (Patient not taking: Reported on 5/6/2022), Disp: 30 Syringe, Rfl: 3    oxyCODONE (OXY-IR) 5 mg Cap, Take 5 mg by mouth every 4 (four) hours as needed for Pain., Disp: , Rfl:     pen needle, diabetic (BD RIO 2ND GEN PEN NEEDLE) 32 gauge x 5/32" Ndle, To use with insulin 4 times daily., Disp: 100 each, Rfl: 11    predniSONE (DELTASONE) 5 MG tablet, Take 1 tablet (5 mg total) by mouth once daily., Disp: 91 tablet, Rfl: 3    rosuvastatin (CRESTOR) 20 MG tablet, Take 1 tablet (20 mg total) by mouth once daily. ONCE AT BEDTIME, Disp: 90 tablet, Rfl: 0    SITagliptin (JANUVIA) 100 MG Tab, Take 1 tablet (100 mg total) by mouth once daily., Disp: 90 tablet, Rfl: 3    tacrolimus (PROGRAF) 1 MG Cap, Take 5 capsules (5 mg total) by mouth every morning AND 5 capsules (5 mg total) every evening., Disp: 360 capsule, Rfl: 11      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, without long-term current use of insulin 10/11/2018    ESRD on dialysis 10/11/2018    Peritoneal dialysis initiated mid September 2018    Pneumonia 2/4/2021    Renal hypertension 10/11/2018    Secondary hyperparathyroidism of renal origin 12/30/2020       Review of Systems "   Constitutional: Negative for appetite change, chills, fatigue and fever.   HENT: Negative for trouble swallowing.    Respiratory: Negative for cough, chest tightness, shortness of breath and wheezing.    Cardiovascular: Negative for chest pain, palpitations and leg swelling.   Gastrointestinal: Negative for abdominal pain, constipation, diarrhea and nausea.   Genitourinary: Negative for difficulty urinating, frequency and urgency.   Musculoskeletal: Negative for arthralgias and myalgias.   Skin: Negative for rash.   Allergic/Immunologic: Positive for immunocompromised state.   Neurological: Negative for dizziness, weakness, light-headedness and headaches.   Psychiatric/Behavioral: Negative for sleep disturbance.       Objective:   There were no vitals taken for this visit.body mass index is unknown because there is no height or weight on file.  Wt Readings from Last 3 Encounters:   05/06/22 69.4 kg (153 lb)   11/16/21 73.1 kg (161 lb 2.5 oz)   11/17/21 71.6 kg (157 lb 13.6 oz)     Temp Readings from Last 3 Encounters:   05/06/22 97.7 °F (36.5 °C) (Oral)   11/17/21 97 °F (36.1 °C) (Temporal)   02/09/21 98.3 °F (36.8 °C) (Oral)     BP Readings from Last 3 Encounters:   05/06/22 (!) 170/80   11/16/21 (!) 160/72   11/17/21 (!) 145/68     Pulse Readings from Last 3 Encounters:   05/06/22 (!) 55   11/17/21 66   03/17/21 63       Physical Exam  Deferred due to AV visit    Labs:  Lab Results   Component Value Date    WBC 6.0 05/06/2022    HGB 13.3 05/06/2022    HCT 42.6 05/06/2022     05/06/2022    K 3.9 05/06/2022     02/09/2021    CO2 30 05/06/2022    BUN 15.8 05/06/2022    CREATININE 0.83 05/06/2022    EGFRNONAA 61 (L) 01/25/2022    EGFRIFAFRICA >60 05/06/2022    GLUCOSE 122 (H) 05/06/2022    CALCIUM 10.4 (H) 05/06/2022    PHOS 3.5 05/06/2022    MG 1.50 (L) 05/06/2022    ALBUMIN 3.8 05/06/2022    AST 16 05/06/2022    ALT 8 05/06/2022    UTPCR 1.64 (H) 11/15/2020    .9 (H) 05/06/2022    TACROLIMUS 5.1  02/09/2021       Lab Results   Component Value Date    EXTANC 580 (H) 02/04/2021    EXTWBC 5.23 05/03/2022    EXTSEGS 49.5 05/03/2022    EXTPLATELETS 157 05/03/2022    EXTHEMOGLOBI 13.0 05/03/2022    EXTHEMATOCRI 41.1 05/03/2022    EXTCREATININ 0.74 05/03/2022    EXTSODIUM 142 05/03/2022    EXTPOTASSIUM 3.9 05/03/2022    EXTBUN 15.08 05/03/2022    EXTCO2 23 05/03/2022    EXTCALCIUM 10.3 (H) 05/03/2022    EXTPHOSPHORU 2.2 (L) 05/03/2022    EXTGLUCOSE 155 (H) 05/03/2022    EXTALBUMIN 3.9 11/09/2021    EXTAST 14 11/09/2021    EXTALT 6 11/09/2021    EXTBILITOTAL 0.5 11/09/2021       Lab Results   Component Value Date    EXTTACROLVL 7.56 05/03/2022    EXTPROTCRE 0.09 05/03/2022    EXTPTHINTACT 267.5 (H) 04/05/2021    EXTPROTEINUA negative 05/03/2022    EXTWBCUA 3-5 05/03/2022    EXTRBCUA none seen 05/03/2022       Labs were reviewed with the patient    Assessment:     1. Renal transplant recipient    2. Long term current use of immunosuppressive drug    3. Renal hypertension    4. Hyperlipidemia, unspecified hyperlipidemia type    5. Type 2 diabetes mellitus with stage 2 chronic kidney disease, unspecified whether long term insulin use        Plan:     Continue to follow with PCP (health maintenance and health screening) and General Nephrologist (CKD care)  Needs to establish Dermatologist for yearly skin check due to IS therapy.  Encourage lifestyle modifications: diet, exercise, weight loss   Continue current IS therapy regimen  No need for refills on IS therapy today.       Allograft function assessment  -CKD 2, remains stable.     5/6/2022  1yr 5mo   Creatinine 0.55 - 1.02 mg/dL 0.83  Labs prior to visit,  not fasting   eGFR if African American mls/min/1.73/m2 >60   Glucose 82 - 115 mg/dL 122 (A)  Labs prior to visit,  not fasting     Recent Labs   Lab 02/08/21  0529 02/09/21  0318 02/09/21  0319 01/25/22  1007 05/06/22  0939   Creatinine 0.7  0.7 0.7 0.7 0.97 0.83   eGFR if non African American >60.0  >60.0 >60.0  >60.0  --   --    Estimated GFR-Non   --   --   --  61 L  --    eGFR if African American >60.0  >60.0 >60.0 >60.0  --   --       Lab Results   Component Value Date    GLUCOSE 122 (H) 05/06/2022     Working with PCP on DM    Encounter for Monitoring Immunosuppression post Transplant    5/6/2022  1yr 5mo   Tacrolimus 5.0-15.0 (Trough) ng/mL 5.1     Recent Labs   Lab 02/07/21  0538 02/08/21  0529 02/09/21  0319   Tacrolimus Lvl 5.0 4.8 L 5.1   -Target level for Kendra is 5-7  -Recheck as per guidelines.  -Monitor for side effects and toxicities, given narrow therapeutic window and significant risk of AE       Evaluation for bone marrow suppression (r/t immunosuppression toxicity or infection)    5/6/2022  1yr 5mo   WBC 4.5 - 11.5 x10(3)/mcL 6.0   Hemoglobin 12.0 - 16.0 gm/dL 13.3   Hematocrit 37.0 - 47.0 % 42.6   Platelets 130 - 400 x10(3)/mcL 157     Lab Results   Component Value Date    WBC 6.0 05/06/2022    HGB 13.3 05/06/2022    HCT 42.6 05/06/2022     05/06/2022       Renal hypertension--within target, watch  BP Readings from Last 3 Encounters:   05/06/22 (!) 170/80   11/16/21 (!) 160/72   11/17/21 (!) 145/68        Metabolic bone disease [Secondary hyperparathyroidism, Phosphorus metabolism disorders]     5/6/2022  1yr 5mo   Hemoglobin A1C External <=7.0 % 8.2 (A)   Vit D, 25-Hydroxy 30.0 - 80.0 ng/mL 47.7  Labs prior to visit,  not fasting   PTH 8.7 - 77.0 pg/mL 272.9 (A)  Labs prior to visit,  not fasting      5/6/2022  1yr 5mo   Calcium 8.4 - 10.2 mg/dL 10.4 (A)  Labs prior to visit,  not fasting   Phosphorus 2.3 - 4.7 mg/dL 3.5  Labs prior to visit,  not fasting   Magnesium 1.60 - 2.60 mg/dL 1.50 (A)  Labs prior to visit,  not fasting     Recent Labs   Lab 01/31/20  1121 11/15/20  1735 11/16/20  0242 02/08/21  0529 02/09/21  0318 02/09/21  0319 01/25/22  1007 05/06/22  0939   Albumin  --  3.2 L   < > 2.7 L  2.7 L 2.8 L 2.8 L  --   --    Albumin Level  --   --   --   --   --   --  3.7  3.8   Calcium  --  8.9   < > 8.7  9.1 9.0 9.1  --   --    Calcium Level Total  --   --   --   --   --   --  10.7 H 10.4 H   Phosphorus  --  4.3   < > 3.0 2.6 L  --   --   --    Phosphorus Level  --   --   --   --   --   --   --  3.5   Magnesium  --   --    < > 1.5 L 1.7  --   --   --    Magnesium Level  --   --   --   --   --   --   --  1.50 L   PTH, Intact 522.0 H 485.0 H  --   --   --   --   --   --    Parathyroid Hormone Intact  --   --   --   --   --   --   --  272.9 H    < > = values in this interval not displayed.        Assessment of electrolytes    5/6/2022  1yr 5mo   Sodium 136 - 145 mmol/L 141  Labs prior to visit,  not fasting   Potassium 3.5 - 5.1 mmol/L 3.9  Labs prior to visit,  not fasting   Chloride 98 - 107 mmol/L 106  Labs prior to visit,  not fasting   CO2 23 - 31 mmol/L 30  Labs prior to visit,  not fasting     Lab Results   Component Value Date     05/06/2022    K 3.9 05/06/2022     02/09/2021    CO2 30 05/06/2022    MG 1.50 (L) 05/06/2022       Screening for BK infection to prevent allograft dysfunction    5/3/2022  1yr 5mo   EXT BK Virus DNA QN PCR not detected     No results found for: BKVIRUSDNAUR, BKQUANTURINE, BKVIRUSLOG, BKVIRUSURINE, BKVIRUSPCRQB    - BK PCR is acceptable  -Continue blood PCR screening as per program guidelines to prevent BK viremia and nephropathy leading to allograft dysfunction and potential graft failure      Screening for proteinuria & urinary abnormalities    5/3/2022  1yr 5mo   EXT PROT/CREAT Ratio UR 0.09     Recent Labs   Lab 11/15/20  1710 11/26/20  0818 02/04/21  1912 01/25/22  1020   Protein, UA  --    < > 1+ A Negative   Glucose, UA  --    < > 3+ A 4+ A   Occult Blood UA  --    < > Negative Negative   Leukocytes, UA  --    < > 2+ A Negative   Prot/Creat Ratio, Urine 1.64 H  --   --   --     < > = values in this interval not displayed.   - Urine p/c ratio acceptable      Management of kidney disease and related issues (HTN, anemia, SPTH,  metabolic bone disease) should continue by community nephrologist.       Follow-up:   Clinic: return to transplant clinic weekly for the first month after transplant; every 2 weeks during months 2-3; then at 6-, 9-, 12-, 18-, 24-, and 36- months post-transplant to reassess for complications from immunosuppression toxicity and monitor for rejection.  Annually thereafter.    Labs: since patient remains at high risk for rejection and drug-related complications that warrant close monitoring, labs will be ordered as follows: continue twice weekly CBC, renal panel, and drug level for first month; then same labs once weekly through 3rd month post-transplant.  Urine for UA and protein/creatinine ratio monthly.  Serum BK - PCR at 1-, 3-, 6-, 9-, 12-, 18-, 24-, 36-, 48-, and 60 months post-transplant.  Hepatic panel at 1-, 2-, 3-, 6-, 9-, 12-, 18-, 24-, and 36- months post-transplant.    Education:   Material provided to the patient.  Patient reminded to call with any health changes since these can be early signs of significant complications.  Also, I advised the patient to be sure any new medications or changes of old medications are discussed with either a pharmacist or physician knowledgeable with transplant to avoid rejection/drug toxicity related to significant drug interactions.    Exercise: reminded Kendra of the importance of regular exercise for weight management, blood sugar and blood pressure management.  I also explained exercise has been shown to improve cardiovascular health, energy level, and sleep hygiene.  Lastly, I advised her that cardiovascular complications are leading cause of death for renal transplant recipients, and regular exercise can help lower this risk.    I spoke with the patient for 30 minutes. More than half dedicated to counseling and education. All questions answered    Rose Reynaga, NP-C  Transplant Nephrology

## 2022-05-11 NOTE — PATIENT INSTRUCTIONS
It is my privilege to participate in your transplant care! Please be sure to let us know if you have any questions or concerns about your health care - we cannot help you if we do not know. Don't forget we are on call 24/7 for any emergencies.      Education:  Patient reminded to call with any health changes, since these can be early signs of significant complications. Also, advised the patient to be sure any new medications or changes of old medications are discussed with either a pharmacist, or physician knowledgeable with transplant to avoid rejection/drug toxicity related to significant drug interactions.     Exercise: reminded Kendra of the importance of regular exercise for weight management, blood sugar and blood pressure management.  I also explained exercise has been shown to improve cardiovascular health, energy level, and sleep hygiene.  Lastly, I advised her that cardiovascular complications are leading cause of death for renal transplant recipients, and regular exercise can help lower this risk.     Please continue seeking general medical care with your primary care provider.    Please continue seeking care for your kidney with your General Nephrologist. This is to ensure that monitoring and care of your kidney is being addressed in a timely manner. Your General Nephrologist will also manage your blood pressure and possible electrolyte abnormalities in your lab work; ei. Potassium, phosphorus, calcium, magnesium, PTH, and Vitamin D.     Please follow with a Dermatologist (skin doctor) for yearly skin checks starting at 1 year post transplant or if your develp skin lesion/new moles. Your are on immunosuppression therapy to help prevent rejection of your transplanted kidney. A side effect of this medications is skin cancer. You should see the Dermatologist at least yearly. If at any point after transplant a skin lesion was remove please update your coordinator with the information.       Best  Rose Hansen, RAFAEL-C

## 2022-05-12 ENCOUNTER — OFFICE VISIT (OUTPATIENT)
Dept: TRANSPLANT | Facility: CLINIC | Age: 67
End: 2022-05-12
Payer: MEDICARE

## 2022-05-12 ENCOUNTER — PATIENT MESSAGE (OUTPATIENT)
Dept: TRANSPLANT | Facility: CLINIC | Age: 67
End: 2022-05-12

## 2022-05-12 DIAGNOSIS — E11.22 TYPE 2 DIABETES MELLITUS WITH STAGE 2 CHRONIC KIDNEY DISEASE, UNSPECIFIED WHETHER LONG TERM INSULIN USE: ICD-10-CM

## 2022-05-12 DIAGNOSIS — N18.2 TYPE 2 DIABETES MELLITUS WITH STAGE 2 CHRONIC KIDNEY DISEASE, UNSPECIFIED WHETHER LONG TERM INSULIN USE: ICD-10-CM

## 2022-05-12 DIAGNOSIS — Z79.899 LONG TERM CURRENT USE OF IMMUNOSUPPRESSIVE DRUG: ICD-10-CM

## 2022-05-12 DIAGNOSIS — Z94.0 RENAL TRANSPLANT RECIPIENT: Primary | ICD-10-CM

## 2022-05-12 DIAGNOSIS — I12.9 RENAL HYPERTENSION: ICD-10-CM

## 2022-05-12 DIAGNOSIS — E78.5 HYPERLIPIDEMIA, UNSPECIFIED HYPERLIPIDEMIA TYPE: ICD-10-CM

## 2022-05-12 PROCEDURE — 99214 PR OFFICE/OUTPT VISIT, EST, LEVL IV, 30-39 MIN: ICD-10-PCS | Mod: 95,,, | Performed by: NURSE PRACTITIONER

## 2022-05-12 PROCEDURE — 99214 OFFICE O/P EST MOD 30 MIN: CPT | Mod: 95,,, | Performed by: NURSE PRACTITIONER

## 2022-05-12 NOTE — LETTER
May 12, 2022                     [address] Trans[facility] 1st [address]  [address]  [place] [address]  Phone: [phone]   Patient: Kendra Malik   MR Number: [id number]   YOB: 1955   Date of Visit: 5/12/2022       Dear      Thank you for referring Kendra Malik to me for evaluation. Attached you will find relevant portions of my assessment and plan of care.    If you have questions, please do not hesitate to call me. I look forward to following Kendra Malik along with you.    Sincerely,    Rose Reynaga, NP    Enclosure    If you would like to receive this communication electronically, please contact [email or web address] or [phone] to request [employer] access.    [employer] is a tool which provides read-only access to select patient information with whom you have a relationship. Its easy to use and provides real time access to review your patients record including encounter summaries, notes, results, and demographic information.    If you feel you have received this communication in error or would no longer like to receive these types of communications, please e-mail [email or web address]

## 2022-05-17 RX ORDER — INSULIN GLARGINE 100 [IU]/ML
INJECTION, SOLUTION SUBCUTANEOUS
COMMUNITY
Start: 2022-04-06 | End: 2022-05-18 | Stop reason: SDUPTHER

## 2022-05-17 RX ORDER — PREDNISOLONE ACETATE 10 MG/ML
1 SUSPENSION/ DROPS OPHTHALMIC
COMMUNITY
Start: 2021-06-25 | End: 2022-07-22

## 2022-05-17 RX ORDER — LORATADINE 10 MG/1
10 TABLET ORAL DAILY PRN
COMMUNITY
Start: 2022-04-06 | End: 2023-05-01

## 2022-05-17 RX ORDER — CYCLOBENZAPRINE HCL 5 MG
5 TABLET ORAL 3 TIMES DAILY PRN
COMMUNITY
Start: 2022-01-08 | End: 2022-07-22

## 2022-05-17 RX ORDER — METFORMIN HYDROCHLORIDE 500 MG/1
500 TABLET ORAL 2 TIMES DAILY WITH MEALS
COMMUNITY
Start: 2022-04-29 | End: 2022-10-19 | Stop reason: SDUPTHER

## 2022-05-17 RX ORDER — ONDANSETRON 4 MG/1
4 TABLET, ORALLY DISINTEGRATING ORAL
COMMUNITY
Start: 2021-11-18 | End: 2023-01-03 | Stop reason: SDUPTHER

## 2022-05-17 RX ORDER — ASPIRIN 325 MG
50000 TABLET, DELAYED RELEASE (ENTERIC COATED) ORAL
COMMUNITY
Start: 2022-01-17 | End: 2022-07-20

## 2022-05-18 ENCOUNTER — OFFICE VISIT (OUTPATIENT)
Dept: INTERNAL MEDICINE | Facility: CLINIC | Age: 67
End: 2022-05-18
Payer: MEDICARE

## 2022-05-18 VITALS — WEIGHT: 147.5 LBS | BODY MASS INDEX: 23.7 KG/M2 | HEIGHT: 66 IN

## 2022-05-18 DIAGNOSIS — E11.65 UNCONTROLLED TYPE 2 DIABETES MELLITUS WITH HYPERGLYCEMIA: ICD-10-CM

## 2022-05-18 DIAGNOSIS — I10 HYPERTENSION, UNSPECIFIED TYPE: ICD-10-CM

## 2022-05-18 PROBLEM — I15.9 SECONDARY HYPERTENSION, UNSPECIFIED: Status: ACTIVE | Noted: 2018-09-24

## 2022-05-18 PROCEDURE — 99214 PR OFFICE/OUTPT VISIT, EST, LEVL IV, 30-39 MIN: ICD-10-PCS | Mod: 95,,, | Performed by: NURSE PRACTITIONER

## 2022-05-18 PROCEDURE — 99214 OFFICE O/P EST MOD 30 MIN: CPT | Mod: 95,,, | Performed by: NURSE PRACTITIONER

## 2022-05-18 RX ORDER — INSULIN GLARGINE 100 [IU]/ML
28 INJECTION, SOLUTION SUBCUTANEOUS NIGHTLY
Qty: 25 ML | Refills: 1 | Status: SHIPPED | OUTPATIENT
Start: 2022-05-18 | End: 2023-01-03 | Stop reason: SDUPTHER

## 2022-05-18 NOTE — PROGRESS NOTES
The patient location is: at home  The chief complaint leading to consultation is: diabetes    Visit type: audiovisual    Face to Face time with patient: 2 mins on video (completed as audio d/t connect timeframe expiring) 32 minutes of total time spent on the encounter, which includes face to face time and non-face to face time preparing to see the patient (eg, review of tests), Obtaining and/or reviewing separately obtained history, Documenting clinical information in the electronic or other health record, Independently interpreting results (not separately reported) and communicating results to the patient/family/caregiver, or Care coordination (not separately reported).     Each patient to whom he or she provides medical services by telemedicine is:  (1) informed of the relationship between the physician and patient and the respective role of any other health care provider with respect to management of the patient; and (2) notified that he or she may decline to receive medical services by telemedicine and may withdraw from such care at any time.    Billing Provider: LYNN Miranda  Level of Service: DE OFFICE/OUTPT VISIT, EST, LEVL IV, 30-39 MIN  Patient PCP Information     Provider PCP Type    LYNN Miranda General          Reason for Visit / Chief Complaint: Peripheral Neuropathy (Lab results )       History of Present Illness / Problem Focused Workflow     Kendra Malik is a 66 y.o. Black or  female presents to the clinic for f/u visit. PMH uncontrolled DM, HTN, CKD st V (s/p left kidney transplant (11/16/20), anemia of chronic disease, HLD, neuropathy, AR, right charcot foot, diabetic retinopathy, and covid pneumonia + (2/2021). Followed by [facility] renal, ortho and optho clinics and Dr. Jeremiah Bowens, podiatry. Reports med compliance. CBGs ranging 140-170s when checked in the morning. BP remains above goal per pt report. Pt states has stopped drinking wine and sweet lattes  but is not following ADA diet as she should. Labs reviewed with pt. Denies chest pain, shortness of breath, cough, fever, headache, dizziness, weakness, abdominal pain, nausea, vomiting, diarrhea, constipation, dysuria, depression, anxiety, SI/HI.         Review of Systems     Review of Systems   Constitutional: Negative.    HENT: Negative.    Eyes: Negative.    Respiratory: Negative.    Cardiovascular: Negative.    Gastrointestinal: Negative.    Endocrine: Negative.    Genitourinary: Negative.    Musculoskeletal: Negative.    Integumentary:  Negative for pallor. Negative.   Neurological: Negative.  Negative for dizziness, tremors, seizures, speech difficulty and headaches.   Psychiatric/Behavioral: Negative.  Negative for confusion. The patient is not nervous/anxious.         Medical / Social / Family History     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, without long-term current use of insulin 10/11/2018    ESRD on dialysis 10/11/2018    Peritoneal dialysis initiated mid 2018    Pneumonia 2021    Renal hypertension 10/11/2018    Secondary hyperparathyroidism of renal origin 2020       Past Surgical History:   Procedure Laterality Date    cataract surgery       SECTION      x3    COLONOSCOPY N/A 3/27/2019    Procedure: COLONOSCOPY;  Surgeon: Arianna Srinivasan MD;  [facility];  Service: Endoscopy;  Laterality: N/A;    HYSTERECTOMY      KIDNEY TRANSPLANT N/A 11/15/2020    Procedure: TRANSPLANT, KIDNEY;  Surgeon: Scott Ann MD;  Location: [address];  Service: Transplant;  Laterality: N/A;    KIDNEY TRANSPLANT Left 2020    OOPHORECTOMY      PERITONEAL CATHETER INSERTION      RETINAL DETACHMENT SURGERY         Social History    reports that she has never smoked. She has never used smokeless tobacco. She reports previous alcohol use. She reports that she does not use drugs.    Family  History  's family history includes Cancer in her paternal uncle; Diabetes in her brother, brother, father, mother, paternal aunt, and paternal grandmother; Gout in her brother; HIV in her sister; Heart disease in her mother; Hypertension in her father and mother.    Medications and Allergies     Medications  Medication List with Changes/Refills   Current Medications    ACETAMINOPHEN (TYLENOL EXTRA STRENGTH ORAL)    Take 500 mg by mouth every 6 (six) hours as needed.    BASAGLAR KWIKPEN U-100 INSULIN GLARGINE 100 UNITS/ML (3ML) SUBQ PEN    SMARTSI Unit(s) SUB-Q Every Night    BLOOD SUGAR DIAGNOSTIC STRP    1 each by Misc.(Non-Drug; Combo Route) route 3 (three) times daily. E11.3513 (DM)    BLOOD-GLUCOSE METER KIT    Use as instructed; E11.3513 (DM)    CHOLECALCIFEROL, VITAMIN D3, 1,250 MCG (50,000 UNIT) CAPSULE    Take 50,000 Units by mouth every 7 days.    CINACALCET (SENSIPAR) 30 MG TAB    Take 1 tablet (30 mg total) by mouth daily with breakfast.    CYCLOBENZAPRINE (FLEXERIL) 5 MG TABLET    Take 5 mg by mouth 3 (three) times daily as needed.    DEXTROMETHORPHAN-GUAIFENESIN  MG/5 ML (ROBITUSSIN-DM)  MG/5 ML LIQUID    Take 5 mLs by mouth every 12 (twelve) hours as needed.    DIPHENHYDRAMINE (BENADRYL) 25 MG CAPSULE    Take 25 mg by mouth every 6 (six) hours as needed for Itching.    FAMOTIDINE/CA CARB/MAG HYDROX (PEPCID COMPLETE ORAL)    Take 1 tablet by mouth daily as needed.    FLUTICASONE PROPIONATE (FLONASE) 50 MCG/ACTUATION NASAL SPRAY    2 sprays by Each Nostril route 2 (two) times daily as needed for Rhinitis.    GABAPENTIN (NEURONTIN) 300 MG CAPSULE    Take 1 capsule (300 mg total) by mouth 3 (three) times daily.    HYDROXYZINE PAMOATE (VISTARIL) 25 MG CAP    Take 25 mg by mouth every 8 (eight) hours as needed. ONCE AT BEDTIME    K PHOS DI & MONO-SOD PHOS MONO (PHOSPHA 250 NEUTRAL) 250 MG TAB    Take 3 tablets by mouth 2 (two) times a day.    LABETALOL (NORMODYNE) 100 MG TABLET     "Take 1 tablet (100 mg total) by mouth 2 (two) times daily. HOLD if BP <120 systolic    LANCETS MISC    1 each by Misc.(Non-Drug; Combo Route) route 3 (three) times daily. E11.3513    LORATADINE (CLARITIN) 10 MG TABLET    Take 10 mg by mouth once daily.    MAGNESIUM OXIDE (MAG-OX) 400 MG (241.3 MG MAGNESIUM) TABLET    Take 2 tablets (800 mg total) by mouth 2 (two) times daily.    METFORMIN (GLUCOPHAGE) 500 MG TABLET        MYCOPHENOLATE (CELLCEPT) 250 MG CAP    Take 4 capsules (1,000 mg total) by mouth 2 (two) times daily.    NIFEDIPINE (PROCARDIA-XL) 30 MG (OSM) 24 HR TABLET    Take 1 tablet (30 mg total) by mouth 2 (two) times a day.    ONDANSETRON (ZOFRAN-ODT) 4 MG TBDL    Take 4 mg by mouth.    OXYCODONE (OXY-IR) 5 MG CAP    Take 5 mg by mouth every 4 (four) hours as needed for Pain.    PEN NEEDLE, DIABETIC (BD RIO 2ND GEN PEN NEEDLE) 32 GAUGE X 5/32" NDLE    To use with insulin 4 times daily.    PREDNISOLONE ACETATE (PRED FORTE) 1 % DRPS    Apply 1 drop to eye.    PREDNISONE (DELTASONE) 5 MG TABLET    Take 1 tablet (5 mg total) by mouth once daily.    ROSUVASTATIN (CRESTOR) 20 MG TABLET    Take 1 tablet (20 mg total) by mouth once daily. ONCE AT BEDTIME    SITAGLIPTIN (JANUVIA) 100 MG TAB    Take 1 tablet (100 mg total) by mouth once daily.    TACROLIMUS (PROGRAF) 1 MG CAP    Take 5 capsules (5 mg total) by mouth every morning AND 5 capsules (5 mg total) every evening.   Discontinued Medications    INSULIN DETEMIR U-100 (LEVEMIR FLEXTOUCH) 100 UNIT/ML (3 ML) SUBQ INPN PEN    Inject 8 Units into the skin 2 (two) times daily. Dose adjustment only    NOVOLOG FLEXPEN U-100 INSULIN 100 UNIT/ML (3 ML) INPN PEN    INJECT 15 UNITS INTO THE SKIN 3 (THREE) TIMES DAILY WITH MEALS. PLUS CORRECTION SCALE. MAX 75UNITS         Allergies  Review of patient's allergies indicates:  No Known Allergies    Physical Examination   Height and Weight  Height: 5' 6.14" (168 cm)  Weight: 66.9 kg (147 lb 7.8 oz)  BSA (Calculated - sq m): " 1.77 sq meters  BMI (Calculated): 23.7  Weight in (lb) to have BMI = 25: 155.2]    Physical Exam  Neurological:      Mental Status: She is alert and oriented to person, place, and time.   Psychiatric:         Mood and Affect: Mood normal.         Speech: Speech normal.         Behavior: Behavior normal. Behavior is cooperative.         Thought Content: Thought content normal.         Judgment: Judgment normal.           Results     Lab Results   Component Value Date    WBC 6.0 05/06/2022    RBC 4.55 05/06/2022    HGB 13.3 05/06/2022    HCT 42.6 05/06/2022    MCV 93.6 05/06/2022    MCH 29.2 05/06/2022    MCHC 31.2 (L) 05/06/2022    RDW 13.9 05/06/2022     05/06/2022    MPV 11.5 05/06/2022    GRAN 31.0 (L) 02/09/2021    LYMPH 41.0 02/09/2021    MONO 27.0 (H) 02/09/2021    EOS CANCELED 02/07/2021    BASO CANCELED 02/07/2021    EOSINOPHIL 1.0 02/09/2021    BASOPHIL 0.0 02/09/2021     Sodium   Date Value Ref Range Status   02/09/2021 140 136 - 145 mmol/L Final     Sodium Level   Date Value Ref Range Status   05/06/2022 141 136 - 145 mmol/L Final     Comment:     Labs prior to visit,  not fasting     Potassium   Date Value Ref Range Status   02/09/2021 3.2 (L) 3.5 - 5.1 mmol/L Final     Potassium Level   Date Value Ref Range Status   05/06/2022 3.9 3.5 - 5.1 mmol/L Final     Comment:     Labs prior to visit,  not fasting     Chloride   Date Value Ref Range Status   02/09/2021 105 95 - 110 mmol/L Final     CO2   Date Value Ref Range Status   02/09/2021 22 (L) 23 - 29 mmol/L Final     Carbon Dioxide   Date Value Ref Range Status   05/06/2022 30 23 - 31 mmol/L Final     Comment:     Labs prior to visit,  not fasting     Glucose   Date Value Ref Range Status   02/09/2021 202 (H) 70 - 110 mg/dL Final     BUN   Date Value Ref Range Status   02/09/2021 11 8 - 23 mg/dL Final     Blood Urea Nitrogen   Date Value Ref Range Status   05/06/2022 15.8 9.8 - 20.1 mg/dL Final     Comment:     Labs prior to visit,  not fasting      Creatinine   Date Value Ref Range Status   05/06/2022 0.83 0.55 - 1.02 mg/dL Final     Comment:     Labs prior to visit,  not fasting   02/09/2021 0.7 0.5 - 1.4 mg/dL Final     Calcium   Date Value Ref Range Status   02/09/2021 9.1 8.7 - 10.5 mg/dL Final     Calcium Level Total   Date Value Ref Range Status   05/06/2022 10.4 (H) 8.4 - 10.2 mg/dL Final     Comment:     Labs prior to visit,  not fasting     Total Protein   Date Value Ref Range Status   02/09/2021 5.7 (L) 6.0 - 8.4 g/dL Final     Albumin   Date Value Ref Range Status   02/09/2021 2.8 (L) 3.5 - 5.2 g/dL Final     Albumin Level   Date Value Ref Range Status   05/06/2022 3.8 3.4 - 4.8 gm/dL Final     Comment:     Labs prior to visit,  not fasting     Total Bilirubin   Date Value Ref Range Status   02/09/2021 0.2 0.1 - 1.0 mg/dL Final     Comment:     For infants and newborns, interpretation of results should be based  on gestational age, weight and in agreement with clinical  observations.    Premature Infant recommended reference ranges:  Up to 24 hours.............<8.0 mg/dL  Up to 48 hours............<12.0 mg/dL  3-5 days..................<15.0 mg/dL  6-29 days.................<15.0 mg/dL       Bilirubin Total   Date Value Ref Range Status   05/06/2022 0.4 <=1.5 mg/dL Final     Comment:     Labs prior to visit,  not fasting     Alkaline Phosphatase   Date Value Ref Range Status   05/06/2022 80 40 - 150 unit/L Final     Comment:     Labs prior to visit,  not fasting   02/09/2021 109 55 - 135 U/L Final     AST   Date Value Ref Range Status   02/09/2021 34 10 - 40 U/L Final     Aspartate Aminotransferase   Date Value Ref Range Status   05/06/2022 16 5 - 34 unit/L Final     Comment:     Labs prior to visit,  not fasting     ALT   Date Value Ref Range Status   02/09/2021 10 10 - 44 U/L Final     Alanine Aminotransferase   Date Value Ref Range Status   05/06/2022 8 0 - 55 unit/L Final     Comment:     Labs prior to visit,  not fasting     Anion Gap    Date Value Ref Range Status   02/09/2021 13 8 - 16 mmol/L Final     eGFR if    Date Value Ref Range Status   02/09/2021 >60.0 >60 mL/min/1.73 m^2 Final     eGFR if non    Date Value Ref Range Status   02/09/2021 >60.0 >60 mL/min/1.73 m^2 Final     Comment:     Calculation used to obtain the estimated glomerular filtration  rate (eGFR) is the CKD-EPI equation.        Estimated GFR-Non    Date Value Ref Range Status   03/18/2022 74 >=90      Lab Results   Component Value Date    CHOL 167 05/06/2022     Lab Results   Component Value Date    HDL 81 (H) 05/06/2022     Lab Results   Component Value Date    LDLCALC 84.6 11/15/2020     Lab Results   Component Value Date    TRIG 85 05/06/2022     Lab Results   Component Value Date    CHOLHDL 26.9 11/15/2020     No results found for: TSH  Lab Results   Component Value Date    PHUR 5.5 01/25/2022    SPECGRAV >=1.030 (A) 02/04/2021    PROTEINUA Negative 01/25/2022    GLUCUA 4+ (A) 01/25/2022    KETONESU 1+ (A) 01/25/2022    OCCULTUA Negative 01/25/2022    NITRITE Negative 01/25/2022    LEUKOCYTESUR Negative 01/25/2022     Lab Results   Component Value Date    HGBA1C 8.2 (H) 05/06/2022    HGBA1C 9.2 03/18/2022    HGBA1C 9.0 (H) 11/15/2020     No results found for: MICROALBUR, [id number]   No results found for this or any previous visit.         Assessment and Plan (including Health Maintenance)     Plan: RTC 3 months and prn. POC A1C at next visit.        Health Maintenance Due   Topic Date Due    Foot Exam  Never done    Shingles Vaccine (1 of 2) Never done    Colorectal Cancer Screening  03/27/2020    Mammogram  10/16/2021    COVID-19 Vaccine (4 - Booster for Moderna series) 04/27/2022    DEXA Scan  04/29/2022       Problem List Items Addressed This Visit        Cardiac/Vascular    Hypertension    Current Assessment & Plan     Followed by [facility] nephrology clinic.  On labetalol BID and nifedipine.              Endocrine     Uncontrolled type 2 diabetes mellitus    Overview     A1C 8.2. Previous A1C 9.2.  Increase basaglar to 28 units.  Continue januvia and metformin.           Current Assessment & Plan     A1C 8.2. Previous A1C 9.2.  Increase basaglar to 28 units.  Continue januvia and metformin.   Continue medications as prescribed.  Follow ADA diet.  Avoid soda, simple sweets, and limit rice/pasta/bread/starches and consume brown options when possible.   Maintain healthy weight with BMI goal <30.   Perform aerobic exercise for 150 minutes per week (or 5 days a week for 30 minutes each day).   Examine feet daily.   Obtain annual dilated eye exam.  Eye exam: 6/7/21  Foot exam: 10/29/21             Relevant Medications    BASAGLAR KWIKPEN U-100 INSULIN glargine 100 units/mL (3mL) SubQ pen    metFORMIN (GLUCOPHAGE) 500 MG tablet          Health Maintenance Topics with due status: Not Due       Topic Last Completion Date    TETANUS VACCINE 02/10/2017    Eye Exam 02/15/2022    Lipid Panel 05/06/2022    Hemoglobin A1c 05/06/2022       Future Appointments   Date Time Provider Department Center   7/22/2022  8:00 AM Ann Marie Scruggs MD [facility]   8/2/2022 11:00 AM [facility] [place] [facility]            Signature:  LYNN Miranda  [facility]  [address] [facility] [id number]    Date of encounter: 5/18/22

## 2022-05-18 NOTE — ASSESSMENT & PLAN NOTE
A1C 8.2. Previous A1C 9.2.  Increase basaglar to 28 units.  Continue januvia and metformin.   Continue medications as prescribed.  Follow ADA diet.  Avoid soda, simple sweets, and limit rice/pasta/bread/starches and consume brown options when possible.   Maintain healthy weight with BMI goal <30.   Perform aerobic exercise for 150 minutes per week (or 5 days a week for 30 minutes each day).   Examine feet daily.   Obtain annual dilated eye exam.  Eye exam: 6/7/21  Foot exam: 10/29/21

## 2022-05-26 DIAGNOSIS — Z94.0 KIDNEY REPLACED BY TRANSPLANT: ICD-10-CM

## 2022-05-26 DIAGNOSIS — E83.39 HYPOPHOSPHATEMIA: ICD-10-CM

## 2022-05-30 DIAGNOSIS — E83.39 HYPOPHOSPHATEMIA: ICD-10-CM

## 2022-05-30 DIAGNOSIS — Z94.0 KIDNEY REPLACED BY TRANSPLANT: ICD-10-CM

## 2022-06-22 ENCOUNTER — TELEPHONE (OUTPATIENT)
Dept: OPHTHALMOLOGY | Facility: CLINIC | Age: 67
End: 2022-06-22
Payer: MEDICAID

## 2022-06-22 NOTE — TELEPHONE ENCOUNTER
----- Message from Hayley Gutierrez sent at 6/21/2022  3:38 PM CDT -----  Regarding: glasses  Patient stated she cant not see out of her glasses and has a driving test and she would like to come in before her appointment 8/2/2022 because she can not see with her prescription

## 2022-06-22 NOTE — TELEPHONE ENCOUNTER
Called the patient to clarify her need. Pt states she had her MRx updated here 2/15/22. States the glasses have not been correct since she had them made but that she has been wearing them to see if she would get used to them. Pt states the eyeglass place that made the glasses has checked them. They are made according to the prescription she was provided. The pt has an appointment scheduled for 8/2/2022, but she has to renew her license 8/14/2022 and needs her glasses before then. Kimberly Gordon to call pt to adjust appointment schedule.

## 2022-07-08 DIAGNOSIS — N18.9 CHRONIC KIDNEY DISEASE, UNSPECIFIED CKD STAGE: Primary | ICD-10-CM

## 2022-07-19 ENCOUNTER — TELEPHONE (OUTPATIENT)
Dept: NEPHROLOGY | Facility: CLINIC | Age: 67
End: 2022-07-19
Payer: MEDICAID

## 2022-07-22 ENCOUNTER — OFFICE VISIT (OUTPATIENT)
Dept: NEPHROLOGY | Facility: CLINIC | Age: 67
End: 2022-07-22
Payer: MEDICARE

## 2022-07-22 VITALS
HEART RATE: 58 BPM | SYSTOLIC BLOOD PRESSURE: 144 MMHG | BODY MASS INDEX: 24.7 KG/M2 | DIASTOLIC BLOOD PRESSURE: 70 MMHG | HEIGHT: 66 IN | TEMPERATURE: 98 F | WEIGHT: 153.69 LBS

## 2022-07-22 DIAGNOSIS — T38.0X5A IMMUNOSUPPRESSION DUE TO CHRONIC STEROID USE: ICD-10-CM

## 2022-07-22 DIAGNOSIS — D84.821 IMMUNOSUPPRESSION DUE TO CHRONIC STEROID USE: ICD-10-CM

## 2022-07-22 DIAGNOSIS — Z79.52 IMMUNOSUPPRESSION DUE TO CHRONIC STEROID USE: ICD-10-CM

## 2022-07-22 DIAGNOSIS — E83.52 HYPERCALCEMIA: ICD-10-CM

## 2022-07-22 DIAGNOSIS — Z94.0 HISTORY OF RENAL TRANSPLANT: Primary | ICD-10-CM

## 2022-07-22 DIAGNOSIS — I10 PRIMARY HYPERTENSION: ICD-10-CM

## 2022-07-22 DIAGNOSIS — E11.65 UNCONTROLLED TYPE 2 DIABETES MELLITUS WITH HYPERGLYCEMIA: ICD-10-CM

## 2022-07-22 PROCEDURE — 99214 OFFICE O/P EST MOD 30 MIN: CPT | Mod: PBBFAC | Performed by: INTERNAL MEDICINE

## 2022-07-22 NOTE — PROGRESS NOTES
"[facility] Visit    Chief Complaint:      Chronic Kidney Disease (Follow up)     Subjective:     HPI:  Kendra Malik is a 66 y.o.female with history of uncontrolled type 2 diabetes, hypertension, hyperlipidemia, AOCD, CKD V s/p renal transplant in Nov 2020 (on immunosuppression), vitamin D toxicity, hypercalcemia and hyperparathyroidism presenting to nephrology clinic today 7/22/22 for  F/U visit. Patient received her renal transplant in [place] in November 2020. Patient continues on immunosuppression with tacrolimus, mycophenolate, daily prednisolone 5 mg oral tablet. Patient reports compliance with all medications and denies any drug/tobacco use.       Patient has no acute complaints today. On last visit, noted to have elevated SBP in the 170's; nifedipine was increased to BID. BP today 140's-150's. Also some improvement in hypercalcemia noted on previous visit. She denies any CP, SOB, Fever, Chills, Cough, dysuria, hematuria, HA, nausea, vomiting, diarrhea. Today, patient's only complaint is some episodes of hypoglycemia. Notes CBG around mid afternoons have dropped as low as 70, does report symptomatic hypoglycemia with symptoms such as lightheadedness and weakness. We have advised her to discuss with her PCP    Review of Systems  A comprehensive 12 point review of systems was completed.  Please see above for pertinent positives and negatives.     Objective:   Last 24 Hour Vital Signs:  Vitals  BP: (!) 144/70  Temp: 98.3 °F (36.8 °C)  Temp src: Oral  Pulse: (!) 58  Height: 5' 6" (167.6 cm)  Weight: 69.7 kg (153 lb 10.6 oz)    Physical Examination:    Vitals:   Vitals:    07/22/22 0812   BP: (!) 144/70   Pulse: (!) 58   Temp: 98.3 °F (36.8 °C)       General: Awake, alert, & oriented to person, place & time. No acute distress  Psychiatric: Mood and affect normal  HEENT: Normocephalic, atraumatic. Face symmetric. Mucous membranes moist.   Cardiovascular: Regular rate & rhythm. Normal S1 & S2 " w/out murmurs, rubs or gallops.  Pulmonary: Bilateral symmetric chest rise. Non-labored, CTAB  Abdominal:  Soft, nontender, nondistended. Bowel sounds present  Extremities: No clubbing, cyanosis or edema  Skin:  Warm & dry.  Neuro:   Strength 5/5, DTR 2+ & tone normal throughout. Sensation intact bilateraly.      Assessment & Plan:      H/O CKD V s/p Renal Transplant 2020  Chronic Immunosuppression Therapy   - received renal transplant in Nov 2020 in [place], history of uncontrolled type 2 diabetes and hypertension  - Immunosuppression on Tacrolimus 5mg BID, Mycophenolate 1000 BID , and Prednisolone 5 mg once daily  -Currently GFR calculated 94  -Continue recommendations post renal transplant per guidelines, labs required every visit include CMP/magnesium/phosphorus/CBC with differential/tacrolimus level/urinalysis/UPCR/fasting blood glucose  - recommendations post renal transplant per guidelines, labs required every 3 months or every visit include hemoglobin A1c, fasting lipid profile  - recommendations post renal transplant per guidelines, labs required at least every 6 to 12 months include PTH and 25-OH Vit D  - recommendations post renal transplant per guidelines, labs required at 12, 18, and 24 months post transplant include BK virus blood and/or urine PCR testing   - today ordered the following labs to be reviewed at next visit in 2 months: CBC, CMP, urine creatinine, urine protein, UA, magnesium level, phosphorus level, PTH intact, vitamin d      Hypercalcemia- improving  Vitamin D Toxicity  -  10.4 on 5/3/22 (previously 11.1)-> 10.2 on 7/21/22 labs  - Was suspected to be due to high vitamin D levels (level 57)  - Taking cinaclet qHs  - Continues off Vitamin D supplementation  - ordered PTH intact, CMP, Phos, Vit D to be followed up next visit    Type 2 Diabetes, uncontrolled  - last A1c 8.2 (5/2022)   - continue diabetic mgmt per PCP which include Lantus 28 units at bedtime, metformin 500 BID, and  januvia  -Patient reports mid- afternoon hypoglycemic episodes where CBG drops as low as 70; we have advised her to discuss with PCP   - discussed lifestyle and diet modifications     Uncontrolled Hypertension  - BP in clinic today 144/70  - Patient reports -150s normally  -Currently on  labetalol 100 bid, nifedipine 30 BID  - Advised to continue to monitor BP as well as HR. Today in clinic HR 58. Advised that if HR <55 once resumes nifedipine BID, to discontinue and only take once per day.     Post-Renal Transplant Vaccine Recommendations:  - Flu Vaccine: provided today 3/18/2022  - Pneumovax: provided today 3/18/2022  - Hep A vaccine: received 2018  - Hep B vaccine: no official record available in chart however patient did receive vaccination when dialysis was initiated  - TDap vaccine: received 2017  - Shingrix vaccine: Offer at next visit  - Polio vaccine: received in 1962  - Meningococcal vaccine:  Offer at next visit  - COVID vaccine: completed 2 dose series in May 2021, June 2021, completed booster in December 2021        Continue following measures:  -follow 2 gram a day dietary sodium restriction  -control diabetes (goal A1c less than 7%)  -control high blood pressure (goal blood pressure is less than 130/80, please check blood pressure twice a week and bring blood pressure logs to office visit)  -exercise at least 30 minutes a day, 5 days a week  -maintain healthy weight  -decrease or stop alcohol use  -do not smoke  -stay well hydrated (drink water only, avoid juices, sweet tea, and sodas)  -ask about staying up-to-date on vaccinations (flu vaccine, pneumonia vaccine, hepatitis B vaccine)  -avoid excessive use of NSAIDs (ibuprofen, naproxen, Aleve, Advil, Toradol, Mobic), take Tylenol as needed for headache or mild pain  -take cholesterol lowering medications if prescribed (LDL goal less than 100)     Follow-up with the primary care provider as scheduled.  Will follow up with Dr. Scruggs in  [place]

## 2022-07-26 ENCOUNTER — OFFICE VISIT (OUTPATIENT)
Dept: OPHTHALMOLOGY | Facility: CLINIC | Age: 67
End: 2022-07-26
Payer: MEDICARE

## 2022-07-26 VITALS — BODY MASS INDEX: 24.8 KG/M2 | WEIGHT: 154.31 LBS | HEIGHT: 66 IN

## 2022-07-26 DIAGNOSIS — H35.373 EPIRETINAL MEMBRANE (ERM) OF BOTH EYES: ICD-10-CM

## 2022-07-26 DIAGNOSIS — H02.403 PTOSIS OF BOTH EYELIDS: ICD-10-CM

## 2022-07-26 DIAGNOSIS — E11.3553 STABLE PROLIFERATIVE DIABETIC RETINOPATHY OF BOTH EYES ASSOCIATED WITH TYPE 2 DIABETES MELLITUS: Primary | ICD-10-CM

## 2022-07-26 PROCEDURE — 92134 CPTRZ OPH DX IMG PST SGM RTA: CPT | Mod: PBBFAC,PO | Performed by: OPHTHALMOLOGY

## 2022-07-26 PROCEDURE — 99214 OFFICE O/P EST MOD 30 MIN: CPT | Mod: PBBFAC,PO | Performed by: STUDENT IN AN ORGANIZED HEALTH CARE EDUCATION/TRAINING PROGRAM

## 2022-07-26 NOTE — PROGRESS NOTES
OCT mac (7/26/22):  OD: blunted contour, nasal thickening, ERM  OS: blunted contour, ERM, traction superiorly  Stable OCT mac    Assessment/Plan:  1. T2DM with stable PDR OD  - Hba1c last 13.5 (1/22), on insulin  - S/P PRP OD x6  - History of Sub-optimal response to Avastin (last 7/9/2019 for NV + VH followed by PRP fill in x3), s/p Lucentis x2 (last 11/27/17) with improvement of CME  - Had new VH first noticed by pt on 5/1/2020, 5/5/20 B scan without RD, given lucentis x2  - Good ; no active NV now on exam  - will observe for now  - BP/BG/chol control, and follow up with PCP  - Return precautions  - MRx updated 2/15/22    2. T2DM with stable PDR OS  - extrafoveal mild traction stable form last visit.  - s/p PPV/MP/fax/EL/ C3F8 for TRD OS due to PDR at , 6/2016  - For extrafoveal CSME --> no leakage on IVFA (at least before November 2016), ? chronic intraretinal cystic changes  - Return precautions, CTM  - No active NV on exam  - With mild traction component  - Will observe for now    3. ERM OU  -ERM + regressed fibrovascular tissue ou  -seen with Dr. Joseph 6/27/21, will monitor for now    4. Pseudophakia OU  - S/p CEIOL OS 3/21/19, had single episode of post op iritis, resolved  - s/p CE/IOL OD 5/2/19, BCVA 20/60-70  - s/p YAG cap OU    5. Ptosis OU  - Pt complains she consistently has to lift her eyelids up to see  - MRD1 of 0 mm both eyes  - Will external referral to Dr Caldwell here in [facility] 4-6 months repeat OCT mac DFE

## 2022-09-14 ENCOUNTER — TELEPHONE (OUTPATIENT)
Dept: INTERNAL MEDICINE | Facility: CLINIC | Age: 67
End: 2022-09-14

## 2022-09-14 DIAGNOSIS — Z94.0 S/P CADAVER RENAL TRANSPLANT: Primary | ICD-10-CM

## 2022-09-14 NOTE — TELEPHONE ENCOUNTER
Pt is requesting a referral to see her kidney Dr. Karla Nation, pt recently changed her insurance plan to [employer] and is unable to see her over here, pt is requesting that the referral be sent to Dr. Nation's [place] office.

## 2022-10-19 DIAGNOSIS — Z94.0 KIDNEY REPLACED BY TRANSPLANT: ICD-10-CM

## 2022-10-19 DIAGNOSIS — Z94.0 RENAL TRANSPLANT RECIPIENT: ICD-10-CM

## 2022-10-19 DIAGNOSIS — N18.9 CHRONIC KIDNEY DISEASE, UNSPECIFIED CKD STAGE: ICD-10-CM

## 2022-10-19 DIAGNOSIS — I10 HYPERTENSION, UNSPECIFIED TYPE: ICD-10-CM

## 2022-10-19 DIAGNOSIS — E11.22 TYPE 2 DIABETES MELLITUS WITH DIABETIC CHRONIC KIDNEY DISEASE, UNSPECIFIED CKD STAGE, UNSPECIFIED WHETHER LONG TERM INSULIN USE: ICD-10-CM

## 2022-10-19 NOTE — TELEPHONE ENCOUNTER
----- Message from Simeon Newell sent at 10/19/2022  2:10 PM CDT -----  Regarding: Refills  Patient called in to speak with nurse regarding some meds she is in need of refills for. Meds not specified. Requesting a call back to discuss further.          Contact: [phone]

## 2022-10-19 NOTE — TELEPHONE ENCOUNTER
Pt needs refwerral for nephrologist as she has moved to new clinic. Clinic staff will not let pt contact her bc she is not in their system. Sent three month supply of meds and will send referral for pt to reestablish care with prior nephrologist.----- Message from Simeon Newell sent at 10/19/2022  2:10 PM CDT -----  Regarding: Refills  Patient called in to speak with nurse regarding some meds she is in need of refills for. Meds not specified. Requesting a call back to discuss further.          Contact: [phone]

## 2022-10-20 DIAGNOSIS — E11.65 UNCONTROLLED TYPE 2 DIABETES MELLITUS WITH HYPERGLYCEMIA: Primary | ICD-10-CM

## 2022-10-20 RX ORDER — HYDROXYZINE PAMOATE 25 MG/1
25 CAPSULE ORAL NIGHTLY
Qty: 90 CAPSULE | Refills: 0 | Status: ON HOLD | OUTPATIENT
Start: 2022-10-20 | End: 2023-01-26 | Stop reason: CLARIF

## 2022-10-20 RX ORDER — MYCOPHENOLATE MOFETIL 250 MG/1
1000 CAPSULE ORAL 2 TIMES DAILY
Qty: 240 CAPSULE | Refills: 11 | Status: SHIPPED | OUTPATIENT
Start: 2022-10-20 | End: 2022-11-11 | Stop reason: SDUPTHER

## 2022-10-20 RX ORDER — METFORMIN HYDROCHLORIDE 500 MG/1
500 TABLET ORAL 2 TIMES DAILY WITH MEALS
Qty: 60 TABLET | Refills: 2 | Status: SHIPPED | OUTPATIENT
Start: 2022-10-20 | End: 2023-01-03

## 2022-10-20 RX ORDER — PREDNISONE 5 MG/1
5 TABLET ORAL DAILY
Qty: 91 TABLET | Refills: 3 | Status: SHIPPED | OUTPATIENT
Start: 2022-10-20 | End: 2022-11-11 | Stop reason: SDUPTHER

## 2022-10-20 RX ORDER — TACROLIMUS 1 MG/1
CAPSULE ORAL
Qty: 360 CAPSULE | Refills: 11 | Status: SHIPPED | OUTPATIENT
Start: 2022-10-20 | End: 2022-11-11 | Stop reason: SDUPTHER

## 2022-10-20 RX ORDER — GABAPENTIN 300 MG/1
300 CAPSULE ORAL 3 TIMES DAILY
Qty: 90 CAPSULE | Refills: 0 | Status: SHIPPED | OUTPATIENT
Start: 2022-10-20 | End: 2023-01-03

## 2022-10-20 RX ORDER — NIFEDIPINE 30 MG/1
30 TABLET, EXTENDED RELEASE ORAL 2 TIMES DAILY
Qty: 30 TABLET | Refills: 3 | Status: SHIPPED | OUTPATIENT
Start: 2022-10-20 | End: 2023-04-03 | Stop reason: SDUPTHER

## 2022-11-03 ENCOUNTER — DOCUMENTATION ONLY (OUTPATIENT)
Dept: TRANSPLANT | Facility: CLINIC | Age: 67
End: 2022-11-03

## 2022-11-03 LAB
EXT ALBUMIN: 3.7
EXT ALKALINE PHOSPHATASE: 102
EXT ALT: <5
EXT AST: 9
EXT BACTERIA UA: ABNORMAL
EXT BILIRUBIN DIRECT: 0.2 MG/DL
EXT BILIRUBIN TOTAL: 0.5
EXT BUN: 14.33 (ref 9.8–20.1)
EXT CALCIUM: 10.1
EXT CHLORIDE: 108 (ref 98–107)
EXT CO2: 26 (ref 23–31)
EXT CREATININE: 0.89 MG/DL (ref 0.57–1.11)
EXT EOSINOPHIL%: 3.1 (ref 0.7–5.8)
EXT GFR MDRD AF AMER: >60
EXT GFR MDRD NON AF AMER: >60
EXT GLUCOSE UA: 100
EXT GLUCOSE: 185
EXT HEMATOCRIT: 41.4 (ref 34.1–44.9)
EXT HEMOGLOBIN: 12.9
EXT LYMPH%: 33.8 (ref 19.3–51.7)
EXT MAGNESIUM: 1.4
EXT MONOCYTES%: 12.5 (ref 4.7–12.5)
EXT NITRITES UA: NEGATIVE
EXT PHOSPHORUS: 3
EXT PLATELETS: 167 (ref 140–369)
EXT POTASSIUM: 4.3 (ref 3.5–5.1)
EXT PROT/CREAT RATIO UR: 0.08
EXT PROTEIN TOTAL: 6.6
EXT PROTEIN TOTAL: 6.6
EXT PROTEIN UA: NEGATIVE
EXT RBC UA: ABNORMAL
EXT SEGS%: 49.4 (ref 34–71.1)
EXT SODIUM: 142 MMOL/L (ref 134–145)
EXT TACROLIMUS LVL: 6.42 (ref 5–20)
EXT WBC UA: ABNORMAL
EXT WBC: 4.17

## 2022-11-07 ENCOUNTER — PATIENT MESSAGE (OUTPATIENT)
Dept: TRANSPLANT | Facility: CLINIC | Age: 67
End: 2022-11-07
Payer: MEDICARE

## 2022-11-07 NOTE — PROGRESS NOTES
Patient was not seen [arrived late and left before provider could see]. RN rescheduled appointment.

## 2022-11-08 ENCOUNTER — OFFICE VISIT (OUTPATIENT)
Dept: TRANSPLANT | Facility: CLINIC | Age: 67
End: 2022-11-08
Payer: MEDICARE

## 2022-11-08 ENCOUNTER — TELEPHONE (OUTPATIENT)
Dept: TRANSPLANT | Facility: CLINIC | Age: 67
End: 2022-11-08
Payer: MEDICARE

## 2022-11-08 DIAGNOSIS — Z94.0 DECEASED-DONOR KIDNEY TRANSPLANT: ICD-10-CM

## 2022-11-08 DIAGNOSIS — N18.2 CHRONIC KIDNEY DISEASE (CKD), STAGE II (MILD): Primary | ICD-10-CM

## 2022-11-08 DIAGNOSIS — Z79.899 IMMUNOSUPPRESSIVE MANAGEMENT ENCOUNTER FOLLOWING KIDNEY TRANSPLANT: ICD-10-CM

## 2022-11-08 DIAGNOSIS — Z94.0 IMMUNOSUPPRESSIVE MANAGEMENT ENCOUNTER FOLLOWING KIDNEY TRANSPLANT: ICD-10-CM

## 2022-11-08 PROCEDURE — 99499 NO LOS: ICD-10-PCS | Mod: 95,,, | Performed by: INTERNAL MEDICINE

## 2022-11-08 PROCEDURE — 99499 UNLISTED E&M SERVICE: CPT | Mod: 95,,, | Performed by: INTERNAL MEDICINE

## 2022-11-08 NOTE — LETTER
November 10, 2022                     [address] Transplant 1st Fl  [address]  [facility] [address]  Phone: [phone]   Patient: Kendra Malik   MR Number: [id number]   YOB: 1955   Date of Visit: 11/8/2022       Dear      Thank you for referring Kendra Malik to me for evaluation. Attached you will find relevant portions of my assessment and plan of care.    If you have questions, please do not hesitate to call me. I look forward to following Kendra Malik along with you.    Sincerely,    Kelley Denny MD    Enclosure    If you would like to receive this communication electronically, please contact [email or web address] or [phone] to request [employer] [facility] access.    [employer] [facility] is a tool which provides read-only access to select patient information with whom you have a relationship. Its easy to use and provides real time access to review your patients record including encounter summaries, notes, results, and demographic information.    If you feel you have received this communication in error or would no longer like to receive these types of communications, please e-mail [email or web address]

## 2022-11-08 NOTE — TELEPHONE ENCOUNTER
Pt verbalized understanding to sign into her appt at 4 pm----- Message from Mansi Fair RN sent at 11/8/2022  2:56 PM CST -----  Regarding: FW: SPEAK WITH OFFICE  Contact: HOLLAND    ----- Message -----  From: Taylor Hansen  Sent: 11/8/2022   2:18 PM CST  [facility]  Subject: SPEAK WITH OFFICE                                PT CALLING TO SPEAK WITH OFFICE..[phone] (home)

## 2022-11-09 ENCOUNTER — TELEPHONE (OUTPATIENT)
Dept: TRANSPLANT | Facility: CLINIC | Age: 67
End: 2022-11-09
Payer: MEDICARE

## 2022-11-10 ENCOUNTER — DOCUMENTATION ONLY (OUTPATIENT)
Dept: TRANSPLANT | Facility: CLINIC | Age: 67
End: 2022-11-10
Payer: MEDICARE

## 2022-11-10 LAB
EXT ALBUMIN: 3.7
EXT ALKALINE PHOSPHATASE: 102
EXT ALT: <5
EXT AST: 9
EXT BACTERIA UA: ABNORMAL
EXT BILIRUBIN DIRECT: 0.2 MG/DL
EXT BILIRUBIN TOTAL: 0.5
EXT BK VIRUS DNA QN PCR: <390 COPY/ML
EXT BUN: 14.33 (ref 9.8–20.1)
EXT CALCIUM: 10.1
EXT CHLORIDE: 108 (ref 98–107)
EXT CO2: 26 (ref 23–31)
EXT CREATININE: 0.89 MG/DL (ref 0.57–1.11)
EXT EOSINOPHIL%: 3.1 (ref 0.07–5.8)
EXT GLUCOSE UA: 100
EXT HEMATOCRIT: 41.4
EXT HEMOGLOBIN: 12.9
EXT LYMPH%: 33.8 (ref 19.3–51.7)
EXT MAGNESIUM: 1.4 (ref 1.6–2.6)
EXT MONOCYTES%: 12.5 (ref 4.7–12.5)
EXT NITRITES UA: NEGATIVE
EXT PHOSPHORUS: 3 (ref 2.3–4.7)
EXT PLATELETS: 167 (ref 140–369)
EXT POTASSIUM: 4.3 ` (ref 3.5–5.1)
EXT PROT/CREAT RATIO UR: 0.08
EXT PROTEIN TOTAL: 6.6
EXT PROTEIN TOTAL: 6.6 (ref 5.8–8.1)
EXT PROTEIN UA: NEGATIVE
EXT RBC UA: NEGATIVE
EXT SEGS%: 49.4 (ref 34–71.1)
EXT SODIUM: 142 MMOL/L (ref 136–145)
EXT TACROLIMUS LVL: 6.42 (ref 5–20)
EXT WBC UA: NEGATIVE
EXT WBC: 4.17 (ref 3.98–10.04)

## 2022-11-10 NOTE — PROGRESS NOTES
The patient location is: home in [place]  The chief complaint leading to consultation is: reassess immunosuppression and kidney function    Visit type: audiovisual    Face to Face time with patient: 22  30 minutes of total time spent on the encounter, which includes face to face time and non-face to face time preparing to see the patient (eg, review of tests), Obtaining and/or reviewing separately obtained history, Documenting clinical information in the electronic or other health record, Independently interpreting results (not separately reported) and communicating results to the patient/family/caregiver, or Care coordination (not separately reported).     Each patient to whom he or she provides medical services by telemedicine is:  (1) informed of the relationship between the physician and patient and the respective role of any other health care provider with respect to management of the patient; and (2) notified that he or she may decline to receive medical services by telemedicine and may withdraw from such care at any time.    Notes:     Post-Transplant Assessment    Referring Physician: Anali Moura  Current Nephrologist: Ann Marie Scruggs    ORGAN: LEFT KIDNEY  Donor Type: donation after brain death  PHS Increased Risk: no  Cold Ischemia: 801 mins  Induction Medications: thymoglobulin    Subjective:     CC:  Reassessment of renal allograft function and management of chronic immunosuppression.    HPI:  Ms. Malik is a 67 y.o. 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  She has CKD stage 2 - GFR 60-89 and her baseline creatinine is between 0.7-0.9. She takes mycophenolate mofetil, prednisone and tacrolimus for maintenance immunosuppression.      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 cPRA 91%. CIT 13+h. KDPI - 29%, cPRA 90%  ORTHOSTATIC HYPOTENSION  2/4-2/9 /2022  COVID, PNA, neutropenia      POST  "TRANSPLANT UPDATE 11/10/2022   Kendra is now 2 yrs post kidney transplant. She feels "fanstastic." She denies any changes in her health. She has seen nephrology, Dr. JAYCOB Scruggs, but cannot get f/u appt d/t needing a referral at a new location [[place]] per that office.  She denies fever, CP, SOB, GI issues, problems voiding, edema. She reports she is tolerating IS well.    Current Outpatient Medications   Medication Sig    acetaminophen (TYLENOL EXTRA STRENGTH ORAL) Take 500 mg by mouth every 6 (six) hours as needed.    BASAGLAR KWIKPEN U-100 INSULIN glargine 100 units/mL SubQ pen Inject 28 Units into the skin every evening.    blood sugar diagnostic Strp 1 each by Misc.(Non-Drug; Combo Route) route 3 (three) times daily. E11.3513 (DM)    blood-glucose meter kit Use as instructed; E11.3513 (DM)    cinacalcet (SENSIPAR) 30 MG Tab TAKE 1 TABLET (30 MG TOTAL) BY MOUTH DAILY WITH BREAKFAST.    diphenhydrAMINE (BENADRYL) 25 mg capsule Take 25 mg by mouth every 6 (six) hours as needed for Itching.    famotidine/Ca carb/mag hydrox (PEPCID COMPLETE ORAL) Take 1 tablet by mouth daily as needed.    fluticasone propionate (FLONASE) 50 mcg/actuation nasal spray 2 sprays by Each Nostril route 2 (two) times daily as needed for Rhinitis.    gabapentin (NEURONTIN) 300 MG capsule Take 1 capsule (300 mg total) by mouth 3 (three) times daily.    hydrOXYzine pamoate (VISTARIL) 25 MG Cap Take 1 capsule (25 mg total) by mouth every evening.    k phos di & mono-sod phos mono (PHOSPHA 250 NEUTRAL) 250 mg Tab Take 3 tablets by mouth 2 (two) times a day.    labetaloL (NORMODYNE) 100 MG tablet TAKE 1 TABLET BY MOUTH TWICE A DAY    lancets Misc 1 each by Misc.(Non-Drug; Combo Route) route 3 (three) times daily. E11.3513    loratadine (CLARITIN) 10 mg tablet Take 10 mg by mouth daily as needed.    magnesium oxide (MAG-OX) 400 mg (241.3 mg magnesium) tablet Take 2 tablets (800 mg total) by mouth 2 (two) times daily.    metFORMIN (GLUCOPHAGE) " "500 MG tablet Take 1 tablet (500 mg total) by mouth 2 (two) times daily with meals.    mycophenolate (CELLCEPT) 250 mg Cap Take 4 capsules (1,000 mg total) by mouth 2 (two) times daily.    NIFEdipine (PROCARDIA-XL) 30 MG (OSM) 24 hr tablet Take 1 tablet (30 mg total) by mouth 2 (two) times a day.    ondansetron (ZOFRAN-ODT) 4 MG TbDL Take 4 mg by mouth.    oxyCODONE (OXY-IR) 5 mg Cap Take 5 mg by mouth every 4 (four) hours as needed for Pain.    pen needle, diabetic (BD RIO 2ND GEN PEN NEEDLE) 32 gauge x 5/32" Ndle To use with insulin 4 times daily.    predniSONE (DELTASONE) 5 MG tablet Take 1 tablet (5 mg total) by mouth once daily.    rosuvastatin (CRESTOR) 20 MG tablet TAKE 1 TABLET BY MOUTH EVERYDAY AT BEDTIME    SITagliptin (JANUVIA) 100 MG Tab Take 1 tablet (100 mg total) by mouth once daily.    tacrolimus (PROGRAF) 1 MG Cap Take 5 capsules (5 mg total) by mouth every morning AND 5 capsules (5 mg total) every evening.     No current facility-administered medications for this visit.       Review of Systems   Constitutional:  Negative for fever.   HENT:  Negative for mouth sores.    Eyes:  Negative for visual disturbance.   Respiratory:  Negative for shortness of breath.    Cardiovascular:  Negative for chest pain and leg swelling.   Gastrointestinal: Negative.    Genitourinary:  Negative for decreased urine volume, difficulty urinating, dysuria and hematuria.   Musculoskeletal: Negative.    Skin:  Negative for rash.   Allergic/Immunologic: Positive for immunocompromised state.   Neurological:  Negative for tremors.     Objective:     There were no vitals taken for this visit.body mass index is unknown because there is no height or weight on file.    Physical Exam  Constitutional:       General: She is not in acute distress.  Cardiovascular:      Rate and Rhythm: Normal rate and regular rhythm.   Pulmonary:      Effort: Pulmonary effort is normal. No respiratory distress.      Breath sounds: Normal breath " sounds.   Abdominal:      General: Bowel sounds are normal.      Palpations: Abdomen is soft. There is no mass.      Tenderness: There is no abdominal tenderness.     Recent Labs   Lab 01/31/20  1121 11/15/20  1710 11/15/20  1735 11/15/20  2318 02/07/21  0538 02/08/21  0529 02/09/21  0318 02/09/21  0319 01/25/22  1007 03/18/22  0753 05/06/22  0939   WBC  --   --  8.72   < > 3.55 L 3.03 L  --  2.73 L  --  4.9 6.0   Hgb  --   --   --   --   --   --   --   --   --  12.6 13.3   Hemoglobin  --   --  10.8 L   < > 11.4 L 11.2 L  --  11.1 L  --   --   --    POC Hematocrit  --   --   --    < >  --   --   --   --   --   --   --    Hematocrit  --   --  34.1 L   < > 35.9 L 36.3 L  --  35.2 L  --   --   --    Hct  --   --   --   --   --   --   --   --   --  39.8 42.6   Sodium  --   --  144   < > 144  144 145  145 140 140  --   --   --    Sodium Level  --   --   --   --   --   --   --   --  139 142 141   Potassium  --   --  3.5   < > 3.5  3.5 3.4 L  3.5 3.2 L 3.2 L  --   --   --    Potassium Level  --   --   --   --   --   --   --   --  4.6 3.8 3.9   Chloride  --   --  106   < > 110  110 111 H  111 H 105 105  --   --   --    CO2  --   --  25   < > 24  25 24  26 23 22 L  --   --   --    Carbon Dioxide  --   --   --   --   --   --   --   --  27 29 30   BUN  --   --  46 H   < > 12  12 11  12 11 11  --   --   --    Blood Urea Nitrogen  --   --   --   --   --   --   --   --  12.0 16.0 15.8   Creatinine  --   --  5.9 H   < > 0.8  0.7 0.7  0.7 0.7 0.7 0.97 0.82 0.83   eGFR if non   --   --  7.0 A   < > >60.0  >60.0 >60.0  >60.0 >60.0 >60.0  --   --   --    Estimated GFR-Non   --   --   --   --   --   --   --   --  61 L 74  --    Estimated GFR-  --   --   --   --   --   --   --   --  74 L 90 >60   Glucose Level  --   --   --   --   --   --   --   --  304 H 89 122 H   Calcium  --   --  8.9   < > 9.3  9.3 8.7  9.1 9.0 9.1  --   --   --    Calcium Level Total  --   --   --   --    --   --   --   --  10.7 H 11.1 10.4 H   Phosphorus  --   --  4.3   < > 3.1 3.0 2.6 L  --   --   --   --    Phosphorus Level  --   --   --   --   --   --   --   --   --   --  3.5   Magnesium  --   --   --    < > 1.7 1.5 L 1.7  --   --   --   --    Magnesium Level  --   --   --   --   --   --   --   --   --   --  1.50 L   Albumin  --   --  3.2 L   < > 2.7 L  2.6 L 2.7 L  2.7 L 2.8 L 2.8 L  --   --   --    Albumin Level  --   --   --   --   --   --   --   --  3.7 3.9 3.8   AST  --   --  18   < > 27 27  --  34  --   --   --    Aspartate Aminotransferase  --   --   --   --   --   --   --   --  15 13 16   ALT  --   --  12   < > 9 L 8 L  --  10  --   --   --    Alanine Aminotransferase  --   --   --   --   --   --   --   --  8 7 8   Prot/Creat Ratio, Urine  --  1.64 H  --   --   --   --   --   --   --   --   --    PTH, Intact 522.0 H  --  485.0 H  --   --   --   --   --   --   --   --    Parathyroid Hormone Intact  --   --   --   --   --   --   --   --   --   --  272.9 H   Tacrolimus Lvl  --   --   --    < > 5.0 4.8 L  --  5.1  --   --   --     < > = values in this interval not displayed.       Recent Labs   Lab 02/02/21  0806 02/04/21  0939 02/11/21  1200 11/09/21  0931 05/03/22  0831 11/03/22  0855 11/07/22  0917   EXT  L 580 H  --   --   --   --   --    EXT WBC 1.35 LL 1.29 A   < > 4.53 5.23 4.17 4.17   EXT SEGS% 52.6 45.0   < > 84.1 H 49.5 49.4 49.4   EXT Platelets 136 A 144   < > 161 157 167 167   EXT Hemoglobin 12.9 12.9   < > 14.2 13.0 12.9 12.9   EXT Hematocrit 40.7 38.7   < > 44.2 41.1 41.4 41.4   EXT Creatinine 0.81  --    < > 0.88 0.74 0.89 0.89   EXT Sodium 143  --    < > 142 142 142 142   EXT Potassium 4.1  --    < > 4.5 3.9 4.3 4.3   EXT BUN 13.87 A  --    < > 16.62 15.08 14.33 14.33   EXT CO2 22 A  --    < > 26 23 26 26   EXT Calcium 10.0  --    < > 10.1 10.3 H 10.1 10.1   EXT Phosphorus 2.1 A  --    < > 2.7 2.2 L 3.0 3.0   EXT Glucose 105  --    < > 273 H 155 H 185 H  --    EXT Albumin 3.3 L  --     < > 3.9  --  3.7 3.7   EXT AST  --   --    < > 14  --  9 9   EXT ALT  --   --    < > 6  --  <5 <5   EXT BilirubiN Total  --   --    < > 0.5  --  0.5 0.5    < > = values in this interval not displayed.       Recent Labs   Lab 02/17/21  0949 04/05/21  0740 04/12/21  0845 05/03/22  0831 11/03/22  0855 11/07/22  0917   EXT Tacrolimus Lvl 3.57 A 5.72   < > 7.56 6.42 6.42   EXT PROT/CREAT Ratio UR  --  0.09   < > 0.09 0.08 0.08   EXT PTH, Intact 463.8 267.5 H  --   --   --   --    EXT Protein UA neg negative   < > negative negative negative   EXT WBC UA 0-3 0-3   < > 3-5 none seen negative   EXT RBC UA  --  0-3   < > none seen none seen negative    < > = values in this interval not displayed.     Labs were reviewed with the patient.    Assessment:     1. Chronic kidney disease (CKD), stage II (mild)    2. Kidney replaced by transplant    3. At risk for opportunistic infections    4. Immunosuppressive management encounter following kidney transplant      Plan:   -Obtain BK PCR results for 2 yr anniv    CKD2 s/p DD kidney transplant 11/16/2020.  Uncomplicated postoperative course. Doing well overall.   -Doing well, following with local nephrologist  -Last PC ratio 0.08 11/3/22.       Encounter for Monitoring Immunosuppression post Transplant  Current external tacro level is 6.4, acceptable  -continue tacrolimus, pednisone and mycophenolate  -Recheck as per guidelines.  -Monitor for side effects and toxicities, given narrow therapeutic window and significant risk of AE. No evidence of toxicity.    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 Reminded to see PCP and other local providers, and that transplant will remain available.   RTC 1 yr    Follow-up:   Clinic: return to transplant clinic weekly for the first month  after transplant; every 2 weeks during months 2-3; then at 6-, 9-, 12-, 18-, 24-, and 36- months post-transplant to reassess for complications from immunosuppression toxicity and monitor for rejection.  Annually thereafter.    Labs: since patient remains at high risk for rejection and drug-related complications that warrant close monitoring, labs will be ordered as follows: continue twice weekly CBC, renal panel, and drug level for first month; then same labs once weekly through 3rd month post-transplant.  Urine for UA and protein/creatinine ratio monthly.  Serum BK - PCR at 1-, 3-, 6-, 9-, 12-, 18-, 24-, 36- 48-, and 60 months post-transplant.  Hepatic panel at 1-, 2-, 3-, 6-, 9-, 12-, 18-, 24-, and 36- months post-transplant.    MD FRANCESCA Serna Patient Status  Functional Status: 90% - Able to carry on normal activity: minor symptoms of disease  Physical Capacity: No Limitations

## 2022-11-11 ENCOUNTER — OFFICE VISIT (OUTPATIENT)
Dept: TRANSPLANT | Facility: CLINIC | Age: 67
End: 2022-11-11
Payer: MEDICARE

## 2022-11-11 DIAGNOSIS — N18.2 CHRONIC KIDNEY DISEASE (CKD), STAGE II (MILD): Primary | ICD-10-CM

## 2022-11-11 DIAGNOSIS — Z91.89 AT RISK FOR OPPORTUNISTIC INFECTIONS: ICD-10-CM

## 2022-11-11 DIAGNOSIS — Z79.899 IMMUNOSUPPRESSIVE MANAGEMENT ENCOUNTER FOLLOWING KIDNEY TRANSPLANT: ICD-10-CM

## 2022-11-11 DIAGNOSIS — Z94.0 IMMUNOSUPPRESSIVE MANAGEMENT ENCOUNTER FOLLOWING KIDNEY TRANSPLANT: ICD-10-CM

## 2022-11-11 DIAGNOSIS — Z94.0 KIDNEY REPLACED BY TRANSPLANT: ICD-10-CM

## 2022-11-11 PROCEDURE — 99214 PR OFFICE/OUTPT VISIT, EST, LEVL IV, 30-39 MIN: ICD-10-PCS | Mod: 95,,, | Performed by: INTERNAL MEDICINE

## 2022-11-11 PROCEDURE — 99214 OFFICE O/P EST MOD 30 MIN: CPT | Mod: 95,,, | Performed by: INTERNAL MEDICINE

## 2022-11-11 RX ORDER — PREDNISONE 5 MG/1
5 TABLET ORAL DAILY
Qty: 91 TABLET | Refills: 3 | Status: SHIPPED | OUTPATIENT
Start: 2022-11-11 | End: 2023-12-14 | Stop reason: SDUPTHER

## 2022-11-11 RX ORDER — MYCOPHENOLATE MOFETIL 250 MG/1
1000 CAPSULE ORAL 2 TIMES DAILY
Qty: 240 CAPSULE | Refills: 11 | Status: SHIPPED | OUTPATIENT
Start: 2022-11-11 | End: 2023-11-15 | Stop reason: SDUPTHER

## 2022-11-11 RX ORDER — TACROLIMUS 1 MG/1
CAPSULE ORAL
Qty: 360 CAPSULE | Refills: 11 | Status: SHIPPED | OUTPATIENT
Start: 2022-11-11 | End: 2023-12-14

## 2022-11-11 NOTE — PATIENT INSTRUCTIONS
Congratulations on reaching another anniversary after transplant! I expect you will have many more!    -You will need a team to care for you the best way possible! For your health, you should follow with your general nephrologist, primary care provider/PCP, dentist and eye doctor, and GYN (for ladies) for routine/preventative care. If you are a diabetic, you should see a podiatrist for regular foot checks. If you need help establishing with one, let us know.    -Remember to keep transplant posted about medication changes or new developments in your health. These may warrant changing your immunosuppression. We are not automatically notified of changes in your condition, so please call or message us with any updates.    -Don't forget we have pharmacist, dietician and social workers that are able to help you, at your request.    -We recommend you stay up to date with vaccines - your primary care provider will help with this, since vaccine recommendations vary and get updated. You should get a yearly influenza vaccine. It does not protect you against all infections, and you can still get the flu. It is proven to protect transplant patients from influenza related serious    SAFE OVER THE COUNTER REMEDIES  Acne  - Clean affected areas with Panoxyl foaming wash [or any other wash containing 10% benzoyl peroxide] - follow direction on package insert and beware it can bleach fabrics and hair.  - You may also try Differin [adapalene gel  0.1%] to the affected areas as per package insert as well.  - A good moisturizer may be needed if skin dries out. Cetaphil and Cerave make ones often recommended by dermatologists.  - Don't forget to protect your skin from the sun, since you are at increased risk of skin cancer.    Fever or pain   - Tylenol (acetaminophen).   - For pain you can try salon pas or lidocaine patches to be applied directly to area of pain.   - Diclofenac [Voltaren] gel is safe to use for a few weeks.    - We do  not recommend NSAIDS  by mouth such as Motrin, Advil, Aleve, naprosen, BC powders, etc. If in doubt, please check!    Cough Suppressant - Dextromethorphan Hydrobromide 30 mg or cough drops (Halls or equivalent generic)    Nasal congestion   - Saline nasal spray or Afrin nasal spray.   - Topical vicks vaporub or nasal inhaler is also acceptable.  - (Afrin is oxymetazoline, but should ONLY USE FOR 3 DAYS).   - Note tablet form decongestants (Sudafed, phenylephrine) can raise blood pressure and are not recommended    Allergy symptoms - Antihistamine (Benadryl, Claritin, Zyrtec). These can also help dry up drainage.    For sore throat -  salt water gargles, Chloraseptic spray or sore throat lozenges     For thick secretions (thick mucous) - Guaifenesin (Mucinex), saline nasal spray     To prevent colds - low dose vitamin C is probably OK, but can increase risk of kidney stones. Zinc lozenges (not tablets) taken through the day may help minimize. Let the lozenge dissolve in the back of your throat. Note: Zinc can inhibit the virus from multiplying in your throat, but it is not proven to prevent colds.    Indigestion  - famotidine (Pepcid) 20 mg daily is generally safe. Tums can cause high calcium and should be taken only after taking to your provider about your calcium levels. High calcium can hurt the kidney. Avoid cimetidine (Tagamet) as it can interfere with your immunosuppression and cause toxic levels. A few doses of pepto bismol should be fine. Continued indigestion should be evaluated by your provider.    Nausea or vomiting - these symptoms can indicate there is something wrong with your stomach and should be evaluated if they last more than 6-8 hours OR anytime you cannot keep your medicines down. Not being able to take your medicine can cause complications.     Diarrhea -  Pepto-Bismol, even Metamucil can help liquid stools. Diarrhea can be a sign of infection, so please let your provider know if it continues  past 1-2 days for full evaluation. Avoid Imodium unless you have spoken to a physician.    Constipation- Colace, Dulcolax, MiraLax are safe to take regardless of your kidney function.  Please check with the physician before using magnesium or phosphorus containing supplements such as magnesium citrate, milk of magnesium, or Fleet's Phospho-Soda.  These remedies may cause harm, depending on the degree of kidney insufficiency you have.  Your doctor can advise if year magnesium and phosphorus levels are low enough to take these.    Herbal and natural remedies - Some herbals are very safe and effective while others are proven to cause renal failure or interact with your medication. Do NOT take any natural or herbal remedies without discussing with transplant.    If you develop fever or shortness of breath, or start to feel worse, you need to get checked out again or go to ED, depending on the severity of your symptoms.    As always, feel free to ask any questions and raise any concerns regarding your health care.    Best Wishes,  Dr. Jessenia Denny and your entire transplant team

## 2022-11-11 NOTE — Clinical Note
Mckenna [cc Dr. Scruggs] - please reach out to Dr. Scruggs's office in [place], [place]. Kendra has seen Dr. Scruggs in July at different site, but unfortunately cannot get a follow up appointment because she needs referral. We can certainly provider referral if that is helpful.

## 2022-11-11 NOTE — LETTER
November 11, 2022        Ann Marie Scruggs  [address] [facility] [address]  Phone: [phone]  Fax: [phone]             [facility]  [address]  [facility] [address]  Phone: [phone]   Patient: Kendra Malik   MR Number: [id number]   YOB: 1955   Date of Visit: 11/11/2022       Dear Dr. Ann Marie Scruggs    Thank you for referring Kendra Malik to me for evaluation. Attached you will find relevant portions of my assessment and plan of care.    If you have questions, please do not hesitate to call me. I look forward to following Kendra Malik along with you.    Sincerely,    Kelley Denny MD    Enclosure    If you would like to receive this communication electronically, please contact [email or web address] or [phone] to request [employer] [facility] access.    [employer] Link is a tool which provides read-only access to select patient information with whom you have a relationship. Its easy to use and provides real time access to review your patients record including encounter summaries, notes, results, and demographic information.    If you feel you have received this communication in error or would no longer like to receive these types of communications, please e-mail [email or web address]

## 2022-11-16 ENCOUNTER — TELEPHONE (OUTPATIENT)
Dept: OPHTHALMOLOGY | Facility: CLINIC | Age: 67
End: 2022-11-16
Payer: MEDICARE

## 2022-11-16 NOTE — TELEPHONE ENCOUNTER
11/16/2022 3:32 PM  Referral that was sent to Dr. Caldwell was denied due to his private office in [place] not accepting medicaid. I faxed [facility], Dr. Caldwell works there a few times a month and that clinic accepts medicaid

## 2022-11-25 NOTE — TELEPHONE ENCOUNTER
Inform the pt that the referral has been placed. Also inform her that she will have to establish with a new PCP as we do not accept [employer]. Thanks

## 2022-11-29 NOTE — TELEPHONE ENCOUNTER
Pt notified via phone that referral was sent to Dr. Scruggs's office and to be expecting an appointment with them, pt verbalized understanding. Pt states she was told that [employer] [facility] will be accepted by this facility by the first of the new year, if not she will find another PCP.

## 2022-11-30 ENCOUNTER — TELEPHONE (OUTPATIENT)
Dept: NEPHROLOGY | Facility: CLINIC | Age: 67
End: 2022-11-30
Payer: MEDICARE

## 2022-11-30 NOTE — TELEPHONE ENCOUNTER
Patient was unable to get appointment at [place] [facility], [facility]

## 2022-12-19 ENCOUNTER — TELEPHONE (OUTPATIENT)
Dept: NEPHROLOGY | Facility: CLINIC | Age: 67
End: 2022-12-19
Payer: MEDICARE

## 2022-12-19 NOTE — TELEPHONE ENCOUNTER
----- Message from Page Dominguez RN sent at 12/6/2022  1:29 PM CST -----  Regarding: Referral updat  Referral faxed 11/29

## 2022-12-20 ENCOUNTER — TELEPHONE (OUTPATIENT)
Dept: NEPHROLOGY | Facility: CLINIC | Age: 67
End: 2022-12-20
Payer: MEDICARE

## 2022-12-20 NOTE — TELEPHONE ENCOUNTER
Pt has [employer]-would like to be seen at [facility] with Dr. Scruggs.  3/24/23@11:30  Will put pt on waiting list

## 2023-01-01 DIAGNOSIS — E11.22 TYPE 2 DIABETES MELLITUS WITH DIABETIC CHRONIC KIDNEY DISEASE, UNSPECIFIED CKD STAGE, UNSPECIFIED WHETHER LONG TERM INSULIN USE: ICD-10-CM

## 2023-01-03 ENCOUNTER — OFFICE VISIT (OUTPATIENT)
Dept: INTERNAL MEDICINE | Facility: CLINIC | Age: 68
End: 2023-01-03
Payer: MEDICARE

## 2023-01-03 VITALS
BODY MASS INDEX: 23.65 KG/M2 | HEART RATE: 61 BPM | SYSTOLIC BLOOD PRESSURE: 138 MMHG | RESPIRATION RATE: 18 BRPM | HEIGHT: 66 IN | DIASTOLIC BLOOD PRESSURE: 68 MMHG | TEMPERATURE: 98 F | WEIGHT: 147.19 LBS

## 2023-01-03 DIAGNOSIS — Z29.89 PROPHYLACTIC IMMUNOTHERAPY: ICD-10-CM

## 2023-01-03 DIAGNOSIS — Z94.0 HISTORY OF KIDNEY TRANSPLANT: Primary | ICD-10-CM

## 2023-01-03 DIAGNOSIS — E78.2 MIXED HYPERLIPIDEMIA: ICD-10-CM

## 2023-01-03 DIAGNOSIS — Z78.0 POST-MENOPAUSAL: ICD-10-CM

## 2023-01-03 DIAGNOSIS — Z12.31 ENCOUNTER FOR SCREENING MAMMOGRAM FOR MALIGNANT NEOPLASM OF BREAST: ICD-10-CM

## 2023-01-03 DIAGNOSIS — L08.9 TYPE 2 DIABETES MELLITUS WITH RIGHT DIABETIC FOOT INFECTION: ICD-10-CM

## 2023-01-03 DIAGNOSIS — E11.65 UNCONTROLLED TYPE 2 DIABETES MELLITUS WITH HYPERGLYCEMIA: ICD-10-CM

## 2023-01-03 DIAGNOSIS — I10 PRIMARY HYPERTENSION: ICD-10-CM

## 2023-01-03 DIAGNOSIS — E11.628 TYPE 2 DIABETES MELLITUS WITH RIGHT DIABETIC FOOT INFECTION: ICD-10-CM

## 2023-01-03 PROCEDURE — 99215 OFFICE O/P EST HI 40 MIN: CPT | Mod: PBBFAC | Performed by: NURSE PRACTITIONER

## 2023-01-03 PROCEDURE — 99214 PR OFFICE/OUTPT VISIT, EST, LEVL IV, 30-39 MIN: ICD-10-PCS | Mod: S$PBB,,, | Performed by: NURSE PRACTITIONER

## 2023-01-03 PROCEDURE — 99214 OFFICE O/P EST MOD 30 MIN: CPT | Mod: S$PBB,,, | Performed by: NURSE PRACTITIONER

## 2023-01-03 RX ORDER — METFORMIN HYDROCHLORIDE 500 MG/1
TABLET ORAL
Qty: 180 TABLET | Refills: 3 | Status: SHIPPED | OUTPATIENT
Start: 2023-01-03 | End: 2024-01-25

## 2023-01-03 RX ORDER — INSULIN GLARGINE 100 [IU]/ML
15 INJECTION, SOLUTION SUBCUTANEOUS NIGHTLY
Qty: 15 ML | Refills: 1 | Status: SHIPPED | OUTPATIENT
Start: 2023-01-03 | End: 2023-02-13 | Stop reason: ALTCHOICE

## 2023-01-03 RX ORDER — ONDANSETRON 4 MG/1
4 TABLET, ORALLY DISINTEGRATING ORAL EVERY 6 HOURS PRN
Qty: 30 TABLET | Refills: 0 | Status: SHIPPED | OUTPATIENT
Start: 2023-01-03 | End: 2023-01-03 | Stop reason: SDUPTHER

## 2023-01-03 RX ORDER — GABAPENTIN 300 MG/1
CAPSULE ORAL
Qty: 270 CAPSULE | Refills: 3 | Status: SHIPPED | OUTPATIENT
Start: 2023-01-03

## 2023-01-03 RX ORDER — SILVER SULFADIAZINE 10 G/1000G
1 CREAM TOPICAL
COMMUNITY
Start: 2022-12-27 | End: 2024-01-25 | Stop reason: ALTCHOICE

## 2023-01-03 RX ORDER — HYDROXYZINE HYDROCHLORIDE 25 MG/1
1 TABLET, FILM COATED ORAL NIGHTLY
COMMUNITY
End: 2024-01-25 | Stop reason: SDUPTHER

## 2023-01-03 RX ORDER — SITAGLIPTIN 100 MG/1
TABLET, FILM COATED ORAL
Qty: 90 TABLET | Refills: 3 | Status: SHIPPED | OUTPATIENT
Start: 2023-01-03 | End: 2023-12-14 | Stop reason: ALTCHOICE

## 2023-01-03 RX ORDER — SULFAMETHOXAZOLE AND TRIMETHOPRIM 800; 160 MG/1; MG/1
1 TABLET ORAL
COMMUNITY
Start: 2023-01-01 | End: 2023-03-24 | Stop reason: ALTCHOICE

## 2023-01-03 RX ORDER — ONDANSETRON 4 MG/1
4 TABLET, ORALLY DISINTEGRATING ORAL EVERY 6 HOURS PRN
Qty: 30 TABLET | Refills: 0 | Status: SHIPPED | OUTPATIENT
Start: 2023-01-03 | End: 2023-04-03 | Stop reason: SDUPTHER

## 2023-01-03 NOTE — ASSESSMENT & PLAN NOTE
A1C 8.6 not at goal. Previous A1C 8.2.   Had recent diabetic foot infection.  Continue basaglar 15 units d/t CBG readings.  Continue metformin and januvia.  Instructed to contact clinic if readings begin to elevate.  Continue medications as prescribed.  Follow ADA diet.  Avoid soda, simple sweets, and limit rice/pasta/bread/starches and consume brown options when possible.   Maintain healthy weight with BMI goal <30.   Perform aerobic exercise for 150 minutes per week (or 5 days a week for 30 minutes each day).   Examine feet daily.   Obtain annual dilated eye exam.  Eye exam: 7/26/22  Foot exam: 10/29/21 (will complete at next visit)

## 2023-01-03 NOTE — ASSESSMENT & PLAN NOTE
Keep nephrology appts as scheduled.  Continue immunosuppressants as prescribed.  Reports infections immediately.

## 2023-01-03 NOTE — ASSESSMENT & PLAN NOTE
Keep podiatry, HH visits and wound care as instructed.  Report S/S of infection immediately.  Achieve adequate blood glucose control.  Take meds as prescribed.  Do not walk barefoot.    Continue oral abx as prescribed.  Refilled zofran prn nausea.

## 2023-01-03 NOTE — ASSESSMENT & PLAN NOTE
Keep nephrology apts as scheduled; next visit in March.  At goal.  Continue labetalol and procardia.  Follow a low sodium (less than 2 grams of sodium per day), DASH diet.   Continue medications as prescribed.  Monitor blood pressure and report any consistent values greater than 140/90 and keep a log.  Maintain healthy weight with a BMI goal of <30.   Aerobic exercise for 150 minutes per week (or 5 days a week for 30 minutes each day).

## 2023-01-03 NOTE — PROGRESS NOTES
LYNN Miranda   [facility]  [address] [facility] [address]      PATIENT NAME: Kendra Malik  : 1955  DATE: 1/3/23  MRN: [id number]      Billing Provider: LYNN Miranda  Level of Service: [place] OFFICE/OUTPT VISIT, EST, LEVL IV, 30-39 MIN  Patient PCP Information       Provider PCP Type    LYNN Miranda General            Reason for Visit / Chief Complaint: Follow-up (Needs refills, did Not take b/p meds this am)       History of Present Illness / Problem Focused Workflow     Kendra Malik is a 67 y.o. Black or  female presents to the clinic for uncontrolled DM. PMH uncontrolled DM, HTN, CKD st V (s/p left kidney transplant (20), anemia of chronic disease, HLD, neuropathy, AR, right charcot foot, diabetic retinopathy, and covid pneumonia + (2021). Followed by [facility] ortho and optho clinics, Dr. Karla Scruggs, nephrology (at [facility]) and Dr. Jeremiah Bowens, podiatry. Pt has not been seen since May d/t no show. Upon further investigation, was d/t insurance changes. Pt had inpt admissions for right diabetic foot wound (stepped on object and was unaware) with I&D w/debridement with TheraSkin graft in 2022. Continues to see Dr. Bowens with improvement. Is taking 15 units of basaglar not 20 units as prescribed. States will drop if takes 20 units. Is taking other meds as prescribed. Requesting diabetic shoes and inserts. Is wearing right darco shoe and has podiatry appt today. Sees Dr. Bowens weekly and HH ([facility]) weekly and son/self changes drsg daily. Continues to take abx daily; has been on since hospital discharge. Requesting rx for zofran d/t nausea from bactrim. CBGs range 112-120 when checked at bedtime. States readings have come down significantly since infection clearing. Attempts to follow ADA/renal diet. Has been having labs drawn at [facility]; results requested. Denies chest pain, shortness of breath, cough, fever, headache, dizziness, weakness, abdominal pain, nausea, vomiting, diarrhea, constipation, dysuria, depression, anxiety, SI/HI.      Review of Systems     Review of Systems   Constitutional: Negative.    HENT: Negative.     Eyes: Negative.    Respiratory: Negative.     Cardiovascular: Negative.    Gastrointestinal: Negative.    Endocrine: Negative.    Genitourinary: Negative.    Musculoskeletal: Negative.    Integumentary:  Negative.   Neurological: Negative.    Psychiatric/Behavioral: Negative.        Medical / Social / Family History     Past Medical History:   Diagnosis Date    Cataract     DM2 w CKD on chronic dialysis, without long-term current use of insulin 10/11/2018    ESRD on dialysis 10/11/2018    Peritoneal dialysis initiated mid 2018    Pneumonia 2021    Renal hypertension 10/11/2018    Secondary hyperparathyroidism of renal origin 2020       Past Surgical History:   Procedure Laterality Date    cataract surgery       SECTION      x3    COLONOSCOPY N/A 2019    Procedure: COLONOSCOPY;  Surgeon: Arianna Srinivasan MD;  Location: [facility];  Service: Endoscopy;  Laterality: N/A;    DEBRIDEMENT OF FOOT Right     HYSTERECTOMY      KIDNEY TRANSPLANT N/A 11/15/2020    Procedure: TRANSPLANT, KIDNEY;  Surgeon: Scott Ann MD;  Location: [address];  Service: Transplant;  Laterality: N/A;    KIDNEY TRANSPLANT Left 2020    OOPHORECTOMY      PERITONEAL CATHETER INSERTION      RETINAL DETACHMENT SURGERY         Social History  Ms. Malik reports that she has never smoked. She has never used smokeless tobacco. She reports that she does not currently use alcohol. She reports that she does not use drugs.    Family History  's family history includes Cancer in her paternal uncle; Diabetes in her brother, brother, father, mother, paternal aunt, and paternal grandmother;  "Gout in her brother; HIV in her sister; Heart disease in her mother; Hypertension in her father and mother.    Medications and Allergies     Medications  Medication List with Changes/Refills   Current Medications    ACETAMINOPHEN (TYLENOL EXTRA STRENGTH ORAL)    Take 500 mg by mouth every 6 (six) hours as needed.    BLOOD SUGAR DIAGNOSTIC STRP    1 each by Misc.(Non-Drug; Combo Route) route 3 (three) times daily. E11.3513 (DM)    BLOOD-GLUCOSE METER KIT    Use as instructed; E11.3513 (DM)    DIPHENHYDRAMINE (BENADRYL) 25 MG CAPSULE    Take 25 mg by mouth every 6 (six) hours as needed for Itching.    FAMOTIDINE/CA CARB/MAG HYDROX (PEPCID COMPLETE ORAL)    Take 1 tablet by mouth daily as needed.    FLUTICASONE PROPIONATE (FLONASE) 50 MCG/ACTUATION NASAL SPRAY    2 sprays by Each Nostril route 2 (two) times daily as needed for Rhinitis.    HYDROXYZINE HCL (ATARAX) 25 MG TABLET    Take 1 tablet by mouth every evening.    HYDROXYZINE PAMOATE (VISTARIL) 25 MG CAP    Take 1 capsule (25 mg total) by mouth every evening.    LABETALOL (NORMODYNE) 100 MG TABLET    TAKE 1 TABLET BY MOUTH TWICE A DAY    LANCETS MISC    1 each by Misc.(Non-Drug; Combo Route) route 3 (three) times daily. E11.3513    LORATADINE (CLARITIN) 10 MG TABLET    Take 10 mg by mouth daily as needed.    MYCOPHENOLATE (CELLCEPT) 250 MG CAP    Take 4 capsules (1,000 mg total) by mouth 2 (two) times daily.    NIFEDIPINE (PROCARDIA-XL) 30 MG (OSM) 24 HR TABLET    Take 1 tablet (30 mg total) by mouth 2 (two) times a day.    PEN NEEDLE, DIABETIC (BD RIO 2ND GEN PEN NEEDLE) 32 GAUGE X 5/32" NDLE    To use with insulin 4 times daily.    PREDNISONE (DELTASONE) 5 MG TABLET    Take 1 tablet (5 mg total) by mouth once daily.    ROSUVASTATIN (CRESTOR) 20 MG TABLET    TAKE 1 TABLET BY MOUTH EVERYDAY AT BEDTIME    SILVER SULFADIAZINE 1% (SILVADENE) 1 % CREAM    Apply 1 application topically.    SULFAMETHOXAZOLE-TRIMETHOPRIM 800-160MG (BACTRIM DS) 800-160 MG TAB    Take 1 " "tablet by mouth.    TACROLIMUS (PROGRAF) 1 MG CAP    Take 5 capsules (5 mg total) by mouth every morning AND 5 capsules (5 mg total) every evening.   Changed and/or Refilled Medications    Modified Medication Previous Medication    BASAGLAR KWIKPEN U-100 INSULIN GLARGINE 100 UNITS/ML SUBQ PEN BASAGLAR KWIKPEN U-100 INSULIN glargine 100 units/mL SubQ pen       Inject 15 Units into the skin every evening.    Inject 28 Units into the skin every evening.    GABAPENTIN (NEURONTIN) 300 MG CAPSULE gabapentin (NEURONTIN) 300 MG capsule       TAKE 1 CAPSULE BY MOUTH THREE TIMES A DAY    Take 1 capsule (300 mg total) by mouth 3 (three) times daily.    METFORMIN (GLUCOPHAGE) 500 MG TABLET metFORMIN (GLUCOPHAGE) 500 MG tablet       TAKE 1 TABLET BY MOUTH TWICE A DAY WITH MEALS    Take 1 tablet (500 mg total) by mouth 2 (two) times daily with meals.    ONDANSETRON (ZOFRAN-ODT) 4 MG TBDL ondansetron (ZOFRAN-ODT) 4 MG TbDL       Take 1 tablet (4 mg total) by mouth every 6 (six) hours as needed (nausea).    Take 4 mg by mouth.    SITAGLIPTIN PHOSPHATE (JANUVIA) 100 MG TAB SITagliptin (JANUVIA) 100 MG Tab       TAKE 1 TABLET BY MOUTH EVERY DAY    Take 1 tablet (100 mg total) by mouth once daily.         Allergies  Review of patient's allergies indicates:  No Known Allergies    Physical Examination   Vital Signs  Temp: 97.9 °F (36.6 °C)  Temp src: Oral  Pulse: 61  Resp: 18  BP: 138/68  BP Location: Left arm  Patient Position: Sitting  Pain Score: 0-No pain  Height and Weight  Height: 5' 5.98" (167.6 cm)  Weight: 66.8 kg (147 lb 3.2 oz)  BSA (Calculated - sq m): 1.76 sq meters  BMI (Calculated): 23.8  Weight in (lb) to have BMI = 25: 154.5]    Physical Exam  Constitutional:       Appearance: Normal appearance.   Cardiovascular:      Rate and Rhythm: Normal rate and regular rhythm.   Pulmonary:      Effort: Pulmonary effort is normal.      Breath sounds: Normal breath sounds.   Musculoskeletal:         General: Normal range of motion. "      Cervical back: Normal range of motion.   Feet:      Comments: Right foot dressing with darco shoe.   Skin:     General: Skin is warm and dry.   Neurological:      General: No focal deficit present.      Mental Status: She is alert and oriented to person, place, and time.   Psychiatric:         Mood and Affect: Mood normal.         Speech: Speech normal.         Behavior: Behavior normal. Behavior is cooperative.         Thought Content: Thought content normal.         Judgment: Judgment normal.         Results     Lab Results   Component Value Date    WBC 6.0 05/06/2022    RBC 4.55 05/06/2022    HGB 13.3 05/06/2022    HCT 42.6 05/06/2022    MCV 93.6 05/06/2022    MCH 29.2 05/06/2022    MCHC 31.2 (L) 05/06/2022    RDW 13.9 05/06/2022     05/06/2022    MPV 11.5 05/06/2022    GRAN 31.0 (L) 02/09/2021    LYMPH 41.0 02/09/2021    MONO 27.0 (H) 02/09/2021    EOS CANCELED 02/07/2021    BASO CANCELED 02/07/2021    EOSINOPHIL 1.0 02/09/2021    BASOPHIL 0.0 02/09/2021     Sodium   Date Value Ref Range Status   02/09/2021 140 136 - 145 mmol/L Final     Sodium Level   Date Value Ref Range Status   05/06/2022 141 136 - 145 mmol/L Final     Comment:     Labs prior to visit,  not fasting     Potassium   Date Value Ref Range Status   02/09/2021 3.2 (L) 3.5 - 5.1 mmol/L Final     Potassium Level   Date Value Ref Range Status   05/06/2022 3.9 3.5 - 5.1 mmol/L Final     Comment:     Labs prior to visit,  not fasting     Chloride   Date Value Ref Range Status   02/09/2021 105 95 - 110 mmol/L Final     CO2   Date Value Ref Range Status   02/09/2021 22 (L) 23 - 29 mmol/L Final     Carbon Dioxide   Date Value Ref Range Status   05/06/2022 30 23 - 31 mmol/L Final     Comment:     Labs prior to visit,  not fasting     Glucose   Date Value Ref Range Status   02/09/2021 202 (H) 70 - 110 mg/dL Final     BUN   Date Value Ref Range Status   02/09/2021 11 8 - 23 mg/dL Final     Blood Urea Nitrogen   Date Value Ref Range Status    05/06/2022 15.8 9.8 - 20.1 mg/dL Final     Comment:     Labs prior to visit,  not fasting     Creatinine   Date Value Ref Range Status   05/06/2022 0.83 0.55 - 1.02 mg/dL Final     Comment:     Labs prior to visit,  not fasting   02/09/2021 0.7 0.5 - 1.4 mg/dL Final     Calcium   Date Value Ref Range Status   02/09/2021 9.1 8.7 - 10.5 mg/dL Final     Calcium Level Total   Date Value Ref Range Status   05/06/2022 10.4 (H) 8.4 - 10.2 mg/dL Final     Comment:     Labs prior to visit,  not fasting     Total Protein   Date Value Ref Range Status   02/09/2021 5.7 (L) 6.0 - 8.4 g/dL Final     Albumin   Date Value Ref Range Status   02/09/2021 2.8 (L) 3.5 - 5.2 g/dL Final     Albumin Level   Date Value Ref Range Status   05/06/2022 3.8 3.4 - 4.8 gm/dL Final     Comment:     Labs prior to visit,  not fasting     Total Bilirubin   Date Value Ref Range Status   02/09/2021 0.2 0.1 - 1.0 mg/dL Final     Comment:     For infants and newborns, interpretation of results should be based  on gestational age, weight and in agreement with clinical  observations.    Premature Infant recommended reference ranges:  Up to 24 hours.............<8.0 mg/dL  Up to 48 hours............<12.0 mg/dL  3-5 days..................<15.0 mg/dL  6-29 days.................<15.0 mg/dL       Bilirubin Total   Date Value Ref Range Status   05/06/2022 0.4 <=1.5 mg/dL Final     Comment:     Labs prior to visit,  not fasting     Alkaline Phosphatase   Date Value Ref Range Status   05/06/2022 80 40 - 150 unit/L Final     Comment:     Labs prior to visit,  not fasting   02/09/2021 109 55 - 135 U/L Final     AST   Date Value Ref Range Status   02/09/2021 34 10 - 40 U/L Final     Aspartate Aminotransferase   Date Value Ref Range Status   05/06/2022 16 5 - 34 unit/L Final     Comment:     Labs prior to visit,  not fasting     ALT   Date Value Ref Range Status   02/09/2021 10 10 - 44 U/L Final     Alanine Aminotransferase   Date Value Ref Range Status   05/06/2022  8 0 - 55 unit/L Final     Comment:     Labs prior to visit,  not fasting     Anion Gap   Date Value Ref Range Status   02/09/2021 13 8 - 16 mmol/L Final     eGFR if    Date Value Ref Range Status   02/09/2021 >60.0 >60 mL/min/1.73 m^2 Final     eGFR if non    Date Value Ref Range Status   02/09/2021 >60.0 >60 mL/min/1.73 m^2 Final     Comment:     Calculation used to obtain the estimated glomerular filtration  rate (eGFR) is the CKD-EPI equation.        Estimated GFR-Non    Date Value Ref Range Status   03/18/2022 74 >=90      Lab Results   Component Value Date    CHOL 167 05/06/2022     Lab Results   Component Value Date    HDL 81 (H) 05/06/2022     Lab Results   Component Value Date    LDLCALC 84.6 11/15/2020     Lab Results   Component Value Date    TRIG 85 05/06/2022     Lab Results   Component Value Date    CHOLHDL 26.9 11/15/2020     No results found for: TSH  Lab Results   Component Value Date    PHUR 5.5 01/25/2022    SPECGRAV >=1.030 (A) 02/04/2021    PROTEINUA Negative 01/25/2022    GLUCUA 4+ (A) 01/25/2022    KETONESU 1+ (A) 01/25/2022    OCCULTUA Negative 01/25/2022    NITRITE Negative 01/25/2022    LEUKOCYTESUR Negative 01/25/2022     Lab Results   Component Value Date    HGBA1C 8.2 (H) 05/06/2022    HGBA1C 9.2 03/18/2022    HGBA1C 9.0 (H) 11/15/2020     No results found for: MICROALBUR, [id number]   No results found for this or any previous visit.         Assessment and Plan (including Health Maintenance)     Plan: RTC 3 months and prn. Labs one week prior to appt.         Health Maintenance Due   Topic Date Due    Foot Exam  Never done    Colorectal Cancer Screening  03/27/2020    Mammogram  10/16/2021    COVID-19 Vaccine (4 - Booster for Moderna series) 02/21/2022    DEXA Scan  04/29/2022    Hemoglobin A1c  08/06/2022    Shingles Vaccine (2 of 2) 12/07/2022       Problem List Items Addressed This Visit          Cardiac/Vascular    Hyperlipidemia     Current Assessment & Plan     Continue crestor.  Follow a low cholesterol, low saturated fat diet with less than 200 mg of cholesterol a day.   Avoid fried foods and high saturated fats (bonilla, sausage, cookies, cakes, chips, cheese, whole milk, butter, mayonnaise, creamy dressings, gravy and cream sauces).  Add flax seed or fish oil supplements to diet.   Increase dietary fiber.   Regular exercise improves cholesterol levels.  Physical activity 5 times a week for 30 minutes per day (or 150 minutes per week).   Stressed importance of dietary modifications.           Hypertension    Current Assessment & Plan     Keep nephrology apts as scheduled; next visit in March.  At goal.  Continue labetalol and procardia.  Follow a low sodium (less than 2 grams of sodium per day), DASH diet.   Continue medications as prescribed.  Monitor blood pressure and report any consistent values greater than 140/90 and keep a log.  Maintain healthy weight with a BMI goal of <30.   Aerobic exercise for 150 minutes per week (or 5 days a week for 30 minutes each day).              Renal/    History of kidney transplant - Primary    Current Assessment & Plan     Keep nephrology appts as scheduled.  Continue immunosuppressants as prescribed.  Reports infections immediately.            Immunology/Multi System    Prophylactic immunotherapy       Endocrine    Type 2 diabetes mellitus with right diabetic foot infection    Current Assessment & Plan     Keep podiatry, HH visits and wound care as instructed.  Report S/S of infection immediately.  Achieve adequate blood glucose control.  Take meds as prescribed.  Do not walk barefoot.    Continue oral abx as prescribed.  Refilled zofran prn nausea.         Relevant Medications    BASAGLAR KWIKPEN U-100 INSULIN glargine 100 units/mL SubQ pen    Uncontrolled type 2 diabetes mellitus with hyperglycemia    Overview     A1C 8.2. Previous A1C 9.2.  Increase basaglar to 28 units.  Continue januvia and  metformin.         Current Assessment & Plan     A1C 8.6 not at goal. Previous A1C 8.2.   Had recent diabetic foot infection.  Continue basaglar 15 units d/t CBG readings.  Continue metformin and januvia.  Instructed to contact clinic if readings begin to elevate.  Continue medications as prescribed.  Follow ADA diet.  Avoid soda, simple sweets, and limit rice/pasta/bread/starches and consume brown options when possible.   Maintain healthy weight with BMI goal <30.   Perform aerobic exercise for 150 minutes per week (or 5 days a week for 30 minutes each day).   Examine feet daily.   Obtain annual dilated eye exam.  Eye exam: 7/26/22  Foot exam: 10/29/21 (will complete at next visit)           Relevant Medications    BASAGLAR KWIKPEN U-100 INSULIN glargine 100 units/mL SubQ pen    Other Relevant Orders    Hemoglobin A1C     Other Visit Diagnoses       Encounter for screening mammogram for malignant neoplasm of breast        Relevant Orders    Mammo Digital Screening Bilat w/ Mario    Post-menopausal        Relevant Orders    DXA Bone Density Appendicular Skeleton            Health Maintenance Topics with due status: Not Due       Topic Last Completion Date    TETANUS VACCINE 02/10/2017    Lipid Panel 05/06/2022    Eye Exam 07/26/2022       Future Appointments   Date Time Provider Department Center   1/31/2023  9:30 AM PROVIDERS, [facility]   3/24/2023 11:30 AM Ann Marie Scruggs MD [facility]            Signature:  LYNN Miranda  [facility]  [address] [facility] [address]    Date of encounter: 1/3/23

## 2023-01-03 NOTE — ASSESSMENT & PLAN NOTE
Continue crestor.  Follow a low cholesterol, low saturated fat diet with less than 200 mg of cholesterol a day.   Avoid fried foods and high saturated fats (bonilla, sausage, cookies, cakes, chips, cheese, whole milk, butter, mayonnaise, creamy dressings, gravy and cream sauces).  Add flax seed or fish oil supplements to diet.   Increase dietary fiber.   Regular exercise improves cholesterol levels.  Physical activity 5 times a week for 30 minutes per day (or 150 minutes per week).   Stressed importance of dietary modifications.

## 2023-01-04 DIAGNOSIS — N18.9 CHRONIC KIDNEY DISEASE, UNSPECIFIED CKD STAGE: Primary | ICD-10-CM

## 2023-01-05 ENCOUNTER — TELEPHONE (OUTPATIENT)
Dept: NEPHROLOGY | Facility: CLINIC | Age: 68
End: 2023-01-05
Payer: MEDICARE

## 2023-01-06 ENCOUNTER — OFFICE VISIT (OUTPATIENT)
Dept: NEPHROLOGY | Facility: CLINIC | Age: 68
End: 2023-01-06
Payer: MEDICARE

## 2023-01-06 VITALS
HEART RATE: 72 BPM | BODY MASS INDEX: 23.59 KG/M2 | OXYGEN SATURATION: 100 % | TEMPERATURE: 98 F | SYSTOLIC BLOOD PRESSURE: 140 MMHG | DIASTOLIC BLOOD PRESSURE: 70 MMHG | WEIGHT: 146.81 LBS | RESPIRATION RATE: 18 BRPM | HEIGHT: 66 IN

## 2023-01-06 DIAGNOSIS — Z94.0 HISTORY OF KIDNEY TRANSPLANT: ICD-10-CM

## 2023-01-06 DIAGNOSIS — N18.30 STAGE 3 CHRONIC KIDNEY DISEASE, UNSPECIFIED WHETHER STAGE 3A OR 3B CKD: ICD-10-CM

## 2023-01-06 DIAGNOSIS — N25.81 SECONDARY HYPERPARATHYROIDISM OF RENAL ORIGIN: ICD-10-CM

## 2023-01-06 DIAGNOSIS — L08.9 TYPE 2 DIABETES MELLITUS WITH RIGHT DIABETIC FOOT INFECTION: ICD-10-CM

## 2023-01-06 DIAGNOSIS — T45.2X1D POISONING BY VITAMIN D, ACCIDENTAL OR UNINTENTIONAL, SUBSEQUENT ENCOUNTER: ICD-10-CM

## 2023-01-06 DIAGNOSIS — N18.2 STAGE 2 CHRONIC KIDNEY DISEASE: ICD-10-CM

## 2023-01-06 DIAGNOSIS — I15.9 SECONDARY HYPERTENSION, UNSPECIFIED: Primary | ICD-10-CM

## 2023-01-06 DIAGNOSIS — D63.1 ANEMIA OF RENAL DISEASE: ICD-10-CM

## 2023-01-06 DIAGNOSIS — N18.9 ANEMIA OF RENAL DISEASE: ICD-10-CM

## 2023-01-06 DIAGNOSIS — E11.628 TYPE 2 DIABETES MELLITUS WITH RIGHT DIABETIC FOOT INFECTION: ICD-10-CM

## 2023-01-06 DIAGNOSIS — Z79.899 LONG TERM CURRENT USE OF IMMUNOSUPPRESSIVE DRUG: ICD-10-CM

## 2023-01-06 PROCEDURE — 99215 OFFICE O/P EST HI 40 MIN: CPT | Mod: PBBFAC | Performed by: INTERNAL MEDICINE

## 2023-01-06 NOTE — PROGRESS NOTES
"[facility] Visit    Chief Complaint:      Chronic Kidney Disease (RTC, took meds, c/o right side pain on occasion )     Subjective:     HPI:  Kendra Malik is a 66 y.o.female with history of uncontrolled type 2 diabetes, hypertension, hyperlipidemia, AOCD, CKD V s/p renal transplant in Nov 2020 (on immunosuppression), vitamin D toxicity, hypercalcemia and hyperparathyroidism presenting to nephrology clinic today for  F/U visit. Patient received her renal transplant in [place] in November 2020. Patient continues on immunosuppression with tacrolimus, mycophenolate, daily prednisolone 5 mg oral tablet. Patient reports compliance with all medications and denies any drug/tobacco use.       Patient has no acute complaints today. She is doing well. BP at home runs around systolic 115. No CP, SOB. Is urinating well. Mentions some symptoms of hypoglycemia that she has discussed with her PCP recently. Taking all her medications      Review of Systems    Constitutional: no fever/chills  EENT: no sore throat, ear pain, sinus pain/congestion, nasal congestion/drainage  Respiratory: no cough, no wheezing, no shortness of breath  Cardiovascular: no chest pain, no palpitations, no edema  Gastrointestinal: no nausea, vomiting, or diarrhea. No abdominal pain  Genitourinary: no dysuria, no urinary frequency or urgency, no hematuria  Integumentary: no skin rash or abnormal lesion  Neurologic: no headache, no dizziness, no weakness or numbness      Objective:   Last 24 Hour Vital Signs:  Vitals  BP: (!) 140/70  Temp: 97.7 °F (36.5 °C)  Temp src: Oral  Pulse: 72  Resp: 18  SpO2: 100 %  Height: 5' 5.75" (167 cm)  Weight: 66.6 kg (146 lb 12.8 oz)    Physical Examination:    Vitals:   Vitals:    01/06/23 0956   BP: (!) 140/70   Pulse:    Resp:    Temp:        General - Appears comfortable, appropriatley conversive   Mental Status - alert and oriented x 3, speaking in logical, relevant sentences   HEENT - no rhinorrhea "   Cardiac - RRR, no murmurs, rubs, or gallops; no edema in LE   Respiratory - breathing comfortably; clear to ascultation bilaterally   Abdominal - nondistended, soft, nontender to palpation   Extremities - trace pitting edema in BLE; has bandage over right foot  Skin - no rashes or bruises seen on skin        Assessment & Plan:      H/O CKD V s/p Renal Transplant 2020  Current CKD - GFR 71  Chronic Immunosuppression Therapy   - received renal transplant in Nov 2020 in [place], history of uncontrolled type 2 diabetes and hypertension  - Immunosuppression on Tacrolimus 5mg BID, Mycophenolate 1000 BID , and Prednisolone 5 mg once daily  -Continue recommendations post renal transplant per guidelines, labs required every visit include CMP/magnesium/phosphorus/CBC with differential/tacrolimus level/urinalysis/UPCR/fasting blood glucose  - recommendations post renal transplant per guidelines, labs required every 3 months or every visit include hemoglobin A1c, fasting lipid profile  - recommendations post renal transplant per guidelines, labs required at least every 6 to 12 months include PTH and 25-OH Vit D  - recommendations post renal transplant per guidelines, labs required at 12, 18, and 24 months post transplant include BK virus blood and/or urine PCR testing   - today ordered the following labs to be reviewed at next visit: CBC, CMP, urine creatinine, urine protein, UA, PTH intact, vitamin d, tacrolimus level      Hypercalcemia- improving  Vitamin D Toxicity  -  Previously 11.1; currently 10.6  - Was suspected to be due to high vitamin D levels (level 57) - no longer on Vit D supplementation    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 Will monitor repeat labs  -consider calcitriol if PTH elevated next visit    Type 2 Diabetes, uncontrolled  - last A1c 8.2 (5/2022)   - continue  diabetic mgmt per PCP   - discussed lifestyle and diet modifications     Uncontrolled Hypertension  - BP in clinic today 140/70 but has  at home per patient  -Currently on  labetalol 100 bid, nifedipine 30 BID. Continue    Post-Renal Transplant Vaccine Recommendations:  - Flu Vaccine: provided 10/2022  - Pneumovax: provided 3/18/2022  - Hep A vaccine: received 2018  - Hep B vaccine: no official record available in chart however patient did receive vaccination when dialysis was initiated  - TDap vaccine: received 2017  - Shingrix vaccine: dose #1 10/2022  - Polio vaccine: received in 1962  - Meningococcal vaccine:  Offer at next visit  - COVID vaccine: completed 2 dose series in May 2021, June 2021, completed booster in December 2021        Continue following measures:  -follow 2 gram a day dietary sodium restriction  -control diabetes (goal A1c less than 7%)  -control high blood pressure (goal blood pressure is less than 130/80, please check blood pressure twice a week and bring blood pressure logs to office visit)  -exercise at least 30 minutes a day, 5 days a week  -maintain healthy weight  -decrease or stop alcohol use  -do not smoke  -stay well hydrated (drink water only, avoid juices, sweet tea, and sodas)  -ask about staying up-to-date on vaccinations (flu vaccine, pneumonia vaccine, hepatitis B vaccine)  -avoid excessive use of NSAIDs (ibuprofen, naproxen, Aleve, Advil, Toradol, Mobic), take Tylenol as needed for headache or mild pain  -take cholesterol lowering medications if prescribed (LDL goal less than 100)     Follow up in 11 weeks with labs prior    Felix Gibson PGY 3

## 2023-01-18 ENCOUNTER — HOSPITAL ENCOUNTER (OUTPATIENT)
Dept: RADIOLOGY | Facility: HOSPITAL | Age: 68
Discharge: HOME | End: 2023-01-18
Attending: NURSE PRACTITIONER
Payer: MEDICARE

## 2023-01-18 DIAGNOSIS — Z78.0 POST-MENOPAUSAL: ICD-10-CM

## 2023-01-18 DIAGNOSIS — Z12.31 ENCOUNTER FOR SCREENING MAMMOGRAM FOR MALIGNANT NEOPLASM OF BREAST: ICD-10-CM

## 2023-01-18 PROCEDURE — 77080 DEXA BONE DENSITY SPINE HIP: ICD-10-PCS | Mod: 26,,, | Performed by: STUDENT IN AN ORGANIZED HEALTH CARE EDUCATION/TRAINING PROGRAM

## 2023-01-18 PROCEDURE — 77067 SCR MAMMO BI INCL CAD: CPT | Mod: 26,,, | Performed by: RADIOLOGY

## 2023-01-18 PROCEDURE — 77067 MAMMO DIGITAL SCREENING BILAT WITH TOMO: ICD-10-PCS | Mod: 26,,, | Performed by: RADIOLOGY

## 2023-01-18 PROCEDURE — 77080 DXA BONE DENSITY AXIAL: CPT | Mod: 26,,, | Performed by: STUDENT IN AN ORGANIZED HEALTH CARE EDUCATION/TRAINING PROGRAM

## 2023-01-18 PROCEDURE — 77063 MAMMO DIGITAL SCREENING BILAT WITH TOMO: ICD-10-PCS | Mod: 26,,, | Performed by: RADIOLOGY

## 2023-01-18 PROCEDURE — 77080 DXA BONE DENSITY AXIAL: CPT | Mod: TC

## 2023-01-18 PROCEDURE — 77063 BREAST TOMOSYNTHESIS BI: CPT | Mod: 26,,, | Performed by: RADIOLOGY

## 2023-01-18 PROCEDURE — 77067 SCR MAMMO BI INCL CAD: CPT | Mod: TC

## 2023-01-20 ENCOUNTER — OFFICE VISIT (OUTPATIENT)
Dept: INTERNAL MEDICINE | Facility: CLINIC | Age: 68
End: 2023-01-20
Payer: MEDICARE

## 2023-01-20 VITALS
RESPIRATION RATE: 18 BRPM | WEIGHT: 154.38 LBS | OXYGEN SATURATION: 99 % | HEIGHT: 65 IN | DIASTOLIC BLOOD PRESSURE: 70 MMHG | SYSTOLIC BLOOD PRESSURE: 156 MMHG | HEART RATE: 68 BPM | BODY MASS INDEX: 25.72 KG/M2 | TEMPERATURE: 98 F

## 2023-01-20 DIAGNOSIS — Z01.818 PRE-OPERATIVE CLEARANCE: Primary | ICD-10-CM

## 2023-01-20 PROCEDURE — 99215 OFFICE O/P EST HI 40 MIN: CPT | Mod: PBBFAC

## 2023-01-20 NOTE — PROGRESS NOTES
I have reviewed and concur with the resident's history, physical, assessment, and plan.  I have discussed with him all issues related to the diagnosis, workup and treatment plan. Care provided as reasonable and necessary. Medically stable for surgery    Sunny Bhagat MD  [facility]

## 2023-01-20 NOTE — PROGRESS NOTES
"[facility] Clinic Visit    Chief Complaint:      Pre-op Exam (Right foot.)     Subjective:     HPI:  Kendra Malik is a 67 y.o. female with PMH of uncontrolled DM, HTN, CKD st V (s/p left kidney transplant (11/16/20), anemia of chronic disease, HLD, neuropathy, AR, right charcot foot, diabetic retinopathy.  She presents to clinic today for pre-op clearance. Doing well and has no symptoms including any CP, SOB, N/V, HA, palpitations. Lives active lifestyle. BP elevated today but only took one medication for BP, concerned about hypotension but reports numbers are in the 110's after taking only one pill. No other complaints today.       Review of Systems  Review of Systems   Constitutional:  Negative for chills and fever.   Respiratory:  Negative for cough, hemoptysis, sputum production, shortness of breath and wheezing.    Cardiovascular:  Negative for chest pain, palpitations, orthopnea, claudication, leg swelling and PND.   Gastrointestinal:  Negative for abdominal pain, constipation, diarrhea, heartburn, nausea and vomiting.   Genitourinary:  Negative for dysuria, flank pain, frequency, hematuria and urgency.   Musculoskeletal:  Negative for back pain, falls, joint pain, myalgias and neck pain.       Objective:   Last 24 Hour Vital Signs:  Vitals  BP: (!) 156/70  Temp: 98.4 °F (36.9 °C)  Pulse: 68  Resp: 18  SpO2: 99 %  Height: 5' 5" (165.1 cm)  Weight: 70 kg (154 lb 6.4 oz)    Physical Examination:  Physical Exam  Vitals and nursing note reviewed.   Constitutional:       Appearance: Normal appearance.   Cardiovascular:      Rate and Rhythm: Normal rate and regular rhythm.      Pulses: Normal pulses.      Heart sounds: Normal heart sounds.   Pulmonary:      Effort: Pulmonary effort is normal.      Breath sounds: Normal breath sounds.   Abdominal:      General: Abdomen is flat. Bowel sounds are normal.      Palpations: Abdomen is soft.   Musculoskeletal:      Comments: Right foot in boot   Skin:     " General: Skin is warm and dry.   Neurological:      General: No focal deficit present.      Mental Status: She is alert and oriented to person, place, and time. Mental status is at baseline.         Labs  CMP:   Recent Labs   Lab 01/25/22  1007 03/18/22  0753 05/06/22  0939   Sodium Level 139 142 141   Potassium Level 4.6 3.8 3.9   Chloride 104 108 106   Carbon Dioxide 27 29 30   Blood Urea Nitrogen 12.0 16.0 15.8   Creatinine 0.97 0.82 0.83   Glucose Level 304 H 89 122 H   Calcium Level Total 10.7 H 11.1 10.4 H   Albumin Level 3.7 3.9 3.8   Bilirubin Total 0.4 0.4 0.4   Bilirubin Direct 0.2 0.2 0.2   Bilirubin Indirect 0.20 0.20 0.20   Aspartate Aminotransferase 15 13 16   Alanine Aminotransferase 8 7 8   Alkaline Phosphatase 77 82 80     CBC:   Recent Labs   Lab 02/09/21  0319 03/18/22  0753 05/06/22  0939   WBC 2.73 L 4.9 6.0   Neut #  --  2.18 3.8   RBC 3.47 L 4.16 4.55   Hgb  --  12.6 13.3   Hemoglobin 11.1 L  --   --    Hematocrit 35.2 L  --   --    Hct  --  39.8 42.6   Platelets 152  --   --    Platelet  --  171 157    H 95.7 93.6   RDW 12.5 14.3 13.9     FLP:   Recent Labs   Lab 11/15/20  1735 05/06/22  0939   Cholesterol 171  --    Cholesterol Total  --  167   HDL 46  --    HDL Cholesterol  --  81 H   LDL Cholesterol  --  69.00   Triglycerides 202 H  --    Triglyceride  --  85     DM:   Recent Labs   Lab 11/15/20  2050 11/16/20  0242 01/25/22  1007 01/25/22  1020 03/18/22  0753 05/06/22  0939   Hemoglobin A1C 9.0 H  --   --   --   --   --    Hemoglobin A1c  --   --   --   --  9.2 8.2 H   Urine Creatinine  --   --   --  129.4 H  --   --    Urine Protein Level  --   --   --  13.2  --   --    Creatinine  --    < > 0.97  --  0.82 0.83    < > = values in this interval not displayed.     Thyroid:     Anemia:   Recent Labs   Lab 02/08/21  1900 02/09/21  0319 05/06/22  0939   Hgb  --    < > 13.3   Hemoglobin  --    < >  --    Hematocrit  --    < >  --    Hct  --    < > 42.6   Ferritin 2,573 H  --   --     <  > = values in this interval not displayed.         Assessment & Plan:     Pre-operative assessment   -CRI of 1, on DM  ->4 mets, able to climb stairs w/o SOB, CP  - not on ASA or anti-coags  -Low to moderate risk for upcoming surgery    CKD V s/p renal transplant in Nov 2020   on immunosuppression)  - received renal transplant in Nov 2020 in [place], history of uncontrolled type 2 diabetes and hypertension  - Immunosuppression on Tacrolimus 5mg BID, Mycophenolate 1000 BID , and Prednisolone 5 mg once daily  -Continue recommendations post renal transplant per guidelines, followed by nephrology clinic     type 2 diabetes,  - last A1c 8.2 (5/2022)   - continue diabetic mgmt per PCP   - discussed lifestyle and diet modifications      hypertension  - BP in clinic today 55924 but has  at home per patient  - only took one BP medication this AM  -Currently on  labetalol 100 bid, nifedipine 30 BID     hyperlipidemia,  -continue crestor   -stable  -f/u w/ PCP        Juan Carlos Cai DO  [facility] PGY-1

## 2023-01-23 ENCOUNTER — PATIENT MESSAGE (OUTPATIENT)
Dept: INTERNAL MEDICINE | Facility: CLINIC | Age: 68
End: 2023-01-23
Payer: MEDICARE

## 2023-01-26 ENCOUNTER — HOSPITAL ENCOUNTER (OUTPATIENT)
Facility: HOSPITAL | Age: 68
Discharge: HOME | End: 2023-01-26
Attending: PODIATRIST | Admitting: PODIATRIST
Payer: MEDICARE

## 2023-01-26 ENCOUNTER — ANESTHESIA EVENT (OUTPATIENT)
Dept: SURGERY | Facility: HOSPITAL | Age: 68
End: 2023-01-26
Payer: MEDICARE

## 2023-01-26 ENCOUNTER — ANESTHESIA (OUTPATIENT)
Dept: SURGERY | Facility: HOSPITAL | Age: 68
End: 2023-01-26
Payer: MEDICARE

## 2023-01-26 DIAGNOSIS — L97.512 RIGHT FOOT ULCER, WITH FAT LAYER EXPOSED: ICD-10-CM

## 2023-01-26 PROCEDURE — 63600175 PHARM REV CODE 636 W HCPCS

## 2023-01-26 PROCEDURE — 25000003 PHARM REV CODE 250: Performed by: NURSE ANESTHETIST, CERTIFIED REGISTERED

## 2023-01-26 PROCEDURE — 36000707: Performed by: PODIATRIST

## 2023-01-26 PROCEDURE — 37000009 HC ANESTHESIA EA ADD 15 MINS: Performed by: PODIATRIST

## 2023-01-26 PROCEDURE — 87075 CULTR BACTERIA EXCEPT BLOOD: CPT | Performed by: PODIATRIST

## 2023-01-26 PROCEDURE — 36000706: Performed by: PODIATRIST

## 2023-01-26 PROCEDURE — 71000016 HC POSTOP RECOV ADDL HR: Performed by: PODIATRIST

## 2023-01-26 PROCEDURE — 25000003 PHARM REV CODE 250: Performed by: PODIATRIST

## 2023-01-26 PROCEDURE — 87070 CULTURE OTHR SPECIMN AEROBIC: CPT | Performed by: PODIATRIST

## 2023-01-26 PROCEDURE — 88311 DECALCIFY TISSUE: CPT

## 2023-01-26 PROCEDURE — 71000015 HC POSTOP RECOV 1ST HR: Performed by: PODIATRIST

## 2023-01-26 PROCEDURE — 27201423 OPTIME MED/SURG SUP & DEVICES STERILE SUPPLY: Performed by: PODIATRIST

## 2023-01-26 PROCEDURE — 63600175 PHARM REV CODE 636 W HCPCS: Performed by: NURSE ANESTHETIST, CERTIFIED REGISTERED

## 2023-01-26 PROCEDURE — 37000008 HC ANESTHESIA 1ST 15 MINUTES: Performed by: PODIATRIST

## 2023-01-26 PROCEDURE — 87205 SMEAR GRAM STAIN: CPT | Performed by: PODIATRIST

## 2023-01-26 DEVICE — ALLOGRAFT THERASKIN LARGE: Type: IMPLANTABLE DEVICE | Site: FOOT | Status: FUNCTIONAL

## 2023-01-26 RX ORDER — LIDOCAINE HYDROCHLORIDE 10 MG/ML
INJECTION, SOLUTION EPIDURAL; INFILTRATION; INTRACAUDAL; PERINEURAL
Status: DISCONTINUED | OUTPATIENT
Start: 2023-01-26 | End: 2023-01-26

## 2023-01-26 RX ORDER — DIPHENHYDRAMINE HYDROCHLORIDE 50 MG/ML
25 INJECTION INTRAMUSCULAR; INTRAVENOUS EVERY 6 HOURS PRN
Status: CANCELLED | OUTPATIENT
Start: 2023-01-26

## 2023-01-26 RX ORDER — ONDANSETRON 2 MG/ML
4 INJECTION INTRAMUSCULAR; INTRAVENOUS DAILY PRN
Status: CANCELLED | OUTPATIENT
Start: 2023-01-26

## 2023-01-26 RX ORDER — PROPOFOL 10 MG/ML
VIAL (ML) INTRAVENOUS
Status: DISCONTINUED | OUTPATIENT
Start: 2023-01-26 | End: 2023-01-26

## 2023-01-26 RX ORDER — BUPIVACAINE HYDROCHLORIDE 5 MG/ML
INJECTION, SOLUTION EPIDURAL; INTRACAUDAL
Status: DISCONTINUED
Start: 2023-01-26 | End: 2023-01-26 | Stop reason: HOSPADM

## 2023-01-26 RX ORDER — LIDOCAINE HYDROCHLORIDE 10 MG/ML
INJECTION INFILTRATION; PERINEURAL
Status: DISCONTINUED
Start: 2023-01-26 | End: 2023-01-26 | Stop reason: HOSPADM

## 2023-01-26 RX ORDER — MIDAZOLAM HYDROCHLORIDE 1 MG/ML
INJECTION INTRAMUSCULAR; INTRAVENOUS
Status: COMPLETED
Start: 2023-01-26 | End: 2023-01-26

## 2023-01-26 RX ORDER — ACETAMINOPHEN 10 MG/ML
1000 INJECTION, SOLUTION INTRAVENOUS ONCE
Status: CANCELLED | OUTPATIENT
Start: 2023-01-26 | End: 2023-01-26

## 2023-01-26 RX ORDER — MIDAZOLAM HYDROCHLORIDE 1 MG/ML
2 INJECTION INTRAMUSCULAR; INTRAVENOUS ONCE AS NEEDED
Status: COMPLETED | OUTPATIENT
Start: 2023-01-26 | End: 2023-01-26

## 2023-01-26 RX ORDER — LIDOCAINE HYDROCHLORIDE 10 MG/ML
1 INJECTION, SOLUTION EPIDURAL; INFILTRATION; INTRACAUDAL; PERINEURAL ONCE
Status: DISCONTINUED | OUTPATIENT
Start: 2023-01-26 | End: 2023-01-26 | Stop reason: HOSPADM

## 2023-01-26 RX ORDER — LIDOCAINE HYDROCHLORIDE 10 MG/ML
INJECTION INFILTRATION; PERINEURAL
Status: DISCONTINUED | OUTPATIENT
Start: 2023-01-26 | End: 2023-01-26 | Stop reason: HOSPADM

## 2023-01-26 RX ORDER — HYDROMORPHONE HYDROCHLORIDE 2 MG/ML
0.2 INJECTION, SOLUTION INTRAMUSCULAR; INTRAVENOUS; SUBCUTANEOUS EVERY 5 MIN PRN
Status: CANCELLED | OUTPATIENT
Start: 2023-01-26

## 2023-01-26 RX ORDER — BUPIVACAINE HYDROCHLORIDE 5 MG/ML
INJECTION, SOLUTION EPIDURAL; INTRACAUDAL
Status: DISCONTINUED | OUTPATIENT
Start: 2023-01-26 | End: 2023-01-26 | Stop reason: HOSPADM

## 2023-01-26 RX ORDER — SODIUM CHLORIDE, SODIUM GLUCONATE, SODIUM ACETATE, POTASSIUM CHLORIDE AND MAGNESIUM CHLORIDE 30; 37; 368; 526; 502 MG/100ML; MG/100ML; MG/100ML; MG/100ML; MG/100ML
INJECTION, SOLUTION INTRAVENOUS CONTINUOUS
Status: DISCONTINUED | OUTPATIENT
Start: 2023-01-26 | End: 2023-01-26 | Stop reason: HOSPADM

## 2023-01-26 RX ORDER — PROPOFOL 10 MG/ML
VIAL (ML) INTRAVENOUS CONTINUOUS PRN
Status: DISCONTINUED | OUTPATIENT
Start: 2023-01-26 | End: 2023-01-26

## 2023-01-26 RX ADMIN — PROPOFOL 50 MCG/KG/MIN: 10 INJECTION, EMULSION INTRAVENOUS at 01:01

## 2023-01-26 RX ADMIN — PROPOFOL 30 MG: 10 INJECTION, EMULSION INTRAVENOUS at 01:01

## 2023-01-26 RX ADMIN — SODIUM CHLORIDE 1 G: 9 INJECTION, SOLUTION INTRAVENOUS at 01:01

## 2023-01-26 RX ADMIN — LIDOCAINE HYDROCHLORIDE 50 MG: 10 INJECTION, SOLUTION EPIDURAL; INFILTRATION; INTRACAUDAL; PERINEURAL at 01:01

## 2023-01-26 RX ADMIN — MIDAZOLAM HYDROCHLORIDE 2 MG: 1 INJECTION INTRAMUSCULAR; INTRAVENOUS at 12:01

## 2023-01-26 RX ADMIN — MIDAZOLAM HYDROCHLORIDE 2 MG: 1 INJECTION, SOLUTION INTRAMUSCULAR; INTRAVENOUS at 12:01

## 2023-01-26 NOTE — TRANSFER OF CARE
"Anesthesia Transfer of Care Note    Patient: Kendra Malik    Procedure(s) Performed: Procedure(s) (LRB):  APPLICATION, GRAFT Theraskin  Right/  Rep Marlo Childs (Right)  DEBRIDEMENT, FOOT Right (Right)  BIOPSY, BONE  Right 5th digit (Left)    Patient location: PACU    Transport from OR: Transported from OR on room air with adequate spontaneous ventilation    Post pain: adequate analgesia    Post assessment: no apparent anesthetic complications    Post vital signs: stable    Level of consciousness: responds to stimulation    Nausea/Vomiting: no nausea/vomiting    Complications: none    Transfer of care protocol was followed      Last vitals:   Visit Vitals  BP (!) 178/68   Pulse 64   Temp 36.6 °C (97.9 °F) (Oral)   Ht 5' 4" (1.626 m)   Wt 67.5 kg (148 lb 13 oz)   SpO2 100%   Breastfeeding No   BMI 25.54 kg/m²     "

## 2023-01-26 NOTE — ANESTHESIA PREPROCEDURE EVALUATION
01/26/2023  Kendra Malik is a 67 y.o., female with hx poorly controlled DMT2, and diabetic foot.  Here today for exploration, debridement, theraskin graft application.  She is otherwise feeling like she is at baseline.  She denies cardiopulmonary complaints.  Other PMH as outlined here.      Pre-op Assessment    I have reviewed the Patient Summary Reports.     I have reviewed the Nursing Notes. I have reviewed the NPO Status.   I have reviewed the Medications.     Review of Systems  Anesthesia Hx:  No problems with previous Anesthesia  Denies Family Hx of Anesthesia complications.   Denies Personal Hx of Anesthesia complications.   Cardiovascular:   Hypertension    Pulmonary:   Pneumonia    Renal/:   Chronic Renal Disease    Endocrine:   Diabetes        Physical Exam  General: Well nourished, Cooperative, Alert and Oriented    Airway:  Mallampati: II   Mouth Opening: Normal  TM Distance: Normal  Tongue: Normal  Neck ROM: Normal ROM    Chest/Lungs:  Clear to auscultation, Normal Respiratory Rate    Heart:  Rate: Normal  Rhythm: Regular Rhythm        Anesthesia Plan  Type of Anesthesia, risks & benefits discussed:    Anesthesia Type: Gen Natural Airway  Intra-op Monitoring Plan: Standard ASA Monitors  Post Op Pain Control Plan: multimodal analgesia  Induction:  IV  Informed Consent: Informed consent signed with the Patient and all parties understand the risks and agree with anesthesia plan.  All questions answered.   ASA Score: 3  Day of Surgery Review of History & Physical: H&P Update referred to the surgeon/provider.    Ready For Surgery From Anesthesia Perspective.     .

## 2023-01-26 NOTE — ANESTHESIA POSTPROCEDURE EVALUATION
Anesthesia Post Evaluation    Patient: Kendra Malik    Procedure(s) Performed: Procedure(s) (LRB):  APPLICATION, GRAFT Theraskin  Right/  Rep Marlo Liveas (Right)  DEBRIDEMENT, FOOT Right (Right)  BIOPSY, BONE  Right 5th digit (Left)          FERNANDO mitigation strategies: Multimodal analgesia                  Vitals Value Taken Time   /68 01/26/23 1032   Temp 36.6 °C (97.9 °F) 01/26/23 1032   Pulse 64 01/26/23 1032   Resp 20 01/26/23 1423   SpO2 100 % 01/26/23 1032         No case tracking events are documented in the log.      Pain/Lisa Score: No data recorded

## 2023-01-27 LAB
GRAM STN SPEC: NORMAL
GRAM STN SPEC: NORMAL
POCT GLUCOSE: 121 MG/DL (ref 70–110)

## 2023-01-27 NOTE — OP NOTE
[facility] [address] [place] [address]    PATIENT NAME:      HOLLAND PETERSEN  YOB: 1955  CSN:               [id number]  MRN:               [id number]  ADMIT DATE:        01/26/2023 10:00:00  PHYSICIAN:         Jeremiah Bowens DPM                          OPERATIVE REPORT      DATE OF SURGERY:    01/26/2023 00:00:00    SURGEON:  Jeremiah Bowens DPM    ASSISTANT:  None.    PREOPERATIVE DIAGNOSIS:  Right foot chronic ulceration and left 5th digit   osteomyelitis.    POSTOPERATIVE DIAGNOSIS:  Right foot chronic ulceration and left 5th digit   osteomyelitis.    PROCEDURE:  Debridement of right foot with TheraSkin graft application, and left   5th digit bone biopsy.    PATHOLOGY:  Bone proximal phalanx for Microbiology, and bone middle phalanx for   pathology.    HEMOSTASIS:  Pneumatic ankle tourniquet, 250 mmHg.    ESTIMATED BLOOD LOSS:  Less than 20 mL.    INJECTABLES:  About 20 cc of 1:1 mix of 1% lidocaine plain and 0.5% Marcaine   plain.    MATERIALS:  1 TheraSkin graft and staples.  3-0 Vicryl and 4-0 nylon.    COMPLICATIONS:  None.    DESCRIPTION OF PROCEDURE:  Under mild sedation, patient was brought into the   operating room and placed on the operating table in a supine position.    Following IV sedation, the patient's bilateral lower extremities were then   scrubbed, prepped, and draped in usual septic manner.  A pneumatic ankle   tourniquet was applied to the patient's bilateral ankles.  An Esmarch bandage   was used to exsanguinate the foot.  The tourniquet was then inflated to a   setting of 250 mmHg on the right foot.      Attention was then directed to the plantar aspect of the right foot where a   large ulceration was noted to the plantar forefoot area.  Utilizing a 15 blade,   all nonviable tissue was debrided down to and including subcu.  Thus, an   excisional  debridement was performed.  Next, utilizing a Misonix debrider, the   base of the wound was then debrided of all nonviable tissue down to and   including subcu, thus an excisional debridement was performed.  Following   debridement, the wound measured roughly 7 x 2 x 1.3 cm.  A TheraSkin graft was   used to cover the wound deficit.  The entire graft was used.  The graft was then   held in place with staples.  Next, a dressing was applied consisting of a   nonadherent Mepitel, Telfa, 4 x 4's, Kerlix, and an Ace wrap.  The tourniquet   was then deflated with prompt hyperemic response noted to all digits of the   right foot.      Next, attention was directed to the left 5th digit.  An Esmarch bandage was used   to exsanguinate the foot.  The pneumatic ankle tourniquet was then inflated to   a setting of 250 mmHg.     Attention was then directed to the lateral aspect of the left 5th digit where an   ulceration was appreciated.  Utilizing a 15 blade, a linear incision was made   through the ulceration down to bone.  Next, utilizing a bone cutter, the head of   the proximal phalanx and the base of the middle phalanx was transected and   removed and placed on the back table.  The head of the proximal phalanx was sent   for tissue culture, and the base of the middle phalanx was sent for pathology.    Next, another small portion of the proximal phalanx was resected and passed   from the surgical field.  The bone specimen appeared to be soft in texture   indicating signs of a bacterial infection.  The remaining tissue appeared to be   healthy.  There was no fetid odor and no appreciation of any abscess or   collection.  The area was then copiously flushed with normal saline.  The deeper   layers were reapproximated and coapted utilizing 3-0 Vicryl.  The skin was   reapproximated and coapted utilizing 4-0 nylon.  A dressing was applied   consisting of 4 x 4's, Krissy, and a Coban wrap.  The tourniquet was then   deflated with  prompt hyperemic response noted to all digits of the left foot.      The patient tolerated the procedure well with no complications.  The patient was   transferred to the PACU with all vital signs stable and neurovascular status   intact.    POSTOPERATIVE PLAN:  Following a period of postoperative monitoring, the patient   was to be discharged home with the following written and oral instructions.  1. Keep dressings clean, dry and intact.  2. Elevate bilateral lower extremities.  3. The patient is take all medications as described.  4. The patient is to remain partial weightbearing in a postoperative shoe to her   right foot.  The patient can remain full weightbearing in a supportive tennis   shoe to her left foot.    5. The patient is to call my office with any questions or concerns.    6. The patient will follow up next week in my office for her first postop visit.        ______________________________  EDU Daigle/ANDREEA  DD:  01/27/2023  Time:  11:38AM  DT:  01/27/2023  Time:  12:55PM  Job #:  [id number]      OPERATIVE REPORT

## 2023-01-29 LAB — BACTERIA SPEC ANAEROBE CULT: NORMAL

## 2023-01-30 VITALS
HEIGHT: 64 IN | TEMPERATURE: 98 F | OXYGEN SATURATION: 87 % | WEIGHT: 148.81 LBS | DIASTOLIC BLOOD PRESSURE: 68 MMHG | SYSTOLIC BLOOD PRESSURE: 162 MMHG | HEART RATE: 61 BPM | BODY MASS INDEX: 25.41 KG/M2

## 2023-01-30 LAB
BACTERIA SPEC CULT: ABNORMAL
PSYCHE PATHOLOGY RESULT: NORMAL

## 2023-02-01 NOTE — ANESTHESIA POSTPROCEDURE EVALUATION
Anesthesia Post Evaluation    Patient: Kendra Malik    Procedure(s) Performed: Procedure(s) (LRB):  APPLICATION, GRAFT Theraskin  Right/  Rep Marlo Baras (Right)  DEBRIDEMENT, FOOT Right (Right)  BIOPSY, BONE  Right 5th digit (Left)    Final Anesthesia Type: general      Patient location during evaluation: PACU  Patient participation: Yes- Able to Participate  Level of consciousness: awake and alert  Post-procedure vital signs: reviewed and stable  Pain management: adequate  Airway patency: patent      Anesthetic complications: no      Cardiovascular status: blood pressure returned to baseline  Respiratory status: unassisted  Hydration status: euvolemic  Follow-up not needed.          Vitals Value Taken Time   /68 01/26/23 1456   Temp ** 02/01/23 1305   Pulse 61 01/26/23 1456   Resp ** 02/01/23 1305   SpO2 87 % 01/26/23 1456         No case tracking events are documented in the log.      Pain/Lisa Score: No data recorded

## 2023-02-13 DIAGNOSIS — E11.649 UNCONTROLLED TYPE 2 DIABETES MELLITUS WITH HYPOGLYCEMIA, UNSPECIFIED HYPOGLYCEMIA COMA STATUS: Primary | ICD-10-CM

## 2023-02-13 RX ORDER — INSULIN GLARGINE 100 [IU]/ML
15 INJECTION, SOLUTION SUBCUTANEOUS NIGHTLY
Qty: 15 ML | Refills: 3 | Status: SHIPPED | OUTPATIENT
Start: 2023-02-13 | End: 2023-07-11 | Stop reason: CLARIF

## 2023-02-13 RX ORDER — LINEZOLID 2 MG/ML
600 INJECTION, SOLUTION INTRAVENOUS 2 TIMES DAILY
COMMUNITY
End: 2023-03-24 | Stop reason: ALTCHOICE

## 2023-02-13 RX ORDER — METRONIDAZOLE 500 MG/1
500 TABLET ORAL 3 TIMES DAILY
COMMUNITY
End: 2023-03-24 | Stop reason: ALTCHOICE

## 2023-02-15 ENCOUNTER — ANESTHESIA EVENT (OUTPATIENT)
Dept: SURGERY | Facility: HOSPITAL | Age: 68
End: 2023-02-15
Payer: MEDICARE

## 2023-02-15 NOTE — DISCHARGE INSTRUCTIONS
Patient Education       Amputation of the Foot or Toe Discharge Instructions      What care is needed at home?   Prepare your home to help with your recovery.  Do not change your bandages. Keep your dressing clean, dry, and intact.   When you may take a bath or shower when released by your doctor.  You may walk on your surgical foot per your doctor's instructions with your surgical shoe.  Ask your doctor when you may go back to your normal activities like work or driving.  Be sure to wash your hands before and after touching your wound or dressing.  If you smoke, stop. Smoking lowers blood flow and can slow healing.  If you have diabetes, continue to monitor your blood sugar. Take your medications as ordered.  What follow-up care is needed?   Be sure to keep your follow up visits.  If you have stitches or staples, you will need to have them taken out. Your doctor will often want to do this in 1 to 2 weeks.  Will physical activity be limited?   You may have to limit your activity. Talk to your doctor about the right amount of activity for you.  What problems could happen?   Poor wound healing or spread of dead tissue. This might require amputation of more of your foot, toes, or leg.  Infection  Very bad pain in the remaining tissue. This is stump pain.  Painful feeling that the foot or toe is still there. This is phantom pain.  Bleeding  Nerve damage  Limp ? depending on which toe has been removed  When do I need to call the doctor?   Signs of infection. These include a fever of 100.4°F (38°C) or higher, chills, or wound that will not heal.  Signs of wound infection. These include swelling, redness, warmth around the wound; too much pain when touched; yellowish, greenish, or bloody discharge; foul smell coming from the cut site; cut site opens up.  Trouble breathing or not able to take a deep breath  Numbness or tingling feeling in the rest of your foot, toes, or leg  Chalky white, blue, or black color in the rest of  your foot, toes, or leg  Pain that you cannot control with the drugs you have been given

## 2023-02-16 ENCOUNTER — HOSPITAL ENCOUNTER (OUTPATIENT)
Facility: HOSPITAL | Age: 68
Discharge: HOME | End: 2023-02-16
Attending: PODIATRIST | Admitting: PODIATRIST
Payer: MEDICARE

## 2023-02-16 ENCOUNTER — ANESTHESIA (OUTPATIENT)
Dept: SURGERY | Facility: HOSPITAL | Age: 68
End: 2023-02-16
Payer: MEDICARE

## 2023-02-16 DIAGNOSIS — I96 GANGRENE: ICD-10-CM

## 2023-02-16 DIAGNOSIS — M86.272 SUBACUTE OSTEOMYELITIS, LEFT ANKLE AND FOOT: ICD-10-CM

## 2023-02-16 LAB — POCT GLUCOSE: 153 MG/DL (ref 70–110)

## 2023-02-16 PROCEDURE — 36000706: Performed by: PODIATRIST

## 2023-02-16 PROCEDURE — 71000016 HC POSTOP RECOV ADDL HR: Performed by: PODIATRIST

## 2023-02-16 PROCEDURE — 37000009 HC ANESTHESIA EA ADD 15 MINS: Performed by: PODIATRIST

## 2023-02-16 PROCEDURE — 63600175 PHARM REV CODE 636 W HCPCS: Performed by: PODIATRIST

## 2023-02-16 PROCEDURE — 36000707: Performed by: PODIATRIST

## 2023-02-16 PROCEDURE — 25000003 PHARM REV CODE 250: Performed by: NURSE ANESTHETIST, CERTIFIED REGISTERED

## 2023-02-16 PROCEDURE — 82962 GLUCOSE BLOOD TEST: CPT | Performed by: PODIATRIST

## 2023-02-16 PROCEDURE — 63600175 PHARM REV CODE 636 W HCPCS: Performed by: ANESTHESIOLOGY

## 2023-02-16 PROCEDURE — 71000015 HC POSTOP RECOV 1ST HR: Performed by: PODIATRIST

## 2023-02-16 PROCEDURE — 88311 DECALCIFY TISSUE: CPT

## 2023-02-16 PROCEDURE — 37000008 HC ANESTHESIA 1ST 15 MINUTES: Performed by: PODIATRIST

## 2023-02-16 PROCEDURE — 63600175 PHARM REV CODE 636 W HCPCS: Performed by: NURSE ANESTHETIST, CERTIFIED REGISTERED

## 2023-02-16 PROCEDURE — 63600175 PHARM REV CODE 636 W HCPCS

## 2023-02-16 PROCEDURE — 25000003 PHARM REV CODE 250: Performed by: PODIATRIST

## 2023-02-16 RX ORDER — BUPIVACAINE HYDROCHLORIDE 5 MG/ML
INJECTION, SOLUTION EPIDURAL; INTRACAUDAL
Status: DISCONTINUED
Start: 2023-02-16 | End: 2023-02-16 | Stop reason: HOSPADM

## 2023-02-16 RX ORDER — HYDROMORPHONE HYDROCHLORIDE 2 MG/ML
0.4 INJECTION, SOLUTION INTRAMUSCULAR; INTRAVENOUS; SUBCUTANEOUS EVERY 5 MIN PRN
Status: CANCELLED | OUTPATIENT
Start: 2023-02-16

## 2023-02-16 RX ORDER — LIDOCAINE HYDROCHLORIDE 10 MG/ML
1 INJECTION, SOLUTION EPIDURAL; INFILTRATION; INTRACAUDAL; PERINEURAL ONCE
Status: DISCONTINUED | OUTPATIENT
Start: 2023-02-16 | End: 2023-02-16 | Stop reason: HOSPADM

## 2023-02-16 RX ORDER — ONDANSETRON 2 MG/ML
4 INJECTION INTRAMUSCULAR; INTRAVENOUS DAILY PRN
Status: CANCELLED | OUTPATIENT
Start: 2023-02-16

## 2023-02-16 RX ORDER — LIDOCAINE HYDROCHLORIDE 10 MG/ML
INJECTION, SOLUTION EPIDURAL; INFILTRATION; INTRACAUDAL; PERINEURAL
Status: DISCONTINUED | OUTPATIENT
Start: 2023-02-16 | End: 2023-02-16

## 2023-02-16 RX ORDER — BUPIVACAINE HYDROCHLORIDE 5 MG/ML
INJECTION, SOLUTION EPIDURAL; INTRACAUDAL
Status: DISCONTINUED | OUTPATIENT
Start: 2023-02-16 | End: 2023-02-16 | Stop reason: HOSPADM

## 2023-02-16 RX ORDER — LINEZOLID 2 MG/ML
600 INJECTION, SOLUTION INTRAVENOUS
Status: DISCONTINUED | OUTPATIENT
Start: 2023-02-16 | End: 2023-02-16 | Stop reason: HOSPADM

## 2023-02-16 RX ORDER — MIDAZOLAM HYDROCHLORIDE 1 MG/ML
2 INJECTION INTRAMUSCULAR; INTRAVENOUS ONCE AS NEEDED
Status: COMPLETED | OUTPATIENT
Start: 2023-02-16 | End: 2023-02-16

## 2023-02-16 RX ORDER — SODIUM CHLORIDE 9 MG/ML
INJECTION, SOLUTION INTRAVENOUS CONTINUOUS
Status: CANCELLED | OUTPATIENT
Start: 2023-02-16

## 2023-02-16 RX ORDER — MIDAZOLAM HYDROCHLORIDE 1 MG/ML
INJECTION INTRAMUSCULAR; INTRAVENOUS
Status: COMPLETED
Start: 2023-02-16 | End: 2023-02-16

## 2023-02-16 RX ORDER — MEPERIDINE HYDROCHLORIDE 25 MG/ML
12.5 INJECTION INTRAMUSCULAR; INTRAVENOUS; SUBCUTANEOUS EVERY 10 MIN PRN
Status: CANCELLED | OUTPATIENT
Start: 2023-02-16 | End: 2023-02-17

## 2023-02-16 RX ORDER — HYDROMORPHONE HYDROCHLORIDE 2 MG/ML
0.2 INJECTION, SOLUTION INTRAMUSCULAR; INTRAVENOUS; SUBCUTANEOUS EVERY 5 MIN PRN
Status: CANCELLED | OUTPATIENT
Start: 2023-02-16

## 2023-02-16 RX ORDER — SODIUM CHLORIDE, SODIUM LACTATE, POTASSIUM CHLORIDE, CALCIUM CHLORIDE 600; 310; 30; 20 MG/100ML; MG/100ML; MG/100ML; MG/100ML
INJECTION, SOLUTION INTRAVENOUS CONTINUOUS
Status: DISCONTINUED | OUTPATIENT
Start: 2023-02-16 | End: 2023-02-16 | Stop reason: HOSPADM

## 2023-02-16 RX ORDER — PROCHLORPERAZINE EDISYLATE 5 MG/ML
5 INJECTION INTRAMUSCULAR; INTRAVENOUS EVERY 30 MIN PRN
Status: CANCELLED | OUTPATIENT
Start: 2023-02-16

## 2023-02-16 RX ORDER — SODIUM CHLORIDE, SODIUM GLUCONATE, SODIUM ACETATE, POTASSIUM CHLORIDE AND MAGNESIUM CHLORIDE 30; 37; 368; 526; 502 MG/100ML; MG/100ML; MG/100ML; MG/100ML; MG/100ML
INJECTION, SOLUTION INTRAVENOUS CONTINUOUS
Status: DISCONTINUED | OUTPATIENT
Start: 2023-02-16 | End: 2023-02-16 | Stop reason: HOSPADM

## 2023-02-16 RX ORDER — ONDANSETRON 4 MG/1
8 TABLET, ORALLY DISINTEGRATING ORAL EVERY 6 HOURS PRN
Status: DISCONTINUED | OUTPATIENT
Start: 2023-02-16 | End: 2023-02-16 | Stop reason: HOSPADM

## 2023-02-16 RX ORDER — FAMOTIDINE 10 MG/ML
INJECTION INTRAVENOUS
Status: DISCONTINUED
Start: 2023-02-16 | End: 2023-02-16 | Stop reason: HOSPADM

## 2023-02-16 RX ORDER — PROPOFOL 10 MG/ML
VIAL (ML) INTRAVENOUS CONTINUOUS PRN
Status: DISCONTINUED | OUTPATIENT
Start: 2023-02-16 | End: 2023-02-16

## 2023-02-16 RX ADMIN — PROPOFOL 40 MCG/KG/MIN: 10 INJECTION, EMULSION INTRAVENOUS at 12:02

## 2023-02-16 RX ADMIN — SODIUM CHLORIDE, POTASSIUM CHLORIDE, SODIUM LACTATE AND CALCIUM CHLORIDE: 600; 310; 30; 20 INJECTION, SOLUTION INTRAVENOUS at 10:02

## 2023-02-16 RX ADMIN — MIDAZOLAM HYDROCHLORIDE 2 MG: 1 INJECTION INTRAMUSCULAR; INTRAVENOUS at 12:02

## 2023-02-16 RX ADMIN — LIDOCAINE HYDROCHLORIDE 50 MG: 10 INJECTION, SOLUTION EPIDURAL; INFILTRATION; INTRACAUDAL; PERINEURAL at 12:02

## 2023-02-16 RX ADMIN — MIDAZOLAM HYDROCHLORIDE 2 MG: 1 INJECTION, SOLUTION INTRAMUSCULAR; INTRAVENOUS at 12:02

## 2023-02-16 RX ADMIN — LINEZOLID 600 MG: 600 INJECTION, SOLUTION INTRAVENOUS at 01:02

## 2023-02-16 RX ADMIN — SODIUM CHLORIDE, POTASSIUM CHLORIDE, SODIUM LACTATE AND CALCIUM CHLORIDE: 600; 310; 30; 20 INJECTION, SOLUTION INTRAVENOUS at 12:02

## 2023-02-16 NOTE — ANESTHESIA PREPROCEDURE EVALUATION
02/16/2023  Kendra Malik is a 67 y.o., female with history of renal transplant on immunosuppression presents as an outpatient for left 5th toe amputation.  EKG normal sinus rhythm    2D echo 2019  EF 60% with grade 2 diastolic dysfunction   Mild aortic stenosis/aortic insufficiency/TR   PA systolic pressure 45    Nuclear stress test 2019   EF 63%   No evidence of ischemia or infarct    Last 3 sets of Vitals    Vitals - 1 value per visit 2/16/2023 2/16/2023 2/16/2023   SYSTOLIC 198 - 186   DIASTOLIC 86 - 73   Pulse 92 - 85   Temp 97.7 - -   Resp - - 20   SPO2 97 - 99   Weight (lb) - 153 -   Weight (kg) - 69.4 -   Height - 64 -   BMI (Calculated) - 26.2 -   VISIT REPORT - - -   Pain Score  - - -   Some recent data might be hidden         Lab Results   Component Value Date    WBC 6.0 05/06/2022    HGB 13.3 05/06/2022    HCT 42.6 05/06/2022    MCV 93.6 05/06/2022     05/06/2022          BMP  Lab Results   Component Value Date     05/06/2022    K 3.9 05/06/2022     02/09/2021    CO2 30 05/06/2022    BUN 15.8 05/06/2022    CREATININE 0.83 05/06/2022    CALCIUM 10.4 (H) 05/06/2022    ANIONGAP 13 02/09/2021    ESTGFRAFRICA >60.0 02/09/2021    EGFRNONAA 74 03/18/2022      Results for orders placed during the hospital encounter of 11/15/20    Echo Color Flow Doppler? Yes    Interpretation Summary  · There is left ventricular concentric hypertrophy.  · The left ventricle is normal in size with normal systolic function. The estimated ejection fraction is 70%.  · Normal left ventricular diastolic function.  · Normal right ventricular size with normal right ventricular systolic function.  · Trivial pericardial effusion.  · Mild tricuspid regurgitation.  · Mild pulmonic regurgitation.     Pre-op Assessment    I have reviewed the Patient Summary Reports.    I have reviewed the NPO Status.   I have  reviewed the Medications.   Steroids Taken In Past Year: Prednisone    Review of Systems  Anesthesia Hx:   Denies Personal Hx of Anesthesia complications.   Social:  Non-Smoker    Cardiovascular:   Hypertension  Functional Capacity good / => 4 METS    Renal/:   Chronic Renal Disease Status post renal transplant   Endocrine:   Diabetes, type 2, using insulin        Physical Exam  General: Well nourished, Cooperative, Alert and Oriented    Airway:  Mallampati: II   Mouth Opening: Normal  TM Distance: Normal  Tongue: Normal  Neck ROM: Normal ROM    Dental:  Intact    Chest/Lungs:  Clear to auscultation, Normal Respiratory Rate    Heart:  Rate: Normal  Rhythm: Regular Rhythm        Anesthesia Plan  Type of Anesthesia, risks & benefits discussed:    Anesthesia Type: Gen Natural Airway  Intra-op Monitoring Plan: Standard ASA Monitors  Induction:  IV  Informed Consent: Informed consent signed with the Patient and all parties understand the risks and agree with anesthesia plan.  All questions answered.   ASA Score: 3  Day of Surgery Review of History & Physical: H&P Update referred to the surgeon/provider.  Anesthesia Plan Notes: General anesthesia with LMA okay if required by Podiatry    Ready For Surgery From Anesthesia Perspective.     .

## 2023-02-16 NOTE — TRANSFER OF CARE
"Anesthesia Transfer of Care Note    Patient: Kendra Malik    Procedure(s) Performed: Procedure(s) (LRB):  AMPUTATION, TOE  Left 5th digit (Left)    Patient location: OPS    Anesthesia Type: MAC    Transport from OR: Transported from OR on room air with adequate spontaneous ventilation    Post pain: adequate analgesia    Post assessment: no apparent anesthetic complications    Post vital signs: stable    Level of consciousness: awake, alert and oriented    Complications: none    Transfer of care protocol was followed      Last vitals:   Visit Vitals  BP (!) 186/73 (BP Location: Left arm)   Pulse 85   Temp 36.5 °C (97.7 °F) (Oral)   Resp 20   Ht 5' 4" (1.626 m)   Wt 69.4 kg (153 lb)   SpO2 99%   Breastfeeding No   BMI 26.26 kg/m²     "

## 2023-02-16 NOTE — ANESTHESIA POSTPROCEDURE EVALUATION
Anesthesia Post Evaluation    Patient: Kendra Mlaik    Procedure(s) Performed: Procedure(s) (LRB):  AMPUTATION, TOE  Left 5th digit (Left)    Final Anesthesia Type: MAC      Patient location during evaluation: OPS  Patient participation: Yes- Able to Participate  Level of consciousness: awake and alert and oriented  Post-procedure vital signs: reviewed and stable  Airway patency: patent    PONV status at discharge: No PONV  Anesthetic complications: no      Cardiovascular status: blood pressure returned to baseline  Respiratory status: unassisted  Hydration status: euvolemic  Follow-up not needed.          Vitals Value Taken Time   /80 02/16/23 1341   Temp 36.8 °C (98.2 °F) 02/16/23 1341   Pulse 74 02/16/23 1341   Resp 18 02/16/23 1341   SpO2 99 % 02/16/23 1341         No case tracking events are documented in the log.      Pain/Lisa Score: No data recorded

## 2023-02-16 NOTE — OP NOTE
[facility] [address] [place] [address]    PATIENT NAME:      HOLLAND PETERSEN  YOB: 1955  CSN:               [id number]  MRN:               [id number]  ADMIT DATE:        02/16/2023 08:55:00  PHYSICIAN:         Jeremiah Bowens DPM                          OPERATIVE REPORT      DATE OF SURGERY:    02/16/2023 00:00:00    SURGEON:  Jeremiah Bowens DPM    ASSISTANTS:  None.    PREOPERATIVE DIAGNOSIS:  Left 5th digit gangrene.    POSTOPERATIVE DIAGNOSIS:  Left 5th digit gangrene.    PROCEDURE:  Amputation of left 5th digit.    HEMOSTASIS:  Anatomic.    ANESTHESIA:  Monitored anesthesia care with about 6 cc of 0.5% Marcaine plain.    COMPLICATIONS:  None.    MATERIALS:  4-0 Vicryl, 4-0 nylon.    PROCEDURE IN DETAIL:  Under mild sedation, patient was brought into the   operating room and placed on the operating room table in a supine position.    Following IV sedation, the patient's left lower extremity was then scrubbed,   prepped, and draped in the usual aseptic manner.  Attention was then directed to   the dorsal aspect of the left 5th digit, where the digit appeared to be   swollen, dark in color with a history of osteomyelitis.  Next, utilizing a 15   blade, a racquet-shaped incision was made at the base of the digit.  The digit   was then disarticulated at the fifth metatarsal phalangeal joint  and passed from the surgical field and placed on the back   table to be sent for pathology.  Copious amounts of healthy bleeding were noted   during the procedure.  Next, the area was flushed with normal saline.  The   deeper layers were reapproximated and coapted utilizing 4-0 Vicryl.  The skin   was reapproximated and coapted utilizing 4-0 nylon.  A dressing was applied   consisting of a nonadherent, 4 x 4's, Kerlix, and an Ace wrap.  The patient   tolerated the procedure well with no  complications.  The patient was transferred   to PACU with all vital signs stable and neurovascular status intact.  Following   a period of postoperative monitoring, the patient is to be discharged home with   the following written oral instructions:  1. Keep dressings clean, dry, and intact.  2. Elevate left foot.  3. Take all medications as prescribed.  4. The patient must remain partial weightbearing in a postoperative shoe.  5. The patient is to follow up in my office within 1 week.  The patient is to   call my office or my cell phone with any questions or concerns.        ______________________________  EDU Daigle/ANDREEA  DD:  02/16/2023  Time:  01:58PM  DT:  02/16/2023  Time:  02:16PM  Job #:  [id number]      OPERATIVE REPORT

## 2023-02-17 VITALS
SYSTOLIC BLOOD PRESSURE: 123 MMHG | WEIGHT: 153 LBS | HEIGHT: 64 IN | TEMPERATURE: 98 F | OXYGEN SATURATION: 95 % | DIASTOLIC BLOOD PRESSURE: 72 MMHG | HEART RATE: 71 BPM | RESPIRATION RATE: 18 BRPM | BODY MASS INDEX: 26.12 KG/M2

## 2023-02-20 LAB — PSYCHE PATHOLOGY RESULT: NORMAL

## 2023-03-06 ENCOUNTER — LAB REQUISITION (OUTPATIENT)
Dept: LAB | Facility: HOSPITAL | Age: 68
End: 2023-03-06
Payer: MEDICARE

## 2023-03-06 DIAGNOSIS — Z79.2 LONG TERM (CURRENT) USE OF ANTIBIOTICS: ICD-10-CM

## 2023-03-06 DIAGNOSIS — L08.9 LOCAL INFECTION OF THE SKIN AND SUBCUTANEOUS TISSUE, UNSPECIFIED: ICD-10-CM

## 2023-03-06 LAB
ABS NEUT (OLG): 2.82 X10(3)/MCL (ref 2.1–9.2)
ALBUMIN SERPL-MCNC: 3.3 G/DL (ref 3.4–4.8)
ALBUMIN/GLOB SERPL: 1.2 RATIO (ref 1.1–2)
ALP SERPL-CCNC: 67 UNIT/L (ref 40–150)
ALT SERPL-CCNC: 5 UNIT/L (ref 0–55)
ANISOCYTOSIS BLD QL SMEAR: ABNORMAL
AST SERPL-CCNC: 12 UNIT/L (ref 5–34)
BILIRUBIN DIRECT+TOT PNL SERPL-MCNC: 0.4 MG/DL
BUN SERPL-MCNC: 12.5 MG/DL (ref 9.8–20.1)
CALCIUM SERPL-MCNC: 10 MG/DL (ref 8.4–10.2)
CHLORIDE SERPL-SCNC: 106 MMOL/L (ref 98–107)
CO2 SERPL-SCNC: 25 MMOL/L (ref 23–31)
CREAT SERPL-MCNC: 0.75 MG/DL (ref 0.55–1.02)
CRP SERPL-MCNC: 1.3 MG/L
EOSINOPHIL NFR BLD MANUAL: 0.47 X10(3)/MCL (ref 0–0.9)
EOSINOPHIL NFR BLD MANUAL: 10 %
ERYTHROCYTE [DISTWIDTH] IN BLOOD BY AUTOMATED COUNT: 15.4 % (ref 11.5–17)
ERYTHROCYTE [SEDIMENTATION RATE] IN BLOOD: 9 MM/HR (ref 0–20)
GFR SERPLBLD CREATININE-BSD FMLA CKD-EPI: >60 MLS/MIN/1.73/M2
GLOBULIN SER-MCNC: 2.8 GM/DL (ref 2.4–3.5)
GLUCOSE SERPL-MCNC: 162 MG/DL (ref 82–115)
HCT VFR BLD AUTO: 29 % (ref 37–47)
HGB BLD-MCNC: 9.2 G/DL (ref 12–16)
IMM GRANULOCYTES # BLD AUTO: 0.01 X10(3)/MCL (ref 0–0.04)
IMM GRANULOCYTES NFR BLD AUTO: 0.2 %
INSTRUMENT WBC (OLG): 4.7 X10(3)/MCL
LYMPHOCYTES NFR BLD MANUAL: 1.18 X10(3)/MCL
LYMPHOCYTES NFR BLD MANUAL: 25 %
MACROCYTES BLD QL SMEAR: ABNORMAL
MCH RBC QN AUTO: 28.5 PG
MCHC RBC AUTO-ENTMCNC: 31.7 G/DL (ref 33–36)
MCV RBC AUTO: 89.8 FL (ref 80–94)
MONOCYTES NFR BLD MANUAL: 0.28 X10(3)/MCL (ref 0.1–1.3)
MONOCYTES NFR BLD MANUAL: 6 %
NEUTROPHILS NFR BLD MANUAL: 60 %
NRBC BLD AUTO-RTO: 0 %
PLATELET # BLD AUTO: 94 X10(3)/MCL (ref 130–400)
PLATELET # BLD EST: ABNORMAL 10*3/UL
PMV BLD AUTO: 12.1 FL (ref 7.4–10.4)
POTASSIUM SERPL-SCNC: 4.5 MMOL/L (ref 3.5–5.1)
PROT SERPL-MCNC: 6.1 GM/DL (ref 5.8–7.6)
RBC # BLD AUTO: 3.23 X10(6)/MCL (ref 4.2–5.4)
RBC MORPH BLD: ABNORMAL
SODIUM SERPL-SCNC: 138 MMOL/L (ref 136–145)
WBC # SPEC AUTO: 4.7 X10(3)/MCL (ref 4.5–11.5)

## 2023-03-06 PROCEDURE — 86140 C-REACTIVE PROTEIN: CPT | Performed by: PODIATRIST

## 2023-03-06 PROCEDURE — 85651 RBC SED RATE NONAUTOMATED: CPT | Performed by: PODIATRIST

## 2023-03-06 PROCEDURE — 80053 COMPREHEN METABOLIC PANEL: CPT | Performed by: PODIATRIST

## 2023-03-06 PROCEDURE — 85027 COMPLETE CBC AUTOMATED: CPT | Performed by: PODIATRIST

## 2023-03-21 ENCOUNTER — TELEPHONE (OUTPATIENT)
Dept: NEPHROLOGY | Facility: CLINIC | Age: 68
End: 2023-03-21
Payer: MEDICARE

## 2023-03-21 NOTE — TELEPHONE ENCOUNTER
Spoke with pt:  will do labs at [facility] 12 hours after last dose  the previous night.  Patient states understanding

## 2023-03-23 ENCOUNTER — OFFICE VISIT (OUTPATIENT)
Dept: OPHTHALMOLOGY | Facility: CLINIC | Age: 68
End: 2023-03-23
Payer: MEDICARE

## 2023-03-23 VITALS — BODY MASS INDEX: 26.12 KG/M2 | HEIGHT: 64 IN | WEIGHT: 153 LBS

## 2023-03-23 DIAGNOSIS — H02.403 PTOSIS OF BOTH EYELIDS: ICD-10-CM

## 2023-03-23 DIAGNOSIS — E11.3553 STABLE PROLIFERATIVE DIABETIC RETINOPATHY OF BOTH EYES ASSOCIATED WITH TYPE 2 DIABETES MELLITUS: Primary | ICD-10-CM

## 2023-03-23 PROCEDURE — 99213 OFFICE O/P EST LOW 20 MIN: CPT | Mod: PBBFAC,PO | Performed by: STUDENT IN AN ORGANIZED HEALTH CARE EDUCATION/TRAINING PROGRAM

## 2023-03-23 PROCEDURE — 92134 CPTRZ OPH DX IMG PST SGM RTA: CPT | Mod: PBBFAC,PO | Performed by: STUDENT IN AN ORGANIZED HEALTH CARE EDUCATION/TRAINING PROGRAM

## 2023-03-23 PROCEDURE — 92134 CPTRZ OPH DX IMG PST SGM RTA: CPT | Mod: PBBFAC,PO | Performed by: OPHTHALMOLOGY

## 2023-03-23 RX ORDER — KETOCONAZOLE 20 MG/G
CREAM TOPICAL
COMMUNITY
Start: 2023-03-01 | End: 2024-01-25 | Stop reason: ALTCHOICE

## 2023-03-23 RX ORDER — COLLAGENASE SANTYL 250 [ARB'U]/G
OINTMENT TOPICAL
COMMUNITY
Start: 2023-03-20 | End: 2024-01-25 | Stop reason: ALTCHOICE

## 2023-03-23 RX ORDER — CEFEPIME HYDROCHLORIDE 2 G/1
INJECTION, POWDER, FOR SOLUTION INTRAVENOUS
COMMUNITY
Start: 2023-03-09 | End: 2023-03-24 | Stop reason: ALTCHOICE

## 2023-03-23 RX ORDER — ZOSTER VACCINE RECOMBINANT, ADJUVANTED 50 MCG/0.5
KIT INTRAMUSCULAR
COMMUNITY
Start: 2022-10-12 | End: 2024-01-25 | Stop reason: ALTCHOICE

## 2023-03-23 NOTE — PROGRESS NOTES
HPI     T2DM with stable PDR     Additional comments: OCT mac and DFE           Ptosis     Additional comments: Patient states that she had an appointment to have   and eval but she missed it.            Comments    T2DM with stable PDR- OCT mac and DFE          Last edited by Te Ribeiro MA on 3/23/2023  9:35 AM.        OCT 03/23/23  OD: 283, ERM, minimal edema  OS: 304, ERM, interval increase in IRF    OCT mac (7/26/22):  OD: blunted contour, nasal thickening, ERM  OS: blunted contour, ERM, traction superiorly  Stable OCT mac    Assessment/Plan:  1. T2DM with stable PDR OD  - Hba1c last 7.3 (1/23), on insulin  - S/P PRP OD x6  - History of Sub-optimal response to Avastin (last 7/9/2019 for NV + VH followed by PRP fill in x3), s/p Lucentis x2 (last 11/27/17) with improvement of CME  - Had new VH first noticed by pt on 5/1/2020, 5/5/20 B scan without RD, given lucentis x2  - Good ; no active NV now on exam  - will observe for now  - BP/BG/chol control, and follow up with PCP  - Return precautions    2. T2DM with stable PDR OS  - extrafoveal mild traction stable form last visit.  - s/p PPV/MP/fax/EL/ C3F8 for TRD OS due to PDR at , 6/2016  - For extrafoveal CSME --> no leakage on IVFA (at least before November 2016), ? chronic intraretinal cystic changes  - Return precautions, CTM  - No active NV on exam  - With mild traction component  - Will observe for now  - 3/23/23: pt doing well. VA stable. Slight increase in IRF. Outside fovea. Will monitor.    3. ERM OU  -ERM + regressed fibrovascular tissue ou  -seen with Dr. Joseph 6/27/21, will monitor for now    4. Pseudophakia OU  - S/p CEIOL OS 3/21/19, had single episode of post op iritis, resolved  - s/p CE/IOL OD 5/2/19, BCVA 20/60-70  - s/p YAG cap OU    5. Ptosis OU  - Pt complains she consistently has to lift her eyelids up to see  - MRD1 of 0 mm both eyes  - Will external referral to Dr Cordon in [facility] 2-3 months repeat OCT mac DFE

## 2023-03-24 ENCOUNTER — OFFICE VISIT (OUTPATIENT)
Dept: NEPHROLOGY | Facility: CLINIC | Age: 68
End: 2023-03-24
Payer: MEDICARE

## 2023-03-24 VITALS
SYSTOLIC BLOOD PRESSURE: 130 MMHG | RESPIRATION RATE: 18 BRPM | BODY MASS INDEX: 25.64 KG/M2 | DIASTOLIC BLOOD PRESSURE: 58 MMHG | TEMPERATURE: 98 F | OXYGEN SATURATION: 100 % | WEIGHT: 150.19 LBS | HEART RATE: 67 BPM | HEIGHT: 64 IN

## 2023-03-24 DIAGNOSIS — D63.1 ANEMIA OF RENAL DISEASE: ICD-10-CM

## 2023-03-24 DIAGNOSIS — Z94.0 S/P KIDNEY TRANSPLANT: Primary | ICD-10-CM

## 2023-03-24 DIAGNOSIS — N18.2 STAGE 2 CHRONIC KIDNEY DISEASE: ICD-10-CM

## 2023-03-24 DIAGNOSIS — Z79.899 LONG TERM CURRENT USE OF IMMUNOSUPPRESSIVE DRUG: ICD-10-CM

## 2023-03-24 DIAGNOSIS — E11.65 UNCONTROLLED TYPE 2 DIABETES MELLITUS WITH HYPERGLYCEMIA: ICD-10-CM

## 2023-03-24 DIAGNOSIS — E78.2 MIXED HYPERLIPIDEMIA: ICD-10-CM

## 2023-03-24 DIAGNOSIS — I10 PRIMARY HYPERTENSION: ICD-10-CM

## 2023-03-24 DIAGNOSIS — N25.81 SECONDARY HYPERPARATHYROIDISM OF RENAL ORIGIN: ICD-10-CM

## 2023-03-24 DIAGNOSIS — N18.9 ANEMIA OF RENAL DISEASE: ICD-10-CM

## 2023-03-24 PROCEDURE — 99215 OFFICE O/P EST HI 40 MIN: CPT | Mod: PBBFAC | Performed by: INTERNAL MEDICINE

## 2023-03-24 NOTE — PROGRESS NOTES
[facility] Visit    Chief Complaint:      Chronic Kidney Disease (RTC, transplant, krysta le edema, took meds)     Subjective:     HPI:  Kendra Malik is a 66 y.o.female with history of uncontrolled type 2 diabetes, hypertension, hyperlipidemia, AOCD, CKD V s/p renal transplant in Nov 2020 (on immunosuppression), vitamin D toxicity, hypercalcemia and hyperparathyroidism presenting to nephrology clinic today for  F/U visit. Patient received her renal transplant in [place] in November 2020. Patient continues on immunosuppression with tacrolimus, mycophenolate, daily prednisolone 5 mg oral tablet. Patient reports compliance with all medications and denies any drug/tobacco use.       Patient has no acute complaints today. She is doing well. BP at home runs around systolic 115. No CP, SOB. Is urinating well. Mentions some symptoms of hypoglycemia that she has discussed with her PCP recently. Taking all her medications      Review of Systems    Constitutional: no fever/chills  EENT: no sore throat, ear pain, sinus pain/congestion, nasal congestion/drainage  Respiratory: no cough, no wheezing, no shortness of breath  Cardiovascular: no chest pain, no palpitations, no edema  Gastrointestinal: no nausea, vomiting, or diarrhea. No abdominal pain  Genitourinary: no dysuria, no urinary frequency or urgency, no hematuria  Integumentary: no skin rash or abnormal lesion  Neurologic: no headache, no dizziness, no weakness or numbness      Objective:   Physical Examination:    Vitals:   Vitals:    03/24/23 1117   BP: (!) 130/58   Pulse:    Resp:    Temp:          General - Appears comfortable, appropriatley conversive   Mental Status - alert and oriented x 3, speaking in logical, relevant sentences   HEENT - no rhinorrhea   Cardiac - RRR, no murmurs, rubs, or gallops; no edema in LE   Respiratory - breathing comfortably; clear to ascultation bilaterally   Abdominal - nondistended, soft, nontender to palpation    Extremities - trace pitting edema in BLE; has bandage over right foot  Skin - no rashes or bruises seen on skin        Assessment & Plan:      H/O CKD V s/p Renal Transplant 2020  Current   Chronic Immunosuppression Therapy   - received renal transplant in Nov 2020 in [place], history of uncontrolled type 2 diabetes and hypertension  - Immunosuppression on Tacrolimus 5mg BID, Mycophenolate 1000 BID , and Prednisolone 2.5 mg once daily  -Continue recommendations post renal transplant per guidelines, labs required every visit include CMP/magnesium/phosphorus/CBC with differential/tacrolimus level/urinalysis/UPCR/fasting blood glucose  - recommendations post renal transplant per guidelines, labs required every 3 months or every visit include hemoglobin A1c, fasting lipid profile  - recommendations post renal transplant per guidelines, labs required at least every 6 to 12 months include PTH and 25-OH Vit D  - recommendations post renal transplant per guidelines, labs required at 12, 18, and 24 months post transplant include BK virus blood and/or urine PCR testing   - today ordered the following labs to be reviewed at next visit: CBC, CMP, urine creatinine, urine protein, UA, PTH intact, tacrolimus level       Secondary Hyperparathyroidism  -secondary to renal disease   Will monitor repeat labs  -consider calcitriol if PTH elevated next visit    Type 2 Diabetes, fairly well controlled.  - last A1c 7.3(2/23)  - continue diabetic mgmt per PCP   - discussed lifestyle and diet modifications.    Primary Hypertension, well controlled.  - BP well controlled in clinic today  -Currently on  labetalol 100 bid, nifedipine 30 BID. Continue    Post-Renal Transplant Vaccine Recommendations:  - Flu Vaccine: provided 10/2022  - Pneumovax: provided 3/18/2022  - Hep A vaccine: received 2018  - Hep B vaccine: no official record available in chart however patient did receive vaccination when dialysis was initiated  - TDap  vaccine: received 2017  - Shingrix vaccine: dose #1 10/2022  - Polio vaccine: received in 1962  - Meningococcal vaccine:  Offer at next visit  - COVID vaccine: completed 2 dose series in May 2021, June 2021, completed booster in December 2021        Continue following measures:  -follow 2 gram a day dietary sodium restriction  -control diabetes (goal A1c less than 7%)  -control high blood pressure (goal blood pressure is less than 130/80, please check blood pressure twice a week and bring blood pressure logs to office visit)  -exercise at least 30 minutes a day, 5 days a week  -maintain healthy weight  -decrease or stop alcohol use  -do not smoke  -stay well hydrated (drink water only, avoid juices, sweet tea, and sodas)  -ask about staying up-to-date on vaccinations (flu vaccine, pneumonia vaccine, hepatitis B vaccine)  -avoid excessive use of NSAIDs (ibuprofen, naproxen, Aleve, Advil, Toradol, Mobic), take Tylenol as needed for headache or mild pain  -take cholesterol lowering medications if prescribed (LDL goal less than 100)     Follow up in 3 mnths with labs prior    Vinay Cardenas MD  [employer] IM Resident PGY-3

## 2023-04-03 ENCOUNTER — OFFICE VISIT (OUTPATIENT)
Dept: INTERNAL MEDICINE | Facility: CLINIC | Age: 68
End: 2023-04-03
Payer: MEDICARE

## 2023-04-03 VITALS
BODY MASS INDEX: 25.41 KG/M2 | HEART RATE: 71 BPM | HEIGHT: 64 IN | WEIGHT: 148.81 LBS | RESPIRATION RATE: 18 BRPM | TEMPERATURE: 98 F | SYSTOLIC BLOOD PRESSURE: 150 MMHG | DIASTOLIC BLOOD PRESSURE: 68 MMHG

## 2023-04-03 DIAGNOSIS — E11.3553 STABLE PROLIFERATIVE DIABETIC RETINOPATHY OF BOTH EYES ASSOCIATED WITH TYPE 2 DIABETES MELLITUS: Chronic | ICD-10-CM

## 2023-04-03 DIAGNOSIS — S91.301D OPEN WOUND OF RIGHT FOOT, SUBSEQUENT ENCOUNTER: ICD-10-CM

## 2023-04-03 DIAGNOSIS — E78.2 MIXED HYPERLIPIDEMIA: Chronic | ICD-10-CM

## 2023-04-03 DIAGNOSIS — I10 HYPERTENSION, UNSPECIFIED TYPE: ICD-10-CM

## 2023-04-03 DIAGNOSIS — Z79.4 TYPE 2 DIABETES MELLITUS WITH STABLE PROLIFERATIVE RETINOPATHY OF BOTH EYES, WITH LONG-TERM CURRENT USE OF INSULIN: Primary | Chronic | ICD-10-CM

## 2023-04-03 DIAGNOSIS — E11.3553 TYPE 2 DIABETES MELLITUS WITH STABLE PROLIFERATIVE RETINOPATHY OF BOTH EYES, WITH LONG-TERM CURRENT USE OF INSULIN: Primary | Chronic | ICD-10-CM

## 2023-04-03 DIAGNOSIS — Z94.0 RENAL TRANSPLANT RECIPIENT: ICD-10-CM

## 2023-04-03 PROBLEM — Z79.899 LONG TERM CURRENT USE OF IMMUNOSUPPRESSIVE DRUG: Chronic | Status: ACTIVE | Noted: 2020-11-16

## 2023-04-03 PROBLEM — E11.65 UNCONTROLLED TYPE 2 DIABETES MELLITUS WITH HYPERGLYCEMIA: Chronic | Status: ACTIVE | Noted: 2022-05-18

## 2023-04-03 PROBLEM — S91.301A OPEN WOUND OF RIGHT FOOT: Status: ACTIVE | Noted: 2023-04-03

## 2023-04-03 PROBLEM — Z79.60 LONG TERM CURRENT USE OF IMMUNOSUPPRESSIVE DRUG: Chronic | Status: ACTIVE | Noted: 2020-11-16

## 2023-04-03 PROBLEM — E78.5 HYPERLIPIDEMIA: Chronic | Status: ACTIVE | Noted: 2019-04-29

## 2023-04-03 PROBLEM — M85.80 OSTEOPENIA: Chronic | Status: ACTIVE | Noted: 2020-12-07

## 2023-04-03 PROCEDURE — 3008F BODY MASS INDEX DOCD: CPT | Mod: CPTII,,, | Performed by: NURSE PRACTITIONER

## 2023-04-03 PROCEDURE — 3066F NEPHROPATHY DOC TX: CPT | Mod: CPTII,,, | Performed by: NURSE PRACTITIONER

## 2023-04-03 PROCEDURE — 1159F MED LIST DOCD IN RCRD: CPT | Mod: CPTII,,, | Performed by: NURSE PRACTITIONER

## 2023-04-03 PROCEDURE — 1101F PT FALLS ASSESS-DOCD LE1/YR: CPT | Mod: CPTII,,, | Performed by: NURSE PRACTITIONER

## 2023-04-03 PROCEDURE — 1126F AMNT PAIN NOTED NONE PRSNT: CPT | Mod: CPTII,,, | Performed by: NURSE PRACTITIONER

## 2023-04-03 PROCEDURE — 3066F PR DOCUMENTATION OF TREATMENT FOR NEPHROPATHY: ICD-10-PCS | Mod: CPTII,,, | Performed by: NURSE PRACTITIONER

## 2023-04-03 PROCEDURE — 3077F SYST BP >= 140 MM HG: CPT | Mod: CPTII,,, | Performed by: NURSE PRACTITIONER

## 2023-04-03 PROCEDURE — 99215 OFFICE O/P EST HI 40 MIN: CPT | Mod: PBBFAC | Performed by: NURSE PRACTITIONER

## 2023-04-03 PROCEDURE — 99214 PR OFFICE/OUTPT VISIT, EST, LEVL IV, 30-39 MIN: ICD-10-PCS | Mod: S$PBB,,, | Performed by: NURSE PRACTITIONER

## 2023-04-03 PROCEDURE — 1159F PR MEDICATION LIST DOCUMENTED IN MEDICAL RECORD: ICD-10-PCS | Mod: CPTII,,, | Performed by: NURSE PRACTITIONER

## 2023-04-03 PROCEDURE — 1126F PR PAIN SEVERITY QUANTIFIED, NO PAIN PRESENT: ICD-10-PCS | Mod: CPTII,,, | Performed by: NURSE PRACTITIONER

## 2023-04-03 PROCEDURE — 3288F FALL RISK ASSESSMENT DOCD: CPT | Mod: CPTII,,, | Performed by: NURSE PRACTITIONER

## 2023-04-03 PROCEDURE — 3008F PR BODY MASS INDEX (BMI) DOCUMENTED: ICD-10-PCS | Mod: CPTII,,, | Performed by: NURSE PRACTITIONER

## 2023-04-03 PROCEDURE — 3078F PR MOST RECENT DIASTOLIC BLOOD PRESSURE < 80 MM HG: ICD-10-PCS | Mod: CPTII,,, | Performed by: NURSE PRACTITIONER

## 2023-04-03 PROCEDURE — 1160F PR REVIEW ALL MEDS BY PRESCRIBER/CLIN PHARMACIST DOCUMENTED: ICD-10-PCS | Mod: CPTII,,, | Performed by: NURSE PRACTITIONER

## 2023-04-03 PROCEDURE — 1160F RVW MEDS BY RX/DR IN RCRD: CPT | Mod: CPTII,,, | Performed by: NURSE PRACTITIONER

## 2023-04-03 PROCEDURE — 3077F PR MOST RECENT SYSTOLIC BLOOD PRESSURE >= 140 MM HG: ICD-10-PCS | Mod: CPTII,,, | Performed by: NURSE PRACTITIONER

## 2023-04-03 PROCEDURE — 99214 OFFICE O/P EST MOD 30 MIN: CPT | Mod: S$PBB,,, | Performed by: NURSE PRACTITIONER

## 2023-04-03 PROCEDURE — 3078F DIAST BP <80 MM HG: CPT | Mod: CPTII,,, | Performed by: NURSE PRACTITIONER

## 2023-04-03 PROCEDURE — 1101F PR PT FALLS ASSESS DOC 0-1 FALLS W/OUT INJ PAST YR: ICD-10-PCS | Mod: CPTII,,, | Performed by: NURSE PRACTITIONER

## 2023-04-03 PROCEDURE — 3288F PR FALLS RISK ASSESSMENT DOCUMENTED: ICD-10-PCS | Mod: CPTII,,, | Performed by: NURSE PRACTITIONER

## 2023-04-03 RX ORDER — NIFEDIPINE 30 MG/1
30 TABLET, EXTENDED RELEASE ORAL 2 TIMES DAILY
Qty: 180 TABLET | Refills: 3 | Status: SHIPPED | OUTPATIENT
Start: 2023-04-03

## 2023-04-03 RX ORDER — ONDANSETRON 4 MG/1
4 TABLET, ORALLY DISINTEGRATING ORAL EVERY 6 HOURS PRN
Qty: 30 TABLET | Refills: 0 | Status: SHIPPED | OUTPATIENT
Start: 2023-04-03 | End: 2023-06-22 | Stop reason: SDUPTHER

## 2023-04-03 NOTE — PROGRESS NOTES
Kaitlynn Beck, LYNN   [facility]  [address] [facility] [address]      PATIENT NAME: Kendra Malik  : 1955  DATE: 4/3/23  MRN: [id number]      Reason for Visit / Chief Complaint: Diabetes (Fasting, wants zoster # 2, needs refills, mammogram and BD results)       History of Present Illness / Problem Focused Workflow     Kendra Malik is a 67 y.o. Black or  female presents to the clinic for DM f/u. 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 Followed by [facility] nephrology and optho clinics, and Dr. Jeremiah Bowens, podiatry. Next optho visit in  and renal visit in July.     Today, reports med compliance. CBGs ranging 120-135 when checked BID since last hospital stay with left 5th toe amputation, which has healed. Is attempting ADA/renal diet. Attends wound care qTuesday at Dr. Bowens's office for right foot wound; reports wound healing and will possibly have graft placed in office tomorrow if approved by insurance. Bp elevated today; reports at goal at home when checked. Reports med compliance except stopped taking crestor d/t MD telling her to watch her calcium intake. She saw calcium on the name and stopped taking it on own. Denies chest pain, shortness of breath, cough, fever, headache, dizziness, weakness, abdominal pain, nausea, vomiting, diarrhea, constipation, dysuria, depression, anxiety, SI/HI.      Review of Systems     Review of Systems     See HPI for details    Constitutional: Denies Change in appetite. Denies Chills. Denies Fever. Denies Night sweats.  Eye: Denies Blurred vision. Denies Discharge. Denies Eye pain.  ENT: Denies Decreased hearing. Denies Sore throat. Denies Swollen  glands.  Respiratory: Denies Cough. Denies Shortness of breath. Denies Shortness of breath with exertion. Denies Wheezing.  Cardiovascular: Denies Chest pain at rest. Denies Chest pain with exertion. Denies Irregular heartbeat. Denies Palpitations. Denies Edema.  Gastrointestinal: Denies Abdominal pain. Denies Diarrhea. Denies Nausea. Denies Vomiting. Denies Hematemesis or Hematochezia.  Genitourinary: Denies Dysuria. Denies Urinary frequency. Denies Urinary urgency. Denies Blood in urine.  Endocrine: Denies Cold intolerance. Denies Excessive thirst. Denies Heat intolerance. Denies Weight loss. Denies Weight gain.  Musculoskeletal: Denies Painful joints. Denies Weakness.  Integumentary: Denies Rash. Denies Itching. Denies Dry skin.  Neurologic: Denies Dizziness. Denies Fainting. Denies Headache.  Psychiatric: Denies Depression. Denies Anxiety. Denies Suicidal/Homicidal ideations.    All Other ROS: Negative except as stated in HPI.     Medical / Surgical / Social / Family History       ----------------------------  Cataract  DM2 w CKD  Infected blister of fifth toe      Comment:  left foot  Pneumonia  Renal hypertension  Secondary hyperparathyroidism of renal origin     Past Surgical History:   Procedure Laterality Date    APPLICATION, GRAFT Right 2023    Procedure: APPLICATION, GRAFT [place]  Right/  Rep Marlo Childs;  Surgeon: Jeremiah RODRIGUEZ DPM;  Location: [facility];  Service: Podiatry;  Laterality: Right;    BONE BIOPSY Left 2023    Procedure: BIOPSY, BONE  Right 5th digit;  Surgeon: Jeremiah RODRIGUEZ DPM;  Location: [facility];  Service: Podiatry;  Laterality: Left;    cataract surgery       SECTION      x3    COLONOSCOPY N/A 2019    Procedure: COLONOSCOPY;  Surgeon: Arianna Srinivasan MD;  Location: [facility];  Service: Endoscopy;  Laterality: N/A;    DEBRIDEMENT OF FOOT Right     DEBRIDEMENT OF FOOT Right 2023    Procedure: DEBRIDEMENT, FOOT Right;  Surgeon: Jeremiah Bowens V  EDU;  Location: [facility];  Service: Podiatry;  Laterality: Right;    HYSTERECTOMY      KIDNEY TRANSPLANT N/A 11/15/2020    Procedure: TRANSPLANT, KIDNEY;  Surgeon: Scott Ann MD;  Location: [address];  Service: Transplant;  Laterality: N/A;    KIDNEY TRANSPLANT Left 11/16/2020    OOPHORECTOMY      PERITONEAL CATHETER INSERTION      RETINAL DETACHMENT SURGERY      TOE AMPUTATION Left 2/16/2023    Procedure: AMPUTATION, TOE  Left 5th digit;  Surgeon: Jeremiah RODRIGUEZ DPM;  Location: [facility];  Service: Podiatry;  Laterality: Left;       Social History     Socioeconomic History    Marital status:    Tobacco Use    Smoking status: Never    Smokeless tobacco: Never   Substance and Sexual Activity    Alcohol use: Not Currently     Alcohol/week: 1.0 standard drink     Types: 1 Shots of liquor per week     Comment: 1-2 a year    Drug use: No    Sexual activity: Not Currently   Social History Narrative    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)     Social Determinants of Health     Transportation Needs: Unmet Transportation Needs    Lack of Transportation (Medical): Yes    Lack of Transportation (Non-Medical): No   Housing Stability: Low Risk     Unable to Pay for Housing in the Last Year: No    Number of Places Lived in the Last Year: 1    Unstable Housing in the Last Year: No        Family History   Problem Relation Age of Onset    Diabetes Mother     Heart disease Mother         CHF    Hypertension Mother     Diabetes Father     Hypertension Father     HIV Sister     Diabetes Brother     Diabetes Brother     Gout Brother     Diabetes Paternal Aunt     Cancer Paternal Uncle     Diabetes Paternal Grandmother     Kidney disease Neg Hx         Medications and Allergies     Medications  Current Outpatient Medications   Medication Instructions    acetaminophen (TYLENOL EXTRA STRENGTH ORAL) 500 mg, Oral, Every 6 hours PRN    blood sugar diagnostic Strp 1 each,  "Misc.(Non-Drug; Combo Route), 3 times daily, E11.3513 (DM)    blood-glucose meter kit Use as instructed; E11.3513 (DM)    diphenhydrAMINE (BENADRYL) 25 mg, Oral, Every 6 hours PRN    famotidine/Ca carb/mag hydrox (PEPCID COMPLETE ORAL) 1 tablet, Oral, Daily PRN    fluticasone propionate (FLONASE) 50 mcg/actuation nasal spray SPRAY 2 SPRAYS INTO EACH NOSTRIL TWICE A DAY    gabapentin (NEURONTIN) 300 MG capsule TAKE 1 CAPSULE BY MOUTH THREE TIMES A DAY    hydrOXYzine HCL (ATARAX) 25 MG tablet 1 tablet, Oral, Nightly    insulin (LANTUS SOLOSTAR U-100 INSULIN) 15 Units, Subcutaneous, Nightly    ketoconazole (NIZORAL) 2 % cream Topical (Top)    labetaloL (NORMODYNE) 100 MG tablet TAKE 1 TABLET BY MOUTH TWICE A DAY    lancets Misc 1 each, Misc.(Non-Drug; Combo Route), 3 times daily, E11.3513    loratadine (CLARITIN) 10 mg, Oral, Daily PRN    metFORMIN (GLUCOPHAGE) 500 MG tablet TAKE 1 TABLET BY MOUTH TWICE A DAY WITH MEALS    mycophenolate (CELLCEPT) 1,000 mg, Oral, 2 times daily    NIFEdipine (PROCARDIA-XL) 30 mg, Oral, 2 times daily    ondansetron (ZOFRAN-ODT) 4 mg, Oral, Every 6 hours PRN    pen needle, diabetic (BD RIO 2ND GEN PEN NEEDLE) 32 gauge x 5/32" Ndle To use with insulin 4 times daily.    predniSONE (DELTASONE) 5 mg, Oral, Daily    rosuvastatin (CRESTOR) 20 MG tablet TAKE 1 TABLET BY MOUTH EVERYDAY AT BEDTIME    SANTYL ointment Topical (Top)    SHINGRIX, PF, 50 mcg/0.5 mL injection No dose, route, or frequency recorded.    silver sulfADIAZINE 1% (SILVADENE) 1 % cream 1 application, Topical (Top)    SITagliptin phosphate (JANUVIA) 100 MG Tab TAKE 1 TABLET BY MOUTH EVERY DAY    tacrolimus (PROGRAF) 1 MG Cap Take 5 capsules (5 mg total) by mouth every morning AND 5 capsules (5 mg total) every evening.         Allergies  Review of patient's allergies indicates:  No Known Allergies    Physical Examination     BP (!) 150/68 (BP Location: Right arm, Patient Position: Sitting, BP Method: Medium (Manual))   Pulse 71 " "  Temp 97.8 °F (36.6 °C) (Oral)   Resp 18   Ht 5' 3.78" (1.62 m)   Wt 67.5 kg (148 lb 12.8 oz)   BMI 25.72 kg/m²     Physical Exam  Feet:      Comments: Healed left 5th toe amputation  Drsg to right foot/ankle C/D/I  Offloading shoe to right foot      General: Alert and oriented, No acute distress.  Head: Normocephalic, Atraumatic.  Eye: Pupils are equal, round and reactive to light, Extraocular movements are intact, Sclera non-icteric.  Ears/Nose/Throat: Normal, Mucosa moist,Clear.  Neck/Thyroid: Supple, Non-tender, No carotid bruit, No lymphadenopathy, No JVD, Full range of motion.  Respiratory: Clear to auscultation bilaterally; No wheezes, rales or rhonchi,Non-labored respirations, Symmetrical chest wall expansion.  Cardiovascular: Regular rate and rhythm, S1/S2 normal, No murmurs, rubs or gallops.  Gastrointestinal: Soft, Non-tender, Non-distended, Normal bowel sounds, No palpable organomegaly.  Musculoskeletal: Normal range of motion.  Integumentary: Warm, Dry, Intact, No suspicious lesions or rashes.  Extremities: No clubbing, cyanosis or edema  Neurologic: No focal deficits, Cranial Nerves II-XII are grossly intact, Sensory exam intact.  Psychiatric: Normal interaction, Coherent speech, Appropriate affect       Results     Lab Results   Component Value Date    WBC 4.7 03/06/2023    HGB 9.2 (L) 03/06/2023    HCT 29.0 (L) 03/06/2023    PLT 94 (L) 03/06/2023    CHOL 167 05/06/2022    TRIG 85 05/06/2022    ALT 5 03/06/2023    AST 12 03/06/2023     03/06/2023    K 4.5 03/06/2023     02/09/2021    CREATININE 0.75 03/06/2023    BUN 12.5 03/06/2023    CO2 25 03/06/2023    INR 0.9 11/15/2020    HGBA1C 7.3 (H) 02/06/2023         Assessment and Plan (including Health Maintenance)     Plan:     1. Type 2 diabetes mellitus with stable proliferative retinopathy of both eyes, with long-term current use of insulin   A1C 7.3 at goal. Previous A1C 8.6.   Continue lantus 15 units d/t CBG readings.  Continue " metformin and januvia.  Instructed to contact clinic if readings begin to elevate.  Continue medications as prescribed.  Follow ADA diet.  Avoid soda, simple sweets, and limit rice/pasta/bread/starches and consume brown options when possible.   Maintain healthy weight with BMI goal <30.   Perform aerobic exercise for 150 minutes per week (or 5 days a week for 30 minutes each day).   Examine feet daily.   Obtain annual dilated eye exam.  Eye exam: 3/23/23  Foot exam: 10/29/21 (will complete at next visit - deferred d/t right foot wound w/drsg)     - Hemoglobin A1C; Future  - Lipid Panel; Future  - Microalbumin/Creatinine Ratio, Urine; Future    2. Stable proliferative diabetic retinopathy of both eyes associated with type 2 diabetes mellitus  Keep optho visits as scheduled.  Maintain adequate glucose control.    3. Hypertension, unspecified type  Elevated.  Continue procardia and labetalol.  Reports at goal at home and at goal during previous appts.  Reports consumed some cracklins yesterday.  Follow a low sodium (less than 2 grams of sodium per day), DASH diet.   Continue medications as prescribed.  Monitor blood pressure and report any consistent values greater than 140/90 and keep a log.  Maintain healthy weight with a BMI goal of <30.   Aerobic exercise for 150 minutes per week (or 5 days a week for 30 minutes each day).    - NIFEdipine (PROCARDIA-XL) 30 MG (OSM) 24 hr tablet; Take 1 tablet (30 mg total) by mouth 2 (two) times a day.  Dispense: 180 tablet; Refill: 3    4. Mixed hyperlipidemia  FLP ordered.  Continue crestor; understanding voiced.  Follow a low cholesterol, low saturated fat diet with less than 200 mg of cholesterol a day.   Avoid fried foods and high saturated fats (bonilla, sausage, cookies, cakes, chips, cheese, whole milk, butter, mayonnaise, creamy dressings, gravy and cream sauces).  Add flax seed or fish oil supplements to diet.   Increase dietary fiber.   Regular exercise improves  cholesterol levels.  Physical activity 5 times a week for 30 minutes per day (or 150 minutes per week).   Stressed importance of dietary modifications    5. Renal transplant recipient  Keep renal appts as scheduled.  Continue prograf, and prednisone as prescribed.  - NIFEdipine (PROCARDIA-XL) 30 MG (OSM) 24 hr tablet; Take 1 tablet (30 mg total) by mouth 2 (two) times a day.  Dispense: 180 tablet; Refill: 3    6. Open wound of right foot, subsequent encounter  Keep appts with Dr. Bowens as scheduled.  Continue wound care as ordered per Home Health and self.  Continue to wear offloading shoe as instructed.  ED precautions for s/s of infection.       Problem List Items Addressed This Visit          Ophtho    Stable proliferative diabetic retinopathy of both eyes associated with type 2 diabetes mellitus (Chronic)       Cardiac/Vascular    Hyperlipidemia (Chronic)    Hypertension (Chronic)    Relevant Medications    NIFEdipine (PROCARDIA-XL) 30 MG (OSM) 24 hr tablet       Renal/    Chronic kidney disease    Relevant Medications    NIFEdipine (PROCARDIA-XL) 30 MG (OSM) 24 hr tablet       Endocrine    Type 2 diabetes mellitus with stable proliferative retinopathy of both eyes, with long-term current use of insulin - Primary (Chronic)    Relevant Orders    Hemoglobin A1C    Lipid Panel    Microalbumin/Creatinine Ratio, Urine       Orthopedic    Open wound of right foot     Other Visit Diagnoses       Renal transplant recipient        Relevant Medications    NIFEdipine (PROCARDIA-XL) 30 MG (OSM) 24 hr tablet              Health Maintenance Due   Topic Date Due    Foot Exam  Never done    Colorectal Cancer Screening  03/27/2020    COVID-19 Vaccine (4 - Booster for Moderna series) 02/21/2022    Shingles Vaccine (2 of 2) 12/07/2022       Follow up in about 3 months (around 7/3/2023) for Follow up with labs one week prior to appt. .        Signature:  LYNN Miranda  [facility]  [address] [facility] [address]

## 2023-04-11 DIAGNOSIS — H02.403 PTOSIS OF BOTH EYELIDS: Primary | ICD-10-CM

## 2023-04-28 DIAGNOSIS — N18.9 CHRONIC KIDNEY DISEASE, UNSPECIFIED CKD STAGE: ICD-10-CM

## 2023-04-28 DIAGNOSIS — I10 HYPERTENSION, UNSPECIFIED TYPE: ICD-10-CM

## 2023-04-28 DIAGNOSIS — Z94.0 RENAL TRANSPLANT RECIPIENT: ICD-10-CM

## 2023-05-01 RX ORDER — LORATADINE 10 MG/1
TABLET ORAL
Qty: 90 TABLET | Refills: 1 | Status: SHIPPED | OUTPATIENT
Start: 2023-05-01

## 2023-05-01 RX ORDER — LABETALOL 100 MG/1
TABLET, FILM COATED ORAL
Qty: 180 TABLET | Refills: 1 | Status: SHIPPED | OUTPATIENT
Start: 2023-05-01 | End: 2023-10-31

## 2023-06-13 ENCOUNTER — PATIENT OUTREACH (OUTPATIENT)
Dept: ADMINISTRATIVE | Facility: HOSPITAL | Age: 68
End: 2023-06-13
Payer: MEDICARE

## 2023-06-13 NOTE — PROGRESS NOTES
Population Health Outreach.    Patient called back and voiced that she will call Magalie to have correct PCP updated.

## 2023-06-13 NOTE — PROGRESS NOTES
Population Health Outreach.    Magalie assigned Dr. Busch as PCP but per [facility] patient is seeing LYNN Vega. Notified patient of findings via voicemail. Encouraged patient to call Magalie to have this updated.

## 2023-06-22 ENCOUNTER — OFFICE VISIT (OUTPATIENT)
Dept: INTERNAL MEDICINE | Facility: CLINIC | Age: 68
End: 2023-06-22
Payer: MEDICARE

## 2023-06-22 VITALS
SYSTOLIC BLOOD PRESSURE: 125 MMHG | DIASTOLIC BLOOD PRESSURE: 63 MMHG | WEIGHT: 150 LBS | RESPIRATION RATE: 18 BRPM | HEIGHT: 63 IN | HEART RATE: 64 BPM | BODY MASS INDEX: 26.58 KG/M2 | TEMPERATURE: 98 F | OXYGEN SATURATION: 100 %

## 2023-06-22 DIAGNOSIS — L97.511 DIABETIC ULCER OF TOE OF RIGHT FOOT ASSOCIATED WITH TYPE 2 DIABETES MELLITUS, LIMITED TO BREAKDOWN OF SKIN: Primary | Chronic | ICD-10-CM

## 2023-06-22 DIAGNOSIS — E11.621 DIABETIC ULCER OF TOE OF RIGHT FOOT ASSOCIATED WITH TYPE 2 DIABETES MELLITUS, LIMITED TO BREAKDOWN OF SKIN: Primary | Chronic | ICD-10-CM

## 2023-06-22 DIAGNOSIS — E11.65 UNCONTROLLED TYPE 2 DIABETES MELLITUS WITH HYPERGLYCEMIA: ICD-10-CM

## 2023-06-22 PROBLEM — L97.519 DIABETIC ULCER OF RIGHT FOOT: Chronic | Status: ACTIVE | Noted: 2023-06-22

## 2023-06-22 PROCEDURE — 3061F NEG MICROALBUMINURIA REV: CPT | Mod: CPTII,,, | Performed by: NURSE PRACTITIONER

## 2023-06-22 PROCEDURE — 3074F SYST BP LT 130 MM HG: CPT | Mod: CPTII,,, | Performed by: NURSE PRACTITIONER

## 2023-06-22 PROCEDURE — 3078F PR MOST RECENT DIASTOLIC BLOOD PRESSURE < 80 MM HG: ICD-10-PCS | Mod: CPTII,,, | Performed by: NURSE PRACTITIONER

## 2023-06-22 PROCEDURE — 1159F PR MEDICATION LIST DOCUMENTED IN MEDICAL RECORD: ICD-10-PCS | Mod: CPTII,,, | Performed by: NURSE PRACTITIONER

## 2023-06-22 PROCEDURE — 3008F PR BODY MASS INDEX (BMI) DOCUMENTED: ICD-10-PCS | Mod: CPTII,,, | Performed by: NURSE PRACTITIONER

## 2023-06-22 PROCEDURE — 1126F AMNT PAIN NOTED NONE PRSNT: CPT | Mod: CPTII,,, | Performed by: NURSE PRACTITIONER

## 2023-06-22 PROCEDURE — 3074F PR MOST RECENT SYSTOLIC BLOOD PRESSURE < 130 MM HG: ICD-10-PCS | Mod: CPTII,,, | Performed by: NURSE PRACTITIONER

## 2023-06-22 PROCEDURE — 1160F RVW MEDS BY RX/DR IN RCRD: CPT | Mod: CPTII,,, | Performed by: NURSE PRACTITIONER

## 2023-06-22 PROCEDURE — 99215 OFFICE O/P EST HI 40 MIN: CPT | Mod: PBBFAC | Performed by: NURSE PRACTITIONER

## 2023-06-22 PROCEDURE — 3066F PR DOCUMENTATION OF TREATMENT FOR NEPHROPATHY: ICD-10-PCS | Mod: CPTII,,, | Performed by: NURSE PRACTITIONER

## 2023-06-22 PROCEDURE — 3066F NEPHROPATHY DOC TX: CPT | Mod: CPTII,,, | Performed by: NURSE PRACTITIONER

## 2023-06-22 PROCEDURE — 1126F PR PAIN SEVERITY QUANTIFIED, NO PAIN PRESENT: ICD-10-PCS | Mod: CPTII,,, | Performed by: NURSE PRACTITIONER

## 2023-06-22 PROCEDURE — 1159F MED LIST DOCD IN RCRD: CPT | Mod: CPTII,,, | Performed by: NURSE PRACTITIONER

## 2023-06-22 PROCEDURE — 99214 OFFICE O/P EST MOD 30 MIN: CPT | Mod: S$PBB,,, | Performed by: NURSE PRACTITIONER

## 2023-06-22 PROCEDURE — 1160F PR REVIEW ALL MEDS BY PRESCRIBER/CLIN PHARMACIST DOCUMENTED: ICD-10-PCS | Mod: CPTII,,, | Performed by: NURSE PRACTITIONER

## 2023-06-22 PROCEDURE — 99214 PR OFFICE/OUTPT VISIT, EST, LEVL IV, 30-39 MIN: ICD-10-PCS | Mod: S$PBB,,, | Performed by: NURSE PRACTITIONER

## 2023-06-22 PROCEDURE — 3078F DIAST BP <80 MM HG: CPT | Mod: CPTII,,, | Performed by: NURSE PRACTITIONER

## 2023-06-22 PROCEDURE — 3061F PR NEG MICROALBUMINURIA RESULT DOCUMENTED/REVIEW: ICD-10-PCS | Mod: CPTII,,, | Performed by: NURSE PRACTITIONER

## 2023-06-22 PROCEDURE — 3008F BODY MASS INDEX DOCD: CPT | Mod: CPTII,,, | Performed by: NURSE PRACTITIONER

## 2023-06-22 RX ORDER — ONDANSETRON 4 MG/1
4 TABLET, ORALLY DISINTEGRATING ORAL EVERY 6 HOURS PRN
Qty: 30 TABLET | Refills: 0 | Status: SHIPPED | OUTPATIENT
Start: 2023-06-22 | End: 2024-01-25 | Stop reason: SDUPTHER

## 2023-06-22 NOTE — PROGRESS NOTES
Kaitlynn Beck, LYNN   [facility]  [address] [facility] [address]      PATIENT NAME: Kendra Malik  : 1955  DATE: 23  MRN: [id number]      Reason for Visit / Chief Complaint: Follow-up and Diabetes (HOSPITAL F/U FOR DIABETIC FOOT ULCER OF RIGHT FOOT)       History of Present Illness / Problem Focused Workflow     Kendra Malik is a 67 y.o. Black or  female presents to the clinic for hospital f/u visit. 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 Followed by [facility] nephrology and optho clinics, and Dr. Jeremiah Bowens, podiatry. Next renal visit in July.      Pt was admitted on 23 for chronic nonhealing right diabetic foot wound/infection at [facility] and underwent I&D on 23 and debridement on 23 per Dr. Bowens, podiatry. Pt was discharged home on 23 with oral levaquin x 14 days which she has completed as prescribed. Pt is seeing podiatry every Tuesday, and is changing dressing daily. Reports wound is improving. Home Health nurse will come of  and  for wound care beginning next week. Reports med compliance. CBGs were in the 200-300s when inpatient and has been 130-160s at home since discharge. Reorts was not on all DM meds while inpatient. Pt canceled optho visit in May and will contact to r/s. Denies chest pain, shortness of breath, cough, fever, headache, dizziness, weakness, abdominal pain, nausea, vomiting, diarrhea, constipation, dysuria, depression, anxiety, SI/HI.      Review of Systems     Review of Systems     See HPI for details    Constitutional: Denies Change in appetite. Denies Chills. Denies Fever. Denies Night sweats.  Eye: Denies Blurred vision. Denies Discharge.  Denies Eye pain.  ENT: Denies Decreased hearing. Denies Sore throat. Denies Swollen glands.  Respiratory: Denies Cough. Denies Shortness of breath. Denies Shortness of breath with exertion. Denies Wheezing.  Cardiovascular: DeniesChest pain at rest. Denies Chest pain with exertion. Denies Irregular heartbeat. Denies Palpitations. Denies Edema.  Gastrointestinal: Denies Abdominal pain. Denies Diarrhea. Denies Nausea. Denies Vomiting. Denies Hematemesis or Hematochezia.  Genitourinary: Denies Dysuria. Denies Urinary frequency. Denies Urinary urgency. Denies Blood in urine.  Endocrine: Denies Cold intolerance. Denies Excessive thirst. Denies Heat intolerance. Denies Weight loss. Denies Weight gain.  Musculoskeletal: Denies Painful joints. Denies Weakness.  Neurologic: Denies Dizziness. Denies Fainting. Denies Headache.  Psychiatric: Denies Depression. Denies Anxiety. Denies Suicidal/Homicidal ideations.    All Other ROS: Negative except as stated in HPI.     Medical / Surgical / Social / Family History       ----------------------------  Cataract  DM2 w CKD  Infected blister of fifth toe      Comment:  left foot  Pneumonia  Renal hypertension  Secondary hyperparathyroidism of renal origin     Past Surgical History:   Procedure Laterality Date    APPLICATION, GRAFT Right 2023    Procedure: APPLICATION, GRAFT Theraskin  Right/  Rep Marlo Childs;  Surgeon: Jeremiah RODRIGUEZ DPM;  Location: [facility];  Service: Podiatry;  Laterality: Right;    BONE BIOPSY Left 2023    Procedure: BIOPSY, BONE  Right 5th digit;  Surgeon: Jeremiah RODRIGUEZ DPM;  Location: [facility] OR;  Service: Podiatry;  Laterality: Left;    cataract surgery       SECTION      x3    COLONOSCOPY N/A 2019    Procedure: COLONOSCOPY;  Surgeon: Arianna Srinivasan MD;  Location: [facility];  Service: Endoscopy;  Laterality: N/A;    DEBRIDEMENT OF FOOT Right     DEBRIDEMENT OF FOOT Right 2023    Procedure: DEBRIDEMENT, FOOT Right;  Surgeon:  Jeremiah RODRIGUEZ DPM;  Location: [facility];  Service: Podiatry;  Laterality: Right;    HYSTERECTOMY      KIDNEY TRANSPLANT N/A 11/15/2020    Procedure: TRANSPLANT, KIDNEY;  Surgeon: Scott Ann MD;  Location: [address];  Service: Transplant;  Laterality: N/A;    KIDNEY TRANSPLANT Left 11/16/2020    OOPHORECTOMY      PERITONEAL CATHETER INSERTION      RETINAL DETACHMENT SURGERY      TOE AMPUTATION Left 2/16/2023    Procedure: AMPUTATION, TOE  Left 5th digit;  Surgeon: Jeremiah RODRIGUEZ DPM;  Location: [facility];  Service: Podiatry;  Laterality: Left;       Social History     Socioeconomic History    Marital status:    Tobacco Use    Smoking status: Never    Smokeless tobacco: Never   Substance and Sexual Activity    Alcohol use: Not Currently     Alcohol/week: 1.0 standard drink     Types: 1 Shots of liquor per week     Comment: 1-2 a year    Drug use: No    Sexual activity: Not Currently   Social History Narrative    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)     Social Determinants of Health     Transportation Needs: Unmet Transportation Needs    Lack of Transportation (Medical): Yes    Lack of Transportation (Non-Medical): No   Housing Stability: Low Risk     Unable to Pay for Housing in the Last Year: No    Number of Places Lived in the Last Year: 1    Unstable Housing in the Last Year: No        Family History   Problem Relation Age of Onset    Diabetes Mother     Heart disease Mother         CHF    Hypertension Mother     Diabetes Father     Hypertension Father     HIV Sister     Diabetes Brother     Diabetes Brother     Gout Brother     Diabetes Paternal Aunt     Cancer Paternal Uncle     Diabetes Paternal Grandmother     Kidney disease Neg Hx         Medications and Allergies     Medications  Current Outpatient Medications   Medication Instructions    acetaminophen (TYLENOL EXTRA STRENGTH ORAL) 500 mg, Oral, Every 6 hours PRN    blood sugar diagnostic Strp 1  "each, Misc.(Non-Drug; Combo Route), 3 times daily, E11.3513 (DM)    blood-glucose meter kit Use as instructed; E11.3513 (DM)    diphenhydrAMINE (BENADRYL) 25 mg, Oral, Every 6 hours PRN    famotidine/Ca carb/mag hydrox (PEPCID COMPLETE ORAL) 1 tablet, Oral, Daily PRN    fluticasone propionate (FLONASE) 50 mcg/actuation nasal spray SPRAY 2 SPRAYS INTO EACH NOSTRIL TWICE A DAY    gabapentin (NEURONTIN) 300 MG capsule TAKE 1 CAPSULE BY MOUTH THREE TIMES A DAY    hydrOXYzine HCL (ATARAX) 25 MG tablet 1 tablet, Oral, Nightly    insulin (LANTUS SOLOSTAR U-100 INSULIN) 15 Units, Subcutaneous, Nightly    ketoconazole (NIZORAL) 2 % cream Topical (Top)    labetaloL (NORMODYNE) 100 MG tablet TAKE 1 TABLET BY MOUTH TWICE A DAY    lancets Misc 1 each, Misc.(Non-Drug; Combo Route), 3 times daily, E11.3513    loratadine (CLARITIN) 10 mg tablet TAKE 1 TABLET BY MOUTH EVERY DAY    metFORMIN (GLUCOPHAGE) 500 MG tablet TAKE 1 TABLET BY MOUTH TWICE A DAY WITH MEALS    mycophenolate (CELLCEPT) 1,000 mg, Oral, 2 times daily    NIFEdipine (PROCARDIA-XL) 30 mg, Oral, 2 times daily    ondansetron (ZOFRAN-ODT) 4 mg, Oral, Every 6 hours PRN    pen needle, diabetic (BD RIO 2ND GEN PEN NEEDLE) 32 gauge x 5/32" Ndle To use with insulin 4 times daily.    predniSONE (DELTASONE) 5 mg, Oral, Daily    rosuvastatin (CRESTOR) 20 MG tablet TAKE 1 TABLET BY MOUTH EVERYDAY AT BEDTIME    SANTYL ointment Topical (Top)    SHINGRIX, PF, 50 mcg/0.5 mL injection No dose, route, or frequency recorded.    silver sulfADIAZINE 1% (SILVADENE) 1 % cream 1 application, Topical (Top)    SITagliptin phosphate (JANUVIA) 100 MG Tab TAKE 1 TABLET BY MOUTH EVERY DAY    tacrolimus (PROGRAF) 1 MG Cap Take 5 capsules (5 mg total) by mouth every morning AND 5 capsules (5 mg total) every evening.         Allergies  Review of patient's allergies indicates:  No Known Allergies    Physical Examination     /63 (BP Location: Left arm, Patient Position: Sitting, BP Method: " "Small (Automatic))   Pulse 64   Temp 98.2 °F (36.8 °C) (Oral)   Resp 18   Ht 5' 3" (1.6 m)   Wt 68 kg (150 lb)   SpO2 100%   BMI 26.57 kg/m²     Physical Exam  Feet:      Comments: Tubigrip to right foot with offloading shoe in place      General: Alert and oriented, No acute distress.  Head: Normocephalic, Atraumatic.  Eye: Pupils are equal, round and reactive to light, Extraocular movements are intact, Sclera non-icteric.  Ears/Nose/Throat: Normal, Mucosa moist,Clear.  Neck/Thyroid: Supple, Non-tender, No carotid bruit, No lymphadenopathy, No JVD, Full range of motion.  Respiratory: Clear to auscultation bilaterally; No wheezes, rales or rhonchi,Non-labored respirations, Symmetrical chest wall expansion.  Cardiovascular: Regular rate and rhythm, S1/S2 normal, No murmurs, rubs or gallops.  Gastrointestinal: Soft, Non-tender, Non-distended, Normal bowel sounds, No palpable organomegaly.  Integumentary: Warm, Dry, Intact, No suspicious lesions or rashes.  Extremities: No clubbing, cyanosis or edema  Neurologic: No focal deficits, Cranial Nerves II-XII are grossly intact, Motor strength normal upper and lower extremities, Sensory exam intact.  Psychiatric: Normal interaction, Coherent speech, Appropriate affect       Results     Lab Results   Component Value Date    WBC 4.7 03/06/2023    HGB 9.2 (L) 03/06/2023    HCT 29.0 (L) 03/06/2023    PLT 94 (L) 03/06/2023    CHOL 225 (H) 06/22/2023    TRIG 193 (H) 06/22/2023    ALT 5 03/06/2023    AST 12 03/06/2023     03/06/2023    K 4.5 03/06/2023     02/09/2021    CREATININE 0.81 06/07/2023    BUN 12.5 03/06/2023    CO2 25 03/06/2023    INR 1.0 05/30/2023    HGBA1C 9.4 (H) 06/22/2023         Assessment and Plan (including Health Maintenance)     Plan:     1. Diabetic ulcer of toe of right foot associated with type 2 diabetes mellitus, limited to breakdown of skin  Continue wound care as ordered.  Keep podiatry visits as scheduled.  Achieve adequate glucose " control.  Take meds as prescribed.  Wear offloading shoe as ordered.     2. Uncontrolled type 2 diabetes mellitus with hyperglycemia  A1C 9.4 not at goal.  Had recent foot infection, inpatient stay and home meds were held.  Was receiving insulin sliding scale and lantus.  Continue medications as prescribed.  Follow ADA diet.  Avoid soda, simple sweets, and limit rice/pasta/bread/starches and consume brown options when possible.   Maintain healthy weight with BMI goal <30.   Perform aerobic exercise for 150 minutes per week (or 5 days a week for 30 minutes each day).   Examine feet daily.   Obtain annual dilated eye exam.  Eye exam: 4/11/23  Foot exam: 4/3/23      Problem List Items Addressed This Visit          Endocrine    Diabetic ulcer of right foot - Primary (Chronic)    Uncontrolled type 2 diabetes mellitus with hyperglycemia    Overview     A1C 8.2. Previous A1C 9.2.  Increase basaglar to 28 units.  Continue januvia and metformin.              Health Maintenance Due   Topic Date Due    Colorectal Cancer Screening  03/27/2020    COVID-19 Vaccine (4 - Moderna series) 02/21/2022    Shingles Vaccine (2 of 2) 12/07/2022       Follow up if symptoms worsen or fail to improve, for Keep appt as scheduled in July. POC A1C at visit. .        Signature:  LYNN Miranda  [facility]  [address] [facility] [address]

## 2023-06-27 ENCOUNTER — OFFICE VISIT (OUTPATIENT)
Dept: OPHTHALMOLOGY | Facility: CLINIC | Age: 68
End: 2023-06-27
Payer: MEDICARE

## 2023-06-27 VITALS — WEIGHT: 149.94 LBS | HEIGHT: 63 IN | BODY MASS INDEX: 26.57 KG/M2

## 2023-06-27 DIAGNOSIS — H35.373 EPIRETINAL MEMBRANE (ERM) OF BOTH EYES: ICD-10-CM

## 2023-06-27 DIAGNOSIS — E11.3553 STABLE PROLIFERATIVE DIABETIC RETINOPATHY OF BOTH EYES ASSOCIATED WITH TYPE 2 DIABETES MELLITUS: Primary | ICD-10-CM

## 2023-06-27 DIAGNOSIS — H02.403 PTOSIS OF BOTH EYELIDS: ICD-10-CM

## 2023-06-27 PROCEDURE — 92134 CPTRZ OPH DX IMG PST SGM RTA: CPT | Mod: PBBFAC,PO | Performed by: STUDENT IN AN ORGANIZED HEALTH CARE EDUCATION/TRAINING PROGRAM

## 2023-06-27 PROCEDURE — 99214 OFFICE O/P EST MOD 30 MIN: CPT | Mod: PBBFAC,PO | Performed by: STUDENT IN AN ORGANIZED HEALTH CARE EDUCATION/TRAINING PROGRAM

## 2023-06-27 PROCEDURE — 92134 CPTRZ OPH DX IMG PST SGM RTA: CPT | Mod: PBBFAC,PO | Performed by: OPHTHALMOLOGY

## 2023-06-27 NOTE — PROGRESS NOTES
HPI    RTC 2-3 months repeat OCT mac DFE  Patient was hospitalized for 14 days for an infection in her toe. Her BS   has been uncontrolled due to all of the medications given.  Last edited by Asmita Nuñez MA on 6/27/2023  9:38 AM.            Assessment /Plan     For exam results, see Encounter Report.    Stable proliferative diabetic retinopathy of both eyes associated with type 2 diabetes mellitus    Ptosis of both eyelids    Epiretinal membrane (ERM) of both eyes                             OCT 6/27/23  OD: 289, ERM/fv membrane with traction SN, no irf/srf stable  OS: 276, ERM/FV membrane with traction SN and IT, IRF resolved from last    OCT 03/23/23  OD: 283, ERM, minimal edema  OS: 304, ERM, interval increase in IRF          Assessment/Plan:  1. T2DM with stable PDR OD  - Hba1c last 9.4 6/23 on insulin (increased recently as hospitalized)   - S/P PRP OD x6  - History of Sub-optimal response to Avastin (last 7/9/2019 for NV + VH followed by PRP fill in x3), s/p Lucentis x2 (last 11/27/17) with improvement of CME  - Had new VH first noticed by pt on 5/1/2020, 5/5/20 B scan without RD, given lucentis x2  - Good ; no active NV now on exam  - will observe for now  - BP/BG/chol control, and follow up with PCP  - 6/27/23: stable octm with no srf/irf, no nv on exam   - Return precautions    2. T2DM with stable PDR OS  - extrafoveal mild traction stable form last visit.  - s/p PPV/MP/fax/EL/ C3F8 for TRD OS due to PDR at , 6/2016  - For extrafoveal CSME --> no leakage on IVFA (at least before November 2016), ? chronic intraretinal cystic changes  - Return precautions, CTM  - No active NV on exam  - With mild traction component  - Will observe for now  - 6/27/23: stable without nv on exam, OCT with resolution of IRF from last     3. ERM OU  -ERM + regressed fibrovascular tissue ou  -seen with Dr. Joseph 6/27/21, will monitor for now    4. Pseudophakia OU  - S/p CEIOL OS 3/21/19, had single episode of post op  iritis, resolved  - s/p CE/IOL OD 5/2/19, BCVA 20/60-70  - s/p YAG cap OU  - mrx best 20/50 and 20/40 6/2023     5. Ptosis OU  - Pt complains she consistently has to lift her eyelids up to see  - MRD1 of 0 mm both eyes  - repair pending with dr steward currently      RTC 4-6 months dfe octm repeat

## 2023-07-07 ENCOUNTER — OFFICE VISIT (OUTPATIENT)
Dept: INTERNAL MEDICINE | Facility: CLINIC | Age: 68
End: 2023-07-07
Payer: MEDICARE

## 2023-07-07 VITALS
SYSTOLIC BLOOD PRESSURE: 146 MMHG | WEIGHT: 152.19 LBS | DIASTOLIC BLOOD PRESSURE: 66 MMHG | BODY MASS INDEX: 26.96 KG/M2 | RESPIRATION RATE: 18 BRPM | HEIGHT: 63 IN | HEART RATE: 67 BPM | OXYGEN SATURATION: 98 % | TEMPERATURE: 98 F

## 2023-07-07 DIAGNOSIS — E78.2 MIXED HYPERLIPIDEMIA: Chronic | ICD-10-CM

## 2023-07-07 DIAGNOSIS — I10 PRIMARY HYPERTENSION: Chronic | ICD-10-CM

## 2023-07-07 DIAGNOSIS — H02.403 PTOSIS OF BOTH EYELIDS: Primary | ICD-10-CM

## 2023-07-07 PROCEDURE — 99215 OFFICE O/P EST HI 40 MIN: CPT | Mod: PBBFAC

## 2023-07-07 NOTE — PROGRESS NOTES
Attending Addendum:   Patient seen and examined in clinic. Management and Plan were discussed with resident. Care was reasonable and necessary. Patient has poorly controlled diabetes mellitus with hemoglobin A1C of 9.4. Blood pressure is not controlled, blood pressure 144/66.   Meaghan Benjamin MD  [facility]

## 2023-07-07 NOTE — PROGRESS NOTES
Internal Medicine Clinic Note      SUBJECTIVE:     Chief Complaint: Pre-op Exam (Surgery Clearance: Ptosis Repair)       History of Present Illness:  Kendra Malik is a 67 y.o. female with a PMH of diabetes mellitus, hypertension, hyperlipidemia, anemia of chronic disease, CKD 5 s/p renal transplant 11/2020 (on immunosuppression with cellcept, tacrolimus, prednisone, most recent Cr 0.81), who presents for preoperative evaluation prior to ptosis repair.  Patient will be having bilateral repair.    Patient has no cardiac history, and no history of CVA.  Patient states that she is feeling well today.  She denies any chest pain, palpitations, shortness of breath.  She states that she is very functional and mobile, able to climb multiple flights of stairs without significant respiratory difficulty though is slightly limited by her right foot in which she recently had osteomyelitis.  Able to walk the equivalent of several city blocks without difficulty. Denies any other current complaints including fevers, chills, nausea, vomiting, diarrhea, abdominal pain, dysuria, headaches, or dizziness.    Previous cardiac workup includes a Lexiscan on 04/29/2019 that was negative for any evidence of myocardial ischemia or injury.  She also had a TTE on 11/16/2020 that revealed an EF of 70%, normal diastolic function, LV concentric hypertrophy, mild TR and mild TX.    Review of patient's allergies indicates:  No Known Allergies    Past Medical History:   Diagnosis Date    Cataract     DM2 w CKD 10/11/2018    Infected blister of fifth toe     left foot    Pneumonia 02/04/2021    Renal hypertension 10/11/2018    Secondary hyperparathyroidism of renal origin 12/30/2020     Past Surgical History:   Procedure Laterality Date    APPLICATION, GRAFT Right 1/26/2023    Procedure: APPLICATION, GRAFT Theraskin  Right/  Rep Marlo Childs;  Surgeon: Jeremiah RODRIGUEZ DPM;  Location: [facility];  Service: Podiatry;  Laterality: Right;    BONE  BIOPSY Left 2023    Procedure: BIOPSY, BONE  Right 5th digit;  Surgeon: Jeremiah RODRIGUEZ DPM;  Location: [facility] OR;  Service: Podiatry;  Laterality: Left;    cataract surgery       SECTION      x3    COLONOSCOPY N/A 2019    Procedure: COLONOSCOPY;  Surgeon: Arianna Srinivasan MD;  Location: [facility];  Service: Endoscopy;  Laterality: N/A;    DEBRIDEMENT OF FOOT Right     DEBRIDEMENT OF FOOT Right 2023    Procedure: DEBRIDEMENT, FOOT Right;  Surgeon: Jeremiah RODRIGUEZ DPM;  Location: [facility] OR;  Service: Podiatry;  Laterality: Right;    HYSTERECTOMY      KIDNEY TRANSPLANT N/A 11/15/2020    Procedure: TRANSPLANT, KIDNEY;  Surgeon: Scott Ann MD;  Location: [address];  Service: Transplant;  Laterality: N/A;    KIDNEY TRANSPLANT Left 2020    OOPHORECTOMY      PERITONEAL CATHETER INSERTION      RETINAL DETACHMENT SURGERY      TOE AMPUTATION Left 2023    Procedure: AMPUTATION, TOE  Left 5th digit;  Surgeon: Jeremiah RODRIGUEZ DPM;  Location: [facility] OR;  Service: Podiatry;  Laterality: Left;     Family History   Problem Relation Age of Onset    Diabetes Mother     Heart disease Mother         CHF    Hypertension Mother     Diabetes Father     Hypertension Father     HIV Sister     Diabetes Brother     Diabetes Brother     Gout Brother     Diabetes Paternal Aunt     Cancer Paternal Uncle     Diabetes Paternal Grandmother     Kidney disease Neg Hx      Social History     Tobacco Use    Smoking status: Never    Smokeless tobacco: Never   Substance Use Topics    Alcohol use: Not Currently     Alcohol/week: 1.0 standard drink     Types: 1 Shots of liquor per week     Comment: 1-2 a year    Drug use: No       Current Outpatient Medications   Medication Sig    acetaminophen (TYLENOL EXTRA STRENGTH ORAL) Take 500 mg by mouth every 6 (six) hours as needed.    blood sugar diagnostic Strp 1 each by Misc.(Non-Drug; Combo Route) route 3 (three) times daily. E11.3513 (DM)     "blood-glucose meter kit Use as instructed; E11.3513 (DM)    diphenhydrAMINE (BENADRYL) 25 mg capsule Take 25 mg by mouth every 6 (six) hours as needed for Itching.    famotidine/Ca carb/mag hydrox (PEPCID COMPLETE ORAL) Take 1 tablet by mouth daily as needed.    fluticasone propionate (FLONASE) 50 mcg/actuation nasal spray SPRAY 2 SPRAYS INTO EACH NOSTRIL TWICE A DAY    gabapentin (NEURONTIN) 300 MG capsule TAKE 1 CAPSULE BY MOUTH THREE TIMES A DAY    hydrOXYzine HCL (ATARAX) 25 MG tablet Take 1 tablet by mouth every evening.    insulin (LANTUS SOLOSTAR U-100 INSULIN) glargine 100 units/mL SubQ pen Inject 15 Units into the skin every evening.    ketoconazole (NIZORAL) 2 % cream Apply topically.    labetaloL (NORMODYNE) 100 MG tablet TAKE 1 TABLET BY MOUTH TWICE A DAY    lancets Misc 1 each by Misc.(Non-Drug; Combo Route) route 3 (three) times daily. E11.3513    loratadine (CLARITIN) 10 mg tablet TAKE 1 TABLET BY MOUTH EVERY DAY    metFORMIN (GLUCOPHAGE) 500 MG tablet TAKE 1 TABLET BY MOUTH TWICE A DAY WITH MEALS    mycophenolate (CELLCEPT) 250 mg Cap Take 4 capsules (1,000 mg total) by mouth 2 (two) times daily.    NIFEdipine (PROCARDIA-XL) 30 MG (OSM) 24 hr tablet Take 1 tablet (30 mg total) by mouth 2 (two) times a day.    ondansetron (ZOFRAN-ODT) 4 MG TbDL Take 1 tablet (4 mg total) by mouth every 6 (six) hours as needed (nausea).    pen needle, diabetic (BD RIO 2ND GEN PEN NEEDLE) 32 gauge x 5/32" Ndle To use with insulin 4 times daily.    predniSONE (DELTASONE) 5 MG tablet Take 1 tablet (5 mg total) by mouth once daily.    rosuvastatin (CRESTOR) 20 MG tablet TAKE 1 TABLET BY MOUTH EVERYDAY AT BEDTIME    SANTYL ointment Apply topically.    silver sulfADIAZINE 1% (SILVADENE) 1 % cream Apply 1 application topically.    SITagliptin phosphate (JANUVIA) 100 MG Tab TAKE 1 TABLET BY MOUTH EVERY DAY    tacrolimus (PROGRAF) 1 MG Cap Take 5 capsules (5 mg total) by mouth every morning AND 5 capsules (5 mg total) every " evening.    SHINGRIX, PF, 50 mcg/0.5 mL injection      No current facility-administered medications for this visit.       Review of Systems   Constitutional:  Negative for chills and fever.   Eyes:         Ptosis   Respiratory:  Negative for cough, shortness of breath and stridor.    Cardiovascular:  Negative for chest pain and palpitations.   Gastrointestinal:  Negative for abdominal pain, nausea and vomiting.   Genitourinary:  Negative for dysuria.   Musculoskeletal:  Negative for myalgias.   Skin:  Negative for rash.   Neurological:  Negative for headaches.   Psychiatric/Behavioral:  Negative for depression.       OBJECTIVE:     Vitals:    07/07/23 0745   BP: (!) 146/66   Pulse:    Resp:    Temp:    HR 67, afebrile    Physical Exam  Constitutional:       General: She is not in acute distress.  HENT:      Head: Normocephalic and atraumatic.      Nose: Nose normal.      Mouth/Throat:      Mouth: Mucous membranes are moist.   Eyes:      Extraocular Movements: Extraocular movements intact.   Cardiovascular:      Rate and Rhythm: Normal rate and regular rhythm.      Heart sounds: No murmur heard.    No friction rub. No gallop.   Pulmonary:      Effort: Pulmonary effort is normal.      Breath sounds: No wheezing, rhonchi or rales.   Abdominal:      General: There is no distension.      Palpations: Abdomen is soft.      Tenderness: There is no abdominal tenderness.   Musculoskeletal:      Cervical back: Normal range of motion.      Right lower leg: No edema.      Left lower leg: No edema.      Comments: Offloading shoe on right foot   Skin:     General: Skin is warm and dry.   Neurological:      General: No focal deficit present.      Mental Status: She is alert and oriented to person, place, and time. Mental status is at baseline.   Psychiatric:         Mood and Affect: Mood normal.         Behavior: Behavior normal.         ASSESSMENT/PLAN:     Bilateral Ptosis  - patient will be having bilateral ptosis repair with  ophthalmology  - Patient can reliably achieve >4 METS  - RCRI of 1 (preoperative use of insulin)  - patient is low risk (0.5% 30 day risk per Vergara score) for perioperative cardiovascular event for this low risk procedure    Uncontrolled HTN  - 144/66 in office today    Uncontrolled DM  - A1c 9.4    Sathish Whittington MD  [facility] Internal Medicine PGY-3

## 2023-07-11 ENCOUNTER — OFFICE VISIT (OUTPATIENT)
Dept: INTERNAL MEDICINE | Facility: CLINIC | Age: 68
End: 2023-07-11
Payer: MEDICARE

## 2023-07-11 VITALS
HEIGHT: 63 IN | HEART RATE: 73 BPM | SYSTOLIC BLOOD PRESSURE: 137 MMHG | TEMPERATURE: 98 F | DIASTOLIC BLOOD PRESSURE: 61 MMHG | BODY MASS INDEX: 27.64 KG/M2 | WEIGHT: 156 LBS | RESPIRATION RATE: 20 BRPM

## 2023-07-11 DIAGNOSIS — E11.649 UNCONTROLLED TYPE 2 DIABETES MELLITUS WITH HYPOGLYCEMIA, UNSPECIFIED HYPOGLYCEMIA COMA STATUS: ICD-10-CM

## 2023-07-11 DIAGNOSIS — E11.621 DIABETIC ULCER OF TOE OF RIGHT FOOT ASSOCIATED WITH TYPE 2 DIABETES MELLITUS, LIMITED TO BREAKDOWN OF SKIN: Chronic | ICD-10-CM

## 2023-07-11 DIAGNOSIS — Z86.010 HX OF COLONIC POLYPS: ICD-10-CM

## 2023-07-11 DIAGNOSIS — L97.511 DIABETIC ULCER OF TOE OF RIGHT FOOT ASSOCIATED WITH TYPE 2 DIABETES MELLITUS, LIMITED TO BREAKDOWN OF SKIN: Chronic | ICD-10-CM

## 2023-07-11 DIAGNOSIS — E11.65 UNCONTROLLED TYPE 2 DIABETES MELLITUS WITH HYPERGLYCEMIA: Primary | ICD-10-CM

## 2023-07-11 LAB — HBA1C MFR BLD: 11.1 %

## 2023-07-11 PROCEDURE — 3078F DIAST BP <80 MM HG: CPT | Mod: CPTII,,, | Performed by: NURSE PRACTITIONER

## 2023-07-11 PROCEDURE — 1159F MED LIST DOCD IN RCRD: CPT | Mod: CPTII,,, | Performed by: NURSE PRACTITIONER

## 2023-07-11 PROCEDURE — 3075F PR MOST RECENT SYSTOLIC BLOOD PRESS GE 130-139MM HG: ICD-10-PCS | Mod: CPTII,,, | Performed by: NURSE PRACTITIONER

## 2023-07-11 PROCEDURE — 3078F PR MOST RECENT DIASTOLIC BLOOD PRESSURE < 80 MM HG: ICD-10-PCS | Mod: CPTII,,, | Performed by: NURSE PRACTITIONER

## 2023-07-11 PROCEDURE — 3066F NEPHROPATHY DOC TX: CPT | Mod: CPTII,,, | Performed by: NURSE PRACTITIONER

## 2023-07-11 PROCEDURE — 3008F BODY MASS INDEX DOCD: CPT | Mod: CPTII,,, | Performed by: NURSE PRACTITIONER

## 2023-07-11 PROCEDURE — 1126F PR PAIN SEVERITY QUANTIFIED, NO PAIN PRESENT: ICD-10-PCS | Mod: CPTII,,, | Performed by: NURSE PRACTITIONER

## 2023-07-11 PROCEDURE — 3008F PR BODY MASS INDEX (BMI) DOCUMENTED: ICD-10-PCS | Mod: CPTII,,, | Performed by: NURSE PRACTITIONER

## 2023-07-11 PROCEDURE — 3061F NEG MICROALBUMINURIA REV: CPT | Mod: CPTII,,, | Performed by: NURSE PRACTITIONER

## 2023-07-11 PROCEDURE — 1160F PR REVIEW ALL MEDS BY PRESCRIBER/CLIN PHARMACIST DOCUMENTED: ICD-10-PCS | Mod: CPTII,,, | Performed by: NURSE PRACTITIONER

## 2023-07-11 PROCEDURE — 3061F PR NEG MICROALBUMINURIA RESULT DOCUMENTED/REVIEW: ICD-10-PCS | Mod: CPTII,,, | Performed by: NURSE PRACTITIONER

## 2023-07-11 PROCEDURE — 99214 OFFICE O/P EST MOD 30 MIN: CPT | Mod: S$PBB,,, | Performed by: NURSE PRACTITIONER

## 2023-07-11 PROCEDURE — 1159F PR MEDICATION LIST DOCUMENTED IN MEDICAL RECORD: ICD-10-PCS | Mod: CPTII,,, | Performed by: NURSE PRACTITIONER

## 2023-07-11 PROCEDURE — 3066F PR DOCUMENTATION OF TREATMENT FOR NEPHROPATHY: ICD-10-PCS | Mod: CPTII,,, | Performed by: NURSE PRACTITIONER

## 2023-07-11 PROCEDURE — 99215 OFFICE O/P EST HI 40 MIN: CPT | Mod: PBBFAC | Performed by: NURSE PRACTITIONER

## 2023-07-11 PROCEDURE — 1126F AMNT PAIN NOTED NONE PRSNT: CPT | Mod: CPTII,,, | Performed by: NURSE PRACTITIONER

## 2023-07-11 PROCEDURE — 99214 PR OFFICE/OUTPT VISIT, EST, LEVL IV, 30-39 MIN: ICD-10-PCS | Mod: S$PBB,,, | Performed by: NURSE PRACTITIONER

## 2023-07-11 PROCEDURE — 3075F SYST BP GE 130 - 139MM HG: CPT | Mod: CPTII,,, | Performed by: NURSE PRACTITIONER

## 2023-07-11 PROCEDURE — 1160F RVW MEDS BY RX/DR IN RCRD: CPT | Mod: CPTII,,, | Performed by: NURSE PRACTITIONER

## 2023-07-11 PROCEDURE — 83036 HEMOGLOBIN GLYCOSYLATED A1C: CPT | Mod: PBBFAC | Performed by: NURSE PRACTITIONER

## 2023-07-11 RX ORDER — HYDROCODONE BITARTRATE AND ACETAMINOPHEN 10; 325 MG/1; MG/1
1 TABLET ORAL EVERY 6 HOURS PRN
COMMUNITY
Start: 2023-06-22

## 2023-07-11 RX ORDER — INSULIN DETEMIR 100 [IU]/ML
25 INJECTION, SOLUTION SUBCUTANEOUS NIGHTLY
Qty: 22 ML | Refills: 1 | Status: SHIPPED | OUTPATIENT
Start: 2023-07-11 | End: 2023-12-14 | Stop reason: DRUGHIGH

## 2023-07-11 RX ORDER — SEMAGLUTIDE 0.68 MG/ML
INJECTION, SOLUTION SUBCUTANEOUS
Qty: 3 ML | Refills: 0 | Status: SHIPPED | OUTPATIENT
Start: 2023-07-11 | End: 2023-08-22 | Stop reason: ALTCHOICE

## 2023-07-11 NOTE — PROGRESS NOTES
Kaitlynn Beck, LYNN   [facility]  [address] [address]      PATIENT NAME: Kendra Malik  : 1955  DATE: 23  MRN: [id number]      Reason for Visit / Chief Complaint: Diabetes (Follow up)       History of Present Illness / Problem Focused Workflow     Kendra Malik is a 67 y.o. Black or  female presents to the clinic for uncontrolled DM f/u.  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 Followed by [facility] nephrology and optho clinics, and Dr. Jeremiah Bowens, podiatry. Next renal visit in July.    Today, pt states she did not increase insulin as instructed at last visit. CBGs continue to range 130-170s when checked before meals daily. Attempts to follow ADA/renal diet. Has completed DM ed in the past. At length discussion regarding daily meals and admits to drinking juice daily. At the end of visit, pt admits had several cups of spiked fruit punch at a family gathering. Also reports is to undergo eyelid lifting surgery in 2 weeks - instructed pt to inform provider of A1C prior to surgery. Has appt with podiatry on tomorrow. Denies chest pain, shortness of breath, cough, fever, headache, dizziness, weakness, abdominal pain, nausea, vomiting, diarrhea, constipation, dysuria, depression, anxiety, SI/HI.    Review of Systems     Review of Systems     See HPI for details    Constitutional: Denies Change in appetite. Denies Chills. Denies Fever. Denies Night sweats.  Eye: Denies Blurred vision. Denies Discharge. Denies Eye pain.  ENT: Denies Decreased hearing. Denies Sore throat. Denies Swollen glands.  Respiratory: Denies Cough. Denies Shortness of breath. Denies Shortness of breath with exertion. Denies  Wheezing.  Cardiovascular: Denies Chest pain at rest. Denies Chest pain with exertion. Denies Irregular heartbeat. Denies Palpitations. Denies Edema.  Gastrointestinal: Denies Abdominal pain. Denies Diarrhea. Denies Nausea. Denies Vomiting. Denies Hematemesis or Hematochezia.  Genitourinary: Denies Dysuria. Denies Urinary frequency. Denies Urinary urgency. Denies Blood in urine.  Endocrine: Denies Cold intolerance. Denies Excessive thirst. Denies Heat intolerance. Denies Weight loss. Denies Weight gain.  Musculoskeletal: Denies Painful joints. Denies Weakness.  Integumentary: Denies Rash. Denies Itching. Denies Dry skin.  Neurologic: Denies Dizziness. Denies Fainting. Denies Headache.  Psychiatric: Denies Depression. Denies Anxiety. Denies Suicidal/Homicidal ideations.    All Other ROS: Negative except as stated in HPI.     Medical / Surgical / Social / Family History       ----------------------------  Cataract  DM2 w CKD  Infected blister of fifth toe      Comment:  left foot  Pneumonia  Renal hypertension  Secondary hyperparathyroidism of renal origin     Past Surgical History:   Procedure Laterality Date    APPLICATION, GRAFT Right 2023    Procedure: APPLICATION, GRAFT [place]  Right/  Rep Marlo Calosas;  Surgeon: Jeremiah RODRIGUEZ DPM;  Location: [facility];  Service: Podiatry;  Laterality: Right;    BONE BIOPSY Left 2023    Procedure: BIOPSY, BONE  Right 5th digit;  Surgeon: Jeremiah RODRIGUEZ DPM;  Location: [facility];  Service: Podiatry;  Laterality: Left;    cataract surgery       SECTION      x3    COLONOSCOPY N/A 2019    Procedure: COLONOSCOPY;  Surgeon: Arianna Srinivasan MD;  Location: [facility];  Service: Endoscopy;  Laterality: N/A;    DEBRIDEMENT OF FOOT Right     DEBRIDEMENT OF FOOT Right 2023    Procedure: DEBRIDEMENT, FOOT Right;  Surgeon: Jeremiah RODRIGUEZ DPM;  Location: [facility];  Service: Podiatry;  Laterality: Right;    HYSTERECTOMY      KIDNEY TRANSPLANT N/A  11/15/2020    Procedure: TRANSPLANT, KIDNEY;  Surgeon: Scott Ann MD;  Location: [address];  Service: Transplant;  Laterality: N/A;    KIDNEY TRANSPLANT Left 11/16/2020    OOPHORECTOMY      PERITONEAL CATHETER INSERTION      RETINAL DETACHMENT SURGERY      TOE AMPUTATION Left 2/16/2023    Procedure: AMPUTATION, TOE  Left 5th digit;  Surgeon: Jeremiah RODRIGUEZ DPM;  Location: [facility];  Service: Podiatry;  Laterality: Left;       Social History     Socioeconomic History    Marital status:    Tobacco Use    Smoking status: Never    Smokeless tobacco: Never   Substance and Sexual Activity    Alcohol use: Not Currently     Alcohol/week: 1.0 standard drink     Types: 1 Shots of liquor per week     Comment: 1-2 a year    Drug use: No    Sexual activity: Not Currently   Social History Narrative    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)     Social Determinants of Health     Transportation Needs: Unmet Transportation Needs    Lack of Transportation (Medical): Yes    Lack of Transportation (Non-Medical): No   Housing Stability: Low Risk     Unable to Pay for Housing in the Last Year: No    Number of Places Lived in the Last Year: 1    Unstable Housing in the Last Year: No        Family History   Problem Relation Age of Onset    Diabetes Mother     Heart disease Mother         CHF    Hypertension Mother     Diabetes Father     Hypertension Father     HIV Sister     Diabetes Brother     Diabetes Brother     Gout Brother     Diabetes Paternal Aunt     Cancer Paternal Uncle     Diabetes Paternal Grandmother     Kidney disease Neg Hx         Medications and Allergies     Medications  Current Outpatient Medications   Medication Instructions    acetaminophen (TYLENOL EXTRA STRENGTH ORAL) 500 mg, Oral, Every 6 hours PRN    blood sugar diagnostic Strp 1 each, Misc.(Non-Drug; Combo Route), 3 times daily, E11.3513 (DM)    blood-glucose meter kit Use as instructed; E11.3513 (DM)     "diphenhydrAMINE (BENADRYL) 25 mg, Oral, Every 6 hours PRN    famotidine/Ca carb/mag hydrox (PEPCID COMPLETE ORAL) 1 tablet, Oral, Daily PRN    fluticasone propionate (FLONASE) 50 mcg/actuation nasal spray SPRAY 2 SPRAYS INTO EACH NOSTRIL TWICE A DAY    gabapentin (NEURONTIN) 300 MG capsule TAKE 1 CAPSULE BY MOUTH THREE TIMES A DAY    HYDROcodone-acetaminophen (NORCO)  mg per tablet 1 tablet, Oral, Every 6 hours PRN    hydrOXYzine HCL (ATARAX) 25 MG tablet 1 tablet, Oral, Nightly    ketoconazole (NIZORAL) 2 % cream Topical (Top)    labetaloL (NORMODYNE) 100 MG tablet TAKE 1 TABLET BY MOUTH TWICE A DAY    lancets Misc 1 each, Misc.(Non-Drug; Combo Route), 3 times daily, E11.3513    LEVEMIR FLEXPEN 25 Units, Subcutaneous, Nightly    loratadine (CLARITIN) 10 mg tablet TAKE 1 TABLET BY MOUTH EVERY DAY    metFORMIN (GLUCOPHAGE) 500 MG tablet TAKE 1 TABLET BY MOUTH TWICE A DAY WITH MEALS    mycophenolate (CELLCEPT) 1,000 mg, Oral, 2 times daily    NIFEdipine (PROCARDIA-XL) 30 mg, Oral, 2 times daily    ondansetron (ZOFRAN-ODT) 4 mg, Oral, Every 6 hours PRN    pen needle, diabetic (BD RIO 2ND GEN PEN NEEDLE) 32 gauge x 5/32" Ndle To use with insulin 4 times daily.    predniSONE (DELTASONE) 5 mg, Oral, Daily    rosuvastatin (CRESTOR) 20 MG tablet TAKE 1 TABLET BY MOUTH EVERYDAY AT BEDTIME    SANTYL ointment Topical (Top)    SHINGRIX, PF, 50 mcg/0.5 mL injection No dose, route, or frequency recorded.    silver sulfADIAZINE 1% (SILVADENE) 1 % cream 1 application , Topical (Top)    SITagliptin phosphate (JANUVIA) 100 MG Tab TAKE 1 TABLET BY MOUTH EVERY DAY    tacrolimus (PROGRAF) 1 MG Cap Take 5 capsules (5 mg total) by mouth every morning AND 5 capsules (5 mg total) every evening.         Allergies  Review of patient's allergies indicates:  No Known Allergies    Physical Examination     /61 (BP Location: Right arm, Patient Position: Sitting, BP Method: Medium (Automatic))   Pulse 73   Temp 97.8 °F (36.6 °C) " "(Oral)   Resp 20   Ht 5' 2.99" (1.6 m)   Wt 70.8 kg (156 lb)   BMI 27.64 kg/m²     Physical Exam  Feet:      Comments: Right foot wrapped with gauze, kerlex, cobain with  offloading shoe in use.      General: Alert and oriented, No acute distress.  Head: Normocephalic, Atraumatic.  Eye: Pupils are equal, round and reactive to light, Extraocular movements are intact, Sclera non-icteric.  Ears/Nose/Throat: Normal, Mucosa moist,Clear.  Neck/Thyroid: Supple, Non-tender, No carotid bruit, No lymphadenopathy, No JVD, Full range of motion.  Respiratory: Clear to auscultation bilaterally; No wheezes, rales or rhonchi,Non-labored respirations, Symmetrical chest wall expansion.  Cardiovascular: Regular rate and rhythm, S1/S2 normal, No murmurs, rubs or gallops.  Gastrointestinal: Soft, Non-tender, Non-distended, Normal bowel sounds, No palpable organomegaly.  Integumentary: Warm, Dry, Intact, No suspicious lesions or rashes.  Extremities: No clubbing, cyanosis or edema  Neurologic: No focal deficits, Cranial Nerves II-XII are grossly intact, Motor strength normal upper and lower extremities, Sensory exam intact.  Psychiatric: Normal interaction, Coherent speech, Appropriate affect       Results     Lab Results   Component Value Date    WBC 4.7 03/06/2023    HGB 9.2 (L) 03/06/2023    HCT 29.0 (L) 03/06/2023    PLT 94 (L) 03/06/2023    CHOL 225 (H) 06/22/2023    TRIG 193 (H) 06/22/2023    ALT 5 03/06/2023    AST 12 03/06/2023     03/06/2023    K 4.5 03/06/2023     02/09/2021    CREATININE 0.81 06/07/2023    BUN 12.5 03/06/2023    CO2 25 03/06/2023    INR 1.0 05/30/2023    HGBA1C 9.4 (H) 06/22/2023         Assessment and Plan (including Health Maintenance)     Plan:     1. Uncontrolled type 2 diabetes mellitus with hyperglycemia  POC A1C 11.1 not at goal. Previous A1C 9.4.   Increase levemir to 25 units at bedtime.  Rx ozempic, ok'd per nephrology, Dr. Scruggs.  Continue januvia as prescribed.  Follow ADA " diet.  Avoid soda, simple sweets, and limit rice/pasta/bread/starches and consume brown options when possible.   Maintain healthy weight with BMI goal <30.   Perform aerobic exercise for 150 minutes per week (or 5 days a week for 30 minutes each day).   Examine feet daily.   Obtain annual dilated eye exam.  Eye exam: 4/11/23  Foot exam: 4/3/23      2. Hx of colonic polyps  Ref to GI for repeat colonoscopy.  - Ambulatory referral/consult to Gastroenterology; Future    3. Diabetic ulcer of toe of right foot associated with type 2 diabetes mellitus, limited to breakdown of skin  Keep appts with podiatry and wound care as ordered.      Problem List Items Addressed This Visit          Endocrine    Diabetic ulcer of right foot (Chronic)    Relevant Medications    insulin detemir U-100, Levemir, (LEVEMIR FLEXPEN) 100 unit/mL (3 mL) In pen    Uncontrolled type 2 diabetes mellitus with hyperglycemia - Primary    Overview     A1C 8.2. Previous A1C 9.2.  Increase basaglar to 28 units.  Continue januvia and metformin.         Relevant Medications    insulin detemir U-100, Levemir, (LEVEMIR FLEXPEN) 100 unit/mL (3 mL) In pen    Other Relevant Orders    POCT HEMOGLOBIN A1C     Other Visit Diagnoses       Hx of colonic polyps        Relevant Orders    Ambulatory referral/consult to Gastroenterology    Uncontrolled type 2 diabetes mellitus with hypoglycemia, unspecified hypoglycemia coma status        Relevant Medications    insulin detemir U-100, Levemir, (LEVEMIR FLEXPEN) 100 unit/mL (3 mL) In pen              Health Maintenance Due   Topic Date Due    Colorectal Cancer Screening  03/27/2020    COVID-19 Vaccine (4 - Moderna series) 02/21/2022    Shingles Vaccine (2 of 2) 12/07/2022       Follow up in about 6 weeks (around 8/22/2023) for Follow up with POC A1C at visit. .        Signature:  LYNN Miranda  [facility]  [address]  [id number]

## 2023-07-21 ENCOUNTER — TELEPHONE (OUTPATIENT)
Dept: NEPHROLOGY | Facility: CLINIC | Age: 68
End: 2023-07-21
Payer: MEDICARE

## 2023-07-27 ENCOUNTER — TELEPHONE (OUTPATIENT)
Dept: NEPHROLOGY | Facility: CLINIC | Age: 68
End: 2023-07-27
Payer: MEDICARE

## 2023-07-28 ENCOUNTER — OFFICE VISIT (OUTPATIENT)
Dept: NEPHROLOGY | Facility: CLINIC | Age: 68
End: 2023-07-28
Payer: MEDICARE

## 2023-07-28 VITALS
WEIGHT: 152.63 LBS | HEIGHT: 63 IN | TEMPERATURE: 98 F | SYSTOLIC BLOOD PRESSURE: 150 MMHG | OXYGEN SATURATION: 99 % | HEART RATE: 64 BPM | DIASTOLIC BLOOD PRESSURE: 62 MMHG | BODY MASS INDEX: 27.04 KG/M2 | RESPIRATION RATE: 18 BRPM

## 2023-07-28 DIAGNOSIS — N18.5 STAGE 5 CHRONIC KIDNEY DISEASE NOT ON CHRONIC DIALYSIS: ICD-10-CM

## 2023-07-28 DIAGNOSIS — E83.42 HYPOMAGNESEMIA: ICD-10-CM

## 2023-07-28 DIAGNOSIS — Z94.0 S/P KIDNEY TRANSPLANT: Chronic | ICD-10-CM

## 2023-07-28 DIAGNOSIS — N25.81 SECONDARY HYPERPARATHYROIDISM OF RENAL ORIGIN: Primary | ICD-10-CM

## 2023-07-28 PROCEDURE — 99213 OFFICE O/P EST LOW 20 MIN: CPT | Mod: PBBFAC | Performed by: INTERNAL MEDICINE

## 2023-07-28 RX ORDER — CALCITRIOL 0.25 UG/1
0.25 CAPSULE ORAL
Qty: 12 CAPSULE | Refills: 11 | Status: SHIPPED | OUTPATIENT
Start: 2023-07-28 | End: 2024-07-27

## 2023-07-28 NOTE — PROGRESS NOTES
[facility] Visit    Chief Complaint:      Kidney Transplant (RTC, took meds, right foot boot, c/o left foot blister-will call wound care)     Subjective:     HPI:  Kendra Malik is a 66 y.o.female with history of uncontrolled type 2 diabetes, hypertension, hyperlipidemia, AOCD, CKD V s/p renal transplant in Nov 2020 (on immunosuppression), vitamin D toxicity, hypercalcemia and hyperparathyroidism presenting to nephrology clinic today for  F/U visit. Patient received her renal transplant in [place] in November 2020. Patient continues on immunosuppression with tacrolimus, mycophenolate, daily prednisolone 5 mg oral tablet. Patient reports compliance with all medications and denies any drug/tobacco use.       Patient reports poor sleep overnight given foot pain secondary to neuropathy. States her PCP is working on the medication regimen. Patient also report new scab on left foot which she will discuss with wound care. States her systolic BP usually runs about 120 at home, but her BP is 150/62 today. She states her BP is often more elevated at clinic visits and may be worse today as she did not sleep well due to neuropathic pain.    Review of Systems    Constitutional: no fever/chills  EENT: no sore throat, ear pain, sinus pain/congestion, nasal congestion/drainage  Respiratory: no cough, no wheezing, no shortness of breath  Cardiovascular: no chest pain, no palpitations, no edema  Gastrointestinal: no nausea, vomiting, or diarrhea. No abdominal pain  Genitourinary: no dysuria, no urinary frequency or urgency, no hematuria  Integumentary: Right great toe wound on the plantar surface, new left foot scab  Neurologic: occasional neuropathic pain of the feet, no headache, no dizziness, no weakness or numbness      Objective:   Physical Examination:    Vitals:   Vitals:    07/28/23 0828   BP: (!) 150/62   Pulse:    Resp:    Temp:          General - Appears comfortable, appropriatley conversive   Mental  Status - alert and oriented x 3, speaking in logical, relevant sentences   HEENT - no rhinorrhea   Cardiac - RR, no murmurs, rubs, or gallops; no edema in LE   Respiratory - breathing comfortably; clear to ascultation bilaterally   Abdominal - nondistended, soft, nontender to palpation   Extremities - right foot dressing and boot in place  Skin - no rashes or bruises seen on skin        Assessment & Plan:      H/O CKD V s/p Renal Transplant 2020  Current GFR > 60  Chronic Immunosuppression Therapy   - received renal transplant in Nov 2020 in [place], history of uncontrolled type 2 diabetes and hypertension  - Immunosuppression on Tacrolimus 5mg BID, Mycophenolate 1000 BID , and Prednisolone 2.5 mg once daily  -Continue recommendations post renal transplant per guidelines, labs required every visit include CMP/magnesium/phosphorus/CBC with differential/tacrolimus level/urinalysis/UPCR/fasting blood glucose  - recommendations post renal transplant per guidelines, labs required every 3 months or every visit include hemoglobin A1c, fasting lipid profile  - recommendations post renal transplant per guidelines, labs required at least every 6 to 12 months include PTH and 25-OH Vit D  - recommendations post renal transplant per guidelines, labs required at 12, 18, and 24 months post transplant include BK virus blood and/or urine PCR testing   - Last UPC wnl, UA with small leuk est and rare bacteria, eGFR > 60, BUN/Cr 13.83/0.69, Phos wnl, Mag low at 1.5; as of 7/2023  -Tacrolimus levels therapeutic as of 7/2023  - today ordered the following labs to be reviewed at next visit: CBC, CMP, urine protein creatinine, UA, PTH intact, tacrolimus level    Secondary Hyperparathyroidism  -secondary to renal disease  -Intact  in 7/2023  -Start calcitriol 0.25 mcg MWF    Hypomagnesemia  -Mg 1.5 in 7/2023  -Likely secondary to tacrolimus which cases a magnesium wasting nephropathy  -Recommend over the counter mag ox    Type 2  Diabetes, poorly controlled.  - last A1c 11.1 (7/2023)  - continue diabetic mgmt per PCP   - discussed lifestyle and diet modifications.    Primary Hypertension, well controlled.  - BP elevated today in clinic, reports she did not take both BP meds this morning  -Currently on  labetalol 100 bid, nifedipine 30 BID. Continue. Further management per PCP.    Post-Renal Transplant Vaccine Recommendations:  - Flu Vaccine: provided 10/2022  - Pneumovax: provided 3/18/2022  - Hep A vaccine: received 2018  - Hep B vaccine: no official record available in chart however patient did receive vaccination when dialysis was initiated  - TDap vaccine: received 2017  - Shingrix vaccine: dose #1 10/2022  - Polio vaccine: received in 1962  - Meningococcal vaccine: Offer at next visit  - COVID vaccine: completed 2 dose series in May 2021, June 2021, completed booster in December 2021        Continue following measures:  -follow 2 gram a day dietary sodium restriction  -control diabetes (goal A1c less than 7%)  -control high blood pressure (goal blood pressure is less than 130/80, please check blood pressure twice a week and bring blood pressure logs to office visit)  -exercise at least 30 minutes a day, 5 days a week  -maintain healthy weight  -decrease or stop alcohol use  -do not smoke  -stay well hydrated (drink water only, avoid juices, sweet tea, and sodas)  -ask about staying up-to-date on vaccinations (flu vaccine, pneumonia vaccine, hepatitis B vaccine)  -avoid excessive use of NSAIDs (ibuprofen, naproxen, Aleve, Advil, Toradol, Mobic), take Tylenol as needed for headache or mild pain  -take cholesterol lowering medications if prescribed (LDL goal less than 100)     Follow up in 4 months with labs prior    Bonnie Mendoza MD  [facility], [facility]  7/28/2023

## 2023-08-22 ENCOUNTER — PATIENT OUTREACH (OUTPATIENT)
Dept: ADMINISTRATIVE | Facility: OTHER | Age: 68
End: 2023-08-22
Payer: MEDICARE

## 2023-08-22 ENCOUNTER — OFFICE VISIT (OUTPATIENT)
Dept: INTERNAL MEDICINE | Facility: CLINIC | Age: 68
End: 2023-08-22
Payer: MEDICARE

## 2023-08-22 VITALS
TEMPERATURE: 98 F | DIASTOLIC BLOOD PRESSURE: 70 MMHG | HEIGHT: 63 IN | RESPIRATION RATE: 20 BRPM | HEART RATE: 69 BPM | SYSTOLIC BLOOD PRESSURE: 135 MMHG | WEIGHT: 147.81 LBS | BODY MASS INDEX: 26.19 KG/M2

## 2023-08-22 DIAGNOSIS — L03.116 CELLULITIS OF LEFT FOOT: ICD-10-CM

## 2023-08-22 DIAGNOSIS — E11.65 UNCONTROLLED TYPE 2 DIABETES MELLITUS WITH HYPERGLYCEMIA: Primary | ICD-10-CM

## 2023-08-22 DIAGNOSIS — Z74.09 IMPAIRED MOBILITY AND ACTIVITIES OF DAILY LIVING: ICD-10-CM

## 2023-08-22 DIAGNOSIS — Z78.9 IMPAIRED MOBILITY AND ACTIVITIES OF DAILY LIVING: ICD-10-CM

## 2023-08-22 LAB — HBA1C MFR BLD: 9.2 %

## 2023-08-22 PROCEDURE — 1126F AMNT PAIN NOTED NONE PRSNT: CPT | Mod: CPTII,,, | Performed by: NURSE PRACTITIONER

## 2023-08-22 PROCEDURE — 3288F FALL RISK ASSESSMENT DOCD: CPT | Mod: CPTII,,, | Performed by: NURSE PRACTITIONER

## 2023-08-22 PROCEDURE — 1101F PT FALLS ASSESS-DOCD LE1/YR: CPT | Mod: CPTII,,, | Performed by: NURSE PRACTITIONER

## 2023-08-22 PROCEDURE — 3075F SYST BP GE 130 - 139MM HG: CPT | Mod: CPTII,,, | Performed by: NURSE PRACTITIONER

## 2023-08-22 PROCEDURE — 99214 PR OFFICE/OUTPT VISIT, EST, LEVL IV, 30-39 MIN: ICD-10-PCS | Mod: S$PBB,,, | Performed by: NURSE PRACTITIONER

## 2023-08-22 PROCEDURE — 99214 OFFICE O/P EST MOD 30 MIN: CPT | Mod: S$PBB,,, | Performed by: NURSE PRACTITIONER

## 2023-08-22 PROCEDURE — 3078F DIAST BP <80 MM HG: CPT | Mod: CPTII,,, | Performed by: NURSE PRACTITIONER

## 2023-08-22 PROCEDURE — 1160F RVW MEDS BY RX/DR IN RCRD: CPT | Mod: CPTII,,, | Performed by: NURSE PRACTITIONER

## 2023-08-22 PROCEDURE — 3078F PR MOST RECENT DIASTOLIC BLOOD PRESSURE < 80 MM HG: ICD-10-PCS | Mod: CPTII,,, | Performed by: NURSE PRACTITIONER

## 2023-08-22 PROCEDURE — 3066F NEPHROPATHY DOC TX: CPT | Mod: CPTII,,, | Performed by: NURSE PRACTITIONER

## 2023-08-22 PROCEDURE — 3066F PR DOCUMENTATION OF TREATMENT FOR NEPHROPATHY: ICD-10-PCS | Mod: CPTII,,, | Performed by: NURSE PRACTITIONER

## 2023-08-22 PROCEDURE — 3288F PR FALLS RISK ASSESSMENT DOCUMENTED: ICD-10-PCS | Mod: CPTII,,, | Performed by: NURSE PRACTITIONER

## 2023-08-22 PROCEDURE — 1101F PR PT FALLS ASSESS DOC 0-1 FALLS W/OUT INJ PAST YR: ICD-10-PCS | Mod: CPTII,,, | Performed by: NURSE PRACTITIONER

## 2023-08-22 PROCEDURE — 3046F PR MOST RECENT HEMOGLOBIN A1C LEVEL > 9.0%: ICD-10-PCS | Mod: CPTII,,, | Performed by: NURSE PRACTITIONER

## 2023-08-22 PROCEDURE — 83036 HEMOGLOBIN GLYCOSYLATED A1C: CPT | Mod: PBBFAC | Performed by: NURSE PRACTITIONER

## 2023-08-22 PROCEDURE — 3046F HEMOGLOBIN A1C LEVEL >9.0%: CPT | Mod: CPTII,,, | Performed by: NURSE PRACTITIONER

## 2023-08-22 PROCEDURE — 3008F PR BODY MASS INDEX (BMI) DOCUMENTED: ICD-10-PCS | Mod: CPTII,,, | Performed by: NURSE PRACTITIONER

## 2023-08-22 PROCEDURE — 1159F MED LIST DOCD IN RCRD: CPT | Mod: CPTII,,, | Performed by: NURSE PRACTITIONER

## 2023-08-22 PROCEDURE — 3061F PR NEG MICROALBUMINURIA RESULT DOCUMENTED/REVIEW: ICD-10-PCS | Mod: CPTII,,, | Performed by: NURSE PRACTITIONER

## 2023-08-22 PROCEDURE — 1159F PR MEDICATION LIST DOCUMENTED IN MEDICAL RECORD: ICD-10-PCS | Mod: CPTII,,, | Performed by: NURSE PRACTITIONER

## 2023-08-22 PROCEDURE — 99215 OFFICE O/P EST HI 40 MIN: CPT | Mod: PBBFAC | Performed by: NURSE PRACTITIONER

## 2023-08-22 PROCEDURE — 3008F BODY MASS INDEX DOCD: CPT | Mod: CPTII,,, | Performed by: NURSE PRACTITIONER

## 2023-08-22 PROCEDURE — 1126F PR PAIN SEVERITY QUANTIFIED, NO PAIN PRESENT: ICD-10-PCS | Mod: CPTII,,, | Performed by: NURSE PRACTITIONER

## 2023-08-22 PROCEDURE — 1160F PR REVIEW ALL MEDS BY PRESCRIBER/CLIN PHARMACIST DOCUMENTED: ICD-10-PCS | Mod: CPTII,,, | Performed by: NURSE PRACTITIONER

## 2023-08-22 PROCEDURE — 3075F PR MOST RECENT SYSTOLIC BLOOD PRESS GE 130-139MM HG: ICD-10-PCS | Mod: CPTII,,, | Performed by: NURSE PRACTITIONER

## 2023-08-22 PROCEDURE — 3061F NEG MICROALBUMINURIA REV: CPT | Mod: CPTII,,, | Performed by: NURSE PRACTITIONER

## 2023-08-22 RX ORDER — INSULIN DETEMIR 100 [IU]/ML
28 INJECTION, SOLUTION SUBCUTANEOUS NIGHTLY
Qty: 18 ML | Refills: 1 | Status: SHIPPED | OUTPATIENT
Start: 2023-08-22 | End: 2024-01-25 | Stop reason: SDUPTHER

## 2023-08-22 RX ORDER — ORAL SEMAGLUTIDE 3 MG/1
3 TABLET ORAL DAILY
Qty: 90 TABLET | Refills: 1 | Status: SHIPPED | OUTPATIENT
Start: 2023-08-22 | End: 2024-01-25 | Stop reason: DRUGHIGH

## 2023-08-22 RX ORDER — INSULIN GLARGINE 100 [IU]/ML
25 INJECTION, SOLUTION SUBCUTANEOUS
COMMUNITY
End: 2023-08-22 | Stop reason: ALTCHOICE

## 2023-08-22 NOTE — PROGRESS NOTES
CHW - Outreach Attempt    Community Health Worker left a voicemail message for 1st attempt to contact patient regarding: community resources    Community Health Worker to attempt to contact patient on: 8/22/23

## 2023-08-22 NOTE — PROGRESS NOTES
Kaitlynn Beck, LYNN   [facility]  [address] [facility] [address]      PATIENT NAME: Kendra Malik  : 1955  DATE: 23  MRN: [id number]      Reason for Visit / Chief Complaint: Diabetes (Needs refills, refused vaccines)       History of Present Illness / Problem Focused Workflow     Kendra Malik is a 68 y.o. Black or  female presents to the clinic for uncontrolled DM f/u. 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 Followed by [facility] nephrology and optho clinics, and Dr. Sathish Rankin, podiatry.    Since last visit, pt had admit at [facility] for left foot cellulitis/pain and diabetic foot ulcer. Pt underwent debridement while admitted with a 9 day hospital stay and IV abx. She was discharged on augmentin with home health and wound care. Wound care per  BIW and attends wound care weekly. Pt underwent abn CORINNE studies and saw Dr. Dodd's office on yesterday and had another debridement. Is now seeing Dr. Rankin, podiatry, as Dr. Bowens has moved practice to [place]. Pt reports med compliance since discharge; was being treated with levemir and insulin sliding scale. Pt is requesting electric scooter or electric wheelchair to help with impaired mobility d/t wounds on bilateral feet with offloading shoes. CBGs ranging 120s in am and 200s in the pm when checked. States has been drinking ensure daily. [facility] received call from Dr. Gracia's office while in hospital and will call back to schedule. Is scheduled for bilateral eyelid lift surgery in Oct; preop clearance scheduled. Denies chest pain, shortness of breath, cough, fever, headache, dizziness, abdominal pain, nausea, vomiting, diarrhea, constipation, dysuria,  depression, anxiety, SI/HI.      Review of Systems     Review of Systems     See HPI for details    Constitutional: Denies Change in appetite. Denies Chills. Denies Fever. Denies Night sweats.  Eye: Denies Blurred vision. Denies Discharge. Denies Eye pain.  ENT: Denies Decreased hearing. Denies Sore throat. Denies Swollen glands.  Respiratory: Denies Cough. Denies Shortness of breath. Denies Shortness of breath with exertion. Denies Wheezing.  Cardiovascular: DeniesChest pain at rest. Denies Chest pain with exertion. Denies Irregular heartbeat. Denies Palpitations. Denies Edema.  Gastrointestinal: Denies Abdominal pain. Denies Diarrhea. Denies Nausea. Denies Vomiting. Denies Hematemesis or Hematochezia.  Genitourinary: Denies Dysuria. Denies Urinary frequency. Denies Urinary urgency. Denies Blood in urine.  Endocrine: Denies Cold intolerance. Denies Excessive thirst. Denies Heat intolerance. Denies Weight loss. Denies Weight gain.  Integumentary: Denies Rash. Denies Itching. Denies Dry skin.  Neurologic: Denies Dizziness. Denies Fainting. Denies Headache.  Psychiatric: Denies Depression. Denies Anxiety. Denies Suicidal/Homicidal ideations.    All Other ROS: Negative except as stated in HPI.     Medical / Surgical / Social / Family History       ----------------------------  Cataract  DM2 w CKD  Infected blister of fifth toe      Comment:  left foot  Pneumonia  Renal hypertension  Secondary hyperparathyroidism of renal origin     Past Surgical History:   Procedure Laterality Date    APPLICATION, GRAFT Right 2023    Procedure: APPLICATION, GRAFT [place]  Right/  Rep Marlo Childs;  Surgeon: Jeremiah RODRIGUEZ DPM;  Location: [facility] OR;  Service: Podiatry;  Laterality: Right;    BONE BIOPSY Left 2023    Procedure: BIOPSY, BONE  Right 5th digit;  Surgeon: Jeremiah RODRIGUEZ DPM;  Location: [facility] OR;  Service: Podiatry;  Laterality: Left;    cataract surgery       SECTION      x3    COLONOSCOPY N/A  03/27/2019    Procedure: COLONOSCOPY;  Surgeon: Arianna Srinivasan MD;  Location: [facility] ENDO;  Service: Endoscopy;  Laterality: N/A;    DEBRIDEMENT OF FOOT Right     DEBRIDEMENT OF FOOT Right 1/26/2023    Procedure: DEBRIDEMENT, FOOT Right;  Surgeon: Jeremiah RODRIGUEZ DPM;  [facility]: [facility];  Service: Podiatry;  Laterality: Right;    HYSTERECTOMY      KIDNEY TRANSPLANT N/A 11/15/2020    Procedure: TRANSPLANT, KIDNEY;  Surgeon: Scott Ann MD;  [address];  Service: Transplant;  Laterality: N/A;    KIDNEY TRANSPLANT Left 11/16/2020    OOPHORECTOMY      PERITONEAL CATHETER INSERTION      RETINAL DETACHMENT SURGERY      TOE AMPUTATION Left 2/16/2023    Procedure: AMPUTATION, TOE  Left 5th digit;  Surgeon: Jeremiah RODRIGUEZ DPM;  Location: [facility] OR;  Service: Podiatry;  Laterality: Left;       Social History     Socioeconomic History    Marital status:    Tobacco Use    Smoking status: Never    Smokeless tobacco: Never   Substance and Sexual Activity    Alcohol use: Not Currently     Alcohol/week: 1.0 standard drink of alcohol     Types: 1 Shots of liquor per week     Comment: 1-2 a year    Drug use: No    Sexual activity: Not Currently   Social History Narrative    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)     Social Determinants of Health     Transportation Needs: Unmet Transportation Needs (1/3/2023)    PRAPARE - Transportation     Lack of Transportation (Medical): Yes     Lack of Transportation (Non-Medical): No   Physical Activity: Inactive (8/22/2023)    Exercise Vital Sign     Days of Exercise per Week: 0 days     Minutes of Exercise per Session: 0 min   Housing Stability: Low Risk  (1/3/2023)    Housing Stability Vital Sign     Unable to Pay for Housing in the Last Year: No     Number of Places Lived in the Last Year: 1     Unstable Housing in the Last Year: No        Family History   Problem Relation Age of Onset    Diabetes Mother     Heart disease  "Mother         CHF    Hypertension Mother     Diabetes Father     Hypertension Father     HIV Sister     Diabetes Brother     Diabetes Brother     Gout Brother     Diabetes Paternal Aunt     Cancer Paternal Uncle     Diabetes Paternal Grandmother     Kidney disease Neg Hx         Medications and Allergies     Medications  Current Outpatient Medications   Medication Instructions    acetaminophen (TYLENOL EXTRA STRENGTH ORAL) 500 mg, Oral, Every 6 hours PRN    blood sugar diagnostic Strp 1 each, Misc.(Non-Drug; Combo Route), 3 times daily, E11.3513 (DM)    blood-glucose meter kit Use as instructed; E11.3513 (DM)    calcitRIOL (ROCALTROL) 0.25 mcg, Oral, Every Mon, Wed, Fri    diphenhydrAMINE (BENADRYL) 25 mg, Oral, Every 6 hours PRN    famotidine/Ca carb/mag hydrox (PEPCID COMPLETE ORAL) 1 tablet, Oral, Daily PRN    fluticasone propionate (FLONASE) 50 mcg/actuation nasal spray SPRAY 2 SPRAYS INTO EACH NOSTRIL TWICE A DAY    gabapentin (NEURONTIN) 300 MG capsule TAKE 1 CAPSULE BY MOUTH THREE TIMES A DAY    HYDROcodone-acetaminophen (NORCO)  mg per tablet 1 tablet, Oral, Every 6 hours PRN    hydrOXYzine HCL (ATARAX) 25 MG tablet 1 tablet, Oral, Nightly    ketoconazole (NIZORAL) 2 % cream Topical (Top)    labetaloL (NORMODYNE) 100 MG tablet TAKE 1 TABLET BY MOUTH TWICE A DAY    lancets Misc 1 each, Misc.(Non-Drug; Combo Route), 3 times daily, E11.3513    LEVEMIR FLEXPEN 25 Units, Subcutaneous, Nightly    LEVEMIR FLEXPEN 28 Units, Subcutaneous, Nightly    loratadine (CLARITIN) 10 mg tablet TAKE 1 TABLET BY MOUTH EVERY DAY    metFORMIN (GLUCOPHAGE) 500 MG tablet TAKE 1 TABLET BY MOUTH TWICE A DAY WITH MEALS    mycophenolate (CELLCEPT) 1,000 mg, Oral, 2 times daily    NIFEdipine (PROCARDIA-XL) 30 mg, Oral, 2 times daily    ondansetron (ZOFRAN-ODT) 4 mg, Oral, Every 6 hours PRN    pen needle, diabetic (BD RIO 2ND GEN PEN NEEDLE) 32 gauge x 5/32" Ndle To use with insulin 4 times daily.    predniSONE (DELTASONE) 5 mg, " "Oral, Daily    rosuvastatin (CRESTOR) 20 MG tablet TAKE 1 TABLET BY MOUTH EVERYDAY AT BEDTIME    RYBELSUS 3 mg, Oral, Daily    SANTYL ointment Topical (Top)    SHINGRIX, PF, 50 mcg/0.5 mL injection No dose, route, or frequency recorded.    silver sulfADIAZINE 1% (SILVADENE) 1 % cream 1 application , Topical (Top)    SITagliptin phosphate (JANUVIA) 100 MG Tab TAKE 1 TABLET BY MOUTH EVERY DAY    tacrolimus (PROGRAF) 1 MG Cap Take 5 capsules (5 mg total) by mouth every morning AND 5 capsules (5 mg total) every evening.         Allergies  Review of patient's allergies indicates:  No Known Allergies    Physical Examination     /70 (BP Location: Right arm, Patient Position: Sitting, BP Method: Medium (Automatic))   Pulse 69   Temp 97.8 °F (36.6 °C) (Oral)   Resp 20   Ht 5' 2.99" (1.6 m)   Wt 67 kg (147 lb 12.8 oz)   BMI 26.19 kg/m²     Physical Exam  Feet:      Comments: Drsgs to bilateral feet with offloading shoes bilaterally        General: Alert and oriented, No acute distress.  Head: Normocephalic, Atraumatic.  Eye: Pupils are equal, round and reactive to light, Extraocular movements are intact, Sclera non-icteric.  Ears/Nose/Throat: Normal, Mucosa moist,Clear.  Neck/Thyroid: Supple, Non-tender, No carotid bruit, No lymphadenopathy, No JVD, Full range of motion.  Respiratory: Clear to auscultation bilaterally; No wheezes, rales or rhonchi,Non-labored respirations, Symmetrical chest wall expansion.  Cardiovascular: Regular rate and rhythm, S1/S2 normal, No murmurs, rubs or gallops.  Gastrointestinal: Soft, Non-tender, Non-distended, Normal bowel sounds, No palpable organomegaly.  Musculoskeletal: Impaired mobility  Neurologic: No focal deficits, Cranial Nerves II-XII are grossly intact, Motor strength normal upper and lower extremities, Sensory exam intact.  Psychiatric: Normal interaction, Coherent speech, Appropriate affect       Results     Lab Results   Component Value Date    WBC 4.7 03/06/2023    WBC " 4.7 03/06/2023    HGB 9.2 (L) 03/06/2023    HCT 29.0 (L) 03/06/2023    PLT 94 (L) 03/06/2023    CHOL 225 (H) 06/22/2023    TRIG 193 (H) 06/22/2023    ALT 5 03/06/2023    AST 12 03/06/2023     03/06/2023    K 4.5 03/06/2023     02/06/2023    CREATININE 0.99 08/04/2023    BUN 12.5 03/06/2023    CO2 25 03/06/2023    INR 1.1 08/02/2023    HGBA1C 9.4 (H) 06/22/2023         Assessment and Plan (including Health Maintenance)     Plan:     1. Uncontrolled type 2 diabetes mellitus with hyperglycemia  A1C 9.2 not at goal. Previous A1C 9.4.   Continue ozempic, levemir, januvia and metformin as prescribed.  Instructed should be drinking glucerna not ensure. Understanding voiced.   Follow ADA diet.  Avoid soda, simple sweets, and limit rice/pasta/bread/starches and consume brown options when possible.   Maintain healthy weight with BMI goal <30.   Perform aerobic exercise for 150 minutes per week (or 5 days a week for 30 minutes each day).   Examine feet daily.   Obtain annual dilated eye exam.  Eye exam: 4/11/23  Foot exam: 4/3/23    - POCT HEMOGLOBIN A1C    2. Cellulitis of left foot  Complete augmentin as ordered.  Keep podiatry and vascular appts as scheduled.  Achieve adequate glucose control.  Complete wound care as scheduled.  - Ambulatory referral/consult to Outpatient Case Management    3. Impaired mobility and activities of daily living  Pt currently with bilateral foot wounds and bilateral offloading shoes.      Problem List Items Addressed This Visit          ID    Cellulitis of left foot    Relevant Orders    Ambulatory referral/consult to Outpatient Case Management       Endocrine    Uncontrolled type 2 diabetes mellitus with hyperglycemia - Primary    Overview     A1C 8.2. Previous A1C 9.2.  Increase basaglar to 28 units.  Continue januvia and metformin.         Relevant Medications    insulin detemir U-100, Levemir, (LEVEMIR FLEXPEN) 100 unit/mL (3 mL) InPn pen    semaglutide (RYBELSUS) 3 mg tablet     Other Relevant Orders    POCT HEMOGLOBIN A1C (Completed)    CBC Auto Differential    Comprehensive Metabolic Panel    Hemoglobin A1C       Other    Impaired mobility and activities of daily living         Health Maintenance Due   Topic Date Due    Colorectal Cancer Screening  03/27/2020    COVID-19 Vaccine (4 - Moderna series) 02/21/2022    Shingles Vaccine (2 of 2) 12/07/2022       Follow up in about 2 months (around 10/22/2023) for Follow up, Lab Results.        Signature:  LYNN Miranda  [facility]  [address] [address]

## 2023-08-22 NOTE — PROGRESS NOTES
"CHW - Initial Contact    This Community Health Worker verified the Social Determinant of Health questionnaire with patient via telephone today.      Pt identified barriers of most importance are: [employer] financial assistance: pt is interested in applying for this program may have past due bills with [employer] but doesn't know for sure    Transportation: pt stated [employer] transportation  is not good they "leave her for hours" abt medical appointments and she can't do it so she has family members or friends to bring her CHW informed pt about [place] on aging but Pt stated all of her appointments are in [facility] and [employer] doesn't go outside [place] line so pt declined that resource    Support and Services: CHW  mailed [facility] financial assistance application and three $3.00 Glucerna coupons to pt    Other information discussed the patient needs / wants help with: Glucerna cost Pt stated she would like Glucerna coupons if possible    Follow up required: yes    Follow up: 8/22/23     "

## 2023-08-30 ENCOUNTER — PATIENT OUTREACH (OUTPATIENT)
Dept: ADMINISTRATIVE | Facility: OTHER | Age: 68
End: 2023-08-30
Payer: MEDICARE

## 2023-08-30 NOTE — PROGRESS NOTES
CHW - Outreach Attempt    Community Health Worker left a voicemail message for 1st attempt to contact patient regarding: One week follow up verify if pt received Glucerna coupons and [facility] financial assistance application    Community Health Worker to attempt to contact patient on: 8/30/23

## 2023-09-01 ENCOUNTER — PATIENT OUTREACH (OUTPATIENT)
Dept: ADMINISTRATIVE | Facility: OTHER | Age: 68
End: 2023-09-01
Payer: MEDICARE

## 2023-09-01 NOTE — PROGRESS NOTES
CHW - Case Closure    This Community Health Worker spoke to patient via telephone today.     Pt/Caregiver reported: pt stated she received the coupons yesterday and she was opening her mail today    Pt/Caregiver denied any additional needs at this time and agrees with episode closure at this time.  Provided patient with Community Health Worker's contact information and encouraged him/her to contact this Community Health Worker if additional needs arise.

## 2023-09-22 ENCOUNTER — OFFICE VISIT (OUTPATIENT)
Dept: INTERNAL MEDICINE | Facility: CLINIC | Age: 68
End: 2023-09-22
Payer: MEDICARE

## 2023-09-22 VITALS
SYSTOLIC BLOOD PRESSURE: 119 MMHG | TEMPERATURE: 98 F | HEIGHT: 63 IN | BODY MASS INDEX: 23.99 KG/M2 | OXYGEN SATURATION: 98 % | DIASTOLIC BLOOD PRESSURE: 62 MMHG | RESPIRATION RATE: 16 BRPM | HEART RATE: 65 BPM | WEIGHT: 135.38 LBS

## 2023-09-22 DIAGNOSIS — Z01.818 PRE-OP EVALUATION: Primary | ICD-10-CM

## 2023-09-22 PROCEDURE — 99215 OFFICE O/P EST HI 40 MIN: CPT | Mod: PBBFAC

## 2023-09-22 RX ORDER — HEPARIN SODIUM,PORCINE/PF 10 UNIT/ML
SYRINGE (ML) INTRAVENOUS
COMMUNITY
Start: 2023-09-18 | End: 2024-01-25 | Stop reason: ALTCHOICE

## 2023-09-22 RX ORDER — VANCOMYCIN HYDROCHLORIDE 10 G/1
INJECTION, POWDER, LYOPHILIZED, FOR SOLUTION INTRAVENOUS
COMMUNITY
Start: 2023-09-18 | End: 2024-01-25 | Stop reason: ALTCHOICE

## 2023-09-22 RX ORDER — COLISTIMETHATE SODIUM 150 MG/1
INJECTION, POWDER, LYOPHILIZED, FOR SOLUTION INTRAMUSCULAR; INTRAVENOUS
COMMUNITY
Start: 2023-09-09 | End: 2024-01-25 | Stop reason: ALTCHOICE

## 2023-09-22 RX ORDER — SODIUM CHLORIDE 9 MG/ML
INJECTION, SOLUTION INTRAVENOUS
COMMUNITY
Start: 2023-09-18 | End: 2024-01-25 | Stop reason: ALTCHOICE

## 2023-09-22 RX ORDER — CLOPIDOGREL BISULFATE 75 MG/1
75 TABLET ORAL DAILY
COMMUNITY

## 2023-09-22 RX ORDER — SODIUM CHLORIDE 0.9 % (FLUSH) 0.9 %
SYRINGE (ML) INJECTION
COMMUNITY
Start: 2023-09-18 | End: 2024-01-25 | Stop reason: ALTCHOICE

## 2023-09-22 NOTE — PROGRESS NOTES
Pre-op Clinic Note    Patient Name: Kendra Malik  YOB: 1955   MRN: [id number]  Date: 2023  Home Address: [address] [place] [address]      Subjective:     Chief Complaint:   Chief Complaint   Patient presents with    Pre-op Exam     Surgical clearance for cataract surgery both eyes        HPI:  The patient is a 68 y.o. year old female with hx of T2DM, diabetic retinopathy, HTN, CKD (hx of left kidney transplant 2020 on immunosuppression therapy), hyperparathyroidism, anemia of chronic disease, Charcot foot, Diabetic foot ulcers.     She come to clinic today for pre-op clearance. She is slated to undergo Ptosis Repair at [facility].    Available notes and lab results since last visit were reviewed.     Past Medical History:   Diagnosis Date    Cataract     DM2 w CKD 10/11/2018    Infected blister of fifth toe     left foot    Pneumonia 2021    Renal hypertension 10/11/2018    Secondary hyperparathyroidism of renal origin 2020        Past Surgical History:   Procedure Laterality Date    APPLICATION, GRAFT Right 2023    Procedure: APPLICATION, GRAFT [place]  Right/  Rep Marlo Childs;  Surgeon: Jeremiah RODRIGUEZ DPM;  Location: [facility];  Service: Podiatry;  Laterality: Right;    BONE BIOPSY Left 2023    Procedure: BIOPSY, BONE  Right 5th digit;  Surgeon: Jeremiah RODRIGUEZ DPM;  Location: [facility] OR;  Service: Podiatry;  Laterality: Left;    cataract surgery       SECTION      x3    COLONOSCOPY N/A 2019    Procedure: COLONOSCOPY;  Surgeon: Arianna Srinivasan MD;  Location: [facility];  Service: Endoscopy;  Laterality: N/A;    DEBRIDEMENT OF FOOT Right     DEBRIDEMENT OF FOOT Right 2023    Procedure: DEBRIDEMENT, FOOT Right;  Surgeon: Jeremiah RODRIGUEZ DPM;  Location: [facility] OR;  Service: Podiatry;  Laterality: Right;    HYSTERECTOMY      KIDNEY TRANSPLANT N/A 11/15/2020    Procedure: TRANSPLANT, KIDNEY;  Surgeon: Scott Ann MD;   [facility];  Service: Transplant;  Laterality: N/A;    KIDNEY TRANSPLANT Left 11/16/2020    OOPHORECTOMY      PERITONEAL CATHETER INSERTION      RETINAL DETACHMENT SURGERY      TOE AMPUTATION Left 2/16/2023    Procedure: AMPUTATION, TOE  Left 5th digit;  Surgeon: Jeremiah RODRIGUEZ DPM;  Location: [facility] OR;  Service: Podiatry;  Laterality: Left;       Allergy:  Review of patient's allergies indicates:  No Known Allergies     Current Medications:    Current Outpatient Medications:     acetaminophen (TYLENOL EXTRA STRENGTH ORAL), Take 500 mg by mouth every 6 (six) hours as needed., Disp: , Rfl:     blood sugar diagnostic Strp, 1 each by Misc.(Non-Drug; Combo Route) route 3 (three) times daily. E11.3513 (DM), Disp: 100 each, Rfl: 11    blood-glucose meter kit, Use as instructed; E11.3513 (DM), Disp: 1 each, Rfl: 0    calcitRIOL (ROCALTROL) 0.25 MCG Cap, Take 1 capsule (0.25 mcg total) by mouth every Mon, Wed, Fri., Disp: 12 capsule, Rfl: 11    clopidogreL (PLAVIX) 75 mg tablet, Take 75 mg by mouth once daily., Disp: , Rfl:     diphenhydrAMINE (BENADRYL) 25 mg capsule, Take 25 mg by mouth every 6 (six) hours as needed for Itching., Disp: , Rfl:     famotidine/Ca carb/mag hydrox (PEPCID COMPLETE ORAL), Take 1 tablet by mouth daily as needed., Disp: , Rfl:     fluticasone propionate (FLONASE) 50 mcg/actuation nasal spray, SPRAY 2 SPRAYS INTO EACH NOSTRIL TWICE A DAY, Disp: 48 mL, Rfl: 3    gabapentin (NEURONTIN) 300 MG capsule, TAKE 1 CAPSULE BY MOUTH THREE TIMES A DAY, Disp: 270 capsule, Rfl: 3    HYDROcodone-acetaminophen (NORCO)  mg per tablet, Take 1 tablet by mouth every 6 (six) hours as needed., Disp: , Rfl:     hydrOXYzine HCL (ATARAX) 25 MG tablet, Take 1 tablet by mouth every evening., Disp: , Rfl:     insulin detemir U-100, Levemir, (LEVEMIR FLEXPEN) 100 unit/mL (3 mL) InPn pen, Inject 28 Units into the skin every evening., Disp: 18 mL, Rfl: 1    labetaloL (NORMODYNE) 100 MG tablet, TAKE 1  "TABLET BY MOUTH TWICE A DAY, Disp: 180 tablet, Rfl: 1    lancets Misc, 1 each by Misc.(Non-Drug; Combo Route) route 3 (three) times daily. E11.3513, Disp: 100 each, Rfl: 11    loratadine (CLARITIN) 10 mg tablet, TAKE 1 TABLET BY MOUTH EVERY DAY, Disp: 90 tablet, Rfl: 1    metFORMIN (GLUCOPHAGE) 500 MG tablet, TAKE 1 TABLET BY MOUTH TWICE A DAY WITH MEALS, Disp: 180 tablet, Rfl: 3    mycophenolate (CELLCEPT) 250 mg Cap, Take 4 capsules (1,000 mg total) by mouth 2 (two) times daily., Disp: 240 capsule, Rfl: 11    NIFEdipine (PROCARDIA-XL) 30 MG (OSM) 24 hr tablet, Take 1 tablet (30 mg total) by mouth 2 (two) times a day., Disp: 180 tablet, Rfl: 3    ondansetron (ZOFRAN-ODT) 4 MG TbDL, Take 1 tablet (4 mg total) by mouth every 6 (six) hours as needed (nausea)., Disp: 30 tablet, Rfl: 0    pen needle, diabetic (BD RIO 2ND GEN PEN NEEDLE) 32 gauge x 5/32" Ndle, To use with insulin 4 times daily., Disp: 100 each, Rfl: 11    predniSONE (DELTASONE) 5 MG tablet, Take 1 tablet (5 mg total) by mouth once daily., Disp: 91 tablet, Rfl: 3    rosuvastatin (CRESTOR) 20 MG tablet, TAKE 1 TABLET BY MOUTH EVERYDAY AT BEDTIME, Disp: 90 tablet, Rfl: 1    semaglutide (RYBELSUS) 3 mg tablet, Take 1 tablet (3 mg total) by mouth once daily., Disp: 90 tablet, Rfl: 1    SITagliptin phosphate (JANUVIA) 100 MG Tab, TAKE 1 TABLET BY MOUTH EVERY DAY, Disp: 90 tablet, Rfl: 3    tacrolimus (PROGRAF) 1 MG Cap, Take 5 capsules (5 mg total) by mouth every morning AND 5 capsules (5 mg total) every evening., Disp: 360 capsule, Rfl: 11    insulin detemir U-100, Levemir, (LEVEMIR FLEXPEN) 100 unit/mL (3 mL) InPn pen, Inject 25 Units into the skin every evening. (Patient not taking: Reported on 9/22/2023), Disp: 22 mL, Rfl: 1    ketoconazole (NIZORAL) 2 % cream, Apply topically., Disp: , Rfl:     SANTYL ointment, Apply topically., Disp: , Rfl:     SHINGRIX, PF, 50 mcg/0.5 mL injection, , Disp: , Rfl:     silver sulfADIAZINE 1% (SILVADENE) 1 % cream, Apply " 1 application topically., Disp: , Rfl:      Social History:   reports that she has never smoked. She has never used smokeless tobacco. She reports that she does not currently use alcohol after a past usage of about 1.0 standard drink of alcohol per week. She reports that she does not use drugs.     Family History   Problem Relation Age of Onset    Diabetes Mother     Heart disease Mother         CHF    Hypertension Mother     Diabetes Father     Hypertension Father     HIV Sister     Diabetes Brother     Diabetes Brother     Gout Brother     Diabetes Paternal Aunt     Cancer Paternal Uncle     Diabetes Paternal Grandmother     Kidney disease Neg Hx         Immunization History   Administered Date(s) Administered    COVID-19, MRNA, LN-S, PF (MODERNA FULL 0.5 ML DOSE) 05/14/2021, 06/11/2021, 12/27/2021    DTP 05/01/1962    Hepatitis A, Adult 10/11/2018    Hepatitis B, Adult 10/02/2018, 11/09/2018, 12/06/2018, 04/01/2019    Hepatitis B, Dialysis, 3 Dose 10/02/2018, 11/09/2018, 12/06/2018, 04/01/2019    Influenza 10/01/2012, 11/14/2013, 10/04/2017    Influenza (FLUAD) - Quadrivalent - Adjuvanted - PF *Preferred* (65+) 10/12/2022    Influenza - High Dose - PF (65 years and older) 03/18/2022    Influenza - Quadrivalent 09/28/2016, 09/25/2018, 10/15/2019, 10/05/2020    Influenza - Quadrivalent - High Dose - PF (65 years and older) 03/18/2022    Influenza - Quadrivalent - PF (6-35 months) 10/04/2017    Influenza - Quadrivalent - PF *Preferred* (6 months and older) 10/05/2020    Influenza - Trivalent - PF (ADULT) 10/30/2015    OPV 11/27/1962, 01/08/1963    Pneumococcal 10/01/2012    Pneumococcal Conjugate - 13 Valent 10/11/2018    Pneumococcal Polysaccharide - 23 Valent 09/25/2018, 03/18/2022    Rubella 12/10/1984    Tdap 09/13/1966, 09/10/1968, 09/29/1970, 10/16/1984, 06/26/2003, 02/10/2017    Zoster Recombinant 10/12/2022       Review of Systems   Constitutional:  Negative for chills and fever.   Respiratory:  Negative for  "cough, shortness of breath and wheezing.    Cardiovascular:  Negative for chest pain and leg swelling.   Gastrointestinal:  Negative for abdominal pain, constipation, diarrhea, nausea and vomiting.       Objective:      Physical Exam  Vitals:    09/22/23 0812   BP: 119/62   BP Location: Left arm   Patient Position: Sitting   BP Method: Medium (Automatic)   Pulse: 65   Resp: 16   Temp: 98.1 °F (36.7 °C)   TempSrc: Oral   SpO2: 98%   Weight: 61.4 kg (135 lb 6.4 oz)   Height: 5' 2.99" (1.6 m)        Wt Readings from Last 3 Encounters:   09/22/23 61.4 kg (135 lb 6.4 oz)   08/22/23 67 kg (147 lb 12.8 oz)   07/28/23 69.2 kg (152 lb 9.6 oz)       Physical Exam  Vitals and nursing note reviewed.   Constitutional:       General: She is not in acute distress.     Appearance: Normal appearance. She is normal weight. She is not ill-appearing or toxic-appearing.   HENT:      Head: Normocephalic and atraumatic.   Eyes:      Extraocular Movements: Extraocular movements intact.      Conjunctiva/sclera: Conjunctivae normal.      Pupils: Pupils are equal, round, and reactive to light.   Cardiovascular:      Rate and Rhythm: Normal rate and regular rhythm.      Pulses: Normal pulses.      Heart sounds: No murmur heard.  Pulmonary:      Effort: Pulmonary effort is normal. No respiratory distress.      Breath sounds: Normal breath sounds.   Musculoskeletal:         General: No swelling.      Right lower leg: No edema.      Left lower leg: No edema.   Skin:     General: Skin is warm and dry.   Neurological:      General: No focal deficit present.      Mental Status: She is alert and oriented to person, place, and time.          Body mass index is 23.99 kg/m².      Office Visit on 08/22/2023   Component Date Value Ref Range Status    Hemoglobin A1C, POC 08/22/2023 9.2  % Final   Office Visit on 07/11/2023   Component Date Value Ref Range Status    Hemoglobin A1C, POC 07/11/2023 11.1  % Final   Lab Visit on 06/22/2023   Component Date " Value Ref Range Status    Hemoglobin A1c 06/22/2023 9.4 (H)  <=7.0 % Final    Estimated Average Glucose 06/22/2023 223.1  mg/dL Final    Cholesterol Total 06/22/2023 225 (H)  <=200 mg/dL Final    HDL Cholesterol 06/22/2023 59  35 - 60 mg/dL Final    Triglyceride 06/22/2023 193 (H)  37 - 140 mg/dL Final    Cholesterol/HDL Ratio 06/22/2023 4  0 - 5 Final    Very Low Density Lipoprotein 06/22/2023 39   Final    LDL Cholesterol 06/22/2023 127.00  50.00 - 140.00 mg/dL Final    Urine Microalbumin 06/22/2023 17.6  <=30.0 ug/ml Final    Urine Creatinine 06/22/2023 192.4 (H)  47.0 - 110.0 mg/dL Final    Microalbumin Creatinine Ratio 06/22/2023 9.1  0.0 - 30.0 mg/gm Cr Final           Assessment:   Diabetes complicated by Ptosis, retinopathy, and foot ulcers (Left Osteomyelitis)  PAD  HTN  HLD  CKD 5 s/p kidney transplant now on immunosuppression    Plan  A1c could be better optimized. A1c 9.4. Will defer this PCP. Educated on Proper CBG control  Renal Function Good  LDL needs optimization.   Blood pressure controlled  Recent osteomyelitis on Vanc for 6 weeks  Not on AC or Aspirin but is on Plavix. Plavix prescribed for 30 days only by Vascular Surgery has only 8 days worth left. Surgery is planned for the 11th of October    Patient has no cardiac history, and no history of CVA.. Patient states that she is feeling well today.  She denies any chest pain, palpitations, shortness of breath.  She states that she is very functional and mobile, able to climb multiple flights of stairs without significant respiratory difficulty.  Able to walk the equivalent of several city blocks without difficulty.     Disposition:   RCRI 1 (insulin use)  Patient is low-moderate Risk for this low risk procedure. Can Proceed with surgery if desired    Edilson Tran, DO  Internal Medicine - PGY-3

## 2023-09-25 ENCOUNTER — TELEPHONE (OUTPATIENT)
Dept: INTERNAL MEDICINE | Facility: CLINIC | Age: 68
End: 2023-09-25
Payer: MEDICARE

## 2023-09-25 DIAGNOSIS — M86.672 CHRONIC OSTEOMYELITIS OF LEFT FOOT: ICD-10-CM

## 2023-09-25 DIAGNOSIS — L97.529 TYPE 2 DIABETES MELLITUS WITH LEFT DIABETIC FOOT ULCER: Primary | ICD-10-CM

## 2023-09-25 DIAGNOSIS — E11.621 TYPE 2 DIABETES MELLITUS WITH LEFT DIABETIC FOOT ULCER: Primary | ICD-10-CM

## 2023-09-25 NOTE — TELEPHONE ENCOUNTER
Pt is requesting home Health; pt would like the Referral be sent to [facility] in [place], [place] LD

## 2023-09-26 NOTE — TELEPHONE ENCOUNTER
Contacted patient and she informed me she was receiving HH months ago and her daughter just decided to start helping her while she was out of work but she stated her daughter is starting a new job and now she needs the help again.

## 2023-10-23 ENCOUNTER — LAB VISIT (OUTPATIENT)
Dept: LAB | Facility: HOSPITAL | Age: 68
End: 2023-10-23
Attending: NURSE PRACTITIONER
Payer: MEDICARE

## 2023-10-23 ENCOUNTER — OFFICE VISIT (OUTPATIENT)
Dept: INTERNAL MEDICINE | Facility: CLINIC | Age: 68
End: 2023-10-23
Payer: MEDICARE

## 2023-10-23 ENCOUNTER — TELEPHONE (OUTPATIENT)
Dept: INTERNAL MEDICINE | Facility: CLINIC | Age: 68
End: 2023-10-23

## 2023-10-23 VITALS
DIASTOLIC BLOOD PRESSURE: 68 MMHG | BODY MASS INDEX: 27.32 KG/M2 | HEIGHT: 63 IN | WEIGHT: 154.19 LBS | HEART RATE: 68 BPM | RESPIRATION RATE: 18 BRPM | SYSTOLIC BLOOD PRESSURE: 138 MMHG | TEMPERATURE: 98 F

## 2023-10-23 DIAGNOSIS — R60.0 EDEMA OF BOTH LOWER EXTREMITIES: ICD-10-CM

## 2023-10-23 DIAGNOSIS — E11.65 UNCONTROLLED TYPE 2 DIABETES MELLITUS WITH HYPERGLYCEMIA: ICD-10-CM

## 2023-10-23 DIAGNOSIS — Z23 NEED FOR VACCINATION: Primary | ICD-10-CM

## 2023-10-23 DIAGNOSIS — L08.9 DIABETIC INFECTION OF LEFT FOOT: ICD-10-CM

## 2023-10-23 DIAGNOSIS — I10 PRIMARY HYPERTENSION: Chronic | ICD-10-CM

## 2023-10-23 DIAGNOSIS — E11.628 DIABETIC INFECTION OF LEFT FOOT: ICD-10-CM

## 2023-10-23 DIAGNOSIS — M86.372 CHRONIC MULTIFOCAL OSTEOMYELITIS OF LEFT FOOT: Chronic | ICD-10-CM

## 2023-10-23 LAB
ALBUMIN SERPL-MCNC: 3.8 G/DL (ref 3.4–4.8)
ALBUMIN/GLOB SERPL: 1.1 RATIO (ref 1.1–2)
ALP SERPL-CCNC: 119 UNIT/L (ref 40–150)
ALT SERPL-CCNC: <5 UNIT/L (ref 0–55)
AST SERPL-CCNC: 12 UNIT/L (ref 5–34)
BASOPHILS # BLD AUTO: 0.03 X10(3)/MCL
BASOPHILS NFR BLD AUTO: 0.6 %
BILIRUB SERPL-MCNC: 0.3 MG/DL
BUN SERPL-MCNC: 17.5 MG/DL (ref 9.8–20.1)
CALCIUM SERPL-MCNC: 10.3 MG/DL (ref 8.4–10.2)
CHLORIDE SERPL-SCNC: 109 MMOL/L (ref 98–107)
CO2 SERPL-SCNC: 25 MMOL/L (ref 23–31)
CREAT SERPL-MCNC: 0.76 MG/DL (ref 0.55–1.02)
EOSINOPHIL # BLD AUTO: 0.24 X10(3)/MCL (ref 0–0.9)
EOSINOPHIL NFR BLD AUTO: 4.8 %
ERYTHROCYTE [DISTWIDTH] IN BLOOD BY AUTOMATED COUNT: 15.5 % (ref 11.5–17)
EST. AVERAGE GLUCOSE BLD GHB EST-MCNC: 154.2 MG/DL
GFR SERPLBLD CREATININE-BSD FMLA CKD-EPI: >60 MLS/MIN/1.73/M2
GLOBULIN SER-MCNC: 3.5 GM/DL (ref 2.4–3.5)
GLUCOSE SERPL-MCNC: 214 MG/DL (ref 82–115)
HBA1C MFR BLD: 7 %
HCT VFR BLD AUTO: 38.3 % (ref 37–47)
HGB BLD-MCNC: 11.6 G/DL (ref 12–16)
IMM GRANULOCYTES # BLD AUTO: 0.01 X10(3)/MCL (ref 0–0.04)
IMM GRANULOCYTES NFR BLD AUTO: 0.2 %
LYMPHOCYTES # BLD AUTO: 1.35 X10(3)/MCL (ref 0.6–4.6)
LYMPHOCYTES NFR BLD AUTO: 26.8 %
MCH RBC QN AUTO: 27.6 PG (ref 27–31)
MCHC RBC AUTO-ENTMCNC: 30.3 G/DL (ref 33–36)
MCV RBC AUTO: 91 FL (ref 80–94)
MONOCYTES # BLD AUTO: 0.5 X10(3)/MCL (ref 0.1–1.3)
MONOCYTES NFR BLD AUTO: 9.9 %
NEUTROPHILS # BLD AUTO: 2.9 X10(3)/MCL (ref 2.1–9.2)
NEUTROPHILS NFR BLD AUTO: 57.7 %
NRBC BLD AUTO-RTO: 0 %
PLATELET # BLD AUTO: 197 X10(3)/MCL (ref 130–400)
PMV BLD AUTO: 10.6 FL (ref 7.4–10.4)
POTASSIUM SERPL-SCNC: 4.1 MMOL/L (ref 3.5–5.1)
PROT SERPL-MCNC: 7.3 GM/DL (ref 5.8–7.6)
RBC # BLD AUTO: 4.21 X10(6)/MCL (ref 4.2–5.4)
SODIUM SERPL-SCNC: 141 MMOL/L (ref 136–145)
WBC # SPEC AUTO: 5.03 X10(3)/MCL (ref 4.5–11.5)

## 2023-10-23 PROCEDURE — 3075F PR MOST RECENT SYSTOLIC BLOOD PRESS GE 130-139MM HG: ICD-10-PCS | Mod: CPTII,,, | Performed by: NURSE PRACTITIONER

## 2023-10-23 PROCEDURE — 1126F AMNT PAIN NOTED NONE PRSNT: CPT | Mod: CPTII,,, | Performed by: NURSE PRACTITIONER

## 2023-10-23 PROCEDURE — 1101F PR PT FALLS ASSESS DOC 0-1 FALLS W/OUT INJ PAST YR: ICD-10-PCS | Mod: CPTII,,, | Performed by: NURSE PRACTITIONER

## 2023-10-23 PROCEDURE — 3288F PR FALLS RISK ASSESSMENT DOCUMENTED: ICD-10-PCS | Mod: CPTII,,, | Performed by: NURSE PRACTITIONER

## 2023-10-23 PROCEDURE — 3066F NEPHROPATHY DOC TX: CPT | Mod: CPTII,,, | Performed by: NURSE PRACTITIONER

## 2023-10-23 PROCEDURE — 99214 OFFICE O/P EST MOD 30 MIN: CPT | Mod: S$PBB,,, | Performed by: NURSE PRACTITIONER

## 2023-10-23 PROCEDURE — 3061F PR NEG MICROALBUMINURIA RESULT DOCUMENTED/REVIEW: ICD-10-PCS | Mod: CPTII,,, | Performed by: NURSE PRACTITIONER

## 2023-10-23 PROCEDURE — 90694 VACC AIIV4 NO PRSRV 0.5ML IM: CPT | Mod: PBBFAC

## 2023-10-23 PROCEDURE — 3066F PR DOCUMENTATION OF TREATMENT FOR NEPHROPATHY: ICD-10-PCS | Mod: CPTII,,, | Performed by: NURSE PRACTITIONER

## 2023-10-23 PROCEDURE — 3075F SYST BP GE 130 - 139MM HG: CPT | Mod: CPTII,,, | Performed by: NURSE PRACTITIONER

## 2023-10-23 PROCEDURE — 1101F PT FALLS ASSESS-DOCD LE1/YR: CPT | Mod: CPTII,,, | Performed by: NURSE PRACTITIONER

## 2023-10-23 PROCEDURE — 3078F PR MOST RECENT DIASTOLIC BLOOD PRESSURE < 80 MM HG: ICD-10-PCS | Mod: CPTII,,, | Performed by: NURSE PRACTITIONER

## 2023-10-23 PROCEDURE — 1160F PR REVIEW ALL MEDS BY PRESCRIBER/CLIN PHARMACIST DOCUMENTED: ICD-10-PCS | Mod: CPTII,,, | Performed by: NURSE PRACTITIONER

## 2023-10-23 PROCEDURE — 83036 HEMOGLOBIN GLYCOSYLATED A1C: CPT

## 2023-10-23 PROCEDURE — 36415 COLL VENOUS BLD VENIPUNCTURE: CPT

## 2023-10-23 PROCEDURE — 3008F PR BODY MASS INDEX (BMI) DOCUMENTED: ICD-10-PCS | Mod: CPTII,,, | Performed by: NURSE PRACTITIONER

## 2023-10-23 PROCEDURE — G0008 ADMIN INFLUENZA VIRUS VAC: HCPCS | Mod: PBBFAC

## 2023-10-23 PROCEDURE — 1159F PR MEDICATION LIST DOCUMENTED IN MEDICAL RECORD: ICD-10-PCS | Mod: CPTII,,, | Performed by: NURSE PRACTITIONER

## 2023-10-23 PROCEDURE — 3061F NEG MICROALBUMINURIA REV: CPT | Mod: CPTII,,, | Performed by: NURSE PRACTITIONER

## 2023-10-23 PROCEDURE — 99215 OFFICE O/P EST HI 40 MIN: CPT | Mod: PBBFAC,25 | Performed by: NURSE PRACTITIONER

## 2023-10-23 PROCEDURE — 3288F FALL RISK ASSESSMENT DOCD: CPT | Mod: CPTII,,, | Performed by: NURSE PRACTITIONER

## 2023-10-23 PROCEDURE — 99214 PR OFFICE/OUTPT VISIT, EST, LEVL IV, 30-39 MIN: ICD-10-PCS | Mod: S$PBB,,, | Performed by: NURSE PRACTITIONER

## 2023-10-23 PROCEDURE — 85025 COMPLETE CBC W/AUTO DIFF WBC: CPT

## 2023-10-23 PROCEDURE — 80053 COMPREHEN METABOLIC PANEL: CPT

## 2023-10-23 PROCEDURE — 1126F PR PAIN SEVERITY QUANTIFIED, NO PAIN PRESENT: ICD-10-PCS | Mod: CPTII,,, | Performed by: NURSE PRACTITIONER

## 2023-10-23 PROCEDURE — 3008F BODY MASS INDEX DOCD: CPT | Mod: CPTII,,, | Performed by: NURSE PRACTITIONER

## 2023-10-23 PROCEDURE — 1159F MED LIST DOCD IN RCRD: CPT | Mod: CPTII,,, | Performed by: NURSE PRACTITIONER

## 2023-10-23 PROCEDURE — 3078F DIAST BP <80 MM HG: CPT | Mod: CPTII,,, | Performed by: NURSE PRACTITIONER

## 2023-10-23 PROCEDURE — 1160F RVW MEDS BY RX/DR IN RCRD: CPT | Mod: CPTII,,, | Performed by: NURSE PRACTITIONER

## 2023-10-23 PROCEDURE — 3051F HG A1C>EQUAL 7.0%<8.0%: CPT | Mod: CPTII,,, | Performed by: NURSE PRACTITIONER

## 2023-10-23 PROCEDURE — 3051F PR MOST RECENT HEMOGLOBIN A1C LEVEL 7.0 - < 8.0%: ICD-10-PCS | Mod: CPTII,,, | Performed by: NURSE PRACTITIONER

## 2023-10-23 RX ORDER — FUROSEMIDE 20 MG/1
10 TABLET ORAL DAILY PRN
Qty: 15 TABLET | Refills: 2 | Status: SHIPPED | OUTPATIENT
Start: 2023-10-23

## 2023-10-23 RX ADMIN — INFLUENZA A VIRUS A/VICTORIA/4897/2022 IVR-238 (H1N1) ANTIGEN (FORMALDEHYDE INACTIVATED), INFLUENZA A VIRUS A/DARWIN/6/2021 IVR-227 (H3N2) ANTIGEN (FORMALDEHYDE INACTIVATED), INFLUENZA B VIRUS B/AUSTRIA/1359417/2021 BVR-26 ANTIGEN (FORMALDEHYDE INACTIVATED), INFLUENZA B VIRUS B/PHUKET/3073/2013 BVR-1B ANTIGEN (FORMALDEHYDE INACTIVATED) 0.5 ML: 15; 15; 15; 15 INJECTION, SUSPENSION INTRAMUSCULAR at 10:10

## 2023-10-23 NOTE — TELEPHONE ENCOUNTER
Pt notified via phone that fluid pill (furosemide) will be sent to her pharmacy. Directions are as follows: 10 mg daily as needed, Not to be used more than 3 times a week. Asked pt to repeat back directions. Pt states she will be taking meds on M, W, Friday. I explained to pt that if she only needed medication once a week, take it once that week, if needed 2 times another week to take it twice that week. But she is Not to use more than 3 times in a 1 week period. Pt verbalized understanding. She will need to break 20 mg pill in half as dosage comes in 20 mg tab form.

## 2023-10-23 NOTE — PROGRESS NOTES
"  Kaitlynn Beck, LYNN   [facility]  [address] [facility] [address]      PATIENT NAME: Kendra Malik  : 1955  DATE: 10/23/23  MRN: [id number]      Reason for Visit / Chief Complaint: No chief complaint on file.       History of Present Illness / Problem Focused Workflow     Kendra Malik is a 68 y.o. Black or  female presents to the clinic for uncontrolled DM f/u. 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 Followed by [facility] nephrology and optho clinics, and Dr. Sathish Rankin, podiatry.    Admitted at [facility] on 23 for osteomyelitis of left foot with diabetic left foot infection. While inpt, she underwent successful I&D and was started on IV vancomycin; culture revealed Staph Aureus. She was discharged on 23 with wound care orders and IV vanc x 6 weeks which she completed on Thursday of last week with PICC line removed. She has f/u with Dr. Rankin on today and HH TIW with daily drsg changes on other days. Reports wound is improving. Has virtual ID visit tomorrow. Since discharge, she followed up with Dr. Dodd on 10/18/23; note revealed PTA with limb salvage revascularization and is to remain on plavix x 6 months and 3 month f/u. Reports "fluid to my thighs and legs and feet" since starting on IV vanc. Is not able to move as much as in the past or exercise. Pt has been attempting ADA diet as instructed. Reports med compliance. CBGs ranging  when checked BID. Labs reviewed with pt. Amendable to flu vaccine today.     Review of Systems     Review of Systems     See HPI for details    Constitutional: Denies Change in appetite. Denies Chills. Denies Fever. Denies Night sweats.  Eye: Denies Blurred vision. " Denies Discharge. Denies Eye pain.  ENT: Denies Decreased hearing. Denies Sore throat. Denies Swollen glands.  Respiratory: Denies Cough. Denies Shortness of breath. Denies Shortness of breath with exertion. Denies Wheezing.  Cardiovascular: Denies Chest pain at rest. Denies Chest pain with exertion. Denies Irregular heartbeat. Denies Palpitations. Admits Edema bilateral thighs.  Gastrointestinal: Denies Abdominal pain. Denies Diarrhea. Denies Nausea. Denies Vomiting. Denies Hematemesis or Hematochezia.  Genitourinary: Denies Dysuria. Denies Urinary frequency. Denies Urinary urgency. Denies Blood in urine.  Endocrine: Denies Cold intolerance. Denies Excessive thirst. Denies Heat intolerance. Denies Weight loss. Denies Weight gain.  Musculoskeletal: Denies Painful joints. Denies Weakness.  Integumentary: Denies Rash. Denies Itching. Denies Dry skin.  Neurologic: Denies Dizziness. Denies Fainting. Denies Headache.  Psychiatric: Denies Depression. Denies Anxiety. Denies Suicidal/Homicidal ideations.    All Other ROS: Negative except as stated in HPI.     Medical / Surgical / Social / Family History       ----------------------------  Cataract  DM2 w CKD  Infected blister of fifth toe      Comment:  left foot  Pneumonia  Renal hypertension  Secondary hyperparathyroidism of renal origin     Past Surgical History:   Procedure Laterality Date    APPLICATION, GRAFT Right 2023    Procedure: APPLICATION, GRAFT [place]  Right/  Rep Marlo Childs;  Surgeon: Jeremiah RODRIGUEZ DPM;  Location: [facility] OR;  Service: Podiatry;  Laterality: Right;    BONE BIOPSY Left 2023    Procedure: BIOPSY, BONE  Right 5th digit;  Surgeon: Jeremiah RODRIGUEZ DPM;  Location: [facility] OR;  Service: Podiatry;  Laterality: Left;    cataract surgery       SECTION      x3    COLONOSCOPY N/A 2019    Procedure: COLONOSCOPY;  Surgeon: Arianna Srinivasan MD;  Location: [facility];  Service: Endoscopy;  Laterality: N/A;    DEBRIDEMENT OF  FOOT Right     DEBRIDEMENT OF FOOT Right 1/26/2023    Procedure: DEBRIDEMENT, FOOT Right;  Surgeon: Jeremiah RODRIGUEZ DPM;  Location: [facility] OR;  Service: Podiatry;  Laterality: Right;    HYSTERECTOMY      KIDNEY TRANSPLANT N/A 11/15/2020    Procedure: TRANSPLANT, KIDNEY;  Surgeon: Scott Ann MD;  Location: [facility] OR [address] [place] [address];  Service: Transplant;  Laterality: N/A;    KIDNEY TRANSPLANT Left 11/16/2020    OOPHORECTOMY      PERITONEAL CATHETER INSERTION      RETINAL DETACHMENT SURGERY      TOE AMPUTATION Left 2/16/2023    Procedure: AMPUTATION, TOE  Left 5th digit;  Surgeon: Jeremiah RODRIGUEZ DPM;  Location: [facility] OR;  Service: Podiatry;  Laterality: Left;       Social History     Socioeconomic History    Marital status:    Tobacco Use    Smoking status: Never    Smokeless tobacco: Never   Substance and Sexual Activity    Alcohol use: Not Currently     Alcohol/week: 1.0 standard drink of alcohol     Types: 1 Shots of liquor per week     Comment: 1-2 a year    Drug use: No    Sexual activity: Not Currently   Social History Narrative    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)     Social Determinants of Health     Financial Resource Strain: Medium Risk (8/22/2023)    Overall Financial Resource Strain (CARDIA)     Difficulty of Paying Living Expenses: Somewhat hard   Food Insecurity: No Food Insecurity (8/22/2023)    Hunger Vital Sign     Worried About Running Out of Food in the Last Year: Never true     Ran Out of Food in the Last Year: Never true   Transportation Needs: Unmet Transportation Needs (1/3/2023)    PRAPARE - Transportation     Lack of Transportation (Medical): Yes     Lack of Transportation (Non-Medical): No   Physical Activity: Inactive (8/22/2023)    Exercise Vital Sign     Days of Exercise per Week: 0 days     Minutes of Exercise per Session: 0 min   Housing Stability: Low Risk  (1/3/2023)    Housing Stability Vital Sign     Unable to Pay for Housing in the  Last Year: No     Number of Places Lived in the Last Year: 1     Unstable Housing in the Last Year: No        Family History   Problem Relation Age of Onset    Diabetes Mother     Heart disease Mother         CHF    Hypertension Mother     Diabetes Father     Hypertension Father     HIV Sister     Diabetes Brother     Diabetes Brother     Gout Brother     Diabetes Paternal Aunt     Cancer Paternal Uncle     Diabetes Paternal Grandmother     Kidney disease Neg Hx         Medications and Allergies     Medications  Current Outpatient Medications   Medication Instructions    0.9 % sodium chloride (SODIUM CHLORIDE 0.9%) solution Intravenous    acetaminophen (TYLENOL EXTRA STRENGTH ORAL) 500 mg, Oral, Every 6 hours PRN    blood sugar diagnostic Strp 1 each, Misc.(Non-Drug; Combo Route), 3 times daily, E11.3513 (DM)    blood-glucose meter kit Use as instructed; E11.3513 (DM)    calcitRIOL (ROCALTROL) 0.25 mcg, Oral, Every Mon, Wed, Fri    clopidogreL (PLAVIX) 75 mg, Oral, Daily    colistimethate (COLYMYCIN) 150 mg injection No dose, route, or frequency recorded.    diphenhydrAMINE (BENADRYL) 25 mg, Oral, Every 6 hours PRN    famotidine/Ca carb/mag hydrox (PEPCID COMPLETE ORAL) 1 tablet, Oral, Daily PRN    fluticasone propionate (FLONASE) 50 mcg/actuation nasal spray SPRAY 2 SPRAYS INTO EACH NOSTRIL TWICE A DAY    furosemide (LASIX) 10 mg, Oral, Daily PRN, Do NOT take more than 3 days per week.    gabapentin (NEURONTIN) 300 MG capsule TAKE 1 CAPSULE BY MOUTH THREE TIMES A DAY    heparin, porcine, PF, 10 unit/mL Syrg Intravenous    HYDROcodone-acetaminophen (NORCO)  mg per tablet 1 tablet, Oral, Every 6 hours PRN    hydrOXYzine HCL (ATARAX) 25 MG tablet 1 tablet, Oral, Nightly    ketoconazole (NIZORAL) 2 % cream Topical (Top)    labetaloL (NORMODYNE) 100 MG tablet TAKE 1 TABLET BY MOUTH TWICE A DAY    lancets Misc 1 each, Misc.(Non-Drug; Combo Route), 3 times daily, E11.3513    LEVEMIR FLEXPEN 25 Units, Subcutaneous,  "Nightly    LEVEMIR FLEXPEN 28 Units, Subcutaneous, Nightly    loratadine (CLARITIN) 10 mg tablet TAKE 1 TABLET BY MOUTH EVERY DAY    metFORMIN (GLUCOPHAGE) 500 MG tablet TAKE 1 TABLET BY MOUTH TWICE A DAY WITH MEALS    mycophenolate (CELLCEPT) 1,000 mg, Oral, 2 times daily    NIFEdipine (PROCARDIA-XL) 30 mg, Oral, 2 times daily    NORMAL SALINE FLUSH injection No dose, route, or frequency recorded.    ondansetron (ZOFRAN-ODT) 4 mg, Oral, Every 6 hours PRN    pen needle, diabetic (BD RIO 2ND GEN PEN NEEDLE) 32 gauge x 5/32" Ndle To use with insulin 4 times daily.    predniSONE (DELTASONE) 5 mg, Oral, Daily    rosuvastatin (CRESTOR) 20 MG tablet TAKE 1 TABLET BY MOUTH EVERYDAY AT BEDTIME    RYBELSUS 3 mg, Oral, Daily    SANTYL ointment Topical (Top)    SHINGRIX, PF, 50 mcg/0.5 mL injection No dose, route, or frequency recorded.    silver sulfADIAZINE 1% (SILVADENE) 1 % cream 1 application , Topical (Top)    SITagliptin phosphate (JANUVIA) 100 MG Tab TAKE 1 TABLET BY MOUTH EVERY DAY    tacrolimus (PROGRAF) 1 MG Cap Take 5 capsules (5 mg total) by mouth every morning AND 5 capsules (5 mg total) every evening.    UNABLE TO FIND APPLY 1 GRAM TO INFECTION SITE. PERFORM 1-2 TIMES DAILY    vancomycin (VANCOCIN) 10 gram SolR Intravenous         Allergies  Review of patient's allergies indicates:  No Known Allergies    Physical Examination     /68 (BP Location: Left arm, Patient Position: Sitting, BP Method: Medium (Manual))   Pulse 68   Temp 97.5 °F (36.4 °C) (Oral)   Resp 18   Ht 5' 2.99" (1.6 m)   Wt 69.9 kg (154 lb 3.2 oz)   BMI 27.32 kg/m²     Physical Exam  Musculoskeletal:      Left lower le+ Edema present.   Feet:      Comments: Dressing and offloading shoe to left foot        General: Alert and oriented, No acute distress.  Head: Normocephalic, Atraumatic.  Eye: Pupils are equal, round and reactive to light, Extraocular movements are intact, Sclera non-icteric.  Ears/Nose/Throat: Normal, Mucosa " moist,Clear.  Neck/Thyroid: Supple, Non-tender, No carotid bruit, No lymphadenopathy, Full range of motion.  Respiratory: Clear to auscultation bilaterally; No wheezes, rales or rhonchi,Non-labored respirations, Symmetrical chest wall expansion.  Cardiovascular: Regular rate and rhythm, S1/S2 normal, No murmurs, rubs or gallops.  Gastrointestinal: Soft, Non-tender, Non-distended, Normal bowel sounds, No palpable organomegaly.  Extremities: No clubbing, or cyanosis  Neurologic: No focal deficits, Cranial Nerves II-XII are grossly intact, Motor strength normal upper and lower extremities, Sensory exam intact.  Psychiatric: Normal interaction, Coherent speech, Appropriate affect       Results     Lab Results   Component Value Date    WBC 5.03 10/23/2023    HGB 11.6 (L) 10/23/2023    HCT 38.3 10/23/2023     10/23/2023    CHOL 225 (H) 06/22/2023    TRIG 193 (H) 06/22/2023    ALT <5 10/23/2023    AST 12 10/23/2023     10/23/2023    K 4.1 10/23/2023     (H) 09/12/2023    CREATININE 0.76 10/23/2023    BUN 17.5 10/23/2023    CO2 25 10/23/2023    INR 1.0 09/07/2023    HGBA1C 7.0 10/23/2023     Lab Review   Recent Results (from the past 24 hour(s))   Basic metabolic panel   Collection Time: 09/12/23 4:55 AM   Result Value Ref Range   Creatinine Level 0.66 0.57 - 1.25 mg/dL   Blood Urea Nitrogen Level 7 5 - 25 mg/dL   Sodium Level 139 136 - 145 mmol/L   Potassium Level 3.8 3.5 - 5.1 mmol/L   Chloride Level 112 (H) 100 - 109 mmol/L   CO2 Level 19 (L) 22 - 33 mmol/L   Glucose Level 234 (H) 70 - 100 mg/dL   Calcium Level 8.9 8.8 - 10.6 mg/dL   Anion Gap 8 8 - 16 mmol/L   EGFR 96 mL/min/1.73mSq   CBC auto differential   Collection Time: 09/12/23 4:55 AM   Result Value Ref Range   White Blood Cell Count 5.8 4.0 - 11.0 1000/uL   Red Blood Cell Count 3.70 (L) 3.80 - 5.30 mill/uL   Hemoglobin 10.5 (L) 12.0 - 16.0 g/dL   Hematocrit 31.5 (L) 37.0 - 47.0 %   Mean Corpuscular Volume 85 80 - 100 fL   Mean Corpuscular  Hemoglobin Conc 33.3 31.0 - 37.0 g/dL   Red Cell Distribution Width 15.0 (H) 12.1 - 14.9 %   Platelet Count 265 150 - 375 K/uL   Mean Platelet Volume 9.6 6.5 - 12.0 fL   Neutrophils Abs 2.9 1.5 - 10.0 1000/UL   Lymphocytes Abs 2.2 1.3 - 2.9 1000/ul   Monocytes Abs 0.5 0.1 - 1.0 1000/ul   Eosinophils Abs 0.2 0.0 - 0.7 1000/UL   Basophils Abs 0.0 0.0 - 0.1 1000/UL   Neutrophils % 50 44 - 81 %   Lymphocytes % 38 21 - 47 %   Monocytes % 9 2 - 11 %   Eosinophils % 3 0 - 7 %   Basophils % 1 0 - 1 %   nRBC 0.0 0.0 - 0.0 /100 WBCs   NRBC Absolute <0.01 (L) 0.03 - 0.11 1000/ul   Immature Granulocytes 0.5 0.0 - 0.6 %   Immature Grans (Abs) 0.03 0.00 - 0.09 1000/ul   Immature Platelet Fraction 2.4 1.6 - 4.9 %   POCT glucose   Collection Time: 09/12/23 6:14 AM   Result Value Ref Range   Blood Glucose, Bedside  (H) 70 - 100 mg/dL   POCT glucose   Collection Time: 09/12/23 11:49 AM   Result Value Ref Range   Blood Glucose, Bedside  (H) 70 - 100 mg/dL   POCT glucose   Collection Time: 09/12/23 4:43 PM   Result Value Ref Range   Blood Glucose, Bedside  (H) 70 - 100 mg/dL     Assessment and Plan (including Health Maintenance)     Plan:     1. Uncontrolled type 2 diabetes mellitus with hyperglycemia  A1C 7.0 at goal. Previous A1C 9.2.   Continue rybelsus, levemir, januvia and metformin as prescribed.  Instructed should be drinking glucerna not ensure. Understanding voiced.   Follow ADA diet.  Avoid soda, simple sweets, and limit rice/pasta/bread/starches and consume brown options when possible.   Maintain healthy weight with BMI goal <30.   Perform aerobic exercise for 150 minutes per week (or 5 days a week for 30 minutes each day).   Examine feet daily.   Obtain annual dilated eye exam.  Eye exam: 4/11/23  Foot exam: 4/3/23  - Comprehensive Metabolic Panel; Future  - Hemoglobin A1C; Future  - Lipid Panel; Future  - Urinalysis, Reflex to Urine Culture; Future  - Microalbumin/Creatinine Ratio, Urine; Future    2.  Diabetic infection of left foot  Keep ID and podiatry appts as scheduled.  Continue wound care as ordered.   Maintain adequate glucose control.  Reports s/s of infection immediately.    3. Chronic multifocal osteomyelitis of left foot  Keep ID and podiatry appts as scheduled.  Continue wound care as ordered.   Maintain adequate glucose control.  Reports s/s of infection immediately.    4. Edema of both lower extremities  Discussed with nephrology NP, Anali Moura.  Ok to rx lasix 10 mg daily prn edema; do not take more than 3x/weekly.  Explained to pt; understanding voiced.   Keep the affected body part raised (elevated) above the level of your heart when you are sitting or lying down.  Do not sit still or stand for long periods of time.  Do not wear tight clothing. Do not wear garters on your upper legs.  Exercise your legs to get your circulation going. This helps to move the fluid back into your blood vessels, and it may help the swelling go down.  Eat a low-salt (low-sodium) diet to reduce fluid.  Take over-the-counter and prescription medicines only as told by your health care provider.    5. Need for vaccination  - influenza 65up-adj (QUADRIVALENT ADJUVANTED PF) vaccine 0.5 mL    Problem List Items Addressed This Visit          Cardiac/Vascular    Hypertension (Chronic)    Relevant Orders    TSH       ID    Chronic multifocal osteomyelitis of left foot (Chronic)       Endocrine    Uncontrolled type 2 diabetes mellitus with hyperglycemia    Overview     A1C 8.2. Previous A1C 9.2.  Increase basaglar to 28 units.  Continue januvia and metformin.         Relevant Orders    Comprehensive Metabolic Panel    Hemoglobin A1C    Lipid Panel    Urinalysis, Reflex to Urine Culture    Microalbumin/Creatinine Ratio, Urine    Diabetic infection of left foot       Other    Edema of both lower extremities     Other Visit Diagnoses       Need for vaccination    -  Primary    Relevant Medications    influenza 65up-adj  (QUADRIVALENT ADJUVANTED PF) vaccine 0.5 mL (Completed)              Health Maintenance Due   Topic Date Due    Colorectal Cancer Screening  03/27/2020    Shingles Vaccine (2 of 2) 12/07/2022    COVID-19 Vaccine (4 - 2023-24 season) 09/01/2023    Mammogram  01/20/2024       Follow up in about 4 months (around 2/23/2024) for Follow up, Diabetes, HTN.        Signature:  LYNN Miranda  [facility]  [address] [facility] [address]

## 2023-10-27 DIAGNOSIS — N18.9 CHRONIC KIDNEY DISEASE, UNSPECIFIED CKD STAGE: ICD-10-CM

## 2023-10-27 DIAGNOSIS — Z94.0 RENAL TRANSPLANT RECIPIENT: ICD-10-CM

## 2023-10-27 DIAGNOSIS — E78.5 HYPERLIPIDEMIA, UNSPECIFIED HYPERLIPIDEMIA TYPE: ICD-10-CM

## 2023-10-27 DIAGNOSIS — I10 HYPERTENSION, UNSPECIFIED TYPE: ICD-10-CM

## 2023-10-31 RX ORDER — LABETALOL 100 MG/1
TABLET, FILM COATED ORAL
Qty: 180 TABLET | Refills: 1 | Status: SHIPPED | OUTPATIENT
Start: 2023-10-31

## 2023-10-31 RX ORDER — ROSUVASTATIN CALCIUM 20 MG/1
TABLET, COATED ORAL
Qty: 90 TABLET | Refills: 3 | Status: SHIPPED | OUTPATIENT
Start: 2023-10-31

## 2023-11-14 NOTE — TELEPHONE ENCOUNTER
Patient called in today and stated her HH services will be finishing on next Tuesday but she needs her time extended because she was placed on wound vac this week and will need the help longer. She is requesting another 4 weeks. Please advise.

## 2023-11-14 NOTE — PROGRESS NOTES
The patient location is: home in [place]  The chief complaint leading to consultation is: reassess immunosuppression and kidney function    Visit type: audiovisual    Face to Face time with patient: 20 minutes of total time spent on the encounter, which includes face to face time and non-face to face time preparing to see the patient (eg, review of tests), Obtaining and/or reviewing separately obtained history, Documenting clinical information in the electronic or other health record, Independently interpreting results (not separately reported) and communicating results to the patient/family/caregiver, or Care coordination (not separately reported).     Each patient to whom he or she provides medical services by telemedicine is:  (1) informed of the relationship between the physician and patient and the respective role of any other health care provider with respect to management of the patient; and (2) notified that he or she may decline to receive medical services by telemedicine and may withdraw from such care at any time.      Post-Transplant Assessment    Referring Physician: Anali Moura  Current Nephrologist: Ann Marie Scruggs    ORGAN: LEFT KIDNEY  Donor Type: donation after brain death  PHS Increased Risk: no  Cold Ischemia: 801 mins  Induction Medications: thymoglobulin    Subjective:     CC:  Reassessment of renal allograft function and management of chronic immunosuppression.    HPI:  Ms. Malik is a 68 y.o. 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  She has CKD stage 2 - GFR 60-89 and her baseline creatinine is between 0.7-0.9. She takes mycophenolate mofetil, prednisone and tacrolimus for maintenance immunosuppression.      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 cPRA 91%. CIT 13+h. KDPI - 29%, cPRA 90%  ORTHOSTATIC HYPOTENSION  2/4-2/9 /2022  COVID, PNA, neutropenia      POST TRANSPLANT  "UPDATE 11/14/2023   Kendra is now 2 yrs 11 months  post kidney transplant. She feels "fanstastic."  Just had upper eye left. Left foot wound with wound vac and completed 6 weeks of antibiotic 3 weeks ago. She has home health nursing that come to the house. She has seen nephrology, Dr. JAYCOB Scruggs, next appointment 12/1/2023. but cannot get f/u appt d/t needing a referral at a new location [[place]] per that office.  She denies fever, CP, SOB, GI issues, problems voiding, edema. She reports she is tolerating IS well.    Current Outpatient Medications   Medication Sig    0.9 % sodium chloride (SODIUM CHLORIDE 0.9%) solution Inject into the vein.    acetaminophen (TYLENOL EXTRA STRENGTH ORAL) Take 500 mg by mouth every 6 (six) hours as needed.    blood sugar diagnostic Strp 1 each by Misc.(Non-Drug; Combo Route) route 3 (three) times daily. E11.3513 (DM)    blood-glucose meter kit Use as instructed; E11.3513 (DM)    calcitRIOL (ROCALTROL) 0.25 MCG Cap Take 1 capsule (0.25 mcg total) by mouth every Mon, Wed, Fri.    clopidogreL (PLAVIX) 75 mg tablet Take 75 mg by mouth once daily.    colistimethate (COLYMYCIN) 150 mg injection     diphenhydrAMINE (BENADRYL) 25 mg capsule Take 25 mg by mouth every 6 (six) hours as needed for Itching.    famotidine/Ca carb/mag hydrox (PEPCID COMPLETE ORAL) Take 1 tablet by mouth daily as needed.    fluticasone propionate (FLONASE) 50 mcg/actuation nasal spray SPRAY 2 SPRAYS INTO EACH NOSTRIL TWICE A DAY    furosemide (LASIX) 20 MG tablet Take 0.5 tablets (10 mg total) by mouth daily as needed (edema or swelling). Do NOT take more than 3 days per week.    gabapentin (NEURONTIN) 300 MG capsule TAKE 1 CAPSULE BY MOUTH THREE TIMES A DAY    heparin, porcine, PF, 10 unit/mL Syrg Inject into the vein.    HYDROcodone-acetaminophen (NORCO)  mg per tablet Take 1 tablet by mouth every 6 (six) hours as needed.    hydrOXYzine HCL (ATARAX) 25 MG tablet Take 1 tablet by mouth every evening.    " "insulin detemir U-100, Levemir, (LEVEMIR FLEXPEN) 100 unit/mL (3 mL) InPn pen Inject 25 Units into the skin every evening. (Patient not taking: Reported on 10/23/2023)    insulin detemir U-100, Levemir, (LEVEMIR FLEXPEN) 100 unit/mL (3 mL) InPn pen Inject 28 Units into the skin every evening.    ketoconazole (NIZORAL) 2 % cream Apply topically.    labetaloL (NORMODYNE) 100 MG tablet TAKE 1 TABLET BY MOUTH TWICE A DAY    lancets Misc 1 each by Misc.(Non-Drug; Combo Route) route 3 (three) times daily. E11.3513    loratadine (CLARITIN) 10 mg tablet TAKE 1 TABLET BY MOUTH EVERY DAY    metFORMIN (GLUCOPHAGE) 500 MG tablet TAKE 1 TABLET BY MOUTH TWICE A DAY WITH MEALS    mycophenolate (CELLCEPT) 250 mg Cap Take 4 capsules (1,000 mg total) by mouth 2 (two) times daily.    NIFEdipine (PROCARDIA-XL) 30 MG (OSM) 24 hr tablet Take 1 tablet (30 mg total) by mouth 2 (two) times a day.    NORMAL SALINE FLUSH injection     ondansetron (ZOFRAN-ODT) 4 MG TbDL Take 1 tablet (4 mg total) by mouth every 6 (six) hours as needed (nausea).    pen needle, diabetic (BD RIO 2ND GEN PEN NEEDLE) 32 gauge x 5/32" Ndle To use with insulin 4 times daily.    predniSONE (DELTASONE) 5 MG tablet Take 1 tablet (5 mg total) by mouth once daily.    rosuvastatin (CRESTOR) 20 MG tablet TAKE 1 TABLET BY MOUTH EVERYDAY AT BEDTIME    SANTYL ointment Apply topically.    semaglutide (RYBELSUS) 3 mg tablet Take 1 tablet (3 mg total) by mouth once daily.    SHINGRIX, PF, 50 mcg/0.5 mL injection     silver sulfADIAZINE 1% (SILVADENE) 1 % cream Apply 1 application topically.    SITagliptin phosphate (JANUVIA) 100 MG Tab TAKE 1 TABLET BY MOUTH EVERY DAY    tacrolimus (PROGRAF) 1 MG Cap Take 5 capsules (5 mg total) by mouth every morning AND 5 capsules (5 mg total) every evening.    UNABLE TO FIND APPLY 1 GRAM TO INFECTION SITE. PERFORM 1-2 TIMES DAILY    vancomycin (VANCOCIN) 10 gram SolR Inject into the vein.     No current facility-administered medications for " this visit.       Review of Systems   Constitutional:  Negative for fever.   HENT:  Negative for mouth sores.    Eyes:  Negative for visual disturbance.   Respiratory:  Negative for shortness of breath.    Cardiovascular:  Negative for chest pain and leg swelling.   Gastrointestinal: Negative.    Genitourinary:  Negative for decreased urine volume, difficulty urinating, dysuria and hematuria.   Musculoskeletal: Negative.    Skin:  Negative for rash.   Allergic/Immunologic: Positive for immunocompromised state.   Neurological:  Negative for tremors.       Objective:     There were no vitals taken for this visit.body mass index is unknown because there is no height or weight on file.    Physical Exam  Deferred due to AV visit  Recent Labs   Lab 11/15/20  1710 11/15/20  1735 11/15/20  2318 02/07/21  0538 02/08/21  0529 02/09/21  0318 02/09/21  0319 01/25/22  1007 01/25/22  1007 03/18/22  0753 05/06/22  0939 12/01/22  0455 03/06/23  0853 06/01/23  2227 09/08/23  0654 09/10/23  0547 09/12/23  0455 09/14/23  1155 10/23/23  0908   WBC  --  8.72   < > 3.55 L 3.03 L  --  2.73 L  --    < > 4.9 6.0  --  4.7  4.7  --   --   --   --   --  5.03   Hgb  --   --   --   --   --   --   --   --    < > 12.6 13.3  --  9.2 L  --   --   --   --   --  11.6 L   Hemoglobin  --  10.8 L   < > 11.4 L 11.2 L  --  11.1 L  --   --   --   --   --   --   --   --   --   --   --   --    POC Hematocrit  --   --    < >  --   --   --   --   --   --   --   --   --   --   --   --   --   --   --   --    Hematocrit  --  34.1 L   < > 35.9 L 36.3 L  --  35.2 L  --   --   --   --   --   --   --   --   --   --   --   --    Hct  --   --   --   --   --   --   --   --    < > 39.8 42.6  --  29.0 L  --   --   --   --   --  38.3   Sodium  --  144   < > 144  144 145  145 140 140  --   --   --   --    < >  --   --  139 139 139  --   --    Sodium Level  --   --   --   --   --   --   --  139   < > 142 141  --  138  --   --   --   --   --  141   Potassium  --  3.5   < >  3.5  3.5 3.4 L  3.5 3.2 L 3.2 L  --   --   --   --    < >  --   --  4.3 3.7 3.8  --   --    Potassium Level  --   --   --   --   --   --   --  4.6   < > 3.8 3.9  --  4.5  --   --   --   --   --  4.1   Chloride  --  106   < > 110  110 111 H  111 H 105 105  --   --   --   --    < >  --   --  108 110 H 112 H  --   --    CO2  --  25   < > 24  25 24  26 23 22 L  --   --   --   --   --   --   --   --   --   --   --   --    Carbon Dioxide  --   --   --   --   --   --   --  27   < > 29 30   < > 25  --  23 23 19 L  --  25   BUN  --  46 H   < > 12  12 11  12 11 11  --   --   --   --   --   --   --   --   --   --   --   --    Blood Urea Nitrogen  --   --   --   --   --   --   --  12.0   < > 16.0 15.8   < > 12.5  --  10 5 7  --  17.5   Creatinine  --  5.9 H   < > 0.8  0.7 0.7  0.7 0.7 0.7 0.97   < > 0.82 0.83   < > 0.75   < > 0.82 0.69 0.66 0.75 0.76   eGFR if non   --  7.0 A   < > >60.0  >60.0 >60.0  >60.0 >60.0 >60.0  --   --   --   --   --   --   --   --   --   --   --   --    Estimated GFR-Non   --   --   --   --   --   --   --  61 L  --  74  --   --   --   --   --   --   --   --   --    Estimated GFR-  --   --   --   --   --   --   --  74 L  --  90 >60  --   --   --   --   --   --   --   --    Glucose Level  --   --   --   --   --   --   --  304 H   < > 89 122 H  --  162 H  --   --   --   --   --  214 H   Calcium  --  8.9   < > 9.3  9.3 8.7  9.1 9.0 9.1  --   --   --   --    < >  --   --  10.0 8.8 8.9  --   --    Calcium Level Total  --   --   --   --   --   --   --  10.7 H   < > 11.1 10.4 H  --  10.0  --   --   --   --   --  10.3 H   Phosphorus Level  --   --   --   --   --   --   --   --   --   --  3.5  --   --   --   --   --   --   --   --    Phosphorus  --  4.3   < > 3.1 3.0 2.6 L  --   --   --   --   --   --   --   --   --   --   --   --   --    Magnesium  --   --    < > 1.7 1.5 L 1.7  --   --   --   --   --   --   --   --   --   --   --   --   --     Magnesium Level  --   --   --   --   --   --   --   --   --   --  1.50 L  --   --   --   --   --   --   --   --    Albumin  --  3.2 L   < > 2.7 L  2.6 L 2.7 L  2.7 L 2.8 L 2.8 L  --   --   --   --   --   --   --   --   --   --   --   --    Albumin Level  --   --   --   --   --   --   --  3.7   < > 3.9 3.8  --  3.3 L  --   --   --   --   --  3.8   AST  --  18   < > 27 27  --  34  --   --   --   --   --   --   --   --   --   --   --   --    Aspartate Aminotransferase  --   --   --   --   --   --   --  15   < > 13 16  --  12  --   --   --   --   --  12   ALT  --  12   < > 9 L 8 L  --  10  --   --   --   --   --   --   --   --   --   --   --   --    Alanine Aminotransferase  --   --   --   --   --   --   --  8   < > 7 8  --  5  --   --   --   --   --  <5   Prot/Creat Ratio, Urine 1.64 H  --   --   --   --   --   --   --   --   --   --   --   --   --   --   --   --   --   --    PTH, Intact  --  485.0 H  --   --   --   --   --   --   --   --   --   --   --   --   --   --   --   --   --    Parathyroid Hormone Intact  --   --   --   --   --   --   --   --   --   --  272.9 H  --   --   --   --   --   --   --   --    Tacrolimus Lvl  --   --    < > 5.0 4.8 L  --  5.1  --   --   --   --   --   --   --   --   --   --   --   --     < > = values in this interval not displayed.       Recent Labs   Lab 02/02/21  0806 02/04/21  0939 02/11/21  1200 11/09/21  0931 05/03/22  0831 11/03/22  0855 11/07/22  0917   EXT  L 580 H  --   --   --   --   --    EXT WBC 1.35 LL 1.29 A   < > 4.53 5.23 4.17 4.17   EXT SEGS% 52.6 45.0   < > 84.1 H 49.5 49.4 49.4   EXT Platelets 136 A 144   < > 161 157 167 167   EXT Hemoglobin 12.9 12.9   < > 14.2 13.0 12.9 12.9   EXT Hematocrit 40.7 38.7   < > 44.2 41.1 41.4 41.4   EXT Creatinine 0.81  --    < > 0.88 0.74 0.89 0.89   EXT Sodium 143  --    < > 142 142 142 142   EXT Potassium 4.1  --    < > 4.5 3.9 4.3 4.3   EXT BUN 13.87 A  --    < > 16.62 15.08 14.33 14.33   EXT CO2 22 A  --    < > 26 23 26  26   EXT Calcium 10.0  --    < > 10.1 10.3 H 10.1 10.1   EXT Phosphorus 2.1 A  --    < > 2.7 2.2 L 3.0 3.0   EXT Glucose 105  --    < > 273 H 155 H 185 H  --    EXT Albumin 3.3 L  --    < > 3.9  --  3.7 3.7   EXT AST  --   --    < > 14  --  9 9   EXT ALT  --   --    < > 6  --  <5 <5   EXT BilirubiN Total  --   --    < > 0.5  --  0.5 0.5    < > = values in this interval not displayed.       Recent Labs   Lab 02/17/21  0949 04/05/21  0740 04/12/21  0845 05/03/22  0831 11/03/22  0855 11/07/22  0917   EXT Tacrolimus Lvl 3.57 A 5.72   < > 7.56 6.42 6.42   EXT PROT/CREAT Ratio UR  --  0.09   < > 0.09 0.08 0.08   EXT PTH, Intact 463.8 267.5 H  --   --   --   --    EXT Protein UA neg negative   < > negative negative negative   EXT WBC UA 0-3 0-3   < > 3-5 none seen negative   EXT RBC UA  --  0-3   < > none seen none seen negative    < > = values in this interval not displayed.     Labs were reviewed with the patient.    Assessment:     1. S/P kidney transplant    2. Primary hypertension    3. Long term current use of immunosuppressive drug    4. 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  Uncomplicated postoperative course. Doing well overall.   -Doing well, following with local nephrologist  - cr remains stable   -Last PC ratio 0.08 11/3/22.       Encounter for Monitoring Immunosuppression post Transplant  Current external tacro level is 6.4, acceptable  -continue tacrolimus, pednisone and mycophenolate  -Recheck as per guidelines.  -Monitor for side effects and toxicities, given narrow therapeutic window and significant risk of AE. No evidence of toxicity.    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 Reminded to see PCP and other local providers, and that transplant will remain available.     Follow-up:   Clinic: return to transplant clinic weekly for the first month after transplant; every 2 weeks during months 2-3; then at 6-, 9-, 12-, 18-, 24-, and 36- months post-transplant to reassess for complications from immunosuppression toxicity and monitor for rejection.  Annually thereafter.    Labs: since patient remains at high risk for rejection and drug-related complications that warrant close monitoring, labs will be ordered as follows: continue twice weekly CBC, renal panel, and drug level for first month; then same labs once weekly through 3rd month post-transplant.  Urine for UA and protein/creatinine ratio monthly.  Serum BK - PCR at 1-, 3-, 6-, 9-, 12-, 18-, 24-, 36- 48-, and 60 months post-transplant.  Hepatic panel at 1-, 2-, 3-, 6-, 9-, 12-, 18-, 24-, and 36- months post-transplant.    Rose Reynaga NP       UNOS Patient Status  Functional Status: 90% - Able to carry on normal activity: minor symptoms of disease  Physical Capacity: No Limitations

## 2023-11-14 NOTE — TELEPHONE ENCOUNTER
Contacted patient and notified her that she needed to request extended time through wound care, she verbalized understanding.

## 2023-11-15 ENCOUNTER — OFFICE VISIT (OUTPATIENT)
Dept: TRANSPLANT | Facility: CLINIC | Age: 68
End: 2023-11-15
Payer: MEDICARE

## 2023-11-15 ENCOUNTER — DOCUMENTATION ONLY (OUTPATIENT)
Dept: TRANSPLANT | Facility: CLINIC | Age: 68
End: 2023-11-15

## 2023-11-15 ENCOUNTER — PATIENT MESSAGE (OUTPATIENT)
Dept: TRANSPLANT | Facility: CLINIC | Age: 68
End: 2023-11-15

## 2023-11-15 DIAGNOSIS — Z94.0 S/P KIDNEY TRANSPLANT: Primary | Chronic | ICD-10-CM

## 2023-11-15 DIAGNOSIS — Z79.4 TYPE 2 DIABETES MELLITUS WITH STABLE PROLIFERATIVE RETINOPATHY OF BOTH EYES, WITH LONG-TERM CURRENT USE OF INSULIN: Chronic | ICD-10-CM

## 2023-11-15 DIAGNOSIS — E11.3553 TYPE 2 DIABETES MELLITUS WITH STABLE PROLIFERATIVE RETINOPATHY OF BOTH EYES, WITH LONG-TERM CURRENT USE OF INSULIN: Chronic | ICD-10-CM

## 2023-11-15 DIAGNOSIS — I10 PRIMARY HYPERTENSION: Chronic | ICD-10-CM

## 2023-11-15 DIAGNOSIS — Z94.0 KIDNEY REPLACED BY TRANSPLANT: ICD-10-CM

## 2023-11-15 DIAGNOSIS — Z79.899 LONG TERM CURRENT USE OF IMMUNOSUPPRESSIVE DRUG: Chronic | ICD-10-CM

## 2023-11-15 LAB
EXT BUN: 16.99 (ref 9.8–20.1)
EXT CALCIUM: 10 (ref 8.4–10.2)
EXT CHLORIDE: 112 (ref 98–107)
EXT CO2: 23 (ref 23–31)
EXT CREATININE UA: 208.4 (ref 47–110)
EXT CREATININE: 0.7 MG/DL (ref 0.57–1.11)
EXT EGFR NO RACE VARIABLE: >60
EXT EOSINOPHIL%: 3.3 (ref 0.7–5.8)
EXT GLUCOSE UA: NEGATIVE
EXT GLUCOSE: 111 (ref 82–115)
EXT HEMATOCRIT: 40.1 (ref 34.1–44.9)
EXT HEMOGLOBIN: 12.2 (ref 11.2–15.7)
EXT LYMPH%: 35.1 (ref 19.3–51.7)
EXT MONOCYTES%: 12.2 (ref 4.7–12.5)
EXT NITRITES UA: NEGATIVE
EXT PLATELETS: 212 (ref 140–369)
EXT POTASSIUM: 4.3 (ref 3.5–5.1)
EXT PROT/CREAT RATIO UR: 92 (ref 10–107)
EXT PROTEIN UA: ABNORMAL
EXT SEGS%: 48.5 (ref 34–71.1)
EXT SODIUM: 143 MMOL/L (ref 136–145)
EXT TACROLIMUS LVL: 5.71
EXT URINE PROTEIN: 19.14 (ref 1–14)
EXT WBC: 4.25 (ref 3.98–10.04)

## 2023-11-15 PROCEDURE — 3051F HG A1C>EQUAL 7.0%<8.0%: CPT | Mod: CPTII,95,, | Performed by: NURSE PRACTITIONER

## 2023-11-15 PROCEDURE — 3061F NEG MICROALBUMINURIA REV: CPT | Mod: CPTII,95,, | Performed by: NURSE PRACTITIONER

## 2023-11-15 PROCEDURE — 99214 OFFICE O/P EST MOD 30 MIN: CPT | Mod: 95,,, | Performed by: NURSE PRACTITIONER

## 2023-11-15 PROCEDURE — 3066F PR DOCUMENTATION OF TREATMENT FOR NEPHROPATHY: ICD-10-PCS | Mod: CPTII,95,, | Performed by: NURSE PRACTITIONER

## 2023-11-15 PROCEDURE — 3061F PR NEG MICROALBUMINURIA RESULT DOCUMENTED/REVIEW: ICD-10-PCS | Mod: CPTII,95,, | Performed by: NURSE PRACTITIONER

## 2023-11-15 PROCEDURE — 99214 PR OFFICE/OUTPT VISIT, EST, LEVL IV, 30-39 MIN: ICD-10-PCS | Mod: 95,,, | Performed by: NURSE PRACTITIONER

## 2023-11-15 PROCEDURE — 3051F PR MOST RECENT HEMOGLOBIN A1C LEVEL 7.0 - < 8.0%: ICD-10-PCS | Mod: CPTII,95,, | Performed by: NURSE PRACTITIONER

## 2023-11-15 PROCEDURE — 3066F NEPHROPATHY DOC TX: CPT | Mod: CPTII,95,, | Performed by: NURSE PRACTITIONER

## 2023-11-15 RX ORDER — MYCOPHENOLATE MOFETIL 250 MG/1
1000 CAPSULE ORAL 2 TIMES DAILY
Qty: 240 CAPSULE | Refills: 11 | Status: SHIPPED | OUTPATIENT
Start: 2023-11-15 | End: 2024-11-14

## 2023-11-15 NOTE — LETTER
November 15, 2023        Ann Marie Scruggs  [address] [facility] [address]  Phone: [phone]  Fax: [phone]             [facility] 1st [facility]  [address]  [facility] [address]  Phone: [phone]   Patient: Kendra Malik   MR Number: [id number]   YOB: 1955   Date of Visit: 11/15/2023       Dear Dr. Ann Marie Scruggs    Thank you for referring Kendra Malik to me for evaluation. Attached you will find relevant portions of my assessment and plan of care.    If you have questions, please do not hesitate to call me. I look forward to following Kendra Malik along with you.    Sincerely,    Rose Reynaga, NP    Enclosure    If you would like to receive this communication electronically, please contact [email or web address] or [phone] to request [employer] access.    [employer] is a tool which provides read-only access to select patient information with whom you have a relationship. Its easy to use and provides real time access to review your patients record including encounter summaries, notes, results, and demographic information.    If you feel you have received this communication in error or would no longer like to receive these types of communications, please e-mail [email or web address]

## 2023-11-22 DIAGNOSIS — M54.50 ACUTE BILATERAL LOW BACK PAIN WITHOUT SCIATICA: Primary | ICD-10-CM

## 2023-11-22 RX ORDER — DICLOFENAC EPOLAMINE 0.01 G/1
SYSTEM TOPICAL
OUTPATIENT
Start: 2023-11-22

## 2023-11-22 RX ORDER — LIDOCAINE 50 MG/G
1 PATCH TOPICAL DAILY PRN
Qty: 15 PATCH | Refills: 1 | Status: SHIPPED | OUTPATIENT
Start: 2023-11-22

## 2023-11-22 RX ORDER — DICLOFENAC EPOLAMINE 0.01 G/1
SYSTEM TOPICAL
COMMUNITY
End: 2024-01-25

## 2023-11-22 RX ORDER — LIDOCAINE 50 MG/G
1 PATCH TOPICAL
COMMUNITY
End: 2023-11-22 | Stop reason: SDUPTHER

## 2023-11-22 NOTE — TELEPHONE ENCOUNTER
Pt notified via phone that Rx for Lidoderm will be sent as per provider directions to her pharmacy but PA will be needed and may be filled sometimes next week depending on when reviewed by insurance. Pt verbalized understanding. Pt states using assistive devices for ambulation while having wound vac since last week per podiatrist.

## 2023-12-01 ENCOUNTER — OFFICE VISIT (OUTPATIENT)
Dept: NEPHROLOGY | Facility: CLINIC | Age: 68
End: 2023-12-01
Payer: MEDICARE

## 2023-12-01 DIAGNOSIS — Z94.0 S/P KIDNEY TRANSPLANT: Primary | Chronic | ICD-10-CM

## 2023-12-01 DIAGNOSIS — Z79.4 TYPE 2 DIABETES MELLITUS WITH STABLE PROLIFERATIVE RETINOPATHY OF BOTH EYES, WITH LONG-TERM CURRENT USE OF INSULIN: Chronic | ICD-10-CM

## 2023-12-01 DIAGNOSIS — E11.3553 TYPE 2 DIABETES MELLITUS WITH STABLE PROLIFERATIVE RETINOPATHY OF BOTH EYES, WITH LONG-TERM CURRENT USE OF INSULIN: Chronic | ICD-10-CM

## 2023-12-01 DIAGNOSIS — Z79.899 LONG TERM CURRENT USE OF IMMUNOSUPPRESSIVE DRUG: Chronic | ICD-10-CM

## 2023-12-01 DIAGNOSIS — I10 PRIMARY HYPERTENSION: Chronic | ICD-10-CM

## 2023-12-01 RX ORDER — CEPHALEXIN 500 MG/1
500 CAPSULE ORAL 2 TIMES DAILY
COMMUNITY
Start: 2023-11-29 | End: 2024-01-25 | Stop reason: ALTCHOICE

## 2023-12-01 RX ORDER — NEOMYCIN SULFATE, POLYMYXIN B SULFATE, AND DEXAMETHASONE 3.5; 10000; 1 MG/G; [USP'U]/G; MG/G
OINTMENT OPHTHALMIC
COMMUNITY
Start: 2023-11-29 | End: 2024-01-25 | Stop reason: ALTCHOICE

## 2023-12-01 NOTE — PROGRESS NOTES
Established Patient - Audio Only Telehealth Visit     The patient location is: [place]  The chief complaint leading to consultation is: Follow up  Visit type: Virtual visit with audio only (telephone)  Total time spent with patient: 15 minutes       The reason for the audio only service rather than synchronous audio and video virtual visit was related to technical difficulties or patient preference/necessity.     Each patient to whom I provide medical services by telemedicine is:  (1) informed of the relationship between the physician and patient and the respective role of any other health care provider with respect to management of the patient; and (2) notified that they may decline to receive medical services by telemedicine and may withdraw from such care at any time. Patient verbally consented to receive this service via voice-only telephone call.       HPI:      Kendra Malik is a 68 y.o. female with PMH, CKD5 now s/p kidney transplant (2020), type 2 diabetes, hypertension, hyperlipidemia, AOCD, vitamin D toxicity, hypercalcemia and hyperparathyroidism presenting to nephrology clinic today for  F/U visit. Patient received her renal transplant in [place] in November 2020. Patient continues on immunosuppression with tacrolimus, mycophenolate, daily prednisone 2.5 mg oral tablet. Patient reports compliance with all medications and denies any drug/tobacco use. Recently treated for osteomyelitis of left foot with 6 weeks of antibiotics. Still follows with wound care, has home health. No acute concerns today. Reports BP this morning taken at home stable 120s/60s. Last visit with transplant provider 11/15/2023 via telemedicine.      Review of Systems :  Constitutional:  Negative for fever.   HENT:  Negative for mouth sores.    Eyes:  Negative for visual disturbance.   Respiratory:  Negative for shortness of breath.    Cardiovascular:  Negative for chest pain and leg swelling.   Gastrointestinal: Negative.     Genitourinary:  Negative for decreased urine volume, difficulty urinating, dysuria and hematuria.   Musculoskeletal: Negative.    Skin:  Negative for rash. Positive for wound on left foot.  Allergic/Immunologic: Positive for immunocompromised state.   Neurological:  Negative for tremors.     Telemedicine Exam:  General: Alert and oriented  Pulm: Speaking in full sentences  Neuro: Alert and oriented, normal speech  Psych: Normal mood and affect    Assessment and plan:       H/O CKD V s/p Renal Transplant 2020  Current GFR > 60  Chronic Immunosuppression Therapy   - received renal transplant in Nov 2020 in [place], history of uncontrolled type 2 diabetes and hypertension  - Immunosuppression on Tacrolimus 5mg BID, Mycophenolate 1000 BID , and Prednisolone 2.5 mg once daily  -Continue recommendations post renal transplant per guidelines, labs required every visit include CMP/magnesium/phosphorus/CBC with differential/tacrolimus level/urinalysis/UPCR/fasting blood glucose  - recommendations post renal transplant per guidelines, labs required every 3 months or every visit include hemoglobin A1c, fasting lipid profile  - recommendations post renal transplant per guidelines, labs required at least every 6 to 12 months include PTH and 25-OH Vit D  - recommendations post renal transplant per guidelines, labs required at 12, 18, and 24 months post transplant include BK virus blood and/or urine PCR testing   - Last UPC wnl, UA unremarkable, eGFR > 60, BUN/Cr  16.99/0.70 11/9/2023  -Tacrolimus levels therapeutic as of 711/9/2023  - today ordered the following labs to be reviewed at next visit: CBC, CMP, magnesium, phosphorous, urine protein creatinine, UA, PTH intact, Vitamin D, tacrolimus level    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    Primary Hypertension, well controlled.  -Currently on labetalol 100 bid, nifedipine 30 BID. Continue. Further management per PCP.    Post-Renal Transplant Vaccine Recommendations:  - Flu Vaccine: provided 10/2022  - Pneumovax: provided 3/18/2022  - Hep A vaccine: received 2018  - Hep B vaccine: no official record available in chart however patient did receive vaccination when dialysis was initiated  - TDap vaccine: received 2017  - Shingrix vaccine: dose #1 10/2022, due for second  - Polio vaccine: received in 1962  - Meningococcal vaccine: Offer at next visit  - COVID vaccine: completed 2 dose series in May 2021, June 2021, completed booster in December 2021        Continue following measures:  -follow 2 gram a day dietary sodium restriction  -control diabetes (goal A1c less than 7%)  -control high blood pressure (goal blood pressure is less than 130/80, please check blood pressure twice a week and bring blood pressure logs to office visit)  -exercise at least 30 minutes a day, 5 days a week  -maintain healthy weight  -decrease or stop alcohol use  -do not smoke  -stay well hydrated (drink water only, avoid juices, sweet tea, and sodas)  -ask about staying up-to-date on vaccinations (flu vaccine, pneumonia vaccine, hepatitis B vaccine)  -avoid excessive use of NSAIDs (ibuprofen, naproxen, Aleve, Advil, Toradol, Mobic), take Tylenol as needed for headache or mild pain  -take cholesterol lowering medications if prescribed (LDL goal less than 100)     Follow up in 4 months with labs prior       Sathish Whittington MD  \[facility]\"" Internal Medicine, PGY3       This service was not originating from a related E/M service provided within the previous 7 days nor will  to an E/M service or procedure within the next 24  hours or my soonest available appointment.  Prevailing standard of care was able to be met in this audio-only visit.

## 2023-12-04 ENCOUNTER — TELEPHONE (OUTPATIENT)
Dept: INTERNAL MEDICINE | Facility: CLINIC | Age: 68
End: 2023-12-04
Payer: MEDICARE

## 2023-12-04 NOTE — TELEPHONE ENCOUNTER
Please contact the pt and schedule a virtual visit this month to discuss possible diabetic medication changes. Thanks

## 2023-12-07 NOTE — TELEPHONE ENCOUNTER
Call received from Doretha with [facility] , states [employer] Ass[employer] and FDA do not recommend Rybelsus and Januvia be taken together DT lack of additive and A1C reaction. Recommend stopping Januvia and adjusting other medications.

## 2023-12-14 ENCOUNTER — OFFICE VISIT (OUTPATIENT)
Dept: INTERNAL MEDICINE | Facility: CLINIC | Age: 68
End: 2023-12-14
Payer: MEDICARE

## 2023-12-14 DIAGNOSIS — E11.65 UNCONTROLLED TYPE 2 DIABETES MELLITUS WITH HYPERGLYCEMIA: Primary | ICD-10-CM

## 2023-12-14 DIAGNOSIS — Z94.0 KIDNEY REPLACED BY TRANSPLANT: ICD-10-CM

## 2023-12-14 PROCEDURE — 3051F PR MOST RECENT HEMOGLOBIN A1C LEVEL 7.0 - < 8.0%: ICD-10-PCS | Mod: CPTII,95,, | Performed by: NURSE PRACTITIONER

## 2023-12-14 PROCEDURE — 99213 OFFICE O/P EST LOW 20 MIN: CPT | Mod: 95,,, | Performed by: NURSE PRACTITIONER

## 2023-12-14 PROCEDURE — 1160F RVW MEDS BY RX/DR IN RCRD: CPT | Mod: CPTII,95,, | Performed by: NURSE PRACTITIONER

## 2023-12-14 PROCEDURE — 1160F PR REVIEW ALL MEDS BY PRESCRIBER/CLIN PHARMACIST DOCUMENTED: ICD-10-PCS | Mod: CPTII,95,, | Performed by: NURSE PRACTITIONER

## 2023-12-14 PROCEDURE — 3061F PR NEG MICROALBUMINURIA RESULT DOCUMENTED/REVIEW: ICD-10-PCS | Mod: CPTII,95,, | Performed by: NURSE PRACTITIONER

## 2023-12-14 PROCEDURE — 3061F NEG MICROALBUMINURIA REV: CPT | Mod: CPTII,95,, | Performed by: NURSE PRACTITIONER

## 2023-12-14 PROCEDURE — 3066F PR DOCUMENTATION OF TREATMENT FOR NEPHROPATHY: ICD-10-PCS | Mod: CPTII,95,, | Performed by: NURSE PRACTITIONER

## 2023-12-14 PROCEDURE — 1159F PR MEDICATION LIST DOCUMENTED IN MEDICAL RECORD: ICD-10-PCS | Mod: CPTII,95,, | Performed by: NURSE PRACTITIONER

## 2023-12-14 PROCEDURE — 99213 PR OFFICE/OUTPT VISIT, EST, LEVL III, 20-29 MIN: ICD-10-PCS | Mod: 95,,, | Performed by: NURSE PRACTITIONER

## 2023-12-14 PROCEDURE — 3066F NEPHROPATHY DOC TX: CPT | Mod: CPTII,95,, | Performed by: NURSE PRACTITIONER

## 2023-12-14 PROCEDURE — 3051F HG A1C>EQUAL 7.0%<8.0%: CPT | Mod: CPTII,95,, | Performed by: NURSE PRACTITIONER

## 2023-12-14 PROCEDURE — 1159F MED LIST DOCD IN RCRD: CPT | Mod: CPTII,95,, | Performed by: NURSE PRACTITIONER

## 2023-12-14 RX ORDER — PREDNISONE 5 MG/1
5 TABLET ORAL DAILY
Qty: 91 TABLET | Refills: 3 | Status: SHIPPED | OUTPATIENT
Start: 2023-12-14

## 2023-12-14 RX ORDER — TACROLIMUS 1 MG/1
CAPSULE ORAL
Qty: 360 CAPSULE | Refills: 11 | Status: SHIPPED | OUTPATIENT
Start: 2023-12-14

## 2023-12-14 NOTE — PROGRESS NOTES
The patient location is: at home  The chief complaint leading to consultation is: DM medication evaluation    Visit type: audio only    The reason for the audio only service rather than synchronous audio and video virtual visit was related to technical difficulties with inability to connect with new phone.    23 minutes of total time spent on the encounter, which includes face to face time and non-face to face time preparing to see the patient (eg, review of tests), Obtaining and/or reviewing separately obtained history, Documenting clinical information in the electronic or other health record, Independently interpreting results (not separately reported) and communicating results to the patient/family/caregiver, or Care coordination (not separately reported).         Each patient to whom he or she provides medical services by telemedicine is:  (1) informed of the relationship between the physician and patient and the respective role of any other health care provider with respect to management of the patient; and (2) notified that he or she may decline to receive medical services by telemedicine and may withdraw from such care at any time.    Billing Provider: LYNN Miranda  Level of Service:   Patient PCP Information       Provider PCP Type    LYNN Miranda General            Reason for Visit / Chief Complaint: Diabetes (Medication eval., on Rybelsus and Januvia, needs changes)       History of Present Illness / Problem Focused Workflow     Kendra Malik is a 68 y.o. Black or  female for uncontrolled DM f/u. Medical problems include uncontrolled DM, HTN, CKD st V (s/p left kidney transplant (11/16/20), anemia of chronic disease, HLD, neuropathy, AR, right charcot foot, diabetic retinopathy, osteomyelitis and gangrene to right ankle and foot, and covid pneumonia + (2/2021). Followed by [facility] nephrology and optho clinics, and Dr. Sathish Rankin, podiatry.     Since last visit, pt  has inpt admission for osteomyelitis and left foot infection. Pt's managed insurance plan, [employer], recommends changing current medication regimen since starting rybelsus and stopping januvia. Pt has been taking rybelsus x 3 months with better glycemic control. CBG readings have been 120s when checked BID since out of hospital. States has only used 15 tabs out of 90 day supply of rybelsus. Continues to have wound care with HH and sees podiatrist weekly. Currently has second skin graft in place. Is attempting ADA diet as instructed and med compliance. Denies chest pain, shortness of breath, cough, fever, headache, dizziness, abdominal pain, nausea, vomiting, diarrhea, constipation, dysuria, depression, anxiety, SI/HI.    Review of Systems     Review of Systems   Constitutional:  Negative for fatigue.   Cardiovascular:  Negative for chest pain.   Endocrine: Negative for polydipsia, polyphagia and polyuria.   Integumentary:  Negative for pallor.   Neurological:  Negative for dizziness, tremors, seizures, speech difficulty, weakness and headaches.   Psychiatric/Behavioral:  Negative for confusion. The patient is not nervous/anxious.         See HPI for details    All Other ROS: Negative except as stated in HPI.     Medical / Social / Family History     ----------------------------  Cataract  DM2 w CKD  Infected blister of fifth toe      Comment:  left foot  Pneumonia  Renal hypertension  Secondary hyperparathyroidism of renal origin     Past Surgical History:   Procedure Laterality Date    APPLICATION, GRAFT Right 2023    Procedure: APPLICATION, GRAFT [place]  Right/  Rep Marlo Amadeo;  Surgeon: Jeremiah RODRIGUEZ DPM;  Location: [facility] OR;  Service: Podiatry;  Laterality: Right;    BONE BIOPSY Left 2023    Procedure: BIOPSY, BONE  Right 5th digit;  Surgeon: Jeremiah RODRIGUEZ DPM;  Location: [facility] OR;  Service: Podiatry;  Laterality: Left;    cataract surgery       SECTION      x3     COLONOSCOPY N/A 03/27/2019    Procedure: COLONOSCOPY;  Surgeon: Arianna Srinivasan MD;  Location: [facility];  Service: Endoscopy;  Laterality: N/A;    DEBRIDEMENT OF FOOT Right     DEBRIDEMENT OF FOOT Right 1/26/2023    Procedure: DEBRIDEMENT, FOOT Right;  Surgeon: Jeremiah RODRIGUEZ DPM;  Location: [facility];  Service: Podiatry;  Laterality: Right;    HYSTERECTOMY      KIDNEY TRANSPLANT N/A 11/15/2020    Procedure: TRANSPLANT, KIDNEY;  Surgeon: Scott Ann MD;  Location: [address];  Service: Transplant;  Laterality: N/A;    KIDNEY TRANSPLANT Left 11/16/2020    OOPHORECTOMY      PERITONEAL CATHETER INSERTION      RETINAL DETACHMENT SURGERY      TOE AMPUTATION Left 2/16/2023    Procedure: AMPUTATION, TOE  Left 5th digit;  Surgeon: Jeremiah RODRIGUEZ DPM;  Location: [facility];  Service: Podiatry;  Laterality: Left;       Social History     Socioeconomic History    Marital status:    Tobacco Use    Smoking status: Never    Smokeless tobacco: Never   Substance and Sexual Activity    Alcohol use: Not Currently     Alcohol/week: 1.0 standard drink of alcohol     Types: 1 Shots of liquor per week     Comment: 1-2 a year    Drug use: No    Sexual activity: Not Currently   Social History Narrative    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)     Social Determinants of Health     Financial Resource Strain: Low Risk  (12/7/2023)    Overall Financial Resource Strain (CARDIA)     Difficulty of Paying Living Expenses: Not hard at all   Food Insecurity: No Food Insecurity (12/7/2023)    Hunger Vital Sign     Worried About Running Out of Food in the Last Year: Never true     Ran Out of Food in the Last Year: Never true   Transportation Needs: No Transportation Needs (12/7/2023)    PRAPARE - Transportation     Lack of Transportation (Medical): No     Lack of Transportation (Non-Medical): No   Physical Activity: Insufficiently Active (12/7/2023)    Exercise Vital Sign     Days of  Exercise per Week: 3 days     Minutes of Exercise per Session: 30 min   Stress: No Stress Concern Present (12/7/2023)    [employer] - Occupational Stress Questionnaire     Feeling of Stress : Only a little   Social Connections: Unknown (12/14/2023)    Social Connection and Isolation Panel [NHANES]     Frequency of Communication with Friends and Family: Patient refused     Frequency of Social Gatherings with Friends and Family: Patient refused     Attends Mormonism Services: Patient refused     Active Member of Clubs or Organizations: Patient refused     Attends Club or Organization Meetings: Patient refused     Marital Status: Patient refused   Housing Stability: Low Risk  (12/7/2023)    Housing Stability Vital Sign     Unable to Pay for Housing in the Last Year: No     Number of Places Lived in the Last Year: 1     Unstable Housing in the Last Year: No        Family History   Problem Relation Age of Onset    Diabetes Mother     Heart disease Mother         CHF    Hypertension Mother     Diabetes Father     Hypertension Father     HIV Sister     Diabetes Brother     Diabetes Brother     Gout Brother     Diabetes Paternal Aunt     Cancer Paternal Uncle     Diabetes Paternal Grandmother     Kidney disease Neg Hx         Medications and Allergies     Medications  Current Outpatient Medications   Medication Instructions    0.9 % sodium chloride (SODIUM CHLORIDE 0.9%) solution Intravenous    acetaminophen (TYLENOL EXTRA STRENGTH ORAL) 500 mg, Oral, Every 6 hours PRN    blood sugar diagnostic Strp 1 each, Misc.(Non-Drug; Combo Route), 3 times daily, E11.3513 (DM)    blood-glucose meter kit Use as instructed; E11.3513 (DM)    calcitRIOL (ROCALTROL) 0.25 mcg, Oral, Every Mon, Wed, Fri    cephALEXin (KEFLEX) 500 mg, Oral, 2 times daily    clopidogreL (PLAVIX) 75 mg, Oral, Daily    colistimethate (COLYMYCIN) 150 mg injection No dose, route, or frequency recorded.    diclofenac (FLECTOR) 1.3 %  "PT12 Topical (Top), As needed (PRN)    diphenhydrAMINE (BENADRYL) 25 mg, Oral, Every 6 hours PRN    famotidine/Ca carb/mag hydrox (PEPCID COMPLETE ORAL) 1 tablet, Oral, Daily PRN    fluticasone propionate (FLONASE) 50 mcg/actuation nasal spray SPRAY 2 SPRAYS INTO EACH NOSTRIL TWICE A DAY    furosemide (LASIX) 10 mg, Oral, Daily PRN, Do NOT take more than 3 days per week.    gabapentin (NEURONTIN) 300 MG capsule TAKE 1 CAPSULE BY MOUTH THREE TIMES A DAY    heparin, porcine, PF, 10 unit/mL Syrg Intravenous    HYDROcodone-acetaminophen (NORCO)  mg per tablet 1 tablet, Oral, Every 6 hours PRN    hydrOXYzine HCL (ATARAX) 25 MG tablet 1 tablet, Oral, Nightly    ketoconazole (NIZORAL) 2 % cream Topical (Top)    labetaloL (NORMODYNE) 100 MG tablet TAKE 1 TABLET BY MOUTH TWICE A DAY    lancets Misc 1 each, Misc.(Non-Drug; Combo Route), 3 times daily, E11.3513    LEVEMIR FLEXPEN 28 Units, Subcutaneous, Nightly    LIDOcaine (LIDODERM) 5 % 1 patch, Transdermal, Daily PRN, Remove & Discard patch within 12 hours or as directed by MD    loratadine (CLARITIN) 10 mg tablet TAKE 1 TABLET BY MOUTH EVERY DAY    metFORMIN (GLUCOPHAGE) 500 MG tablet TAKE 1 TABLET BY MOUTH TWICE A DAY WITH MEALS    mycophenolate (CELLCEPT) 1,000 mg, Oral, 2 times daily    neomycin-polymyxin-dexamethasone (DEXACINE) 3.5 mg/g-10,000 unit/g-0.1 % Oint     NIFEdipine (PROCARDIA-XL) 30 mg, Oral, 2 times daily    NORMAL SALINE FLUSH injection No dose, route, or frequency recorded.    ondansetron (ZOFRAN-ODT) 4 mg, Oral, Every 6 hours PRN    pen needle, diabetic (BD RIO 2ND GEN PEN NEEDLE) 32 gauge x 5/32" Ndle To use with insulin 4 times daily.    predniSONE (DELTASONE) 5 mg, Oral, Daily    rosuvastatin (CRESTOR) 20 MG tablet TAKE 1 TABLET BY MOUTH EVERYDAY AT BEDTIME    RYBELSUS 3 mg, Oral, Daily    SANTYL ointment Topical (Top)    SHINGRIX, PF, 50 mcg/0.5 mL injection No dose, route, or frequency recorded.    silver sulfADIAZINE 1% (SILVADENE) 1 % " cream 1 application , Topical (Top)    tacrolimus (PROGRAF) 1 MG Cap Take 5 capsules (5 mg total) by mouth every morning AND 5 capsules (5 mg total) every evening.    UNABLE TO FIND APPLY 1 GRAM TO INFECTION SITE. PERFORM 1-2 TIMES DAILY    vancomycin (VANCOCIN) 10 gram SolR Intravenous         Allergies  Review of patient's allergies indicates:  No Known Allergies    Physical Examination    ]    Physical Exam  Neurological:      Mental Status: She is alert and oriented to person, place, and time.   Psychiatric:         Mood and Affect: Mood normal.         Speech: Speech normal.         Behavior: Behavior normal. Behavior is cooperative.         Thought Content: Thought content normal.         Judgment: Judgment normal.           Results     Lab Results   Component Value Date    WBC 5.03 10/23/2023    HGB 11.6 (L) 10/23/2023    HCT 38.3 10/23/2023     10/23/2023    CHOL 225 (H) 06/22/2023    TRIG 193 (H) 06/22/2023    ALT <5 10/23/2023    AST 12 10/23/2023     10/23/2023    K 4.1 10/23/2023     (H) 09/12/2023    CREATININE 0.76 10/23/2023    BUN 17.5 10/23/2023    CO2 25 10/23/2023    INR 1.0 09/07/2023    HGBA1C 7.0 10/23/2023       Assessment and Plan (including Health Maintenance)     Plan:     1. Uncontrolled type 2 diabetes mellitus with hyperglycemia  A1C 7.0 at goal. Previous A1C 9.2.   D/C januvia.  Increase rybelsus to 2 tabs of current 3 mg dosage until completion of current bottle for a total of 6 mg daily.   Will increase rybelsus to 7 mg dosage at visit.   Pt instructed to continue to check CBGs no less than BID and contact clinic if readings above 160; understanding voiced.  Continue levemir, and metformin as prescribed.  Follow ADA diet.  Avoid soda, simple sweets, and limit rice/pasta/bread/starches and consume brown options when possible.   Maintain healthy weight with BMI goal <30.   Perform aerobic exercise for 150 minutes per week (or 5 days a week for 30 minutes each day).    Examine feet daily.   Obtain annual dilated eye exam.  Eye exam: 4/11/23  Foot exam: 4/3/23    Problem List Items Addressed This Visit          Endocrine    Uncontrolled type 2 diabetes mellitus with hyperglycemia - Primary    Overview     A1C 8.2. Previous A1C 9.2.  Increase basaglar to 28 units.  Continue januvia and metformin.              Health Maintenance Due   Topic Date Due    RSV Vaccine (Age 60+ and Pregnant patients) (1 - 1-dose 60+ series) Never done    Colorectal Cancer Screening  03/27/2020    Shingles Vaccine (2 of 2) 12/07/2022    COVID-19 Vaccine (4 - 2023-24 season) 09/01/2023    Mammogram  01/20/2024       Follow up in about 4 weeks (around 1/11/2024) for Follow up, Diabetes with POC A1C daily.        Signature:  LYNN Miranda  [facility]  [address] [facility] [address]      Date of encounter: 12/14/23    Answers submitted by the patient for this visit:  Diabetes Questionnaire (Submitted on 12/7/2023)  Chief Complaint: Diabetes problem  Diabetes type: type 2  MedicAlert ID: No  Disease duration: 15 Years  blurred vision: No  foot paresthesias: No  foot ulcerations: Yes  visual change: No  weight loss: No  Symptom course: improving  hunger: No  mood changes: No  sleepiness: No  sweats: Yes  blackouts: No  hospitalization: No  nocturnal hypoglycemia: No  required glucagon: No  CVA: No  heart disease: No  nephropathy: Yes  peripheral neuropathy: Yes  PVD: Yes  retinopathy: No  autonomic neuropathy: No  CAD risks: dyslipidemia, hypertension, obesity  Current treatments: diet, insulin injections, oral agent (triple therapy)  Treatment compliance: most of the time  Dose schedule: at bedtime  Given by: patient  Injection sites: abdominal wall, arms  Home blood tests: 3-4 x per day  Home urines: <1 x per month  Monitoring compliance: good  Blood glucose trend: decreasing steadily  Weight trend: fluctuating  dramatically  Current diet: diabetic  Meal planning: carbohydrate counting  Exercise: intermittently  Dietitian visit: No  Eye exam current: Yes  Sees podiatrist: Yes

## 2024-01-11 DIAGNOSIS — E11.65 UNCONTROLLED TYPE 2 DIABETES MELLITUS WITH HYPERGLYCEMIA: ICD-10-CM

## 2024-01-13 ENCOUNTER — NURSE TRIAGE (OUTPATIENT)
Dept: ADMINISTRATIVE | Facility: CLINIC | Age: 69
End: 2024-01-13
Payer: MEDICARE

## 2024-01-13 RX ORDER — ORAL SEMAGLUTIDE 7 MG/1
7 TABLET ORAL DAILY
Qty: 14 TABLET | Refills: 0 | Status: SHIPPED | OUTPATIENT
Start: 2024-01-13 | End: 2024-01-25 | Stop reason: SDUPTHER

## 2024-01-13 NOTE — TELEPHONE ENCOUNTER
[place] is in [place]/CM in in [place]    PCP:  LYNN Vega    NT spoke with Nurse JAM with [facility].  Pt called for refill for Rybelsus 7 mg po once daily for a 90 day supply.  CM states pt ran out early d/t PCP increased dosage to 2 - Rybelsus 3 mg tabs for a total of 6 mg until current bottle completed then it was to be increased to Rybelsus 7 mg once daily.  See PCP note below.  Prescription was not sent into the pharmacy.  She needs refill sent to [facility]/pharmacy #[id number] - [place], [place] - [address].  Pt is out of medication.  Per protocol, care advised is call PCP now.  NT spoke with Nursing Supervisor, Daren, at [facility] and was transferred to [facility] for IM/Hospitalist.  PA returned call and states that they don't cover call for pts PCP.  NT spoke with Nursing Sup again and he states that ELIN Rodriguez and Dr. Eron Graves OCP (MD) are covering today for Nephrology.  Nursing Sup spoke with dialysis unit and they advise to call [facility] Answering Service to contact OCP and let them know that it is for a pt of Dr. Karla Scruggs [phone] or [phone].  NT contacted the Answering Service and LVM with them for OCP to return my call.  NT did not receive a callback from OCP therefore called the answering service again.  Answering service states that NT will receive a callback until the office is open again.  NT sent SecureChat message to OCP.  OCP, states that she is not familiar with this pt and that the Transplant MD needs to refill the medication for her.  Med was filled by her IM Provider though not the Transplant MD.  Pt states that she has 2 pills left that she is not completely out of her medication yet.  NT contacted Transplant OCP, Dr. Denny.  Dr. Denny, OCP recommends to phone in a prescription for 14-day supply to the pharmacy of pts choice for Rybelsus 7 mg Sig: Take 1 tablet by mouth once daily Disp: 14 tabs R: 0 Refill requests to be sent to Kaitlynn Beck NP.  Prescription  phoned into the pharmacy of the pts choice.  Pt contacted and notified that OCP with [facility] Transplant Team prescribed enough meds for 14 days until she can see or speak with her PCP to get a refill.  Advised pt to check with the pharmacy to make sure it is ready for pickup prior to going to the pharmacy.  Pt VU.  Advised to call for worsening/questions/concerns.  VU.  Message routed high priority to PCP.    As per PCP note:  D/C januvia.  Increase rybelsus to 2 tabs of current 3 mg dosage until completion of current bottle for a total of 6 mg daily.   Will increase rybelsus to 7 mg dosage at visit.  Pt instructed to continue to check CBGs no less than BID and contact clinic if readings above 160; understanding voiced.  Continue levemir, and metformin as prescribed.  Follow ADA diet.  Avoid soda, simple sweets, and limit rice/pasta/bread/starches and consume brown options when possible.   Maintain healthy weight with BMI goal <30.   Perform aerobic exercise for 150 minutes per week (or 5 days a week for 30 minutes each day).   Examine feet daily.   Obtain annual dilated eye exam.  Eye exam: 4/11/23  Foot exam: 4/3/23     Reason for Disposition   [1] Prescription refill request for ESSENTIAL medicine (i.e., likelihood of harm to patient if not taken) AND [2] triager unable to refill per department policy    Protocols used: Medication Refill and Renewal Call-A-

## 2024-01-23 ENCOUNTER — TELEPHONE (OUTPATIENT)
Dept: INTERNAL MEDICINE | Facility: CLINIC | Age: 69
End: 2024-01-23
Payer: MEDICARE

## 2024-01-23 NOTE — TELEPHONE ENCOUNTER
----- Message from Kisha Clements LPN sent at 1/23/2024  7:56 AM CST -----  Spoke to [facility] Lab staff, reported Glucose of 578 on 1/22/24 at 4:20 pm. Lab results were then called in to [facility] ED physician Dr. Tavarez. Spoke to patient via phone. Patient states she was told per Dr. Tavarez that she could report to ED if she wanted to. Pt opted out, states she told MD that she knew what to do but she has been going through some stuff. Pt states she lost her uncle on Friday and her father on Sunday. Condolences given to pt at this time. Pt states nurse is currently at her house at time of call but she will be reporting to clinic for f/u appt related to DM on Thursday 1/25/24.   ----- Message -----  From: Allison Rushing  Sent: 1/22/2024   4:23 PM CST  To: Kiran [facility] Staff    Critical lab results  [phone] Catia please call

## 2024-01-23 NOTE — TELEPHONE ENCOUNTER
----- Message from Brennan Kerns MA sent at 1/12/2024  1:22 PM CST -----    ----- Message -----  From: Eugenia Xiao  Sent: 1/11/2024   8:49 AM CST  To: Kiran [facility] Staff    Type:  RX Refill Request    Who Called: pt   Refill or New Rx:refill   RX Name and Strength:semaglutide (RYBELSUS) 3 mg tablet    Preferred Pharmacy with phone number:[facility]/PHARMACY #[id number] - [place], [place] - [address]      [facility] Call Back Number: [phone]  Additional Information: pt stated dosage should go up to 7mg, per previous conversation with .    Pt also stated, medication is helping blood sugar

## 2024-01-23 NOTE — TELEPHONE ENCOUNTER
Addressed in another message.     ----- Message from Kisha Clements LPN sent at 1/23/2024  7:56 AM CST -----  Spoke to [facility] Lab staff, reported Glucose of 578 on 1/22/24 at 4:20 pm. Lab results were then called in to [facility] ED physician Dr. Tavarez. Spoke to patient via phone. Patient states she was told per Dr. Tavarez that she could report to ED if she wanted to. Pt opted out, states she told MD that she knew what to do but she has been going through some stuff. Pt states she lost her uncle on Friday and her father on Sunday. Condolences given to pt at this time. Pt states nurse is currently at her house at time of call but she will be reporting to clinic for f/u appt related to DM on Thursday 1/25/24.   ----- Message -----  From: Allison Rushing  Sent: 1/22/2024   4:23 PM CST  To: Kiran [facility]RI[facility] Staff    Critical lab results ph [phone] Catia please call

## 2024-01-25 ENCOUNTER — OFFICE VISIT (OUTPATIENT)
Dept: INTERNAL MEDICINE | Facility: CLINIC | Age: 69
End: 2024-01-25
Payer: MEDICARE

## 2024-01-25 VITALS
SYSTOLIC BLOOD PRESSURE: 136 MMHG | BODY MASS INDEX: 29.7 KG/M2 | HEIGHT: 62 IN | WEIGHT: 161.38 LBS | HEART RATE: 60 BPM | RESPIRATION RATE: 20 BRPM | DIASTOLIC BLOOD PRESSURE: 78 MMHG | OXYGEN SATURATION: 99 % | TEMPERATURE: 98 F

## 2024-01-25 DIAGNOSIS — E11.3553 TYPE 2 DIABETES MELLITUS WITH STABLE PROLIFERATIVE RETINOPATHY OF BOTH EYES, WITH LONG-TERM CURRENT USE OF INSULIN: ICD-10-CM

## 2024-01-25 DIAGNOSIS — M86.372 CHRONIC MULTIFOCAL OSTEOMYELITIS OF LEFT FOOT: ICD-10-CM

## 2024-01-25 DIAGNOSIS — E11.65 UNCONTROLLED TYPE 2 DIABETES MELLITUS WITH HYPERGLYCEMIA: Primary | ICD-10-CM

## 2024-01-25 DIAGNOSIS — Z79.4 TYPE 2 DIABETES MELLITUS WITH STABLE PROLIFERATIVE RETINOPATHY OF BOTH EYES, WITH LONG-TERM CURRENT USE OF INSULIN: ICD-10-CM

## 2024-01-25 PROBLEM — I96 GANGRENE: Status: ACTIVE | Noted: 2024-01-25

## 2024-01-25 PROCEDURE — 3078F DIAST BP <80 MM HG: CPT | Mod: CPTII,,, | Performed by: NURSE PRACTITIONER

## 2024-01-25 PROCEDURE — 1160F RVW MEDS BY RX/DR IN RCRD: CPT | Mod: CPTII,,, | Performed by: NURSE PRACTITIONER

## 2024-01-25 PROCEDURE — 1126F AMNT PAIN NOTED NONE PRSNT: CPT | Mod: CPTII,,, | Performed by: NURSE PRACTITIONER

## 2024-01-25 PROCEDURE — 3075F SYST BP GE 130 - 139MM HG: CPT | Mod: CPTII,,, | Performed by: NURSE PRACTITIONER

## 2024-01-25 PROCEDURE — 3288F FALL RISK ASSESSMENT DOCD: CPT | Mod: CPTII,,, | Performed by: NURSE PRACTITIONER

## 2024-01-25 PROCEDURE — 99214 OFFICE O/P EST MOD 30 MIN: CPT | Mod: S$PBB,,, | Performed by: NURSE PRACTITIONER

## 2024-01-25 PROCEDURE — 1101F PT FALLS ASSESS-DOCD LE1/YR: CPT | Mod: CPTII,,, | Performed by: NURSE PRACTITIONER

## 2024-01-25 PROCEDURE — 1159F MED LIST DOCD IN RCRD: CPT | Mod: CPTII,,, | Performed by: NURSE PRACTITIONER

## 2024-01-25 PROCEDURE — 99215 OFFICE O/P EST HI 40 MIN: CPT | Mod: PBBFAC | Performed by: NURSE PRACTITIONER

## 2024-01-25 PROCEDURE — 3008F BODY MASS INDEX DOCD: CPT | Mod: CPTII,,, | Performed by: NURSE PRACTITIONER

## 2024-01-25 RX ORDER — ORAL SEMAGLUTIDE 3 MG/1
TABLET ORAL
Qty: 90 TABLET | Refills: 1 | OUTPATIENT
Start: 2024-01-25

## 2024-01-25 RX ORDER — INSULIN DETEMIR 100 [IU]/ML
28 INJECTION, SOLUTION SUBCUTANEOUS NIGHTLY
Qty: 18 ML | Refills: 1 | Status: SHIPPED | OUTPATIENT
Start: 2024-01-25 | End: 2024-04-02

## 2024-01-25 RX ORDER — SEMAGLUTIDE 0.68 MG/ML
INJECTION, SOLUTION SUBCUTANEOUS
Qty: 3 EACH | OUTPATIENT
Start: 2024-01-25

## 2024-01-25 RX ORDER — ONDANSETRON 4 MG/1
4 TABLET, ORALLY DISINTEGRATING ORAL EVERY 6 HOURS PRN
Qty: 30 TABLET | Refills: 0 | Status: SHIPPED | OUTPATIENT
Start: 2024-01-25

## 2024-01-25 RX ORDER — METFORMIN HYDROCHLORIDE 500 MG/1
TABLET ORAL
Qty: 180 TABLET | Refills: 3 | Status: SHIPPED | OUTPATIENT
Start: 2024-01-25

## 2024-01-25 RX ORDER — INSULIN DETEMIR 100 [IU]/ML
28 INJECTION, SOLUTION SUBCUTANEOUS NIGHTLY
Qty: 15 EACH | Refills: 2 | OUTPATIENT
Start: 2024-01-25

## 2024-01-25 RX ORDER — ORAL SEMAGLUTIDE 7 MG/1
7 TABLET ORAL DAILY
Qty: 90 TABLET | Refills: 3 | Status: SHIPPED | OUTPATIENT
Start: 2024-01-25 | End: 2024-04-25 | Stop reason: DRUGHIGH

## 2024-01-25 NOTE — PROGRESS NOTES
Kaitlynn Beck, LYNN   [facility]  [address] [address]      PATIENT NAME: Kendra Malik  : 1955  DATE: 24  MRN: [id number]      Reason for Visit / Chief Complaint: Diabetes and Follow-up (4wk for diabetes )       History of Present Illness / Problem Focused Workflow     Kendra Malik is a 68 y.o. Black or  female presents to the clinic for uncontrolled DM f/u. Medical problems include uncontrolled DM, HTN, CKD st V (s/p left kidney transplant (20), anemia of chronic disease, HLD, neuropathy, AR, right charcot foot, diabetic retinopathy, osteomyelitis and gangrene to right ankle and foot, and covid pneumonia + (2021). Followed by [facility] nephrology and optho clinics, Dr. Sathish Rankin, podiatry and Dr. Dodd, vascular.    Today pt presents after critical glucose of 581 from [facility] on 24 from scheduled previsit labs. Results were after clinic hours and pt was contacted per [facility] ED. Pt chose not to report to ED for evaluation as she stated she knew she had not been doing right. Lab results scanned into EMR and reviewed. States her uncle passed away on Friday and her father on . Condolences provided. Since that time, she reports she has gotten back on track with meds. Note from Dr. Dodd's office also reviewed. Pt continues to receive home health services and wound care  3x/weekly. Has appt with Dr. Rankin later today and saw ID last week.. Denies chest pain, shortness of breath, cough, fever, headache, dizziness, weakness, abdominal pain, nausea, vomiting, diarrhea, constipation, dysuria, depression, anxiety, SI/HI.    Review of Systems     Review of Systems     See HPI for details    Constitutional: Denies Change in appetite. Denies Chills. Denies Fever. Denies Night sweats.  Eye: Denies Blurred vision. Denies Discharge. Denies Eye pain.  ENT: Denies Decreased hearing.  Denies Sore throat. Denies Swollen glands.  Respiratory: Denies Cough. Denies Shortness of breath. Denies Shortness of breath with exertion. Denies Wheezing.  Cardiovascular: DeniesChest pain at rest. Denies Chest pain with exertion. Denies Irregular heartbeat. Denies Palpitations. Denies Edema.  Gastrointestinal: Denies Abdominal pain. Denies Diarrhea. Denies Nausea. Denies Vomiting. Denies Hematemesis or Hematochezia.  Genitourinary: Denies Dysuria. Denies Urinary frequency. Denies Urinary urgency. Denies Blood in urine.  Endocrine: Denies Cold intolerance. Denies Excessive thirst. Denies Heat intolerance. Denies Weight loss. Denies Weight gain.  Musculoskeletal: Denies Painful joints. Denies Weakness.  Integumentary: Denies Rash. Denies Itching. Denies Dry skin.  Neurologic: Denies Dizziness. Denies Fainting. Denies Headache.  Psychiatric: Denies Depression. Denies Anxiety. Denies Suicidal/Homicidal ideations.    All Other ROS: Negative except as stated in HPI.     Medical / Surgical / Social / Family History       ----------------------------  Cataract  DM2 w CKD  Infected blister of fifth toe      Comment:  left foot  Pneumonia  Renal hypertension  Secondary hyperparathyroidism of renal origin     Past Surgical History:   Procedure Laterality Date    APPLICATION, GRAFT Right 2023    Procedure: APPLICATION, GRAFT [place]  Right/  Rep Marlo Childs;  Surgeon: Jeremiah RODRIGUEZ DPM;  Location: [facility] OR;  Service: Podiatry;  Laterality: Right;    BONE BIOPSY Left 2023    Procedure: BIOPSY, BONE  Right 5th digit;  Surgeon: Jeremiah RODRIGUEZ DPM;  Location: [facility] OR;  Service: Podiatry;  Laterality: Left;    cataract surgery       SECTION      x3    COLONOSCOPY N/A 2019    Procedure: COLONOSCOPY;  Surgeon: Arianna Srinivasan MD;  Location: [facility];  Service: Endoscopy;  Laterality: N/A;    DEBRIDEMENT OF FOOT Right     DEBRIDEMENT OF FOOT Right 2023    Procedure: DEBRIDEMENT, FOOT  Right;  Surgeon: Jeremiah RODRIGUEZ DPM;  Location: [facility];  Service: Podiatry;  Laterality: Right;    HYSTERECTOMY      KIDNEY TRANSPLANT N/A 11/15/2020    Procedure: TRANSPLANT, KIDNEY;  Surgeon: Scott Ann MD;  Location: [address];  Service: Transplant;  Laterality: N/A;    KIDNEY TRANSPLANT Left 11/16/2020    OOPHORECTOMY      PERITONEAL CATHETER INSERTION      RETINAL DETACHMENT SURGERY      TOE AMPUTATION Left 2/16/2023    Procedure: AMPUTATION, TOE  Left 5th digit;  Surgeon: Jeremiah RODRIGUEZ DPM;  Location: [facility];  Service: Podiatry;  Laterality: Left;       Social History     Socioeconomic History    Marital status:    Tobacco Use    Smoking status: Never    Smokeless tobacco: Never   Substance and Sexual Activity    Alcohol use: Not Currently     Alcohol/week: 1.0 standard drink of alcohol     Types: 1 Shots of liquor per week     Comment: 1-2 a year    Drug use: No    Sexual activity: Not Currently   Social History Narrative    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)     Social Determinants of Health     Financial Resource Strain: Low Risk  (12/7/2023)    Overall Financial Resource Strain (CARDIA)     Difficulty of Paying Living Expenses: Not hard at all   Food Insecurity: No Food Insecurity (12/7/2023)    Hunger Vital Sign     Worried About Running Out of Food in the Last Year: Never true     Ran Out of Food in the Last Year: Never true   Transportation Needs: No Transportation Needs (12/7/2023)    PRAPARE - Transportation     Lack of Transportation (Medical): No     Lack of Transportation (Non-Medical): No   Physical Activity: Insufficiently Active (12/7/2023)    Exercise Vital Sign     Days of Exercise per Week: 3 days     Minutes of Exercise per Session: 30 min   Stress: No Stress Concern Present (12/7/2023)    [employer] - Occupational Stress Questionnaire     Feeling of Stress : Only a little   Social  Connections: Patient Declined (12/14/2023)    Social Connection and Isolation Panel [NHANES]     Frequency of Communication with Friends and Family: Patient declined     Frequency of Social Gatherings with Friends and Family: Patient declined     Attends Restorationist Services: Patient declined     Active Member of Clubs or Organizations: Patient declined     Attends Club or Organization Meetings: Patient declined     Marital Status: Patient declined   Housing Stability: Unknown (12/7/2023)    Housing Stability Vital Sign     Unable to Pay for Housing in the Last Year: No     Number of Places Lived in the Last Year: 1        Family History   Problem Relation Age of Onset    Diabetes Mother     Heart disease Mother         CHF    Hypertension Mother     Diabetes Father     Hypertension Father     HIV Sister     Diabetes Brother     Diabetes Brother     Gout Brother     Diabetes Paternal Aunt     Cancer Paternal Uncle     Diabetes Paternal Grandmother     Kidney disease Neg Hx         Medications and Allergies     Medications  Current Outpatient Medications   Medication Instructions    acetaminophen (TYLENOL EXTRA STRENGTH ORAL) 500 mg, Oral, Every 6 hours PRN    blood sugar diagnostic Strp 1 each, Misc.(Non-Drug; Combo Route), 3 times daily, E11.3513 (DM)    blood-glucose meter kit Use as instructed; E11.3513 (DM)    calcitRIOL (ROCALTROL) 0.25 mcg, Oral, Every Mon, Wed, Fri    clopidogreL (PLAVIX) 75 mg, Oral, Daily    diphenhydrAMINE (BENADRYL) 25 mg, Oral, Every 6 hours PRN    famotidine/Ca carb/mag hydrox (PEPCID COMPLETE ORAL) 1 tablet, Oral, Daily PRN    fluticasone propionate (FLONASE) 50 mcg/actuation nasal spray SPRAY 2 SPRAYS INTO EACH NOSTRIL TWICE A DAY    furosemide (LASIX) 10 mg, Oral, Daily PRN, Do NOT take more than 3 days per week.    gabapentin (NEURONTIN) 300 MG capsule TAKE 1 CAPSULE BY MOUTH THREE TIMES A DAY    HYDROcodone-acetaminophen (NORCO)  mg per tablet 1 tablet, Oral, Every 6 hours PRN  "   labetaloL (NORMODYNE) 100 MG tablet TAKE 1 TABLET BY MOUTH TWICE A DAY    lancets Misc 1 each, Misc.(Non-Drug; Combo Route), 3 times daily, E11.3513    LEVEMIR FLEXPEN 28 Units, Subcutaneous, Nightly    LIDOcaine (LIDODERM) 5 % 1 patch, Transdermal, Daily PRN, Remove & Discard patch within 12 hours or as directed by MD    loratadine (CLARITIN) 10 mg tablet TAKE 1 TABLET BY MOUTH EVERY DAY    metFORMIN (GLUCOPHAGE) 500 MG tablet TAKE 1 TABLET BY MOUTH TWICE A DAY WITH MEALS    mycophenolate (CELLCEPT) 1,000 mg, Oral, 2 times daily    NIFEdipine (PROCARDIA-XL) 30 mg, Oral, 2 times daily    ondansetron (ZOFRAN-ODT) 4 mg, Oral, Every 6 hours PRN    pen needle, diabetic (BD RIO 2ND GEN PEN NEEDLE) 32 gauge x 5/32" Ndle To use with insulin 4 times daily.    predniSONE (DELTASONE) 5 mg, Oral, Daily    rosuvastatin (CRESTOR) 20 MG tablet TAKE 1 TABLET BY MOUTH EVERYDAY AT BEDTIME    RYBELSUS 7 mg, Oral, Daily    tacrolimus (PROGRAF) 1 MG Cap TAKE 5 CAPSULES (5 MG TOTAL) BY MOUTH EVERY MORNING AND 5 CAPSULES (5 MG TOTAL) EVERY EVENING.         Allergies  Review of patient's allergies indicates:  No Known Allergies    Physical Examination     /78 (BP Location: Left arm, Patient Position: Sitting, BP Method: Medium (Manual))   Pulse 60   Temp 98 °F (36.7 °C) (Oral)   Resp 20   Ht 5' 2" (1.575 m)   Wt 73.2 kg (161 lb 6.4 oz)   SpO2 99%   BMI 29.52 kg/m²     Physical Exam  Feet:      Comments: Left offloading shoe in place; drsg in place. Left 5th toe amputation        General: Alert and oriented, No acute distress.  Head: Normocephalic, Atraumatic.  Eye: Pupils are equal, round and reactive to light, Extraocular movements are intact, Sclera non-icteric.  Ears/Nose/Throat: Normal, Mucosa moist,Clear.  Neck/Thyroid: Supple, Non-tender, No carotid bruit, No lymphadenopathy, No JVD, Full range of motion.  Respiratory: Clear to auscultation bilaterally; No wheezes, rales or rhonchi,Non-labored respirations, " Symmetrical chest wall expansion.  Cardiovascular: Regular rate and rhythm, S1/S2 normal, No murmurs, rubs or gallops.  Gastrointestinal: Soft, Non-tender, Non-distended, Normal bowel sounds, No palpable organomegaly.  Musculoskeletal: Normal range of motion.  Neurologic: No focal deficits, Cranial Nerves II-XII are grossly intact, Motor strength normal upper and lower extremities, Sensory exam intact.  Psychiatric: Normal interaction, Coherent speech, Appropriate affect       Results     Lab Results   Component Value Date    WBC 5.03 10/23/2023    HGB 11.6 (L) 10/23/2023    HCT 38.3 10/23/2023     10/23/2023    CHOL 225 (H) 06/22/2023    TRIG 193 (H) 06/22/2023    ALT <5 10/23/2023    AST 12 10/23/2023     10/23/2023    K 4.1 10/23/2023     (H) 09/12/2023    CREATININE 0.76 10/23/2023    BUN 17.5 10/23/2023    CO2 25 10/23/2023    INR 1.0 09/07/2023    HGBA1C 7.0 10/23/2023         Assessment and Plan (including Health Maintenance)     Plan:     1. Uncontrolled type 2 diabetes mellitus with hyperglycemia   today in clinic.  A1C 9.7.  Refilled rybelsus 7 mg.  Continue levemir, and metformin.  Continue to check CBGs BID.   Continue medications as prescribed.  Follow ADA diet.  Avoid soda, simple sweets, and limit rice/pasta/bread/starches and consume brown options when possible.   Maintain healthy weight with BMI goal <30.   Perform aerobic exercise for 150 minutes per week (or 5 days a week for 30 minutes each day).   Examine feet daily.   Obtain annual dilated eye exam.  Eye exam: 4/11/23  Foot exam: 4/3/23     - POCT Glucose, Hand-Held Device    2. Chronic multifocal osteomyelitis of left foot  Keep ID, podiatry and wound care appts as scheduled.  Continue HH as scheduled.  Achieve adequate glucose control.  Report changes immediately.    Problem List Items Addressed This Visit          ID    Chronic multifocal osteomyelitis of left foot (Chronic)       Endocrine    Type 2 diabetes  mellitus with stable proliferative retinopathy of both eyes, with long-term current use of insulin (Chronic)    Relevant Medications    semaglutide (RYBELSUS) 7 mg tablet    insulin detemir U-100, Levemir, (LEVEMIR FLEXPEN) 100 unit/mL (3 mL) InPn pen    Uncontrolled type 2 diabetes mellitus with hyperglycemia - Primary    Overview     A1C 8.2. Previous A1C 9.2.  Increase basaglar to 28 units.  Continue januvia and metformin.         Relevant Medications    semaglutide (RYBELSUS) 7 mg tablet    insulin detemir U-100, Levemir, (LEVEMIR FLEXPEN) 100 unit/mL (3 mL) InPn pen    Other Relevant Orders    POCT Glucose, Hand-Held Device    Comprehensive Metabolic Panel    Hemoglobin A1C         Health Maintenance Due   Topic Date Due    RSV Vaccine (Age 60+ and Pregnant patients) (1 - 1-dose 60+ series) Never done    Colorectal Cancer Screening  03/27/2020    COVID-19 Vaccine (4 - 2023-24 season) 09/01/2023    Mammogram  01/20/2024       Follow up in about 3 months (around 4/25/2024) for Follow up, Lab Results, Diabetes.        Signature:  Kaitlynn Beck, LYNN  [facility]  [address] [facility] [address]

## 2024-01-29 ENCOUNTER — EXTERNAL HOME HEALTH (OUTPATIENT)
Dept: HOME HEALTH SERVICES | Facility: HOSPITAL | Age: 69
End: 2024-01-29
Payer: MEDICARE

## 2024-02-19 RX ORDER — PEN NEEDLE, DIABETIC 30 GX3/16"
1 NEEDLE, DISPOSABLE MISCELLANEOUS NIGHTLY
Qty: 100 EACH | Refills: 3 | Status: SHIPPED | OUTPATIENT
Start: 2024-02-19 | End: 2024-04-15 | Stop reason: SDUPTHER

## 2024-02-26 ENCOUNTER — OFFICE VISIT (OUTPATIENT)
Dept: OPHTHALMOLOGY | Facility: CLINIC | Age: 69
End: 2024-02-26
Payer: MEDICARE

## 2024-02-26 VITALS — HEIGHT: 62 IN | BODY MASS INDEX: 29.7 KG/M2 | WEIGHT: 161.38 LBS

## 2024-02-26 DIAGNOSIS — H35.373 EPIRETINAL MEMBRANE (ERM) OF BOTH EYES: ICD-10-CM

## 2024-02-26 DIAGNOSIS — E11.3553 STABLE PROLIFERATIVE DIABETIC RETINOPATHY OF BOTH EYES ASSOCIATED WITH TYPE 2 DIABETES MELLITUS: Primary | ICD-10-CM

## 2024-02-26 PROCEDURE — 99214 OFFICE O/P EST MOD 30 MIN: CPT | Mod: PBBFAC,PN | Performed by: STUDENT IN AN ORGANIZED HEALTH CARE EDUCATION/TRAINING PROGRAM

## 2024-02-26 PROCEDURE — 92134 CPTRZ OPH DX IMG PST SGM RTA: CPT | Mod: PBBFAC,PN | Performed by: STUDENT IN AN ORGANIZED HEALTH CARE EDUCATION/TRAINING PROGRAM

## 2024-02-26 PROCEDURE — 92134 CPTRZ OPH DX IMG PST SGM RTA: CPT | Mod: PBBFAC,PN | Performed by: OPHTHALMOLOGY

## 2024-02-26 NOTE — PROGRESS NOTES
HPI    4-6 months dfe octm repeat   Last edited by Asmita Nuñez MA on 2/26/2024 11:24 AM.             OCT 2/26/24  OD: 301 ERM/fv membrane with traction SN, no irf/srf stable  OS: 277 ERM/FV membrane with traction SN and IT, no srf/irf stable    Assessment /Plan     For exam results, see Encounter Report.    Stable proliferative diabetic retinopathy of both eyes associated with type 2 diabetes mellitus    Epiretinal membrane (ERM) of both eyes        Assessment/Plan:  1. T2DM with stable PDR OD  - Hba1c last 7.0 10/2023 on insulin   - S/P PRP OD x6  - History of Sub-optimal response to Avastin (last 7/9/2019 for NV + VH followed by PRP fill in x3), s/p Lucentis x2 (last 11/27/17) with improvement of CME  - Had new VH first noticed by pt on 5/1/2020, 5/5/20 B scan without RD, given lucentis x2  - Good ; no active NV now on exam  - will observe for now  - BP/BG/chol control, and follow up with PCP  - 2/26/24: stable vision and octm without fluid, no nv on exam   - Return precautions    2. T2DM with stable PDR OS  - extrafoveal mild traction stable  - s/p PPV/MP/fax/EL/ C3F8 for TRD OS due to PDR at , 6/2016  - For extrafoveal CSME --> no leakage on IVFA (at least before November 2016), ? chronic intraretinal cystic changes  - Return precautions, CTM  - No active NV on exam  - With mild traction component  - Will observe for now  - 2/26/24: stable vision and octm without fluid, no nv on exam     3. ERM OU  -ERM + regressed fibrovascular tissue ou  -seen with Dr. Joseph 6/27/21, will monitor for now    4. Pseudophakia OU  - S/p CEIOL OS 3/21/19, had single episode of post op iritis, resolved  - s/p CE/IOL OD 5/2/19, BCVA 20/60-70  - s/p YAG cap OU  - mrx best 20/50 and 20/40 6/2023     5. Ptosis OU  - s/p ptosis repair with lahaye late 2023, mrd1 excellent, well healed incision sites, pt very happy       RTC 4-6 months dfe octm, refraction on return.

## 2024-03-26 ENCOUNTER — DOCUMENT SCAN (OUTPATIENT)
Dept: HOME HEALTH SERVICES | Facility: HOSPITAL | Age: 69
End: 2024-03-26
Payer: MEDICARE

## 2024-04-02 DIAGNOSIS — E11.65 UNCONTROLLED TYPE 2 DIABETES MELLITUS WITH HYPERGLYCEMIA: ICD-10-CM

## 2024-04-02 RX ORDER — INSULIN GLARGINE 100 [IU]/ML
28 INJECTION, SOLUTION SUBCUTANEOUS NIGHTLY
Qty: 25 ML | Refills: 1 | Status: SHIPPED | OUTPATIENT
Start: 2024-04-02 | End: 2024-04-15 | Stop reason: ALTCHOICE

## 2024-04-02 NOTE — TELEPHONE ENCOUNTER
Pharmacy comment: Alternative Requested:THE PRESCRIBED MEDICATION IS NOT COVERED BY INSURANCE. PLEASE CONSIDER CHANGING TO ONE OF THE SUGGESTED COVERED ALTERNATIVES.   NOV:4/25/24  LOV:1/25/24

## 2024-04-10 ENCOUNTER — TELEPHONE (OUTPATIENT)
Dept: NEPHROLOGY | Facility: CLINIC | Age: 69
End: 2024-04-10
Payer: MEDICARE

## 2024-04-10 NOTE — TELEPHONE ENCOUNTER
----- Message from Avery Rao sent at 4/10/2024 12:10 PM CDT -----  Regarding: Missed call  Pt returning missed call pt can be contacted at [phone]    Left reminder to do labs

## 2024-04-12 ENCOUNTER — TELEPHONE (OUTPATIENT)
Dept: INTERNAL MEDICINE | Facility: CLINIC | Age: 69
End: 2024-04-12
Payer: MEDICARE

## 2024-04-12 ENCOUNTER — OFFICE VISIT (OUTPATIENT)
Dept: NEPHROLOGY | Facility: CLINIC | Age: 69
End: 2024-04-12
Payer: MEDICARE

## 2024-04-12 VITALS
TEMPERATURE: 98 F | SYSTOLIC BLOOD PRESSURE: 136 MMHG | DIASTOLIC BLOOD PRESSURE: 73 MMHG | BODY MASS INDEX: 28.56 KG/M2 | HEIGHT: 62 IN | HEART RATE: 59 BPM | RESPIRATION RATE: 20 BRPM | OXYGEN SATURATION: 99 % | WEIGHT: 155.19 LBS

## 2024-04-12 DIAGNOSIS — N18.5 STAGE 5 CHRONIC KIDNEY DISEASE NOT ON CHRONIC DIALYSIS: ICD-10-CM

## 2024-04-12 DIAGNOSIS — E83.52 HYPERCALCEMIA: ICD-10-CM

## 2024-04-12 DIAGNOSIS — Z94.0 S/P KIDNEY TRANSPLANT: Primary | ICD-10-CM

## 2024-04-12 DIAGNOSIS — Z79.4 TYPE 2 DIABETES MELLITUS WITH STABLE PROLIFERATIVE RETINOPATHY OF BOTH EYES, WITH LONG-TERM CURRENT USE OF INSULIN: ICD-10-CM

## 2024-04-12 DIAGNOSIS — I10 PRIMARY HYPERTENSION: ICD-10-CM

## 2024-04-12 DIAGNOSIS — Z94.0 HISTORY OF RENAL TRANSPLANT: ICD-10-CM

## 2024-04-12 DIAGNOSIS — E11.3553 TYPE 2 DIABETES MELLITUS WITH STABLE PROLIFERATIVE RETINOPATHY OF BOTH EYES, WITH LONG-TERM CURRENT USE OF INSULIN: ICD-10-CM

## 2024-04-12 DIAGNOSIS — Z79.899 LONG TERM CURRENT USE OF IMMUNOSUPPRESSIVE DRUG: ICD-10-CM

## 2024-04-12 PROCEDURE — 99215 OFFICE O/P EST HI 40 MIN: CPT | Mod: PBBFAC | Performed by: INTERNAL MEDICINE

## 2024-04-12 NOTE — PROGRESS NOTES
[facility] Nephrology Clinic Note    Chief Complaint   Patient presents with    Kidney Transplant     Follow up        History of Present Illness  Kendra Malik is a 68 y.o. female with PMH, CKD5 now s/p kidney transplant (), type 2 diabetes, hypertension, hyperlipidemia, AOCD, vitamin D toxicity, hypercalcemia and hyperparathyroidism presenting to nephrology clinic today for  F/U visit. Patient received her renal transplant in [place] in 2020. Patient continues on immunosuppression with tacrolimus, mycophenolate, daily prednisone 2.5 mg oral tablet. Patient reports compliance with all medications and denies any drug/tobacco use. Recently has been having a hard time with sleep and spoke with her regarding sleep hygiene. Otherwise no other complaints. Reports that her diabetic regimen is currently being changed by her PCP. Reports BP this morning taken at home stable 130s/70s. Last visit with transplant provider 11/15/2023 via telemedicine.     Review of Systems  Twelve point review of systems conducted, negative except as stated in history of present illness.    Review of patient's allergies indicates:  No Known Allergies    Past Medical History:   Past Medical History:   Diagnosis Date    Cataract     DM2 w CKD 10/11/2018    Infected blister of fifth toe     left foot    Pneumonia 2021    Renal hypertension 10/11/2018    Secondary hyperparathyroidism of renal origin 2020       Procedure History:   Past Surgical History:   Procedure Laterality Date    APPLICATION, GRAFT Right 2023    Procedure: APPLICATION, GRAFT [place]  Right/  Rep Marlo Childs;  Surgeon: Jeremiah RODRIGUEZ DPM;  Location: [facility] OR;  Service: Podiatry;  Laterality: Right;    BONE BIOPSY Left 2023    Procedure: BIOPSY, BONE  Right 5th digit;  Surgeon: Jeremiah RODRIGUEZ DPM;  Location: [facility] OR;  Service: Podiatry;  Laterality: Left;    cataract surgery       SECTION      x3    COLONOSCOPY N/A  "03/27/2019    Procedure: COLONOSCOPY;  Surgeon: Arianna Srinivasan MD;  Location: [facility] ENDO;  Service: Endoscopy;  Laterality: N/A;    DEBRIDEMENT OF FOOT Right     DEBRIDEMENT OF FOOT Right 1/26/2023    Procedure: DEBRIDEMENT, FOOT Right;  Surgeon: Jeremiah RODRIGUEZ DPM;  Location: [facility] OR;  Service: Podiatry;  Laterality: Right;    HYSTERECTOMY      KIDNEY TRANSPLANT N/A 11/15/2020    Procedure: TRANSPLANT, KIDNEY;  Surgeon: Scott Ann MD;  [address];  Service: Transplant;  Laterality: N/A;    KIDNEY TRANSPLANT Left 11/16/2020    OOPHORECTOMY      PERITONEAL CATHETER INSERTION      RETINAL DETACHMENT SURGERY      TOE AMPUTATION Left 2/16/2023    Procedure: AMPUTATION, TOE  Left 5th digit;  Surgeon: Jeremiah RODRIGUEZ DPM;  Location: [facility] OR;  Service: Podiatry;  Laterality: Left;       Family History: family history includes Cancer in her paternal uncle; Diabetes in her brother, brother, father, mother, paternal aunt, and paternal grandmother; Gout in her brother; HIV in her sister; Heart disease in her mother; Hypertension in her father and mother.    Social History:  reports that she has never smoked. She has never used smokeless tobacco. She reports that she does not currently use alcohol after a past usage of about 1.0 standard drink of alcohol per week. She reports that she does not use drugs.    Physical Exam:   /73 (BP Location: Right arm)   Pulse (!) 59   Temp 97.7 °F (36.5 °C) (Oral)   Resp 20   Ht 5' 2" (1.575 m)   Wt 70.4 kg (155 lb 3.3 oz)   SpO2 99%   BMI 28.39 kg/m²  Body mass index is 28.39 kg/m².  General appearance: Patient is in no acute distress.  Skin: No rashes or wounds.  HEENT: PERRLA, EOMI, no scleral icterus, no JVD. Neck is supple.  Chest: Respirations are unlabored. Lungs sounds are clear.   Heart: S1, S2.   Abdomen: Benign.  : Deferred.  Extremities: No edema, peripheral pulses are palpable.   Neuro: No focal deficits.     Home " Medications:  Prior to Admission medications    Medication Sig Start Date End Date Taking? Authorizing Provider   acetaminophen (TYLENOL EXTRA STRENGTH ORAL) Take 500 mg by mouth every 6 (six) hours as needed.   Yes Provider, Historical   blood sugar diagnostic Strp 1 each by Misc.(Non-Drug; Combo Route) route 3 (three) times daily. E11.3513 (DM) 1/15/21  Yes Rose Reynaga NP   blood-glucose meter kit Use as instructed; E11.3513 (DM) 11/19/20  Yes Kelley Denny MD   calcitRIOL (ROCALTROL) 0.25 MCG Cap Take 1 capsule (0.25 mcg total) by mouth every Mon, Wed, Fri. 7/28/23 7/27/24 Yes Bonnie Mendoza MD   clopidogreL (PLAVIX) 75 mg tablet Take 75 mg by mouth once daily.   Yes Provider, Historical   diphenhydrAMINE (BENADRYL) 25 mg capsule Take 25 mg by mouth every 6 (six) hours as needed for Itching.   Yes Provider, Historical   famotidine/Ca carb/mag hydrox (PEPCID COMPLETE ORAL) Take 1 tablet by mouth daily as needed.   Yes Provider, Historical   fluticasone propionate (FLONASE) 50 mcg/actuation nasal spray SPRAY 2 SPRAYS INTO EACH NOSTRIL TWICE A DAY 2/23/23  Yes Kaitlynn Beck FNP   furosemide (LASIX) 20 MG tablet Take 0.5 tablets (10 mg total) by mouth daily as needed (edema or swelling). Do NOT take more than 3 days per week. 10/23/23  Yes Kaitlynn Beck FNP   gabapentin (NEURONTIN) 300 MG capsule TAKE 1 CAPSULE BY MOUTH THREE TIMES A DAY 1/3/23  Yes Kaitlynn Beck FNP   HYDROcodone-acetaminophen (NORCO)  mg per tablet Take 1 tablet by mouth every 6 (six) hours as needed. 6/22/23  Yes Provider, Historical   insulin glargine (LANTUS U-100 INSULIN) 100 unit/mL injection Inject 28 Units into the skin every evening. 4/2/24  Yes Kaitlynn Beck FNP   labetaloL (NORMODYNE) 100 MG tablet TAKE 1 TABLET BY MOUTH TWICE A DAY 10/31/23  Yes Kaitlynn Beck FNP   lancets Misc 1 each by Misc.(Non-Drug; Combo Route) route 3 (three) times daily. E11.3513 12/23/20  Yes Catia Huerta MD  "  LIDOcaine (LIDODERM) 5 % Place 1 patch onto the skin daily as needed (back pain). Remove & Discard patch within 12 hours or as directed by MD 11/22/23  Yes Kaitlynn Beck FNP   loratadine (CLARITIN) 10 mg tablet TAKE 1 TABLET BY MOUTH EVERY DAY 5/1/23  Yes Kaitlynn Beck FNP   metFORMIN (GLUCOPHAGE) 500 MG tablet TAKE 1 TABLET BY MOUTH TWICE A DAY WITH MEALS 1/25/24  Yes Kaitlynn Beck FNP   mycophenolate (CELLCEPT) 250 mg Cap Take 4 capsules (1,000 mg total) by mouth 2 (two) times daily. 11/15/23 11/14/24 Yes Kelley Denny MD   NIFEdipine (PROCARDIA-XL) 30 MG (OSM) 24 hr tablet Take 1 tablet (30 mg total) by mouth 2 (two) times a day. 4/3/23  Yes Kaitlynn Beck FNP   ondansetron (ZOFRAN-ODT) 4 MG TbDL Take 1 tablet (4 mg total) by mouth every 6 (six) hours as needed (nausea). 1/25/24  Yes Kaitlynn Beck FNP   pen needle, diabetic (BD RIO 2ND GEN PEN NEEDLE) 32 gauge x 5/32" Ndle Inject 1 each into the skin nightly. 2/19/24  Yes Kaitlynn Beck FNP   predniSONE (DELTASONE) 5 MG tablet Take 1 tablet (5 mg total) by mouth once daily.  Patient taking differently: Take 2.5 mg by mouth once daily. 12/14/23  Yes Kelley Denny MD   rosuvastatin (CRESTOR) 20 MG tablet TAKE 1 TABLET BY MOUTH EVERYDAY AT BEDTIME 10/31/23  Yes Kaitlynn Beck FNP   semaglutide (RYBELSUS) 7 mg tablet Take 1 tablet (7 mg total) by mouth once daily. 1/25/24  Yes Kaitlynn Beck FNP   tacrolimus (PROGRAF) 1 MG Cap TAKE 5 CAPSULES (5 MG TOTAL) BY MOUTH EVERY MORNING AND 5 CAPSULES (5 MG TOTAL) EVERY EVENING. 12/14/23  Yes Kelley Denny MD          Impression:   Problem List Items Addressed This Visit          Cardiac/Vascular    Hypertension (Chronic)       Renal/    S/P kidney transplant - Primary (Chronic)    Relevant Orders    PTH, Intact    Vitamin D    Protein/Creatinine Ratio, Urine    Urinalysis    Phosphorus    Magnesium    Comprehensive Metabolic Panel    CBC Auto Differential "    Tacrolimus ()    Chronic kidney disease    Relevant Orders    PTH, Intact    Vitamin D    Protein/Creatinine Ratio, Urine    Urinalysis    Phosphorus    Magnesium    Comprehensive Metabolic Panel    CBC Auto Differential       Immunology/Multi System    Long term current use of immunosuppressive drug (Chronic)       Endocrine    Type 2 diabetes mellitus with stable proliferative retinopathy of both eyes, with long-term current use of insulin (Chronic)     Other Visit Diagnoses       History of renal transplant        Relevant Orders    PTH, Intact    Vitamin D    Protein/Creatinine Ratio, Urine    Urinalysis    Phosphorus    Magnesium    Comprehensive Metabolic Panel    CBC Auto Differential    Hypercalcemia                 Plan:     H/O CKD V s/p Renal Transplant 2020  Current GFR > 60  Chronic Immunosuppression Therapy   - received renal transplant in Nov 2020 in [place], history of uncontrolled type 2 diabetes and hypertension  - Immunosuppression on Tacrolimus 5mg BID, Mycophenolate 1000 BID , and Prednisolone 2.5 mg once daily  -Continue recommendations post renal transplant per guidelines, labs required every visit include CMP/magnesium/phosphorus/CBC with differential/tacrolimus level/urinalysis/UPCR/fasting blood glucose  - recommendations post renal transplant per guidelines, labs required every 3 months or every visit include hemoglobin A1c, fasting lipid profile  - recommendations post renal transplant per guidelines, labs required at least every 6 to 12 months include PTH and 25-OH Vit D  - recommendations post renal transplant per guidelines, labs required at 12, 18, and 24 months post transplant include BK virus blood and/or urine PCR testing   - Last , UA with calcium oxalate crystals, eGFR > 60, BUN/Cr  14.6/0.95 4/11/2024  -Tacrolimus levels therapeutic at 4.84 as of 4/11/2024  - today ordered the following labs to be reviewed at next visit: CBC, CMP, magnesium, phosphorous, urine  protein creatinine, UA, PTH intact, Vitamin D, tacrolimus level    Hypercalcemia:  - Patient with calcium oxalate crystals on UA 4/11/24 as well as hypercalcemia of 10.55  - Recommended patient stop taking calcium supplement    Secondary Hyperparathyroidism  -secondary to renal disease  -Intact .1 in 4/11/2024  -Continue calcitriol 0.25 mcg MWF     Hypomagnesemia  -Mg 1.4 in 4/11/2024  -Likely secondary to tacrolimus which cases a magnesium wasting nephropathy  -Recommend over the counter mag ox  -Repeat Mg level before next visit    Type 2 Diabetes  - last A1c 7.0 10/23/23  - continue diabetic mgmt per PCP   - discussed lifestyle and diet modifications.    Primary Hypertension, well controlled.  -Currently on labetalol 100 bid, nifedipine 30 BID. Continue. Further management per PCP.    Post-Renal Transplant Vaccine Recommendations:  - Flu Vaccine: provided 10/2022  - Pneumovax: provided 3/18/2022  - Hep A vaccine: received 2018  - Hep B vaccine: no official record available in chart however patient did receive vaccination when dialysis was initiated  - TDap vaccine: received 2017  - Shingrix vaccine: dose #1 10/2022, due for second  - Polio vaccine: received in 1962  - Meningococcal vaccine: Offer at next visit  - COVID vaccine: completed 2 dose series in May 2021, June 2021, completed booster in December 2021        Continue following measures:  -follow 2 gram a day dietary sodium restriction  -control diabetes (goal A1c less than 7%)  -control high blood pressure (goal blood pressure is less than 130/80, please check blood pressure twice a week and bring blood pressure logs to office visit)  -exercise at least 30 minutes a day, 5 days a week  -maintain healthy weight  -decrease or stop alcohol use  -do not smoke  -stay well hydrated (drink water only, avoid juices, sweet tea, and sodas)  -ask about staying up-to-date on vaccinations (flu vaccine, pneumonia vaccine, hepatitis B vaccine)  -avoid excessive  use of NSAIDs (ibuprofen, naproxen, Aleve, Advil, Toradol, Mobic), take Tylenol as needed for headache or mild pain  -take cholesterol lowering medications if prescribed (LDL goal less than 100)    Follow-up with the primary care provider as scheduled.  Return to [facility] Nephrology (kidney) clinic with routine labs in 3 months

## 2024-04-12 NOTE — TELEPHONE ENCOUNTER
"Pt is requesting the "pen" for her lantus-100 instead of the vial that she is receiving  now pt states that if she can't get the pen the she will need some pen needles.  "

## 2024-04-15 RX ORDER — PEN NEEDLE, DIABETIC 30 GX3/16"
1 NEEDLE, DISPOSABLE MISCELLANEOUS NIGHTLY
Qty: 100 EACH | Refills: 3 | Status: SHIPPED | OUTPATIENT
Start: 2024-04-15

## 2024-04-15 RX ORDER — INSULIN GLARGINE 100 [IU]/ML
28 INJECTION, SOLUTION SUBCUTANEOUS NIGHTLY
Qty: 30 ML | Refills: 2 | Status: SHIPPED | OUTPATIENT
Start: 2024-04-15

## 2024-04-24 DIAGNOSIS — Z94.0 RENAL TRANSPLANT RECIPIENT: ICD-10-CM

## 2024-04-24 DIAGNOSIS — I10 HYPERTENSION, UNSPECIFIED TYPE: ICD-10-CM

## 2024-04-24 RX ORDER — NIFEDIPINE 30 MG/1
30 TABLET, EXTENDED RELEASE ORAL 2 TIMES DAILY
Qty: 180 TABLET | Refills: 3 | Status: SHIPPED | OUTPATIENT
Start: 2024-04-24

## 2024-04-25 ENCOUNTER — OFFICE VISIT (OUTPATIENT)
Dept: INTERNAL MEDICINE | Facility: CLINIC | Age: 69
End: 2024-04-25
Payer: MEDICARE

## 2024-04-25 VITALS
DIASTOLIC BLOOD PRESSURE: 64 MMHG | HEIGHT: 62 IN | TEMPERATURE: 98 F | HEART RATE: 66 BPM | OXYGEN SATURATION: 99 % | WEIGHT: 157.63 LBS | SYSTOLIC BLOOD PRESSURE: 138 MMHG | BODY MASS INDEX: 29.01 KG/M2

## 2024-04-25 DIAGNOSIS — E11.621 DIABETIC ULCER OF TOE OF RIGHT FOOT ASSOCIATED WITH TYPE 2 DIABETES MELLITUS, LIMITED TO BREAKDOWN OF SKIN: Chronic | ICD-10-CM

## 2024-04-25 DIAGNOSIS — E11.65 UNCONTROLLED TYPE 2 DIABETES MELLITUS WITH HYPERGLYCEMIA: Primary | ICD-10-CM

## 2024-04-25 DIAGNOSIS — Z86.010 HX OF COLONOSCOPY WITH POLYPECTOMY: ICD-10-CM

## 2024-04-25 DIAGNOSIS — L97.511 DIABETIC ULCER OF TOE OF RIGHT FOOT ASSOCIATED WITH TYPE 2 DIABETES MELLITUS, LIMITED TO BREAKDOWN OF SKIN: Chronic | ICD-10-CM

## 2024-04-25 DIAGNOSIS — Z12.31 ENCOUNTER FOR SCREENING MAMMOGRAM FOR MALIGNANT NEOPLASM OF BREAST: ICD-10-CM

## 2024-04-25 DIAGNOSIS — Z98.890 HX OF COLONOSCOPY WITH POLYPECTOMY: ICD-10-CM

## 2024-04-25 DIAGNOSIS — I10 PRIMARY HYPERTENSION: Chronic | ICD-10-CM

## 2024-04-25 LAB — HBA1C MFR BLD: 10.9 %

## 2024-04-25 PROCEDURE — 1160F RVW MEDS BY RX/DR IN RCRD: CPT | Mod: CPTII,,, | Performed by: NURSE PRACTITIONER

## 2024-04-25 PROCEDURE — 3288F FALL RISK ASSESSMENT DOCD: CPT | Mod: CPTII,,, | Performed by: NURSE PRACTITIONER

## 2024-04-25 PROCEDURE — 1126F AMNT PAIN NOTED NONE PRSNT: CPT | Mod: CPTII,,, | Performed by: NURSE PRACTITIONER

## 2024-04-25 PROCEDURE — 1101F PT FALLS ASSESS-DOCD LE1/YR: CPT | Mod: CPTII,,, | Performed by: NURSE PRACTITIONER

## 2024-04-25 PROCEDURE — 99215 OFFICE O/P EST HI 40 MIN: CPT | Mod: PBBFAC | Performed by: NURSE PRACTITIONER

## 2024-04-25 PROCEDURE — 3066F NEPHROPATHY DOC TX: CPT | Mod: CPTII,,, | Performed by: NURSE PRACTITIONER

## 2024-04-25 PROCEDURE — 3078F DIAST BP <80 MM HG: CPT | Mod: CPTII,,, | Performed by: NURSE PRACTITIONER

## 2024-04-25 PROCEDURE — 99214 OFFICE O/P EST MOD 30 MIN: CPT | Mod: 25,S$PBB,, | Performed by: NURSE PRACTITIONER

## 2024-04-25 PROCEDURE — 3008F BODY MASS INDEX DOCD: CPT | Mod: CPTII,,, | Performed by: NURSE PRACTITIONER

## 2024-04-25 PROCEDURE — 3046F HEMOGLOBIN A1C LEVEL >9.0%: CPT | Mod: CPTII,,, | Performed by: NURSE PRACTITIONER

## 2024-04-25 PROCEDURE — 83036 HEMOGLOBIN GLYCOSYLATED A1C: CPT | Mod: PBBFAC | Performed by: NURSE PRACTITIONER

## 2024-04-25 PROCEDURE — 1159F MED LIST DOCD IN RCRD: CPT | Mod: CPTII,,, | Performed by: NURSE PRACTITIONER

## 2024-04-25 PROCEDURE — 3075F SYST BP GE 130 - 139MM HG: CPT | Mod: CPTII,,, | Performed by: NURSE PRACTITIONER

## 2024-04-25 RX ORDER — ORAL SEMAGLUTIDE 14 MG/1
14 TABLET ORAL DAILY
Qty: 90 TABLET | Refills: 3 | Status: SHIPPED | OUTPATIENT
Start: 2024-04-25

## 2024-04-25 NOTE — PROGRESS NOTES
Kaitlynn Beck, LYNN   [facility]  [address] [facility] [address]      PATIENT NAME: Kendra Malik  : 1955  DATE: 24  MRN: [id number]      Reason for Visit / Chief Complaint: Follow-up and Diabetes (3 month FU for labs and diabetes)       History of Present Illness / Problem Focused Workflow     Kendra Malik is a 68 y.o. Black or  female presents to the clinic for uncontrolled DM f/u. Medical problems include uncontrolled DM, HTN, CKD st V (s/p left kidney transplant (20), anemia of chronic disease, HLD, neuropathy, AR, right charcot foot, diabetic retinopathy, osteomyelitis and gangrene to right ankle and foot, and covid pneumonia + (2021). Followed by [facility] nephrology and optho clinics, Dr. Sathish Rankin, podiatry and Dr. Dodd, vascular.     Since last visit, bilateral foot wounds have healed. Pt to follow up with podiatry in 6 months. Has also been discharged from ID. Pt attended renal visit earlier this month. BP at goal. Reports CBGs ranging high 100-mid 200s when checked. Reports has been taking meds as prescribed. States she has not been eating foods she shouldn't since last visit. Labs reviewed; scanned into media from [facility]. Re-referred to GI for repeat colonoscopy; hx of polypectomy. Denies chest pain, shortness of breath, cough, fever, headache, dizziness, weakness, abdominal pain, nausea, vomiting, diarrhea, constipation, dysuria, depression, anxiety, SI/HI.      Review of Systems     Review of Systems     See HPI for details    Constitutional: Denies Change in appetite. Denies Chills. Denies Fever. Denies Night sweats.  Eye: Denies Blurred vision. Denies Discharge. Denies Eye pain.  ENT: Denies Decreased hearing. Denies Sore throat. Denies Swollen glands.  Respiratory: Denies Cough. Denies Shortness of breath. Denies Shortness of breath with exertion. Denies  Wheezing.  Cardiovascular: Denies Chest pain at rest. Denies Chest pain with exertion. Denies Irregular heartbeat. Denies Palpitations. Denies Edema.  Gastrointestinal: Denies Abdominal pain. Denies Diarrhea. Denies Nausea. Denies Vomiting. Denies Hematemesis or Hematochezia.  Genitourinary: Denies Dysuria. Denies Urinary frequency. Denies Urinary urgency. Denies Blood in urine.  Endocrine: Denies Cold intolerance. Denies Excessive thirst. Denies Heat intolerance. Denies Weight loss. Denies Weight gain.  Musculoskeletal: Denies Painful joints. Denies Weakness.  Integumentary: Denies Rash. Denies Itching. Denies Dry skin.  Neurologic: Denies Dizziness. Denies Fainting. Denies Headache.  Psychiatric: Denies Depression. Denies Anxiety. Denies Suicidal/Homicidal ideations.    All Other ROS: Negative except as stated in HPI.     Medical / Surgical / Social / Family History       -------------------------------------    Cataract    DM2 w CKD    Infected blister of fifth toe    left foot    Pneumonia    Renal hypertension    Secondary hyperparathyroidism of renal origin        Past Surgical History:   Procedure Laterality Date    APPLICATION, GRAFT Right 2023    Procedure: APPLICATION, GRAFT [place]  Right/  Rep Marlo Amadeo;  Surgeon: Jeremiah RODRIGUEZ DPM;  Location: [facility];  Service: Podiatry;  Laterality: Right;    BONE BIOPSY Left 2023    Procedure: BIOPSY, BONE  Right 5th digit;  Surgeon: Jeremiah RODRIGUEZ DPM;  Location: [facility] OR;  Service: Podiatry;  Laterality: Left;    cataract surgery       SECTION      x3    COLONOSCOPY N/A 2019    Procedure: COLONOSCOPY;  Surgeon: Arianna Srinivasan MD;  Location: [facility];  Service: Endoscopy;  Laterality: N/A;    DEBRIDEMENT OF FOOT Right     DEBRIDEMENT OF FOOT Right 2023    Procedure: DEBRIDEMENT, FOOT Right;  Surgeon: Jeremiah RODRIGUEZ DPM;  Location: [facility];  Service: Podiatry;  Laterality: Right;    HYSTERECTOMY      KIDNEY TRANSPLANT  N/A 11/15/2020    Procedure: TRANSPLANT, KIDNEY;  Surgeon: Scott Ann MD;  Location: [address];  Service: Transplant;  Laterality: N/A;    KIDNEY TRANSPLANT Left 11/16/2020    OOPHORECTOMY      PERITONEAL CATHETER INSERTION      RETINAL DETACHMENT SURGERY      TOE AMPUTATION Left 2/16/2023    Procedure: AMPUTATION, TOE  Left 5th digit;  Surgeon: Jeremiah RODRIGUEZ DPM;  Location: [facility];  Service: Podiatry;  Laterality: Left;       Social History     Socioeconomic History    Marital status:    Tobacco Use    Smoking status: Never    Smokeless tobacco: Never   Substance and Sexual Activity    Alcohol use: Not Currently     Alcohol/week: 1.0 standard drink of alcohol     Types: 1 Shots of liquor per week     Comment: 1-2 a year    Drug use: No    Sexual activity: Not Currently   Social History Narrative    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)     Social Determinants of Health     Financial Resource Strain: Low Risk  (12/7/2023)    Overall Financial Resource Strain (CARDIA)     Difficulty of Paying Living Expenses: Not hard at all   Food Insecurity: No Food Insecurity (12/7/2023)    Hunger Vital Sign     Worried About Running Out of Food in the Last Year: Never true     Ran Out of Food in the Last Year: Never true   Transportation Needs: No Transportation Needs (12/7/2023)    PRAPARE - Transportation     Lack of Transportation (Medical): No     Lack of Transportation (Non-Medical): No   Physical Activity: Insufficiently Active (12/7/2023)    Exercise Vital Sign     Days of Exercise per Week: 3 days     Minutes of Exercise per Session: 30 min   Stress: No Stress Concern Present (12/7/2023)    [place] [employer] - Occupational Stress Questionnaire     Feeling of Stress : Only a little   Social Connections: Patient Declined (12/14/2023)    Social Connection and Isolation Panel [NHANES]     Frequency of Communication with Friends and Family:  Patient declined     Frequency of Social Gatherings with Friends and Family: Patient declined     Attends Jehovah's witness Services: Patient declined     Active Member of Clubs or Organizations: Patient declined     Attends Club or Organization Meetings: Patient declined     Marital Status: Patient declined   Housing Stability: Unknown (12/7/2023)    Housing Stability Vital Sign     Unable to Pay for Housing in the Last Year: No     Number of Places Lived in the Last Year: 1        Family History   Problem Relation Name Age of Onset    Diabetes Mother      Heart disease Mother          CHF    Hypertension Mother      Diabetes Father      Hypertension Father      HIV Sister      Diabetes Brother      Diabetes Brother      Gout Brother      Diabetes Paternal Aunt      Cancer Paternal Uncle      Diabetes Paternal Grandmother      Kidney disease Neg Hx          Medications and Allergies     Medications  Current Outpatient Medications   Medication Instructions    acetaminophen (TYLENOL EXTRA STRENGTH ORAL) 500 mg, Oral, Every 6 hours PRN    blood sugar diagnostic Strp 1 each, Misc.(Non-Drug; Combo Route), 3 times daily, E11.3513 (DM)    blood-glucose meter kit Use as instructed; E11.3513 (DM)    calcitRIOL (ROCALTROL) 0.25 mcg, Oral, Every Mon, Wed, Fri    clopidogreL (PLAVIX) 75 mg, Oral, Daily    diphenhydrAMINE (BENADRYL) 25 mg, Oral, Every 6 hours PRN    famotidine/Ca carb/mag hydrox (PEPCID COMPLETE ORAL) 1 tablet, Oral, Daily PRN    fluticasone propionate (FLONASE) 50 mcg/actuation nasal spray SPRAY 2 SPRAYS INTO EACH NOSTRIL TWICE A DAY    furosemide (LASIX) 10 mg, Oral, Daily PRN, Do NOT take more than 3 days per week.    gabapentin (NEURONTIN) 300 MG capsule TAKE 1 CAPSULE BY MOUTH THREE TIMES A DAY    HYDROcodone-acetaminophen (NORCO)  mg per tablet 1 tablet, Oral, Every 6 hours PRN    labetaloL (NORMODYNE) 100 MG tablet TAKE 1 TABLET BY MOUTH TWICE A DAY    lancets Misc 1 each, Misc.(Non-Drug; Combo Route), 3  "times daily, E11.3513    LANTUS SOLOSTAR U-100 INSULIN 28 Units, Subcutaneous, Nightly    LIDOcaine (LIDODERM) 5 % 1 patch, Transdermal, Daily PRN, Remove & Discard patch within 12 hours or as directed by MD    loratadine (CLARITIN) 10 mg tablet TAKE 1 TABLET BY MOUTH EVERY DAY    metFORMIN (GLUCOPHAGE) 500 MG tablet TAKE 1 TABLET BY MOUTH TWICE A DAY WITH MEALS    mycophenolate (CELLCEPT) 1,000 mg, Oral, 2 times daily    NIFEdipine (PROCARDIA-XL) 30 mg, Oral, 2 times daily    ondansetron (ZOFRAN-ODT) 4 mg, Oral, Every 6 hours PRN    pen needle, diabetic (BD RIO 2ND GEN PEN NEEDLE) 32 gauge x 5/32" Ndle 1 each, Subcutaneous, Nightly    predniSONE (DELTASONE) 5 mg, Oral, Daily    rosuvastatin (CRESTOR) 20 MG tablet TAKE 1 TABLET BY MOUTH EVERYDAY AT BEDTIME    RYBELSUS 14 mg, Oral, Daily    tacrolimus (PROGRAF) 1 MG Cap TAKE 5 CAPSULES (5 MG TOTAL) BY MOUTH EVERY MORNING AND 5 CAPSULES (5 MG TOTAL) EVERY EVENING.         Allergies  Review of patient's allergies indicates:  No Known Allergies    Physical Examination     /64 (BP Location: Left arm, Patient Position: Sitting, BP Method: Medium (Manual))   Pulse 66   Temp 97.6 °F (36.4 °C) (Oral)   Ht 5' 2" (1.575 m)   Wt 71.5 kg (157 lb 10.1 oz)   SpO2 99%   BMI 28.83 kg/m²     Physical Exam  Musculoskeletal:        Feet:    Feet:      Right foot:      Skin integrity: Callus and dry skin present.      Left foot:      Skin integrity: Callus and dry skin present.      Comments: Left 5th toe amputation        General: Alert and oriented, No acute distress.  Head: Normocephalic, Atraumatic.  Eye: Pupils are equal, round and reactive to light, Extraocular movements are intact, Sclera non-icteric.  Ears/Nose/Throat: Normal, Mucosa moist,Clear.  Neck/Thyroid: Supple, Non-tender, No carotid bruit, No lymphadenopathy, No JVD, Full range of motion.  Respiratory: Clear to auscultation bilaterally; No wheezes, rales or rhonchi,Non-labored respirations, Symmetrical " chest wall expansion.  Cardiovascular: Regular rate and rhythm, S1/S2 normal, No murmurs, rubs or gallops.  Gastrointestinal: Soft, Non-tender, Non-distended, Normal bowel sounds, No palpable organomegaly.  Musculoskeletal: Normal range of motion.  Integumentary: Warm, Dry  Extremities: No clubbing, cyanosis or edema  Neurologic: No focal deficits, Cranial Nerves II-XII are grossly intact, Motor strength normal upper and lower extremities, Sensory exam intact.  Psychiatric: Normal interaction, Coherent speech, Appropriate affect     Protective Sensation (w/ 10 gram monofilament):  Right: Intact  Left: Intact    Visual Inspection:  Normal -  Bilateral, Nails Intact - with Evidence of Foot Deformity- Bilateral, and Dry Skin -  Bilateral    Pedal Pulses:   Right: Present  Left: Present    Posterior Tibialis Pulses:   Right:Present  Left: Present      Results     Lab Results   Component Value Date    WBC 5.03 10/23/2023    HGB 11.6 (L) 10/23/2023    HCT 38.3 10/23/2023     10/23/2023    CHOL 225 (H) 06/22/2023    TRIG 193 (H) 06/22/2023    ALT <5 10/23/2023    AST 12 10/23/2023     10/23/2023    K 4.1 10/23/2023     (H) 09/12/2023    CREATININE 0.76 10/23/2023    BUN 17.5 10/23/2023    CO2 25 10/23/2023    INR 1.0 09/07/2023    HGBA1C 7.0 10/23/2023      Latest Reference Range & Units 04/25/24 11:22   Hemoglobin A1C, POC % 10.9         Assessment and Plan (including Health Maintenance)     Plan:     1. Uncontrolled type 2 diabetes mellitus with hyperglycemia  A1C 10.9. Previous A1C 9.7  Increase rybelsus to 14 mg.  Continue levemir, and metformin.  Declines referral for DM education.  Continue to check CBGs BID.   Continue medications as prescribed.  Follow ADA diet.  Avoid soda, simple sweets, and limit rice/pasta/bread/starches and consume brown options when possible.   Maintain healthy weight with BMI goal <30.   Perform aerobic exercise for 150 minutes per week (or 5 days a week for 30 minutes  each day).   Examine feet daily.   Obtain annual dilated eye exam.  Eye exam: 2/26/24  Foot exam: 4/25/24  - POCT HEMOGLOBIN A1C  - Hemoglobin A1C; Future  - semaglutide (RYBELSUS) 14 mg tablet; Take 1 tablet (14 mg total) by mouth once daily.  Dispense: 90 tablet; Refill: 3    2. Encounter for screening mammogram for malignant neoplasm of breast  - Mammo Digital Screening Bilat w/ Mario; Future    3. Primary hypertension  At goal.  Continue procardia and labetolol.  Follow a low sodium (less than 2 grams of sodium per day), DASH diet.   Continue medications as prescribed.  Monitor blood pressure and report any consistent values greater than 140/90 and keep a log.  Encouraged smoking cessation to aid in BP reduction and co-morbidities.   Maintain healthy weight with a BMI goal of <30.   Aerobic exercise for 150 minutes per week (or 5 days a week for 30 minutes each day).      5. Hx of colonoscopy with polypectomy  Re-referred to GI for repeat colonoscopy.  Keep appt when scheduled.   - Ambulatory referral/consult to Gastroenterology; Future      Problem List Items Addressed This Visit          Cardiac/Vascular    Hypertension (Chronic)       Endocrine    Diabetic ulcer of right foot (Chronic)    Relevant Medications    semaglutide (RYBELSUS) 14 mg tablet    Uncontrolled type 2 diabetes mellitus with hyperglycemia - Primary    Overview     A1C 8.2. Previous A1C 9.2.  Increase basaglar to 28 units.  Continue januvia and metformin.         Relevant Medications    semaglutide (RYBELSUS) 14 mg tablet    Other Relevant Orders    POCT HEMOGLOBIN A1C (Completed)    Hemoglobin A1C     Other Visit Diagnoses       Encounter for screening mammogram for malignant neoplasm of breast        Relevant Orders    Mammo Digital Screening Bilat w/ Mario    Hx of colonoscopy with polypectomy        Relevant Orders    Ambulatory referral/consult to Gastroenterology              Health Maintenance Due   Topic Date Due    RSV Vaccine (Age 60+  and Pregnant patients) (1 - 1-dose 60+ series) Never done    Colorectal Cancer Screening  03/27/2020    COVID-19 Vaccine (3 - Moderna risk series) 01/24/2022    Mammogram  01/20/2024       Follow up in about 3 months (around 8/2/2024), or Same day as renal appt, for Follow up, Diabetes, Lab Results.        Signature:  Kaitlynn Beck, LYNN  [facility]  [address] [facility] [address]

## 2024-04-26 NOTE — TELEPHONE ENCOUNTER
Pharmacy requesting refill for fluticasone propionate (FLONASE) 50 mcg/actuation nasal spray       LOV: 4/25/24      NOV:8/2/24

## 2024-04-27 RX ORDER — FLUTICASONE PROPIONATE 50 MCG
SPRAY, SUSPENSION (ML) NASAL
Qty: 96 ML | Refills: 1 | Status: SHIPPED | OUTPATIENT
Start: 2024-04-27

## 2024-05-02 DIAGNOSIS — Z86.010 HX OF COLONOSCOPY WITH POLYPECTOMY: Primary | ICD-10-CM

## 2024-05-02 DIAGNOSIS — Z98.890 HX OF COLONOSCOPY WITH POLYPECTOMY: Primary | ICD-10-CM

## 2024-06-10 RX ORDER — LANCETS
1 EACH MISCELLANEOUS 3 TIMES DAILY
Qty: 100 EACH | Refills: 11 | Status: SHIPPED | OUTPATIENT
Start: 2024-06-10

## 2024-06-14 ENCOUNTER — HOSPITAL ENCOUNTER (OUTPATIENT)
Dept: RADIOLOGY | Facility: HOSPITAL | Age: 69
Discharge: HOME | End: 2024-06-14
Attending: NURSE PRACTITIONER
Payer: MEDICARE

## 2024-06-14 DIAGNOSIS — Z12.31 ENCOUNTER FOR SCREENING MAMMOGRAM FOR MALIGNANT NEOPLASM OF BREAST: ICD-10-CM

## 2024-06-14 PROCEDURE — 77063 BREAST TOMOSYNTHESIS BI: CPT | Mod: 26,,, | Performed by: RADIOLOGY

## 2024-06-14 PROCEDURE — 77067 SCR MAMMO BI INCL CAD: CPT | Mod: 26,,, | Performed by: RADIOLOGY

## 2024-06-14 PROCEDURE — 77067 SCR MAMMO BI INCL CAD: CPT | Mod: TC

## 2024-06-27 ENCOUNTER — TELEPHONE (OUTPATIENT)
Dept: INTERNAL MEDICINE | Facility: CLINIC | Age: 69
End: 2024-06-27
Payer: MEDICARE

## 2024-06-27 NOTE — TELEPHONE ENCOUNTER
Spoke to pt. Informed pt in order to receive diabetic shoes she will have to bee seen during Saturday clinic. Informed pt  I will send message to [facility] to be scheduled for Saturday clinic for her diabetic shoes . Pt verbalized understanding .  Please schedule pt for Saturday clinic for diabetic shoes.

## 2024-07-06 DIAGNOSIS — N18.9 CHRONIC KIDNEY DISEASE, UNSPECIFIED CKD STAGE: ICD-10-CM

## 2024-07-06 DIAGNOSIS — I10 HYPERTENSION, UNSPECIFIED TYPE: ICD-10-CM

## 2024-07-06 DIAGNOSIS — Z94.0 RENAL TRANSPLANT RECIPIENT: ICD-10-CM

## 2024-07-08 RX ORDER — LABETALOL 100 MG/1
TABLET, FILM COATED ORAL
Qty: 180 TABLET | Refills: 0 | Status: SHIPPED | OUTPATIENT
Start: 2024-07-08

## 2024-07-10 DIAGNOSIS — N25.81 SECONDARY HYPERPARATHYROIDISM OF RENAL ORIGIN: ICD-10-CM

## 2024-07-10 RX ORDER — CALCITRIOL 0.25 UG/1
0.25 CAPSULE ORAL
Qty: 12 CAPSULE | Refills: 11 | Status: CANCELLED | OUTPATIENT
Start: 2024-07-10 | End: 2025-07-10

## 2024-07-10 NOTE — TELEPHONE ENCOUNTER
----- Message from Manuela Gonzalez sent at 7/10/2024  8:44 AM CDT -----  Pt called requesting a refill for this medication calcitRIOL (ROCALTROL)     [facility] [place]       Pt can be reached @ [phone]

## 2024-07-16 ENCOUNTER — PATIENT MESSAGE (OUTPATIENT)
Dept: NEPHROLOGY | Facility: CLINIC | Age: 69
End: 2024-07-16
Payer: MEDICARE

## 2024-07-17 ENCOUNTER — TELEPHONE (OUTPATIENT)
Dept: NEPHROLOGY | Facility: CLINIC | Age: 69
End: 2024-07-17
Payer: MEDICARE

## 2024-07-23 RX ORDER — FUROSEMIDE 20 MG/1
TABLET ORAL
Qty: 15 TABLET | Refills: 1 | Status: SHIPPED | OUTPATIENT
Start: 2024-07-23

## 2024-08-05 ENCOUNTER — PATIENT MESSAGE (OUTPATIENT)
Dept: INTERNAL MEDICINE | Facility: CLINIC | Age: 69
End: 2024-08-05
Payer: MEDICARE

## 2024-08-05 ENCOUNTER — TELEPHONE (OUTPATIENT)
Dept: NEPHROLOGY | Facility: CLINIC | Age: 69
End: 2024-08-05
Payer: MEDICARE

## 2024-08-06 ENCOUNTER — PATIENT MESSAGE (OUTPATIENT)
Dept: INTERNAL MEDICINE | Facility: CLINIC | Age: 69
End: 2024-08-06

## 2024-08-06 ENCOUNTER — OFFICE VISIT (OUTPATIENT)
Dept: INTERNAL MEDICINE | Facility: CLINIC | Age: 69
End: 2024-08-06
Payer: MEDICARE

## 2024-08-06 VITALS
TEMPERATURE: 98 F | RESPIRATION RATE: 18 BRPM | HEIGHT: 62 IN | DIASTOLIC BLOOD PRESSURE: 70 MMHG | WEIGHT: 158.63 LBS | BODY MASS INDEX: 29.19 KG/M2 | HEART RATE: 64 BPM | SYSTOLIC BLOOD PRESSURE: 138 MMHG

## 2024-08-06 DIAGNOSIS — E11.3553 TYPE 2 DIABETES MELLITUS WITH STABLE PROLIFERATIVE RETINOPATHY OF BOTH EYES, WITH LONG-TERM CURRENT USE OF INSULIN: Chronic | ICD-10-CM

## 2024-08-06 DIAGNOSIS — I10 HYPERTENSION, UNSPECIFIED TYPE: Primary | ICD-10-CM

## 2024-08-06 DIAGNOSIS — E46 PROTEIN-CALORIE MALNUTRITION, UNSPECIFIED SEVERITY: ICD-10-CM

## 2024-08-06 DIAGNOSIS — Z79.4 TYPE 2 DIABETES MELLITUS WITH STAGE 2 CHRONIC KIDNEY DISEASE, WITH LONG-TERM CURRENT USE OF INSULIN: ICD-10-CM

## 2024-08-06 DIAGNOSIS — J30.9 CHRONIC ALLERGIC RHINITIS: ICD-10-CM

## 2024-08-06 DIAGNOSIS — E78.5 HYPERLIPIDEMIA, UNSPECIFIED HYPERLIPIDEMIA TYPE: ICD-10-CM

## 2024-08-06 DIAGNOSIS — E11.22 TYPE 2 DIABETES MELLITUS WITH STAGE 2 CHRONIC KIDNEY DISEASE, WITH LONG-TERM CURRENT USE OF INSULIN: ICD-10-CM

## 2024-08-06 DIAGNOSIS — N18.2 STAGE 2 CHRONIC KIDNEY DISEASE: Chronic | ICD-10-CM

## 2024-08-06 DIAGNOSIS — M54.50 ACUTE BILATERAL LOW BACK PAIN WITHOUT SCIATICA: ICD-10-CM

## 2024-08-06 DIAGNOSIS — Z79.4 TYPE 2 DIABETES MELLITUS WITH STABLE PROLIFERATIVE RETINOPATHY OF BOTH EYES, WITH LONG-TERM CURRENT USE OF INSULIN: Chronic | ICD-10-CM

## 2024-08-06 DIAGNOSIS — Z94.0 S/P KIDNEY TRANSPLANT: Chronic | ICD-10-CM

## 2024-08-06 DIAGNOSIS — N18.2 TYPE 2 DIABETES MELLITUS WITH STAGE 2 CHRONIC KIDNEY DISEASE, WITH LONG-TERM CURRENT USE OF INSULIN: ICD-10-CM

## 2024-08-06 DIAGNOSIS — N25.81 SECONDARY HYPERPARATHYROIDISM OF RENAL ORIGIN: ICD-10-CM

## 2024-08-06 DIAGNOSIS — E55.9 VITAMIN D DEFICIENCY: ICD-10-CM

## 2024-08-06 DIAGNOSIS — E11.3553 STABLE PROLIFERATIVE DIABETIC RETINOPATHY OF BOTH EYES ASSOCIATED WITH TYPE 2 DIABETES MELLITUS: Chronic | ICD-10-CM

## 2024-08-06 DIAGNOSIS — N18.9 CHRONIC KIDNEY DISEASE, UNSPECIFIED CKD STAGE: ICD-10-CM

## 2024-08-06 PROBLEM — D70.9 NEUTROPENIA: Status: RESOLVED | Noted: 2021-02-04 | Resolved: 2024-08-06

## 2024-08-06 PROBLEM — I96 GANGRENE: Status: RESOLVED | Noted: 2024-01-25 | Resolved: 2024-08-06

## 2024-08-06 PROCEDURE — 3078F DIAST BP <80 MM HG: CPT | Mod: CPTII,,, | Performed by: NURSE PRACTITIONER

## 2024-08-06 PROCEDURE — 3288F FALL RISK ASSESSMENT DOCD: CPT | Mod: CPTII,,, | Performed by: NURSE PRACTITIONER

## 2024-08-06 PROCEDURE — 1159F MED LIST DOCD IN RCRD: CPT | Mod: CPTII,,, | Performed by: NURSE PRACTITIONER

## 2024-08-06 PROCEDURE — 99214 OFFICE O/P EST MOD 30 MIN: CPT | Mod: S$PBB,,, | Performed by: NURSE PRACTITIONER

## 2024-08-06 PROCEDURE — 3008F BODY MASS INDEX DOCD: CPT | Mod: CPTII,,, | Performed by: NURSE PRACTITIONER

## 2024-08-06 PROCEDURE — 3066F NEPHROPATHY DOC TX: CPT | Mod: CPTII,,, | Performed by: NURSE PRACTITIONER

## 2024-08-06 PROCEDURE — 99215 OFFICE O/P EST HI 40 MIN: CPT | Mod: PBBFAC | Performed by: NURSE PRACTITIONER

## 2024-08-06 PROCEDURE — 1101F PT FALLS ASSESS-DOCD LE1/YR: CPT | Mod: CPTII,,, | Performed by: NURSE PRACTITIONER

## 2024-08-06 PROCEDURE — 3075F SYST BP GE 130 - 139MM HG: CPT | Mod: CPTII,,, | Performed by: NURSE PRACTITIONER

## 2024-08-06 PROCEDURE — 3046F HEMOGLOBIN A1C LEVEL >9.0%: CPT | Mod: CPTII,,, | Performed by: NURSE PRACTITIONER

## 2024-08-06 PROCEDURE — 1160F RVW MEDS BY RX/DR IN RCRD: CPT | Mod: CPTII,,, | Performed by: NURSE PRACTITIONER

## 2024-08-06 RX ORDER — INSULIN PUMP SYRINGE, 3 ML
EACH MISCELLANEOUS
Qty: 1 EACH | Refills: 0 | Status: SHIPPED | OUTPATIENT
Start: 2024-08-06

## 2024-08-06 RX ORDER — LORATADINE 10 MG/1
10 TABLET ORAL DAILY
Qty: 90 TABLET | Refills: 3 | Status: SHIPPED | OUTPATIENT
Start: 2024-08-06

## 2024-08-06 RX ORDER — SILVER SULFADIAZINE 10 G/1000G
1 CREAM TOPICAL
COMMUNITY
Start: 2024-07-11

## 2024-08-06 RX ORDER — ROSUVASTATIN CALCIUM 20 MG/1
20 TABLET, COATED ORAL DAILY
Qty: 90 TABLET | Refills: 3 | Status: SHIPPED | OUTPATIENT
Start: 2024-08-06

## 2024-08-06 RX ORDER — GABAPENTIN 600 MG/1
600 TABLET ORAL 2 TIMES DAILY
COMMUNITY
Start: 2024-07-18

## 2024-08-06 RX ORDER — LIDOCAINE 50 MG/G
1 PATCH TOPICAL DAILY PRN
Qty: 15 PATCH | Refills: 1 | Status: SHIPPED | OUTPATIENT
Start: 2024-08-06

## 2024-08-07 ENCOUNTER — PATIENT MESSAGE (OUTPATIENT)
Dept: INTERNAL MEDICINE | Facility: CLINIC | Age: 69
End: 2024-08-07
Payer: MEDICARE

## 2024-08-12 ENCOUNTER — PATIENT MESSAGE (OUTPATIENT)
Dept: FAMILY MEDICINE | Facility: CLINIC | Age: 69
End: 2024-08-12
Payer: MEDICARE

## 2024-08-12 DIAGNOSIS — E11.3553 TYPE 2 DIABETES MELLITUS WITH STABLE PROLIFERATIVE RETINOPATHY OF BOTH EYES, WITH LONG-TERM CURRENT USE OF INSULIN: Primary | ICD-10-CM

## 2024-08-12 DIAGNOSIS — Z79.4 TYPE 2 DIABETES MELLITUS WITH STABLE PROLIFERATIVE RETINOPATHY OF BOTH EYES, WITH LONG-TERM CURRENT USE OF INSULIN: Primary | ICD-10-CM

## 2024-08-12 RX ORDER — ORAL SEMAGLUTIDE 14 MG/1
14 TABLET ORAL DAILY
Qty: 90 TABLET | Refills: 1 | Status: SHIPPED | OUTPATIENT
Start: 2024-08-12

## 2024-08-23 ENCOUNTER — TELEPHONE (OUTPATIENT)
Dept: INTERNAL MEDICINE | Facility: CLINIC | Age: 69
End: 2024-08-23
Payer: MEDICARE

## 2024-08-24 ENCOUNTER — OFFICE VISIT (OUTPATIENT)
Dept: INTERNAL MEDICINE | Facility: CLINIC | Age: 69
End: 2024-08-24
Payer: MEDICARE

## 2024-08-24 VITALS
BODY MASS INDEX: 28.71 KG/M2 | SYSTOLIC BLOOD PRESSURE: 150 MMHG | HEART RATE: 61 BPM | RESPIRATION RATE: 18 BRPM | DIASTOLIC BLOOD PRESSURE: 69 MMHG | TEMPERATURE: 97 F | WEIGHT: 156 LBS | HEIGHT: 62 IN | OXYGEN SATURATION: 100 %

## 2024-08-24 DIAGNOSIS — Z79.4 TYPE 2 DIABETES MELLITUS WITH STABLE PROLIFERATIVE RETINOPATHY OF BOTH EYES, WITH LONG-TERM CURRENT USE OF INSULIN: Primary | ICD-10-CM

## 2024-08-24 DIAGNOSIS — E11.3553 TYPE 2 DIABETES MELLITUS WITH STABLE PROLIFERATIVE RETINOPATHY OF BOTH EYES, WITH LONG-TERM CURRENT USE OF INSULIN: Primary | ICD-10-CM

## 2024-08-24 PROCEDURE — 99215 OFFICE O/P EST HI 40 MIN: CPT | Mod: PBBFAC

## 2024-08-24 NOTE — PROGRESS NOTES
[facility] Clinic Visit    Chief Complaint:      Diabetic Foot Exam     Subjective:     HPI:  Kendra Malik is a 69 y.o. female who has a past medical history of Cataract, DM2 w CKD, Infected blister of fifth toe, Pneumonia, Renal hypertension, and Secondary hyperparathyroidism of renal origin.  She presents to clinic today for evaluation for diabetic shoes.       Past Medical History:   Diagnosis Date    Cataract     DM2 w CKD 10/11/2018    Infected blister of fifth toe     left foot    Pneumonia 2021    Renal hypertension 10/11/2018    Secondary hyperparathyroidism of renal origin 2020       Past Surgical History:   Procedure Laterality Date    APPLICATION, GRAFT Right 2023    Procedure: APPLICATION, GRAFT [place]  Right/  Rep Marlo Childs;  Surgeon: Jeremiah RODRIGUEZ DPM;  Location: [facility];  Service: Podiatry;  Laterality: Right;    BONE BIOPSY Left 2023    Procedure: BIOPSY, BONE  Right 5th digit;  Surgeon: Jeremiah RODRIGUEZ DPM;  Location: [facility] OR;  Service: Podiatry;  Laterality: Left;    cataract surgery       SECTION      x3    COLONOSCOPY N/A 2019    Procedure: COLONOSCOPY;  Surgeon: Arianna Srinivasan MD;  Location: [facility];  Service: Endoscopy;  Laterality: N/A;    DEBRIDEMENT OF FOOT Right     DEBRIDEMENT OF FOOT Right 2023    Procedure: DEBRIDEMENT, FOOT Right;  Surgeon: Jeremiah RODRIGUEZ DPM;  Location: [facility] OR;  Service: Podiatry;  Laterality: Right;    HYSTERECTOMY      KIDNEY TRANSPLANT N/A 11/15/2020    Procedure: TRANSPLANT, KIDNEY;  Surgeon: Scott Ann MD;  Location: [address];  Service: Transplant;  Laterality: N/A;    KIDNEY TRANSPLANT Left 2020    OOPHORECTOMY      PERITONEAL CATHETER INSERTION      RETINAL DETACHMENT SURGERY      TOE AMPUTATION Left 2023    Procedure: AMPUTATION, TOE  Left 5th digit;  Surgeon: Jeremiah RODRIGUEZ DPM;  Location: [facility];  Service: Podiatry;  Laterality: Left;        Social History     Socioeconomic History    Marital status:    Tobacco Use    Smoking status: Never    Smokeless tobacco: Never   Substance and Sexual Activity    Alcohol use: Not Currently     Alcohol/week: 1.0 standard drink of alcohol     Types: 1 Shots of liquor per week     Comment: 1-2 a year    Drug use: No    Sexual activity: Not Currently   Social History Narrative    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)     Social Determinants of Health     Financial Resource Strain: Low Risk  (12/7/2023)    Overall Financial Resource Strain (CARDIA)     Difficulty of Paying Living Expenses: Not hard at all   Food Insecurity: No Food Insecurity (12/7/2023)    Hunger Vital Sign     Worried About Running Out of Food in the Last Year: Never true     Ran Out of Food in the Last Year: Never true   Transportation Needs: No Transportation Needs (12/7/2023)    PRAPARE - Transportation     Lack of Transportation (Medical): No     Lack of Transportation (Non-Medical): No   Physical Activity: Insufficiently Active (12/7/2023)    Exercise Vital Sign     Days of Exercise per Week: 3 days     Minutes of Exercise per Session: 30 min   Stress: No Stress Concern Present (12/7/2023)    [place] [employer] - Occupational Stress Questionnaire     Feeling of Stress : Only a little   Housing Stability: Low Risk  (8/6/2024)    Housing Stability Vital Sign     Unable to Pay for Housing in the Last Year: No     Homeless in the Last Year: No         Current Outpatient Medications   Medication Instructions    acetaminophen (TYLENOL EXTRA STRENGTH ORAL) 500 mg, Oral, Every 6 hours PRN    blood sugar diagnostic Strp 1 each, Misc.(Non-Drug; Combo Route), 3 times daily, E11.3513 (DM)    blood-glucose meter kit Use as instructed; E11.3513 (DM)    clopidogreL (PLAVIX) 75 mg, Oral, Daily    diphenhydrAMINE (BENADRYL) 25 mg, Oral, Every 6 hours PRN    famotidine/Ca carb/mag hydrox (PEPCID  "COMPLETE ORAL) 1 tablet, Oral, Daily PRN    fluticasone propionate (FLONASE) 50 mcg/actuation nasal spray USE 2 SPRAYS INTO EACH NOSTRIL TWICE A DAY    furosemide (LASIX) 20 MG tablet TAKE 1/2 TABLET DAILY AS NEEDED (EDEMA OR SWELLING). DO NOT TAKE MORE THAN 3 DAYS PER WEEK.    gabapentin (NEURONTIN) 300 MG capsule TAKE 1 CAPSULE BY MOUTH THREE TIMES A DAY    gabapentin (NEURONTIN) 600 mg, Oral, 2 times daily    HYDROcodone-acetaminophen (NORCO)  mg per tablet 1 tablet, Every 6 hours PRN    labetaloL (NORMODYNE) 100 MG tablet TAKE 1 TABLET BY MOUTH TWICE A DAY    lancets Misc 1 each, Misc.(Non-Drug; Combo Route), 3 times daily, E11.3513    LANTUS SOLOSTAR U-100 INSULIN 28 Units, Subcutaneous, Nightly    LIDOcaine (LIDODERM) 5 % 1 patch, Transdermal, Daily PRN, Remove & Discard patch within 12 hours or as directed by MD    loratadine (CLARITIN) 10 mg, Oral, Daily    metFORMIN (GLUCOPHAGE) 500 MG tablet TAKE 1 TABLET BY MOUTH TWICE A DAY WITH MEALS    mycophenolate (CELLCEPT) 1,000 mg, Oral, 2 times daily    NIFEdipine (PROCARDIA-XL) 30 mg, Oral, 2 times daily    ondansetron (ZOFRAN-ODT) 4 mg, Oral, Every 6 hours PRN    pen needle, diabetic (BD RIO 2ND GEN PEN NEEDLE) 32 gauge x 5/32" Ndle 1 each, Subcutaneous, Nightly    predniSONE (DELTASONE) 5 mg, Oral, Daily    rosuvastatin (CRESTOR) 20 mg, Oral, Daily    RYBELSUS 14 mg, Oral, Daily    silver sulfADIAZINE 1% (SILVADENE) 1 % cream 1 application , Topical (Top)    tacrolimus (PROGRAF) 1 MG Cap TAKE 5 CAPSULES (5 MG TOTAL) BY MOUTH EVERY MORNING AND 5 CAPSULES (5 MG TOTAL) EVERY EVENING.       Review of Systems  Review of Systems   Constitutional:  Negative for chills and fever.   Respiratory:  Negative for shortness of breath.    Cardiovascular:  Negative for chest pain.   Skin:  Negative for itching and rash.         Objective:   Last 24 Hour Vital Signs:  Vitals  BP: (!) 150/69  Temp: 97 °F (36.1 °C)  Temp Source: Oral  Pulse: 61  Resp: 18  SpO2: 100 " "%  Height: 5' 2" (157.5 cm)  Weight: 70.8 kg (156 lb)    Physical Examination:  Vital signs and nursing notes reviewed.  Constitutional: Patient is in no acute distress. Awake and alert.    Head: Atraumatic. Normocephalic.  Eyes: Conjunctivae nl. No scleral icterus.  ENT: Mucous membranes are moist. Oropharynx is clear.  Neck: Supple. Full ROM. No lymphadenopathy.  Cardiovascular: Regular rate and rhythm. No murmurs, rubs, or gallops. Distal pulses are 2+ and symmetric .  Pulmonary/Chest: No respiratory distress. Clear to auscultation bilaterally. No wheezing, rales, or rhonchi.  Abdominal: Soft. Non-distended.   Musculoskeletal: Moves all extremities. No edema.   Skin: Warm and dry.  Neurological: Awake and alert. No acute focal neurological deficits are appreciated.    Protective Sensation (w/ 10 gram monofilament):  Right: Intact  Left: Intact    Visual Inspection:  Ulcer to lateral aspect of right 1st metatarsal with odor and drainage. Callus to left toes.     Pedal Pulses:   Right: Present  Left: Present    Posterior Tibialis Pulses:   Right:Present  Left: Present         Assessment & Plan:     69 yof with pmhx DM and ckd presents for diabetic shoes.  She has history of prior toe amputation and foot ulcers. Has active foot ulcer she is following wound care with and is on levaquin.   Encounter for diabetic shoes  Most Recent A1c 10.9 on 4/25/24 from my chart review although patient stating she had one done at 6.6 very recently.   Diabetic shoes ordered.         Alejandro Mcdonald DO  [facility], [facility]    "

## 2024-08-24 NOTE — PROGRESS NOTES
I have reviewed and concur with the resident's history, physical, assessment, and plan.  I have discussed with him all issues related to the diagnosis, workup and treatment plan. Care provided as reasonable and necessary.    Sunny Bhagat MD  [facility]

## 2024-08-30 ENCOUNTER — OFFICE VISIT (OUTPATIENT)
Dept: FAMILY MEDICINE | Facility: CLINIC | Age: 69
End: 2024-08-30
Payer: MEDICARE

## 2024-08-30 ENCOUNTER — TELEPHONE (OUTPATIENT)
Dept: INTERNAL MEDICINE | Facility: CLINIC | Age: 69
End: 2024-08-30
Payer: MEDICARE

## 2024-08-30 VITALS
HEIGHT: 62 IN | RESPIRATION RATE: 18 BRPM | DIASTOLIC BLOOD PRESSURE: 74 MMHG | SYSTOLIC BLOOD PRESSURE: 138 MMHG | HEART RATE: 65 BPM | WEIGHT: 156.5 LBS | BODY MASS INDEX: 28.8 KG/M2 | TEMPERATURE: 98 F

## 2024-08-30 DIAGNOSIS — Z94.0 S/P KIDNEY TRANSPLANT: Chronic | ICD-10-CM

## 2024-08-30 DIAGNOSIS — I10 PRIMARY HYPERTENSION: Chronic | ICD-10-CM

## 2024-08-30 DIAGNOSIS — I73.9 PERIPHERAL VASCULAR DISEASE, UNSPECIFIED: ICD-10-CM

## 2024-08-30 DIAGNOSIS — Z79.4 TYPE 2 DIABETES MELLITUS WITH OTHER DIABETIC KIDNEY COMPLICATION, WITH LONG-TERM CURRENT USE OF INSULIN: Primary | ICD-10-CM

## 2024-08-30 DIAGNOSIS — E11.29 TYPE 2 DIABETES MELLITUS WITH OTHER DIABETIC KIDNEY COMPLICATION, WITH LONG-TERM CURRENT USE OF INSULIN: Primary | ICD-10-CM

## 2024-08-30 DIAGNOSIS — E78.2 MIXED HYPERLIPIDEMIA: Chronic | ICD-10-CM

## 2024-08-30 DIAGNOSIS — Z89.422 ACQUIRED ABSENCE OF OTHER LEFT TOE(S): ICD-10-CM

## 2024-08-30 PROBLEM — E11.65 UNCONTROLLED TYPE 2 DIABETES MELLITUS WITH HYPERGLYCEMIA: Status: RESOLVED | Noted: 2022-05-18 | Resolved: 2024-08-30

## 2024-08-30 PROCEDURE — 99213 OFFICE O/P EST LOW 20 MIN: CPT | Mod: PBBFAC,PN | Performed by: NURSE PRACTITIONER

## 2024-08-30 RX ORDER — BLOOD-GLUCOSE METER
EACH MISCELLANEOUS
COMMUNITY
Start: 2024-08-07

## 2024-08-30 RX ORDER — CALCITRIOL 0.25 UG/1
CAPSULE ORAL
COMMUNITY

## 2024-08-30 NOTE — ASSESSMENT & PLAN NOTE
This was managed by wound care and vascular specialty, no active wounds on exam today.  Fall precautions advised, gait is normal.  History of Charcot foot.

## 2024-08-30 NOTE — ASSESSMENT & PLAN NOTE
Lab Results   Component Value Date    HGBA1C 7.0 10/23/2023    HGBA1C 9.4 (H) 06/22/2023    HGBA1C 7.3 (H) 02/06/2023    HGBA1C 9.0 (H) 11/15/2020    HGBA1C 6.7 (H) 10/11/2018    .00 06/22/2023    CREATININE 0.76 10/23/2023    CREATININE 0.75 09/14/2023    CREATININE 0.7 02/09/2021      Diabetes Medications               insulin (LANTUS SOLOSTAR U-100 INSULIN) glargine 100 units/mL SubQ pen Inject 28 Units into the skin every evening.    metFORMIN (GLUCOPHAGE) 500 MG tablet TAKE 1 TABLET BY MOUTH TWICE A DAY WITH MEALS    semaglutide (RYBELSUS) 14 mg tablet Take 1 tablet (14 mg total) by mouth once daily.          Most recent A1c July 2024 was 6.6, this was done at [facility] and we will get these labs scanned in.     Since she has been on Rybelsus and we have increase the dosage she has had some lower blood sugar numbers in the 60s and she would like to start decreasing her Lantus.  We have discussed a way to decrease Lantus every week by 2-3 units and continue checking fasting blood sugar, ensure fasting blood sugar is staying below 140, long-term goal staying below 130.    On Statin according to ADA guidelines.  Discussed caution with SGLT2s + diuretics as concomitant use can cause volume depletion. Discussed caution with DPP-Ivs and HF risk.  Follow ADA Diet. Avoid soda, simple sweets, and limit rice/pasta/breads/starches (no more than 45-50 grams per meal).  Maintain healthy weight with goal BMI <30.  Exercise 5 times per week for 30 minutes per day.  Stressed importance of daily foot exams and to wear approved DM shoes.   Stressed importance of annual dilated eye exam.

## 2024-08-30 NOTE — ASSESSMENT & PLAN NOTE
Counseled for low-sodium, low carb, low-cholesterol, good fat, Dash diet.  Recommend increasing fiber in the diet to lower LDL, increasing fish oil and fish such as salmon may help increase HDL good cholesterol.  Increasing aerobic activity as tolerated may also help lower LDL.  Healthy weight maintenance is advised, portion control, calorie counting, and discussed difference between complex carbs and simple carbs.    Continue current statin therapy.  Recommend Co Q10 therapy along with statin to ease muscle aches    Reviewed her labs on her iPhone today from July 2024 that were done at [facility].  Her cholesterol panel is much improved and at goal, we will get the labs to stand into her chart here.

## 2024-08-30 NOTE — TELEPHONE ENCOUNTER
Patient was seen in [facility] on 8/24/24 in regards to Diabetic shoes with inserts. Unable to contact patient via phone, left message on voice mail  to return call to clinic.

## 2024-08-30 NOTE — ASSESSMENT & PLAN NOTE
She follows with vascular surgery, taking clopidogrel and rosuvastatin as prescribed, no active claudication symptoms and no neurovascular deficits on exam.

## 2024-08-30 NOTE — ASSESSMENT & PLAN NOTE
Reviewed updated med list, continue immunosuppression therapy.  Precautions advised with high-risk medication and infection control measures advised.  Continue follow-up with Nephrology.  She did have tacrolimus level checked with her recent labs and [facility] July 2024 and it was normal, in normal range.  She has these labs on her iPhone and we will get them scanned into her chart.

## 2024-08-30 NOTE — TELEPHONE ENCOUNTER
----- Message from Sully Hobson sent at 8/28/2024  4:04 PM CDT -----  Who Called: Kendra Malik    Caller is requesting assistance/information from provider's office.  Preferred Method of Contact: Phone Call  Patient's Preferred Phone Number on File: [phone]   Best Call Back Number, if different:  Additional Information:  medical advice, pt called to request a return cb to confirm status on Rx for diabetic shoes, pt stated [facility] has been sending a request  for supplies but has not received any response back,please advise, thanks

## 2024-08-30 NOTE — PROGRESS NOTES
Internal Medicine Clinic  [facility]     Patient ID: [id number]     Chief Complaint: Diabetes (Test results)      HPI:     Kendra Malik is a 69 y.o. female here today for follow-up with PCP.  Here today for diabetes management.    Medical hx includes uncontrolled DM, HTN, CKD st V (s/p left kidney transplant (20), anemia of chronic disease, HLD, neuropathy, AR, right charcot foot, diabetic retinopathy, osteomyelitis and gangrene to right ankle and foot, and covid pneumonia + (2021). Followed by [facility] nephrology and optho clinics, Dr. Sathish Rankin, podiatry and Dr. Dodd, vascular.      Health Maintenance         Date Due Completion Date    Colorectal Cancer Screening 2020 3/27/2019    Lipid Panel 2024    Hemoglobin A1c 2024    Influenza Vaccine (1) 2024 10/23/2023    Eye Exam 2025    Mammogram 2025    Foot Exam 2025    Low Dose Statin 2025    DEXA Scan 2026    TETANUS VACCINE 02/10/2027 2/10/2017            Past Medical History:   Diagnosis Date    Cataract     DM2 w CKD 10/11/2018    Infected blister of fifth toe     left foot    Pneumonia 2021    Renal hypertension 10/11/2018    Secondary hyperparathyroidism of renal origin 2020        Past Surgical History:   Procedure Laterality Date    APPLICATION, GRAFT Right 2023    Procedure: APPLICATION, GRAFT [place]  Right/  Rep Marlo Childs;  Surgeon: Jeremiah RODRIGUEZ DPM;  Location: [facility] OR;  Service: Podiatry;  Laterality: Right;    BONE BIOPSY Left 2023    Procedure: BIOPSY, BONE  Right 5th digit;  Surgeon: Jeremiah RODRIGUEZ DPM;  Location: [facility] OR;  Service: Podiatry;  Laterality: Left;    cataract surgery       SECTION      x3    COLONOSCOPY N/A 2019    Procedure: COLONOSCOPY;  Surgeon: Arianna Srinivasan MD;  Location: [facility];  Service: Endoscopy;  Laterality: N/A;     DEBRIDEMENT OF FOOT Right     DEBRIDEMENT OF FOOT Right 1/26/2023    Procedure: DEBRIDEMENT, FOOT Right;  Surgeon: Jeremiah RODRIGUEZ DPM;  Location: [facility] OR;  Service: Podiatry;  Laterality: Right;    HYSTERECTOMY      KIDNEY TRANSPLANT N/A 11/15/2020    Procedure: TRANSPLANT, KIDNEY;  Surgeon: Scott Ann MD;  [facility];  Service: Transplant;  Laterality: N/A;    KIDNEY TRANSPLANT Left 11/16/2020    OOPHORECTOMY      PERITONEAL CATHETER INSERTION      RETINAL DETACHMENT SURGERY      TOE AMPUTATION Left 2/16/2023    Procedure: AMPUTATION, TOE  Left 5th digit;  Surgeon: Jeremiah RODRIGUEZ DPM;  Location: [facility] OR;  Service: Podiatry;  Laterality: Left;        Social History     Tobacco Use    Smoking status: Never    Smokeless tobacco: Never   Substance and Sexual Activity    Alcohol use: Not Currently     Alcohol/week: 1.0 standard drink of alcohol     Types: 1 Shots of liquor per week     Comment: 1-2 a year    Drug use: No    Sexual activity: Not Currently        Current Outpatient Medications   Medication Instructions    ACCU-CHEK GUIDE ME GLUCOSE MTR Misc use as directed    acetaminophen (TYLENOL EXTRA STRENGTH ORAL) 500 mg, Oral, Every 6 hours PRN    blood sugar diagnostic Strp 1 each, Misc.(Non-Drug; Combo Route), 3 times daily, E11.3513 (DM)    blood-glucose meter kit Use as instructed; E11.3513 (DM)    calcitRIOL (ROCALTROL) 0.25 MCG Cap TAKE 1 CAPSULE BY MOUTH ON MONDAY, WEDNESDAY, AND FRIDAY    clopidogreL (PLAVIX) 75 mg, Oral, Daily    diphenhydrAMINE (BENADRYL) 25 mg, Oral, Every 6 hours PRN    famotidine/Ca carb/mag hydrox (PEPCID COMPLETE ORAL) 1 tablet, Oral, Daily PRN    fluticasone propionate (FLONASE) 50 mcg/actuation nasal spray USE 2 SPRAYS INTO EACH NOSTRIL TWICE A DAY    furosemide (LASIX) 20 MG tablet TAKE 1/2 TABLET DAILY AS NEEDED (EDEMA OR SWELLING). DO NOT TAKE MORE THAN 3 DAYS PER WEEK.    gabapentin (NEURONTIN) 300 MG capsule TAKE 1 CAPSULE BY MOUTH THREE  "TIMES A DAY    gabapentin (NEURONTIN) 600 mg, Oral, 2 times daily    HYDROcodone-acetaminophen (NORCO)  mg per tablet 1 tablet, Every 6 hours PRN    labetaloL (NORMODYNE) 100 MG tablet TAKE 1 TABLET BY MOUTH TWICE A DAY    lancets Misc 1 each, Misc.(Non-Drug; Combo Route), 3 times daily, E11.3513    LANTUS SOLOSTAR U-100 INSULIN 28 Units, Subcutaneous, Nightly    LIDOcaine (LIDODERM) 5 % 1 patch, Transdermal, Daily PRN, Remove & Discard patch within 12 hours or as directed by MD    loratadine (CLARITIN) 10 mg, Oral, Daily    metFORMIN (GLUCOPHAGE) 500 MG tablet TAKE 1 TABLET BY MOUTH TWICE A DAY WITH MEALS    mycophenolate (CELLCEPT) 1,000 mg, Oral, 2 times daily    NIFEdipine (PROCARDIA-XL) 30 mg, Oral, 2 times daily    ondansetron (ZOFRAN-ODT) 4 mg, Oral, Every 6 hours PRN    pen needle, diabetic (BD RIO 2ND GEN PEN NEEDLE) 32 gauge x 5/32" Ndle 1 each, Subcutaneous, Nightly    predniSONE (DELTASONE) 5 mg, Oral, Daily    rosuvastatin (CRESTOR) 20 mg, Oral, Daily    RYBELSUS 14 mg, Oral, Daily    silver sulfADIAZINE 1% (SILVADENE) 1 % cream 1 application     tacrolimus (PROGRAF) 1 MG Cap TAKE 5 CAPSULES (5 MG TOTAL) BY MOUTH EVERY MORNING AND 5 CAPSULES (5 MG TOTAL) EVERY EVENING.       Review of patient's allergies indicates:  No Known Allergies     Patient Care Team:  Jeff Ridley FNP as PCP - General (Family Medicine)  Ann Marie Scruggs MD as Consulting Physician (Nephrology)     Subjective:     Review of Systems   Constitutional:  Negative for appetite change, chills, diaphoresis and fever.   HENT:  Negative for ear pain, sinus pain and sore throat.    Eyes:  Negative for pain and visual disturbance.   Respiratory:  Negative for cough, shortness of breath and wheezing.    Cardiovascular:  Negative for chest pain, palpitations and leg swelling.   Gastrointestinal:  Negative for abdominal pain, blood in stool, diarrhea, nausea and vomiting.   Endocrine: Negative for cold intolerance. " "  Genitourinary:  Negative for difficulty urinating, dysuria, frequency and hematuria.   Musculoskeletal:  Negative for arthralgias, joint swelling and myalgias.   Skin:  Negative for color change and rash.   Allergic/Immunologic: Negative.    Neurological: Negative.  Negative for dizziness, syncope, light-headedness and numbness.   Hematological: Negative.    Psychiatric/Behavioral: Negative.  Negative for dysphoric mood and suicidal ideas. The patient is not nervous/anxious.    All other systems reviewed and are negative.      12 point review of systems conducted, negative except as stated in the history of present illness. See HPI for details.    Objective:     Visit Vitals  /74 (BP Location: Left arm, Patient Position: Sitting, BP Method: Medium (Manual))   Pulse 65   Temp 98.4 °F (36.9 °C) (Oral)   Resp 18   Ht 5' 2.01" (1.575 m)   Wt 71 kg (156 lb 8 oz)   BMI 28.62 kg/m²       Physical Exam  Vitals and nursing note reviewed.   Constitutional:       General: She is not in acute distress.     Appearance: She is not ill-appearing.   HENT:      Head: Normocephalic and atraumatic.      Mouth/Throat:      Mouth: Mucous membranes are moist.      Pharynx: Oropharynx is clear.   Eyes:      General: No scleral icterus.     Extraocular Movements: Extraocular movements intact.      Conjunctiva/sclera: Conjunctivae normal.      Pupils: Pupils are equal, round, and reactive to light.   Neck:      Vascular: No carotid bruit.   Cardiovascular:      Rate and Rhythm: Normal rate and regular rhythm.      Heart sounds: Murmur heard.      No friction rub. No gallop.   Pulmonary:      Effort: Pulmonary effort is normal. No respiratory distress.      Breath sounds: Normal breath sounds. No wheezing, rhonchi or rales.   Abdominal:      General: Abdomen is flat. Bowel sounds are normal. There is no distension.      Palpations: Abdomen is soft. There is no mass.      Tenderness: There is no abdominal tenderness. "   Musculoskeletal:         General: Normal range of motion.      Cervical back: Normal range of motion and neck supple.   Skin:     General: Skin is warm and dry.   Neurological:      General: No focal deficit present.      Mental Status: She is alert.   Psychiatric:         Mood and Affect: Mood normal.         Labs Reviewed:     Chemistry:  Lab Results   Component Value Date     10/23/2023    K 4.1 10/23/2023    BUN 17.5 10/23/2023    CREATININE 0.76 10/23/2023    EGFRNORACEVR >60 10/23/2023    GLUCOSE 214 (H) 10/23/2023    CALCIUM 10.3 (H) 10/23/2023    ALKPHOS 119 10/23/2023    LABPROT 7.3 10/23/2023    ALBUMIN 3.8 10/23/2023    BILIDIR 0.2 05/06/2022    IBILI 0.20 05/06/2022    AST 12 10/23/2023    ALT <5 10/23/2023    MG 1.50 (L) 05/06/2022    PHOS 3.5 05/06/2022    HXRTJIHN74WM 47.7 05/06/2022        Lab Results   Component Value Date    HGBA1C 7.0 10/23/2023        Hematology:  Lab Results   Component Value Date    WBC 5.03 10/23/2023    HGB 11.6 (L) 10/23/2023    HCT 38.3 10/23/2023     10/23/2023       Lipid Panel:  Lab Results   Component Value Date    CHOL 225 (H) 06/22/2023    HDL 59 06/22/2023    .00 06/22/2023    TRIG 193 (H) 06/22/2023    TOTALCHOLEST 4 06/22/2023        Urine:  Lab Results   Component Value Date    APPEARANCEUA Clear 01/25/2022    PROTEINUA Negative 01/25/2022    LEUKOCYTESUR Negative 01/25/2022    RBCUA 0-5 01/25/2022    WBCUA 0-5 01/25/2022    BACTERIA None 01/25/2022    SQEPUA  (A) 06/17/2019    HYALINECASTS 11-20 (A) 01/25/2022    CREATRANDUR 192.4 (H) 06/22/2023    PROTEINURINE 13.2 01/25/2022        Assessment:       ICD-10-CM ICD-9-CM   1. Type 2 diabetes mellitus with other diabetic kidney complication, with long-term current use of insulin  E11.29 250.40    Z79.4 V58.67   2. Mixed hyperlipidemia  E78.2 272.2   3. Primary hypertension  I10 401.9   4. S/P kidney transplant  Z94.0 V42.0   5. Peripheral vascular disease, unspecified  I73.9 443.9   6.  Acquired absence of other left toe(s)  Z89.422 V49.72        Plan:     1. Type 2 diabetes mellitus with other diabetic kidney complication, with long-term current use of insulin  Assessment & Plan:  Lab Results   Component Value Date    HGBA1C 7.0 10/23/2023    HGBA1C 9.4 (H) 06/22/2023    HGBA1C 7.3 (H) 02/06/2023    HGBA1C 9.0 (H) 11/15/2020    HGBA1C 6.7 (H) 10/11/2018    .00 06/22/2023    CREATININE 0.76 10/23/2023    CREATININE 0.75 09/14/2023    CREATININE 0.7 02/09/2021      Diabetes Medications               insulin (LANTUS SOLOSTAR U-100 INSULIN) glargine 100 units/mL SubQ pen Inject 28 Units into the skin every evening.    metFORMIN (GLUCOPHAGE) 500 MG tablet TAKE 1 TABLET BY MOUTH TWICE A DAY WITH MEALS    semaglutide (RYBELSUS) 14 mg tablet Take 1 tablet (14 mg total) by mouth once daily.          Most recent A1c July 2024 was 6.6, this was done at [facility] and we will get these labs scanned in.     Since she has been on Rybelsus and we have increase the dosage she has had some lower blood sugar numbers in the 60s and she would like to start decreasing her Lantus.  We have discussed a way to decrease Lantus every week by 2-3 units and continue checking fasting blood sugar, ensure fasting blood sugar is staying below 140, long-term goal staying below 130.    On Statin according to ADA guidelines.  Discussed caution with SGLT2s + diuretics as concomitant use can cause volume depletion. Discussed caution with DPP-Ivs and HF risk.  Follow ADA Diet. Avoid soda, simple sweets, and limit rice/pasta/breads/starches (no more than 45-50 grams per meal).  Maintain healthy weight with goal BMI <30.  Exercise 5 times per week for 30 minutes per day.  Stressed importance of daily foot exams and to wear approved DM shoes.   Stressed importance of annual dilated eye exam.        Orders:  -     Hemoglobin A1C; Future; Expected date: 11/30/2024  -     Comprehensive Metabolic Panel; Future; Expected  date: 11/30/2024    2. Mixed hyperlipidemia  Overview:  Rosuvastatin 20 mg daily    Assessment & Plan:  Counseled for low-sodium, low carb, low-cholesterol, good fat, Dash diet.  Recommend increasing fiber in the diet to lower LDL, increasing fish oil and fish such as salmon may help increase HDL good cholesterol.  Increasing aerobic activity as tolerated may also help lower LDL.  Healthy weight maintenance is advised, portion control, calorie counting, and discussed difference between complex carbs and simple carbs.    Continue current statin therapy.  Recommend Co Q10 therapy along with statin to ease muscle aches    Reviewed her labs on her iPhone today from July 2024 that were done at [facility].  Her cholesterol panel is much improved and at goal, we will get the labs to stand into her chart here.      3. Primary hypertension  Overview:  Labetalol 100 mg twice daily   Nifedipine 30 mg XL daily    Assessment & Plan:  Goal BP < 140/90, best goal is BP <130/80 consistently   Reduce the amount of salt in your diet, follow a 2 gm sodium, DASH diet daily            Lose weight if you are overweight or have obesity  Avoid drinking too much alcohol  Stop smoking  Exercise at least 30 minutes per day most days of the week        4. S/P kidney transplant  Assessment & Plan:  Reviewed updated med list, continue immunosuppression therapy.  Precautions advised with high-risk medication and infection control measures advised.  Continue follow-up with Nephrology.  She did have tacrolimus level checked with her recent labs and [facility] July 2024 and it was normal, in normal range.  She has these labs on her iPhone and we will get them scanned into her chart.      5. Peripheral vascular disease, unspecified  Assessment & Plan:  She follows with vascular surgery, taking clopidogrel and rosuvastatin as prescribed, no active claudication symptoms and no neurovascular deficits on exam.      6. Acquired  absence of other left toe(s)  Assessment & Plan:  This was managed by wound care and vascular specialty, no active wounds on exam today.  Fall precautions advised, gait is normal.  History of Charcot foot.           Follow up in about 3 months (around 11/30/2024) for follow up, with lab work prior to visit. In addition to their scheduled follow up, the patient has also been instructed to follow up on as needed basis.     Future Appointments   Date Time Provider Department Center   9/13/2024  9:30 AM Ann Marie Scruggs MD [facility] [place]    12/5/2024  8:00 AM Jeff Ridley FNP [facility]   1/24/2025  8:30 AM PROVIDERS, [facility] [place]

## 2024-09-06 ENCOUNTER — PATIENT MESSAGE (OUTPATIENT)
Dept: NEPHROLOGY | Facility: CLINIC | Age: 69
End: 2024-09-06
Payer: MEDICARE

## 2024-09-06 DIAGNOSIS — N18.30 STAGE 3 CHRONIC KIDNEY DISEASE, UNSPECIFIED WHETHER STAGE 3A OR 3B CKD: Primary | ICD-10-CM

## 2024-09-12 ENCOUNTER — PATIENT MESSAGE (OUTPATIENT)
Dept: NEPHROLOGY | Facility: CLINIC | Age: 69
End: 2024-09-12
Payer: MEDICARE

## 2024-09-13 ENCOUNTER — OFFICE VISIT (OUTPATIENT)
Dept: NEPHROLOGY | Facility: CLINIC | Age: 69
End: 2024-09-13
Payer: MEDICARE

## 2024-09-13 VITALS
DIASTOLIC BLOOD PRESSURE: 76 MMHG | WEIGHT: 157.63 LBS | TEMPERATURE: 98 F | HEIGHT: 62 IN | RESPIRATION RATE: 18 BRPM | OXYGEN SATURATION: 100 % | SYSTOLIC BLOOD PRESSURE: 156 MMHG | HEART RATE: 60 BPM | BODY MASS INDEX: 29.01 KG/M2

## 2024-09-13 DIAGNOSIS — N25.81 SECONDARY HYPERPARATHYROIDISM OF RENAL ORIGIN: Primary | ICD-10-CM

## 2024-09-13 DIAGNOSIS — Z79.899 LONG TERM CURRENT USE OF IMMUNOSUPPRESSIVE DRUG: ICD-10-CM

## 2024-09-13 DIAGNOSIS — D63.1 ANEMIA OF RENAL DISEASE: ICD-10-CM

## 2024-09-13 DIAGNOSIS — Z94.0 S/P KIDNEY TRANSPLANT: ICD-10-CM

## 2024-09-13 DIAGNOSIS — N18.9 ANEMIA OF RENAL DISEASE: ICD-10-CM

## 2024-09-13 PROCEDURE — 99214 OFFICE O/P EST MOD 30 MIN: CPT | Mod: PBBFAC | Performed by: INTERNAL MEDICINE

## 2024-09-13 NOTE — PROGRESS NOTES
[facility] Nephrology Clinic Note    Chief Complaint   Patient presents with    Follow-up     RTC, took meds, w/o complaints        History of Present Illness  Kendra Malik is a 68 y.o. female with PMH, CKD5 now s/p kidney transplant (), type 2 diabetes, hypertension, hyperlipidemia, AOCD, vitamin D toxicity, hypercalcemia and hyperparathyroidism presenting to nephrology clinic today for  F/U visit. Patient received her renal transplant in [place] in 2020. Patient continues on immunosuppression with tacrolimus, mycophenolate, daily prednisone 2.5 mg oral tablet. Patient reports compliance with all medications and denies any drug/tobacco use. Continues to follow with wound care for foot wound. Is having diabetes meds titrated by PCP. Otherwise no other complaints. Reports that her diabetic regimen is currently being changed by her PCP. Reports BP normally at home stable 130s/70s. Last visit with transplant provider 3-4 months ago.     Review of Systems  Twelve point review of systems conducted, negative except as stated in history of present illness.    Review of patient's allergies indicates:  No Known Allergies    Past Medical History:   Past Medical History:   Diagnosis Date    Cataract     DM2 w CKD 10/11/2018    Infected blister of fifth toe     left foot    Pneumonia 2021    Renal hypertension 10/11/2018    Secondary hyperparathyroidism of renal origin 2020       Procedure History:   Past Surgical History:   Procedure Laterality Date    APPLICATION, GRAFT Right 2023    Procedure: APPLICATION, GRAFT [place]  Right/  Rep Marlo Childs;  Surgeon: Jeremiah RODRIGUEZ DPM;  Location: [facility] OR;  Service: Podiatry;  Laterality: Right;    BONE BIOPSY Left 2023    Procedure: BIOPSY, BONE  Right 5th digit;  Surgeon: Jeremiah RODRIGUEZ DPM;  Location: [facility] OR;  Service: Podiatry;  Laterality: Left;    cataract surgery       SECTION      x3    COLONOSCOPY N/A 2019     "Procedure: COLONOSCOPY;  Surgeon: Arianna Srinivasan MD;  Location: [facility];  Service: Endoscopy;  Laterality: N/A;    DEBRIDEMENT OF FOOT Right     DEBRIDEMENT OF FOOT Right 1/26/2023    Procedure: DEBRIDEMENT, FOOT Right;  Surgeon: Jeremiah RODRIGUEZ DPM;  Location: [facility] OR;  Service: Podiatry;  Laterality: Right;    HYSTERECTOMY      KIDNEY TRANSPLANT N/A 11/15/2020    Procedure: TRANSPLANT, KIDNEY;  Surgeon: Scott Ann MD;  Location: [address];  Service: Transplant;  Laterality: N/A;    KIDNEY TRANSPLANT Left 11/16/2020    OOPHORECTOMY      PERITONEAL CATHETER INSERTION      RETINAL DETACHMENT SURGERY      TOE AMPUTATION Left 2/16/2023    Procedure: AMPUTATION, TOE  Left 5th digit;  Surgeon: Jeremiah RODRIGUEZ DPM;  Location: [facility] OR;  Service: Podiatry;  Laterality: Left;       Family History: family history includes Cancer in her paternal uncle; Diabetes in her brother, brother, father, mother, paternal aunt, and paternal grandmother; Gout in her brother; HIV in her sister; Heart disease in her mother; Hypertension in her father and mother.    Social History:  reports that she has never smoked. She has never used smokeless tobacco. She reports that she does not currently use alcohol after a past usage of about 1.0 standard drink of alcohol per week. She reports that she does not use drugs.    Physical Exam:   BP (!) 156/76 (BP Location: Right arm, Patient Position: Sitting, BP Method: Medium (Manual))   Pulse 60   Temp 97.6 °F (36.4 °C) (Oral)   Resp 18   Ht 5' 1.81" (1.57 m)   Wt 71.5 kg (157 lb 10.1 oz)   SpO2 100%   BMI 29.01 kg/m²  Body mass index is 29.01 kg/m².  General appearance: Patient is in no acute distress.  Skin: No rashes or wounds.  HEENT: PERRLA, EOMI, no scleral icterus, no JVD. Neck is supple.  Chest: Respirations are unlabored. Lungs sounds are clear.   Heart: S1, S2.   Abdomen: Benign.  : Deferred.  Extremities: No edema, peripheral pulses are palpable. "   Neuro: No focal deficits.     Home Medications:  Prior to Admission medications    Medication Sig Start Date End Date Taking? Authorizing Provider   acetaminophen (TYLENOL EXTRA STRENGTH ORAL) Take 500 mg by mouth every 6 (six) hours as needed.   Yes Provider, Historical   blood sugar diagnostic Strp 1 each by Misc.(Non-Drug; Combo Route) route 3 (three) times daily. E11.3513 (DM) 1/15/21  Yes Rose Reynaga NP   blood-glucose meter kit Use as instructed; E11.3513 (DM) 11/19/20  Yes Kelley Denny MD   calcitRIOL (ROCALTROL) 0.25 MCG Cap Take 1 capsule (0.25 mcg total) by mouth every Mon, Wed, Fri. 7/28/23 7/27/24 Yes Bonnie Mendoza MD   clopidogreL (PLAVIX) 75 mg tablet Take 75 mg by mouth once daily.   Yes Provider, Historical   diphenhydrAMINE (BENADRYL) 25 mg capsule Take 25 mg by mouth every 6 (six) hours as needed for Itching.   Yes Provider, Historical   famotidine/Ca carb/mag hydrox (PEPCID COMPLETE ORAL) Take 1 tablet by mouth daily as needed.   Yes Provider, Historical   fluticasone propionate (FLONASE) 50 mcg/actuation nasal spray SPRAY 2 SPRAYS INTO EACH NOSTRIL TWICE A DAY 2/23/23  Yes Kaitlynn Beck FNP   furosemide (LASIX) 20 MG tablet Take 0.5 tablets (10 mg total) by mouth daily as needed (edema or swelling). Do NOT take more than 3 days per week. 10/23/23  Yes Kaitlynn Beck FNP   gabapentin (NEURONTIN) 300 MG capsule TAKE 1 CAPSULE BY MOUTH THREE TIMES A DAY 1/3/23  Yes Kaitlynn Beck FNP   HYDROcodone-acetaminophen (NORCO)  mg per tablet Take 1 tablet by mouth every 6 (six) hours as needed. 6/22/23  Yes Provider, Historical   insulin glargine (LANTUS U-100 INSULIN) 100 unit/mL injection Inject 28 Units into the skin every evening. 4/2/24  Yes Kaitlynn Beck FNP   labetaloL (NORMODYNE) 100 MG tablet TAKE 1 TABLET BY MOUTH TWICE A DAY 10/31/23  Yes Kaitlynn Beck FNP   lancets Misc 1 each by Misc.(Non-Drug; Combo Route) route 3 (three) times daily. E11.1299  "12/23/20  Yes Catia Huerta MD   LIDOcaine (LIDODERM) 5 % Place 1 patch onto the skin daily as needed (back pain). Remove & Discard patch within 12 hours or as directed by MD 11/22/23  Yes Kaitlynn Beck FNP   loratadine (CLARITIN) 10 mg tablet TAKE 1 TABLET BY MOUTH EVERY DAY 5/1/23  Yes Kaitlynn Beck FNP   metFORMIN (GLUCOPHAGE) 500 MG tablet TAKE 1 TABLET BY MOUTH TWICE A DAY WITH MEALS 1/25/24  Yes Kaitlynn Beck FNP   mycophenolate (CELLCEPT) 250 mg Cap Take 4 capsules (1,000 mg total) by mouth 2 (two) times daily. 11/15/23 11/14/24 Yes Kelley Denny MD   NIFEdipine (PROCARDIA-XL) 30 MG (OSM) 24 hr tablet Take 1 tablet (30 mg total) by mouth 2 (two) times a day. 4/3/23  Yes Kaitlynn Beck FNP   ondansetron (ZOFRAN-ODT) 4 MG TbDL Take 1 tablet (4 mg total) by mouth every 6 (six) hours as needed (nausea). 1/25/24  Yes Kaitlynn Beck FNP   pen needle, diabetic (BD RIO 2ND GEN PEN NEEDLE) 32 gauge x 5/32" Ndle Inject 1 each into the skin nightly. 2/19/24  Yes Kaitlynn Beck FNP   predniSONE (DELTASONE) 5 MG tablet Take 1 tablet (5 mg total) by mouth once daily.  Patient taking differently: Take 2.5 mg by mouth once daily. 12/14/23  Yes Kelley Denny MD   rosuvastatin (CRESTOR) 20 MG tablet TAKE 1 TABLET BY MOUTH EVERYDAY AT BEDTIME 10/31/23  Yes Kaitlynn Beck FNP   semaglutide (RYBELSUS) 7 mg tablet Take 1 tablet (7 mg total) by mouth once daily. 1/25/24  Yes Kaitlynn Beck FNP   tacrolimus (PROGRAF) 1 MG Cap TAKE 5 CAPSULES (5 MG TOTAL) BY MOUTH EVERY MORNING AND 5 CAPSULES (5 MG TOTAL) EVERY EVENING. 12/14/23  Yes Kelley Denny MD          Impression:   Problem List Items Addressed This Visit          Renal/    S/P kidney transplant (Chronic)    Relevant Orders    CBC Auto Differential    Comprehensive Metabolic Panel    Magnesium    Phosphorus    Protein / creatinine ratio, urine    Urinalysis    PTH, Intact    Vitamin D    Tacrolimus () "       Immunology/Multi System    Long term current use of immunosuppressive drug (Chronic)    Relevant Orders    CBC Auto Differential    Comprehensive Metabolic Panel    Magnesium    Phosphorus    Protein / creatinine ratio, urine    Urinalysis    PTH, Intact    Vitamin D    Tacrolimus ()       Oncology    Anemia of renal disease    Relevant Orders    CBC Auto Differential    Comprehensive Metabolic Panel    Magnesium    Phosphorus    Protein / creatinine ratio, urine    Urinalysis    PTH, Intact    Vitamin D    Tacrolimus ()       Endocrine    Secondary hyperparathyroidism of renal origin - Primary    Relevant Orders    CBC Auto Differential    Comprehensive Metabolic Panel    Magnesium    Phosphorus    Protein / creatinine ratio, urine    Urinalysis    PTH, Intact    Vitamin D    Tacrolimus ()          Plan:     H/O CKD V s/p Renal Transplant 2020  Current GFR > 60  Chronic Immunosuppression Therapy   - received renal transplant in Nov 2020 in [place], history of uncontrolled type 2 diabetes and hypertension  - Immunosuppression on Tacrolimus 5mg BID, Mycophenolate 1000 BID , and Prednisolone 2.5 mg once daily  -Continue recommendations post renal transplant per guidelines, labs required every visit include CMP/magnesium/phosphorus/CBC with differential/tacrolimus level/urinalysis/UPCR/fasting blood glucose  - recommendations post renal transplant per guidelines, labs required every 3 months or every visit include hemoglobin A1c, fasting lipid profile  - recommendations post renal transplant per guidelines, labs required at least every 6 to 12 months include PTH and 25-OH Vit D  - recommendations post renal transplant per guidelines, labs required at 12, 18, and 24 months post transplant include BK virus blood and/or urine PCR testing   - Last UA wnl, eGFR > 60, BUN/Cr  14.6/0.72 7/2024  Tacrolimus levels therapeutic at 7.11 as of 7/2024  - today ordered the following labs to be reviewed at  next visit: CBC, CMP, magnesium, phosphorous, urine protein creatinine, UA, PTH intact, Vitamin D, tacrolimus level    Hypercalcemia:  - Patient with hypercalcemia of 10.2 7/2024  - Recommended patient stop taking calcium supplement    Secondary Hyperparathyroidism  -secondary to renal disease  -Intact .2 in 7/2024  -Continue calcitriol 0.25 mcg MWF     Hypomagnesemia  -Mg 1.5 in 7/2024  -Likely secondary to tacrolimus which cases a magnesium wasting nephropathy  -Recommend over the counter mag ox  -Repeat Mg level before next visit    Type 2 Diabetes  - last A1c 7.0 10/23/23  - continue diabetic mgmt per PCP   - discussed lifestyle and diet modifications.    Primary Hypertension, well controlled.  -Currently on labetalol 100 bid, nifedipine 30 BID. Continue. Further management per PCP.    Post-Renal Transplant Vaccine Recommendations:  - Flu Vaccine: provided 10/2022  - Pneumovax: provided 3/18/2022  - Hep A vaccine: received 2018  - Hep B vaccine: no official record available in chart however patient did receive vaccination when dialysis was initiated  - TDap vaccine: received 2017  - Shingrix vaccine: dose #1 10/2022, due for second  - Polio vaccine: received in 1962  - Meningococcal vaccine: Offer at next visit  - COVID vaccine: completed 2 dose series in May 2021, June 2021, completed booster in December 2021        Continue following measures:  -follow 2 gram a day dietary sodium restriction  -control diabetes (goal A1c less than 7%)  -control high blood pressure (goal blood pressure is less than 130/80, please check blood pressure twice a week and bring blood pressure logs to office visit)  -exercise at least 30 minutes a day, 5 days a week  -maintain healthy weight  -decrease or stop alcohol use  -do not smoke  -stay well hydrated (drink water only, avoid juices, sweet tea, and sodas)  -ask about staying up-to-date on vaccinations (flu vaccine, pneumonia vaccine, hepatitis B vaccine)  -avoid excessive  use of NSAIDs (ibuprofen, naproxen, Aleve, Advil, Toradol, Mobic), take Tylenol as needed for headache or mild pain  -take cholesterol lowering medications if prescribed (LDL goal less than 100)    Follow-up with the primary care provider as scheduled.  Return to [facility] (kidney) clinic with routine labs in 3 months     Nola Wheatley MD   [facility] Internal Medicine PGY-2

## 2024-09-19 DIAGNOSIS — M54.50 ACUTE BILATERAL LOW BACK PAIN WITHOUT SCIATICA: ICD-10-CM

## 2024-09-23 RX ORDER — LIDOCAINE 50 MG/G
1 PATCH TOPICAL DAILY PRN
Qty: 15 PATCH | Refills: 1 | Status: SHIPPED | OUTPATIENT
Start: 2024-09-23

## 2024-09-23 RX ORDER — FUROSEMIDE 20 MG/1
TABLET ORAL
Qty: 15 TABLET | Refills: 2 | Status: SHIPPED | OUTPATIENT
Start: 2024-09-23

## 2024-09-24 ENCOUNTER — PATIENT MESSAGE (OUTPATIENT)
Dept: NEPHROLOGY | Facility: CLINIC | Age: 69
End: 2024-09-24
Payer: MEDICARE

## 2024-09-24 RX ORDER — ONDANSETRON 4 MG/1
4 TABLET, ORALLY DISINTEGRATING ORAL EVERY 6 HOURS PRN
Qty: 30 TABLET | Refills: 0 | Status: SHIPPED | OUTPATIENT
Start: 2024-09-24

## 2024-09-24 NOTE — TELEPHONE ENCOUNTER
Could you please refill this medication for patient? One month supply and 6 refills. Thank you.  Dr. Scruggs   Calcitriol .25 mcg MWF 6 refills  Pt has 3 refills on file

## 2024-09-25 ENCOUNTER — PATIENT MESSAGE (OUTPATIENT)
Dept: FAMILY MEDICINE | Facility: CLINIC | Age: 69
End: 2024-09-25
Payer: MEDICARE

## 2024-09-25 ENCOUNTER — TELEPHONE (OUTPATIENT)
Dept: NEPHROLOGY | Facility: CLINIC | Age: 69
End: 2024-09-25
Payer: MEDICARE

## 2024-12-06 ENCOUNTER — PATIENT MESSAGE (OUTPATIENT)
Dept: NEPHROLOGY | Facility: CLINIC | Age: 69
End: 2024-12-06
Payer: MEDICARE

## 2024-12-20 RX ORDER — FUROSEMIDE 20 MG/1
20 TABLET ORAL
Qty: 15 TABLET | Refills: 2 | Status: SHIPPED | OUTPATIENT
Start: 2024-12-20

## 2024-12-23 ENCOUNTER — TELEPHONE (OUTPATIENT)
Dept: NEPHROLOGY | Facility: CLINIC | Age: 69
End: 2024-12-23
Payer: MEDICARE

## 2024-12-26 ENCOUNTER — TELEPHONE (OUTPATIENT)
Dept: NEPHROLOGY | Facility: CLINIC | Age: 69
End: 2024-12-26
Payer: MEDICARE

## 2024-12-26 NOTE — TELEPHONE ENCOUNTER
Patient will have new insurance-[employer]; rescheduled pt 1/31/25@8AM; Overbook per Dr. Scruggs to be seen sooner-transplant recipient.

## 2025-01-07 DIAGNOSIS — N18.9 CHRONIC KIDNEY DISEASE, UNSPECIFIED CKD STAGE: ICD-10-CM

## 2025-01-07 DIAGNOSIS — Z94.0 RENAL TRANSPLANT RECIPIENT: ICD-10-CM

## 2025-01-07 DIAGNOSIS — Z94.0 KIDNEY REPLACED BY TRANSPLANT: ICD-10-CM

## 2025-01-07 DIAGNOSIS — I10 HYPERTENSION, UNSPECIFIED TYPE: ICD-10-CM

## 2025-01-07 RX ORDER — MYCOPHENOLATE MOFETIL 250 MG/1
1000 CAPSULE ORAL 2 TIMES DAILY
Qty: 240 CAPSULE | Refills: 11 | Status: CANCELLED | OUTPATIENT
Start: 2025-01-07 | End: 2026-01-07

## 2025-01-07 RX ORDER — LABETALOL 100 MG/1
100 TABLET, FILM COATED ORAL 2 TIMES DAILY
Qty: 180 TABLET | Refills: 1 | Status: SHIPPED | OUTPATIENT
Start: 2025-01-07 | End: 2025-07-06

## 2025-01-07 RX ORDER — TACROLIMUS 1 MG/1
CAPSULE ORAL
Qty: 360 CAPSULE | Refills: 11 | Status: CANCELLED | OUTPATIENT
Start: 2025-01-07

## 2025-01-07 RX ORDER — ONDANSETRON 4 MG/1
4 TABLET, ORALLY DISINTEGRATING ORAL EVERY 6 HOURS PRN
Qty: 30 TABLET | Refills: 2 | Status: SHIPPED | OUTPATIENT
Start: 2025-01-07

## 2025-01-17 ENCOUNTER — PATIENT MESSAGE (OUTPATIENT)
Dept: NEPHROLOGY | Facility: CLINIC | Age: 70
End: 2025-01-17
Payer: MEDICARE

## 2025-01-17 DIAGNOSIS — E11.3553 TYPE 2 DIABETES MELLITUS WITH STABLE PROLIFERATIVE RETINOPATHY OF BOTH EYES, WITH LONG-TERM CURRENT USE OF INSULIN: Primary | ICD-10-CM

## 2025-01-17 DIAGNOSIS — Z79.4 TYPE 2 DIABETES MELLITUS WITH STABLE PROLIFERATIVE RETINOPATHY OF BOTH EYES, WITH LONG-TERM CURRENT USE OF INSULIN: Primary | ICD-10-CM

## 2025-01-24 ENCOUNTER — TELEPHONE (OUTPATIENT)
Dept: NEPHROLOGY | Facility: CLINIC | Age: 70
End: 2025-01-24
Payer: MEDICARE

## 2025-01-27 ENCOUNTER — TELEPHONE (OUTPATIENT)
Dept: NEPHROLOGY | Facility: CLINIC | Age: 70
End: 2025-01-27
Payer: MEDICARE

## 2025-01-27 NOTE — TELEPHONE ENCOUNTER
Spoke with [facility] pharmacy:  Refilled Mycophenolate 1000 mg BID 90 days 3 refills  Tacrolimus 1 mg 5 mg BID 90 days 3 refills

## 2025-01-29 ENCOUNTER — PATIENT MESSAGE (OUTPATIENT)
Dept: NEPHROLOGY | Facility: CLINIC | Age: 70
End: 2025-01-29
Payer: MEDICARE

## 2025-01-29 RX ORDER — METFORMIN HYDROCHLORIDE 500 MG/1
500 TABLET ORAL 2 TIMES DAILY WITH MEALS
Qty: 180 TABLET | Refills: 3 | Status: SHIPPED | OUTPATIENT
Start: 2025-01-29

## 2025-01-29 RX ORDER — INSULIN GLARGINE 100 [IU]/ML
28 INJECTION, SOLUTION SUBCUTANEOUS NIGHTLY
Qty: 15 EACH | Refills: 5 | Status: SHIPPED | OUTPATIENT
Start: 2025-01-29

## 2025-01-30 ENCOUNTER — PATIENT MESSAGE (OUTPATIENT)
Dept: NEPHROLOGY | Facility: CLINIC | Age: 70
End: 2025-01-30
Payer: MEDICARE

## 2025-01-31 ENCOUNTER — OFFICE VISIT (OUTPATIENT)
Dept: NEPHROLOGY | Facility: CLINIC | Age: 70
End: 2025-01-31

## 2025-01-31 VITALS
BODY MASS INDEX: 29.66 KG/M2 | HEART RATE: 56 BPM | OXYGEN SATURATION: 100 % | SYSTOLIC BLOOD PRESSURE: 172 MMHG | DIASTOLIC BLOOD PRESSURE: 80 MMHG | WEIGHT: 161.19 LBS | RESPIRATION RATE: 18 BRPM | HEIGHT: 62 IN | TEMPERATURE: 98 F

## 2025-01-31 DIAGNOSIS — Z79.899 LONG TERM CURRENT USE OF IMMUNOSUPPRESSIVE DRUG: ICD-10-CM

## 2025-01-31 DIAGNOSIS — N25.81 SECONDARY HYPERPARATHYROIDISM OF RENAL ORIGIN: ICD-10-CM

## 2025-01-31 DIAGNOSIS — Z94.0 S/P KIDNEY TRANSPLANT: Primary | ICD-10-CM

## 2025-01-31 RX ORDER — ONDANSETRON 4 MG/1
4 TABLET, ORALLY DISINTEGRATING ORAL EVERY 6 HOURS PRN
Qty: 30 TABLET | Refills: 2 | Status: SHIPPED | OUTPATIENT
Start: 2025-01-31

## 2025-01-31 RX ORDER — DEXTROSE 4 G
TABLET,CHEWABLE ORAL
COMMUNITY

## 2025-01-31 RX ORDER — VIT C/E/ZN/COPPR/LUTEIN/ZEAXAN 250MG-90MG
500 CAPSULE ORAL
COMMUNITY

## 2025-01-31 NOTE — PROGRESS NOTES
[facility]ph[facility] Clinic Note    Chief Complaint   Patient presents with    Follow-up     RTC, took meds, taking new ATB-toe infection        History of Present Illness  Kendra Malik is a 68 y.o. female with PMH, CKD5 now s/p kidney transplant (2020), type 2 diabetes, hypertension, hyperlipidemia, AOCD, vitamin D toxicity, hypercalcemia and hyperparathyroidism presenting to nephrology clinic today for  F/U visit. Patient received her renal transplant in [place] in November 2020. Patient continues on immunosuppression with tacrolimus, mycophenolate, daily prednisone 2.5 mg oral tablet. Patient reports compliance with all medications and denies any drug/tobacco use. Continues to follow with wound care for foot wound. Had recent opening of wound and required to infusions of [place]. Has been having some increased nausea since that without vomiting that gets relieved with zofran. Is having diabetes meds titrated by PCP. Otherwise no other complaints.Reports BP normally at home but elevated today due to not taking one of her BP meds and being nervous coming to doctor. Last visit with transplant provider 3-4 months ago. Had wrong dose of tacrolimus sent and rectified today.    Review of Systems  Twelve point review of systems conducted, negative except as stated in history of present illness.    Review of patient's allergies indicates:  No Known Allergies    Past Medical History:   Past Medical History:   Diagnosis Date    Cataract     DM2 w CKD 10/11/2018    Infected blister of fifth toe     left foot    Pneumonia 02/04/2021    Renal hypertension 10/11/2018    Secondary hyperparathyroidism of renal origin 12/30/2020       Procedure History:   Past Surgical History:   Procedure Laterality Date    APPLICATION, GRAFT Right 1/26/2023    Procedure: APPLICATION, GRAFT [place]  Right/  Rep Marlo Childs;  Surgeon: Jeremiah RODRIGUEZ DPM;  Location: [facility];  Service: Podiatry;  Laterality: Right;    BONE BIOPSY Left  2023    Procedure: BIOPSY, BONE  Right 5th digit;  Surgeon: Jeremiah RODRIGUEZ DPM;  Location: [facility] OR;  Service: Podiatry;  Laterality: Left;    cataract surgery       SECTION      x3    COLONOSCOPY N/A 2019    Procedure: COLONOSCOPY;  Surgeon: Arianna Srinivasan MD;  Location: [facility];  Service: Endoscopy;  Laterality: N/A;    DEBRIDEMENT OF FOOT Right     DEBRIDEMENT OF FOOT Right 2023    Procedure: DEBRIDEMENT, FOOT Right;  Surgeon: Jeremiah RODRIGUEZ DPM;  Location: [facility] OR;  Service: Podiatry;  Laterality: Right;    HYSTERECTOMY      KIDNEY TRANSPLANT N/A 11/15/2020    Procedure: TRANSPLANT, KIDNEY;  Surgeon: Scott Ann MD;  Location: [address];  Service: Transplant;  Laterality: N/A;    KIDNEY TRANSPLANT Left 2020    OOPHORECTOMY      PERITONEAL CATHETER INSERTION      RETINAL DETACHMENT SURGERY      TOE AMPUTATION Left 2023    Procedure: AMPUTATION, TOE  Left 5th digit;  Surgeon: Jeremiah RODRIGUEZ DPM;  Location: [facility] OR;  Service: Podiatry;  Laterality: Left;       Family History: family history includes Cancer in her paternal uncle; Diabetes in her brother, brother, father, mother, paternal aunt, and paternal grandmother; Gout in her brother; HIV in her sister; Heart disease in her mother; Hypertension in her father and mother.    Social History:  reports that she has never smoked. She has never used smokeless tobacco. She reports that she does not currently use alcohol after a past usage of about 1.0 standard drink of alcohol per week. She reports that she does not use drugs.    Physical Exam:   There were no vitals taken for this visit. There is no height or weight on file to calculate BMI.  General appearance: Patient is in no acute distress.  Skin: No rashes or wounds.  HEENT: PERRLA, EOMI, no scleral icterus, no JVD. Neck is supple.  Chest: Respirations are unlabored. Lungs sounds are clear.   Heart: S1, S2.   Abdomen: Benign.  :  Deferred.  Extremities: No edema, peripheral pulses are palpable.   Neuro: No focal deficits.     Home Medications:  Prior to Admission medications    Medication Sig Start Date End Date Taking? Authorizing Provider   acetaminophen (TYLENOL EXTRA STRENGTH ORAL) Take 500 mg by mouth every 6 (six) hours as needed.   Yes Provider, Historical   blood sugar diagnostic Strp 1 each by Misc.(Non-Drug; Combo Route) route 3 (three) times daily. E11.3513 (DM) 1/15/21  Yes Rose Reynaga NP   blood-glucose meter kit Use as instructed; E11.3513 (DM) 11/19/20  Yes Kelley Denny MD   calcitRIOL (ROCALTROL) 0.25 MCG Cap Take 1 capsule (0.25 mcg total) by mouth every Mon, Wed, Fri. 7/28/23 7/27/24 Yes Bonnie Mendoza MD   clopidogreL (PLAVIX) 75 mg tablet Take 75 mg by mouth once daily.   Yes Provider, Historical   diphenhydrAMINE (BENADRYL) 25 mg capsule Take 25 mg by mouth every 6 (six) hours as needed for Itching.   Yes Provider, Historical   famotidine/Ca carb/mag hydrox (PEPCID COMPLETE ORAL) Take 1 tablet by mouth daily as needed.   Yes Provider, Historical   fluticasone propionate (FLONASE) 50 mcg/actuation nasal spray SPRAY 2 SPRAYS INTO EACH NOSTRIL TWICE A DAY 2/23/23  Yes Kaitlynn Beck FNP   furosemide (LASIX) 20 MG tablet Take 0.5 tablets (10 mg total) by mouth daily as needed (edema or swelling). Do NOT take more than 3 days per week. 10/23/23  Yes Kaitlynn Beck FNP   gabapentin (NEURONTIN) 300 MG capsule TAKE 1 CAPSULE BY MOUTH THREE TIMES A DAY 1/3/23  Yes Kaitlynn Beck FNP   HYDROcodone-acetaminophen (NORCO)  mg per tablet Take 1 tablet by mouth every 6 (six) hours as needed. 6/22/23  Yes Provider, Historical   insulin glargine (LANTUS U-100 INSULIN) 100 unit/mL injection Inject 28 Units into the skin every evening. 4/2/24  Yes Kaitlynn Beck FNP   labetaloL (NORMODYNE) 100 MG tablet TAKE 1 TABLET BY MOUTH TWICE A DAY 10/31/23  Yes Kiran, Patience, FNP   lancets Misc 1 each by  "Misc.(Non-Drug; Combo Route) route 3 (three) times daily. E11.3513 12/23/20  Yes Catia Huerta MD   LIDOcaine (LIDODERM) 5 % Place 1 patch onto the skin daily as needed (back pain). Remove & Discard patch within 12 hours or as directed by MD 11/22/23  Yes Kaitlynn Beck FNP   loratadine (CLARITIN) 10 mg tablet TAKE 1 TABLET BY MOUTH EVERY DAY 5/1/23  Yes Kaitlynn Beck FNP   metFORMIN (GLUCOPHAGE) 500 MG tablet TAKE 1 TABLET BY MOUTH TWICE A DAY WITH MEALS 1/25/24  Yes Kaitlynn Beck FNP   mycophenolate (CELLCEPT) 250 mg Cap Take 4 capsules (1,000 mg total) by mouth 2 (two) times daily. 11/15/23 11/14/24 Yes Kelley Denny MD   NIFEdipine (PROCARDIA-XL) 30 MG (OSM) 24 hr tablet Take 1 tablet (30 mg total) by mouth 2 (two) times a day. 4/3/23  Yes Kaitlynn Beck FNP   ondansetron (ZOFRAN-ODT) 4 MG TbDL Take 1 tablet (4 mg total) by mouth every 6 (six) hours as needed (nausea). 1/25/24  Yes Kaitlynn Beck FNP   pen needle, diabetic (BD RIO 2ND GEN PEN NEEDLE) 32 gauge x 5/32" Ndle Inject 1 each into the skin nightly. 2/19/24  Yes Kaitlynn Beck FNP   predniSONE (DELTASONE) 5 MG tablet Take 1 tablet (5 mg total) by mouth once daily.  Patient taking differently: Take 2.5 mg by mouth once daily. 12/14/23  Yes Kelley Denny MD   rosuvastatin (CRESTOR) 20 MG tablet TAKE 1 TABLET BY MOUTH EVERYDAY AT BEDTIME 10/31/23  Yes Kaitlynn Beck FNP   semaglutide (RYBELSUS) 7 mg tablet Take 1 tablet (7 mg total) by mouth once daily. 1/25/24  Yes Kaitlynn Beck FNP   tacrolimus (PROGRAF) 1 MG Cap TAKE 5 CAPSULES (5 MG TOTAL) BY MOUTH EVERY MORNING AND 5 CAPSULES (5 MG TOTAL) EVERY EVENING. 12/14/23  Yes Kelley Denny MD          Impression:   Problem List Items Addressed This Visit    None           Plan:     H/O CKD V s/p Renal Transplant 2020  Current GFR > 60  Chronic Immunosuppression Therapy   - received renal transplant in Nov 2020 in [place], history of " uncontrolled type 2 diabetes and hypertension  - Immunosuppression on Tacrolimus 5mg BID, Mycophenolate 1000 BID , and Prednisolone 2.5 mg once daily  -Continue recommendations post renal transplant per guidelines, labs required every visit include CMP/magnesium/phosphorus/CBC with differential/tacrolimus level/urinalysis/UPCR/fasting blood glucose  - recommendations post renal transplant per guidelines, labs required every 3 months or every visit include hemoglobin A1c, fasting lipid profile  - recommendations post renal transplant per guidelines, labs required at least every 6 to 12 months include PTH and 25-OH Vit D  - recommendations post renal transplant per guidelines, labs required at 12, 18, and 24 months post transplant include BK virus blood and/or urine PCR testing   - Last UA wnl, eGFR > 60, BUN/Cr  13.1/0.88 1/2025  Tacrolimus levels therapeutic at 6.99 as of 1/2025  - today ordered the following labs to be reviewed at next visit: CBC, CMP, magnesium, phosphorous, urine protein creatinine, UA, PTH intact, Vitamin D, tacrolimus level    Hypercalcemia:  - Patient with hypercalcemia of 10.29 1/2025  - Recommended patient stop taking calcium supplement    Secondary Hyperparathyroidism  -secondary to renal disease  -Intact .4 in 7/2024  -Continue calcitriol 0.25 mcg MWF     Hypomagnesemia  -Mg 1.7 in 1/2025  -Likely secondary to tacrolimus which cases a magnesium wasting nephropathy  -Recommend over the counter mag ox  -Repeat Mg level before next visit    Type 2 Diabetes  - last A1c 10.9 4/24  - continue diabetic mgmt per PCP   - discussed lifestyle and diet modifications.    Primary Hypertension, well controlled.  -Currently on labetalol 100 bid, nifedipine 30 BID. Continue. Further management per PCP.    Post-Renal Transplant Vaccine Recommendations:  - Flu Vaccine: provided 10/2022  - Pneumovax: provided 3/18/2022  - Hep A vaccine: received 2018  - Hep B vaccine: no official record available in  chart however patient did receive vaccination when dialysis was initiated  - TDap vaccine: received 2017  - Shingrix vaccine: dose #1 10/2022, due for second  - Polio vaccine: received in 1962  - Meningococcal vaccine: Offer at next visit  - COVID vaccine: completed 2 dose series in May 2021, June 2021, completed booster in December 2021        Continue following measures:  -follow 2 gram a day dietary sodium restriction  -control diabetes (goal A1c less than 7%)  -control high blood pressure (goal blood pressure is less than 130/80, please check blood pressure twice a week and bring blood pressure logs to office visit)  -exercise at least 30 minutes a day, 5 days a week  -maintain healthy weight  -decrease or stop alcohol use  -do not smoke  -stay well hydrated (drink water only, avoid juices, sweet tea, and sodas)  -ask about staying up-to-date on vaccinations (flu vaccine, pneumonia vaccine, hepatitis B vaccine)  -avoid excessive use of NSAIDs (ibuprofen, naproxen, Aleve, Advil, Toradol, Mobic), take Tylenol as needed for headache or mild pain  -take cholesterol lowering medications if prescribed (LDL goal less than 100)    Follow-up with the primary care provider as scheduled.  Return to [facility] (kidney) clinic with routine labs in 3 months     Nola Wheatley MD   [facility] PGY-2

## 2025-01-31 NOTE — PROGRESS NOTES
Edlevated BP:  patient stated she had very salty food last night.  Patient will keep BP log for 2-3 weeks.  Will call for numbers.

## 2025-02-05 DIAGNOSIS — Z79.4 TYPE 2 DIABETES MELLITUS WITH OTHER DIABETIC KIDNEY COMPLICATION, WITH LONG-TERM CURRENT USE OF INSULIN: Primary | ICD-10-CM

## 2025-02-05 DIAGNOSIS — E11.29 TYPE 2 DIABETES MELLITUS WITH OTHER DIABETIC KIDNEY COMPLICATION, WITH LONG-TERM CURRENT USE OF INSULIN: Primary | ICD-10-CM

## 2025-03-24 ENCOUNTER — TELEPHONE (OUTPATIENT)
Dept: INTERNAL MEDICINE | Facility: CLINIC | Age: 70
End: 2025-03-24
Payer: MEDICARE

## 2025-03-24 NOTE — TELEPHONE ENCOUNTER
Returned patient's call informed her I would ask RAFAEL Dixon to place her labs in computer to be drawn on 3/25/2025. Patient verbalized understanding.

## 2025-03-25 DIAGNOSIS — Z79.4 TYPE 2 DIABETES MELLITUS WITH STAGE 2 CHRONIC KIDNEY DISEASE, WITH LONG-TERM CURRENT USE OF INSULIN: Primary | ICD-10-CM

## 2025-03-25 DIAGNOSIS — E11.22 TYPE 2 DIABETES MELLITUS WITH STAGE 2 CHRONIC KIDNEY DISEASE, WITH LONG-TERM CURRENT USE OF INSULIN: Primary | ICD-10-CM

## 2025-03-25 DIAGNOSIS — N18.2 TYPE 2 DIABETES MELLITUS WITH STAGE 2 CHRONIC KIDNEY DISEASE, WITH LONG-TERM CURRENT USE OF INSULIN: Primary | ICD-10-CM

## 2025-03-25 DIAGNOSIS — I10 PRIMARY HYPERTENSION: Chronic | ICD-10-CM

## 2025-03-27 ENCOUNTER — OFFICE VISIT (OUTPATIENT)
Dept: INTERNAL MEDICINE | Facility: CLINIC | Age: 70
End: 2025-03-27
Payer: MEDICARE

## 2025-03-27 ENCOUNTER — DOCUMENTATION ONLY (OUTPATIENT)
Dept: INTERNAL MEDICINE | Facility: CLINIC | Age: 70
End: 2025-03-27
Payer: MEDICARE

## 2025-03-27 VITALS
TEMPERATURE: 98 F | RESPIRATION RATE: 18 BRPM | HEART RATE: 72 BPM | SYSTOLIC BLOOD PRESSURE: 138 MMHG | WEIGHT: 162 LBS | HEIGHT: 61 IN | BODY MASS INDEX: 30.58 KG/M2 | DIASTOLIC BLOOD PRESSURE: 70 MMHG

## 2025-03-27 DIAGNOSIS — N39.46 MIXED STRESS AND URGE URINARY INCONTINENCE: ICD-10-CM

## 2025-03-27 DIAGNOSIS — D63.1 ANEMIA OF RENAL DISEASE: ICD-10-CM

## 2025-03-27 DIAGNOSIS — Z79.4 TYPE 2 DIABETES MELLITUS WITH OTHER DIABETIC KIDNEY COMPLICATION, WITH LONG-TERM CURRENT USE OF INSULIN: ICD-10-CM

## 2025-03-27 DIAGNOSIS — E11.29 TYPE 2 DIABETES MELLITUS WITH OTHER DIABETIC KIDNEY COMPLICATION, WITH LONG-TERM CURRENT USE OF INSULIN: ICD-10-CM

## 2025-03-27 DIAGNOSIS — J30.9 CHRONIC ALLERGIC RHINITIS: ICD-10-CM

## 2025-03-27 DIAGNOSIS — I10 HYPERTENSION, UNSPECIFIED TYPE: ICD-10-CM

## 2025-03-27 DIAGNOSIS — Z94.0 S/P KIDNEY TRANSPLANT: Primary | Chronic | ICD-10-CM

## 2025-03-27 DIAGNOSIS — J30.9 CHRONIC ALLERGIC RHINITIS: Primary | ICD-10-CM

## 2025-03-27 DIAGNOSIS — Z94.0 RENAL TRANSPLANT RECIPIENT: ICD-10-CM

## 2025-03-27 DIAGNOSIS — Z79.4 TYPE 2 DIABETES MELLITUS WITH STABLE PROLIFERATIVE RETINOPATHY OF BOTH EYES, WITH LONG-TERM CURRENT USE OF INSULIN: ICD-10-CM

## 2025-03-27 DIAGNOSIS — E11.9 CONTROLLED TYPE 2 DIABETES MELLITUS WITHOUT COMPLICATION, WITHOUT LONG-TERM CURRENT USE OF INSULIN: ICD-10-CM

## 2025-03-27 DIAGNOSIS — E78.2 MIXED HYPERLIPIDEMIA: Chronic | ICD-10-CM

## 2025-03-27 DIAGNOSIS — Z79.899 LONG TERM CURRENT USE OF IMMUNOSUPPRESSIVE DRUG: Chronic | ICD-10-CM

## 2025-03-27 DIAGNOSIS — E11.3553 TYPE 2 DIABETES MELLITUS WITH STABLE PROLIFERATIVE RETINOPATHY OF BOTH EYES, WITH LONG-TERM CURRENT USE OF INSULIN: ICD-10-CM

## 2025-03-27 DIAGNOSIS — I10 PRIMARY HYPERTENSION: Chronic | ICD-10-CM

## 2025-03-27 DIAGNOSIS — N18.9 ANEMIA OF RENAL DISEASE: ICD-10-CM

## 2025-03-27 PROBLEM — R32 URINARY INCONTINENCE: Status: ACTIVE | Noted: 2025-03-27

## 2025-03-27 PROCEDURE — 99215 OFFICE O/P EST HI 40 MIN: CPT | Mod: PBBFAC | Performed by: NURSE PRACTITIONER

## 2025-03-27 RX ORDER — ORAL SEMAGLUTIDE 14 MG/1
14 TABLET ORAL DAILY
Qty: 90 TABLET | Refills: 1 | Status: SHIPPED | OUTPATIENT
Start: 2025-03-27

## 2025-03-27 RX ORDER — TACROLIMUS 5 MG/1
5 CAPSULE ORAL 2 TIMES DAILY
COMMUNITY

## 2025-03-27 RX ORDER — PEN NEEDLE, DIABETIC 30 GX3/16"
1 NEEDLE, DISPOSABLE MISCELLANEOUS NIGHTLY
Qty: 100 EACH | Refills: 3 | Status: SHIPPED | OUTPATIENT
Start: 2025-03-27

## 2025-03-27 RX ORDER — LORATADINE 10 MG/1
10 TABLET ORAL DAILY
Qty: 90 TABLET | Refills: 3 | Status: SHIPPED | OUTPATIENT
Start: 2025-03-27

## 2025-03-27 RX ORDER — LANCETS
1 EACH MISCELLANEOUS 3 TIMES DAILY
Qty: 100 EACH | Refills: 11 | Status: SHIPPED | OUTPATIENT
Start: 2025-03-27

## 2025-03-27 RX ORDER — NIFEDIPINE 30 MG/1
30 TABLET, EXTENDED RELEASE ORAL 2 TIMES DAILY
Qty: 180 TABLET | Refills: 3 | Status: SHIPPED | OUTPATIENT
Start: 2025-03-27

## 2025-03-27 RX ORDER — FLUTICASONE PROPIONATE 50 MCG
2 SPRAY, SUSPENSION (ML) NASAL 2 TIMES DAILY
Qty: 96 ML | Refills: 1 | OUTPATIENT
Start: 2025-03-27

## 2025-03-27 NOTE — PROGRESS NOTES
Patient ID: [id number]     Chief Complaint: Establish Care        HPI:     HPI      Kendra Malik is a 69 y.o. female here today to establish care. Pt states she had left kidney transplant in 2020. Pt followed by Dr Scruggs in Renal clinic and prescribes her anti rejection drugs.           Immunizations:   Immunization History   Administered Date(s) Administered    COVID-19 MRNA, LN-S PF (MODERNA HALF 0.25 ML DOSE) 12/27/2021    COVID-19, MRNA, LN-S, PF (MODERNA FULL 0.5 ML DOSE) 05/14/2021, 06/11/2021    DTP 05/01/1962    Hepatitis A, Adult 10/11/2018    Hepatitis B, Adult 10/02/2018, 11/09/2018, 12/06/2018, 04/01/2019    Hepatitis B, Dialysis, 3 Dose 10/02/2018, 11/09/2018, 12/06/2018, 04/01/2019    Influenza 10/01/2012, 11/14/2013, 10/30/2015, 10/04/2017    Influenza (FLUAD) - Quadrivalent - Adjuvanted - PF *Preferred* (65+) 10/12/2022, 10/23/2023    Influenza - Quadrivalent 09/28/2016, 09/25/2018, 10/15/2019, 10/05/2020    Influenza - Quadrivalent - High Dose - PF (65 years and older) 03/18/2022    Influenza - Quadrivalent - PF (6-35 months) 10/04/2017    Influenza - Quadrivalent - PF *Preferred* (6 months and older) 09/28/2016, 10/05/2020    Influenza - Trivalent - Fluarix, Flulaval, Fluzone, Afluria - PF 10/30/2015, 10/04/2017    Influenza - Trivalent - Fluzone High Dose - PF (65 years and older) 03/18/2022    OPV 11/27/1962, 01/08/1963    Pneumococcal 10/01/2012    Pneumococcal Conjugate - 13 Valent 10/11/2018    Pneumococcal Polysaccharide - 23 Valent 09/25/2018, 03/18/2022    Rubella 12/10/1984    Td (ADULT) 09/13/1966, 09/10/1968, 09/29/1970, 10/16/1984, 06/26/2003    Tdap 09/13/1966, 09/10/1968, 09/29/1970, 10/16/1984, 06/26/2003, 02/10/2017    Zoster Recombinant 10/12/2022, 12/30/2023        -------------------------------------    Cataract    DM2 w CKD    Infected blister of fifth toe    left foot    Pneumonia    Renal hypertension    Secondary hyperparathyroidism of renal origin        Past  Surgical History:   Procedure Laterality Date    APPLICATION, GRAFT Right 2023    Procedure: APPLICATION, GRAFT [place]  Right/  Rep Marlo Childs;  Surgeon: Jermeiah RODRIGUEZ DPM;  Location: [facility] OR;  Service: Podiatry;  Laterality: Right;    BONE BIOPSY Left 2023    Procedure: BIOPSY, BONE  Right 5th digit;  Surgeon: Jeremiah RODRIGUEZ DPM;  Location: [facility] OR;  Service: Podiatry;  Laterality: Left;    cataract surgery       SECTION      x3    COLONOSCOPY N/A 2019    Procedure: COLONOSCOPY;  Surgeon: Arianna Srinivasan MD;  Location: [facility] ENDO;  Service: Endoscopy;  Laterality: N/A;    DEBRIDEMENT OF FOOT Right     DEBRIDEMENT OF FOOT Right 2023    Procedure: DEBRIDEMENT, FOOT Right;  Surgeon: Jeremiah RODRIGUEZ DPM;  Location: [facility] OR;  Service: Podiatry;  Laterality: Right;    EYE SURGERY      HYSTERECTOMY      KIDNEY TRANSPLANT N/A 11/15/2020    Procedure: TRANSPLANT, KIDNEY;  Surgeon: Scott Ann MD;  Location: [address];  Service: Transplant;  Laterality: N/A;    KIDNEY TRANSPLANT Left 2020    OOPHORECTOMY      PERITONEAL CATHETER INSERTION      RETINAL DETACHMENT SURGERY      TOE AMPUTATION Left 2023    Procedure: AMPUTATION, TOE  Left 5th digit;  Surgeon: Jeremiah RODRIGUEZ DPM;  Location: [facility] OR;  Service: Podiatry;  Laterality: Left;    TUBAL LIGATION         Review of patient's allergies indicates:  No Known Allergies    Current Outpatient Medications   Medication Instructions    ACCU-CHEK GUIDE ME GLUCOSE MTR Misc use as directed    acetaminophen (TYLENOL EXTRA STRENGTH ORAL) 500 mg, Every 6 hours PRN    blood sugar diagnostic Strp One touch. For twice daily glucose checks.    blood-glucose meter Misc USE AS DIRECTED    calcitRIOL (ROCALTROL) 0.25 MCG Cap TAKE 1 CAPSULE BY MOUTH ON MONDAY, WEDNESDAY, AND FRIDAY    clopidogreL (PLAVIX) 75 mg, Daily    cyanocobalamin (VITAMIN B-12) 500 mcg, As needed (PRN)    diphenhydrAMINE (BENADRYL) 25 mg,  "Every 6 hours PRN    famotidine/Ca carb/mag hydrox (PEPCID COMPLETE ORAL) 1 tablet, Daily PRN    fluticasone propionate (FLONASE) 50 mcg/actuation nasal spray USE 2 SPRAYS INTO EACH NOSTRIL TWICE A DAY    furosemide (LASIX) 20 mg, Oral, As needed (PRN)    gabapentin (NEURONTIN) 600 mg, 2 times daily    HYDROcodone-acetaminophen (NORCO)  mg per tablet 1 tablet, Every 6 hours PRN    labetaloL (NORMODYNE) 100 mg, Oral, 2 times daily    lancets Misc 1 each, Misc.(Non-Drug; Combo Route), 3 times daily, E11.3513    LANTUS SOLOSTAR U-100 INSULIN 28 Units, Subcutaneous, Nightly    LIDOcaine (LIDODERM) 5 % 1 patch, Transdermal, Daily PRN, Remove & Discard patch within 12 hours or as directed by MD    loratadine (CLARITIN) 10 mg, Oral, Daily    metFORMIN (GLUCOPHAGE) 500 mg, Oral, 2 times daily with meals    mycophenolate (CELLCEPT) 1,000 mg, Oral, 2 times daily    NIFEdipine (PROCARDIA-XL) 30 mg, Oral, 2 times daily    ondansetron (ZOFRAN-ODT) 4 mg, Oral, Every 6 hours PRN    pen needle, diabetic (BD RIO 2ND GEN PEN NEEDLE) 32 gauge x 5/32" Ndle 1 each, Subcutaneous, Nightly    predniSONE (DELTASONE) 5 mg, Oral, Daily    rosuvastatin (CRESTOR) 20 mg, Oral, Daily    RYBELSUS 14 mg, Oral, Daily    silver sulfADIAZINE 1% (SILVADENE) 1 % cream 1 application     tacrolimus (PROGRAF) 5 mg, 2 times daily       Social History[1]     Family History   Problem Relation Name Age of Onset    Diabetes Mother Josie Oliveros     Heart disease Mother Josie Oliveros         CHF    Hypertension Mother Josie Oliveros     Vision loss Mother Josie Oliveros     Diabetes Father      Hypertension Father      Colon cancer Father      HIV Sister      Diabetes Brother      Diabetes Brother      Gout Brother      Diabetes Paternal Grandmother      Diabetes Paternal Aunt      Cancer Paternal Uncle      Kidney disease Neg Hx          Patient Care Team:  Jessica Dixon FNP as PCP - General (Family Medicine)  Ann Marie Scruggs MD as " "Consulting Physician (Nephrology)     Subjective:     Review of Systems     See HPI for details    Constitutional: Denies Change in appetite. Denies Chills. Denies Fever. Denies Night sweats.  Eye: Denies Blurred vision. Denies Discharge. Denies Eye pain.  ENT: Denies Decreased hearing. Denies Sore throat. Denies Swollen glands.  Respiratory: Denies Cough. Denies Shortness of breath. Denies Shortness of breath with exertion. Denies Wheezing.  Cardiovascular: DeniesChest pain at rest. Denies Chest pain with exertion. Denies Irregular heartbeat. Denies Palpitations. Denies Edema.  Gastrointestinal: Denies Abdominal pain. DeniesDiarrhea. Denies Nausea. Denies Vomiting. Denies Hematemesis or Hematochezia.  Genitourinary: Denies Dysuria. Denies Urinary frequency. Denies Urinary urgency. Denies Blood in urine.  Endocrine: Denies Cold intolerance. Denies Excessive thirst. Denies Heat intolerance. Denies Weight loss. Denies Weight gain.  Musculoskeletal: Denies Painful joints. Denies Weakness.  Integumentary: Denies Rash. Denies Itching. Denies Dry skin.  Neurologic: Denies Dizziness. Denies Fainting. Denies Headache.  Psychiatric: Denies Depression. Denies Anxiety. Denies Suicidal/Homicidal ideations.    All Other ROS: Negative except as stated in HPI.       Objective:     Visit Vitals  /70 (BP Location: Right arm, Patient Position: Sitting)   Pulse 72   Temp 97.8 °F (36.6 °C) (Oral)   Resp 18   Ht 5' 1" (1.549 m)   Wt 73.5 kg (162 lb)   BMI 30.61 kg/m²       Physical Exam    General: Alert and oriented, No acute distress.  Head: Normocephalic, Atraumatic.  Eye: Pupils are equal, round and reactive to light, Extraocular movements are intact, Sclera non-icteric.  Ears/Nose/Throat: Normal, Mucosa moist,Clear.  Neck/Thyroid: Supple, Non-tender, No carotid bruit, No lymphadenopathy, No JVD, Full range of motion.  Respiratory: Clear to auscultation bilaterally; No wheezes, rales or rhonchi,Non-labored respirations, " Symmetrical chest wall expansion.  Cardiovascular: Regular rate and rhythm, S1/S2 normal, No murmurs, rubs or gallops.  Gastrointestinal: Soft, Non-tender, Non-distended, Normal bowel sounds, No palpable organomegaly.  Musculoskeletal: Normal range of motion.  Integumentary: Warm, Dry, Intact, No suspicious lesions or rashes.  Extremities: No clubbing, cyanosis or edema  Neurologic: No focal deficits, Cranial Nerves II-XII are grossly intact, Motor strength normal upper and lower extremities, Sensory exam intact.  Psychiatric: Normal interaction, Coherent speech, Euthymic mood, Appropriate affect       Labs Reviewed:     Chemistry:  Lab Results   Component Value Date     10/23/2023     09/12/2023     02/09/2021    K 4.1 10/23/2023    K 3.8 09/12/2023    K 3.2 (L) 02/09/2021    BUN 17.5 10/23/2023    BUN 7 09/12/2023    BUN 11 02/09/2021    CREATININE 0.76 10/23/2023    CREATININE 0.75 09/14/2023    CREATININE 0.7 02/09/2021    EGFRNORACEVR >60 10/23/2023    GLUCOSE 214 (H) 10/23/2023    CALCIUM 10.3 (H) 10/23/2023    CALCIUM 8.9 09/12/2023    CALCIUM 9.1 02/09/2021    ALKPHOS 119 10/23/2023    ALKPHOS 109 02/09/2021    LABPROT 7.3 10/23/2023    LABPROT 12.7 09/07/2023    LABPROT 10.1 11/15/2020    ALBUMIN 3.8 10/23/2023    ALBUMIN 2.8 (L) 02/09/2021    BILIDIR 0.2 05/06/2022    BILIDIR 0.1 10/11/2018    IBILI 0.20 05/06/2022    AST 12 10/23/2023    AST 34 02/09/2021    ALT <5 10/23/2023    ALT 10 02/09/2021    MG 1.50 (L) 05/06/2022    MG 1.7 02/09/2021    PHOS 3.5 05/06/2022    PHOS 2.6 (L) 02/09/2021    AVIWILHV86NO 47.7 05/06/2022    [id number] 19 (L) 02/05/2021        Lab Results   Component Value Date    HGBA1C 7.0 10/23/2023    HGBA1C 7.3 (H) 02/06/2023    HGBA1C 9.0 (H) 11/15/2020        Hematology:  Lab Results   Component Value Date    WBC 5.03 10/23/2023    HGB 11.6 (L) 10/23/2023    HCT 38.3 10/23/2023     10/23/2023       Lipid Panel:  Lab Results   Component Value Date    CHOL  225 (H) 06/22/2023    CHOL 171 11/15/2020    HDL 59 06/22/2023    HDL 46 11/15/2020    .00 06/22/2023    TRIG 193 (H) 06/22/2023    TRIG 202 (H) 11/15/2020    TOTALCHOLEST 4 06/22/2023    TOTALCHOLEST 3.7 11/15/2020        Urine:  Lab Results   Component Value Date    APPEARANCEUA Clear 01/25/2022    PROTEINUA Negative 01/25/2022    LEUKOCYTESUR Negative 01/25/2022    RBCUA 0-5 01/25/2022    WBCUA 0-5 01/25/2022    BACTERIA None 01/25/2022    SQEPUA  (A) 06/17/2019    HYALINECASTS 11-20 (A) 01/25/2022    CREATRANDUR 192.4 (H) 06/22/2023    PROTEINURINE 13.2 01/25/2022        Assessment:       ICD-10-CM ICD-9-CM   1. S/P kidney transplant  Z94.0 V42.0   2. Controlled type 2 diabetes mellitus without complication, without long-term current use of insulin  E11.9 250.00   3. Primary hypertension  I10 401.9   4. Mixed hyperlipidemia  E78.2 272.2   5. Long term current use of immunosuppressive drug  Z79.899 V58.69   6. Anemia of renal disease  N18.9 285.21    D63.1    7. Type 2 diabetes mellitus with other diabetic kidney complication, with long-term current use of insulin  E11.29 250.40    Z79.4 V58.67   8. Chronic allergic rhinitis  J30.9 477.9   9. Renal transplant recipient  Z94.0 V42.0   10. Hypertension, unspecified type  I10 401.9   11. Type 2 diabetes mellitus with stable proliferative retinopathy of both eyes, with long-term current use of insulin  E11.3553 250.50    Z79.4 362.02     V58.67        Plan:     1. S/P kidney transplant (Primary)  Pt s/p renal transplant 2020. Pt followed in renal clinic and also with transplant doctor Kelley Denny MD. Keep appts.     2. Controlled type 2 diabetes mellitus without complication, without long-term current use of insulin  A1c 6.8. ADA diet and exercise encouraged. Cont Rebelsys 14 mg as prescribed, Metformin as prescribed. A1c in 6 months. Urine microalbumin  done 3-26-25 10.6. DM foot exam 8-24-24. DM eye exam done 2-26-34.     3. Primary  hypertension  Pt states she ate salty foods yesterday and only took one of her BP meds this AM. BP elevated. Low fat low salt diet and exercise. Instructed on importance of BP control due to hx CKD and renal transplant. Cont Nifedipine, Labetalol as prescribed. RTC in 1 month for BP check.    4. Mixed hyperlipidemia  Low fat diet and exercise encouraged. Cont Crestor as prescribed. Pt has FLP done 3-26-25 at [facility] lab showing:  Chol 226, Trigs 84, HDL 73, .     5. Long term current use of immunosuppressive drug  Pt s/p renal transplant 2020. Pt followed in renal clinic and also with transplant doctor Kelley Denny MD. Keep appts.     6. Anemia of renal disease  CBC done at [facility] lab 3-26-25 showing WBC 3.38, RBC 4.52, Hgb 13.2, Hct 41.6.      7. Mixed incontinence  Pt states she has urinary incontinence and is requesting referral to Urogynecologist for evaluation and mgmt. Informed pt [facility] does not have a Urogynecologisit in house so pt will need to check with her insurance to find an outside provider and let me know and will send a referral.      Follow up in about 6 months (around 9/27/2025) for with labs 1 week prior to appt.- [place] . In addition to their scheduled follow up, the patient has also been instructed to follow up on as needed basis.     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)     Social Drivers of Health     Financial Resource Strain: Low Risk  (3/27/2025)    Overall Financial Resource Strain (CARDIA)     Difficulty of Paying Living Expenses: Not hard at all   Food Insecurity: No Food Insecurity (3/27/2025)    Hunger Vital Sign     Worried About Running Out of Food in the Last Year: Never true     Ran Out of Food in the Last Year: Never true   Transportation Needs: No Transportation Needs (3/27/2025)    PRAPARE - Transportation     Lack of Transportation (Medical): No     Lack of Transportation (Non-Medical): No   Physical Activity: Inactive (3/27/2025)    Exercise Vital Sign     Days of Exercise per Week: 0 days     Minutes of Exercise per Session: 0 min   Stress: No Stress Concern Present (3/27/2025)    [employer] [place] [employer] - Occupational Stress Questionnaire     Feeling of Stress : Not at all   Housing Stability: Low Risk  (3/27/2025)    Housing Stability Vital Sign     Unable to Pay for Housing in the Last Year: No     Homeless in the Last Year: No

## 2025-03-27 NOTE — TELEPHONE ENCOUNTER
Refill request for fluticasone propionate (FLONASE) 50 mcg/actuation nasal spray in chart. Last OV 08/30/2024.

## 2025-03-28 ENCOUNTER — TELEPHONE (OUTPATIENT)
Dept: NEPHROLOGY | Facility: CLINIC | Age: 70
End: 2025-03-28
Payer: MEDICARE

## 2025-03-28 NOTE — TELEPHONE ENCOUNTER
Left voicemail:  Morning! Pt needs refill on calcitRIOL (ROCALTROL) 0.25 MCG Cap p o MWF   predniSONE (DELTASONE) 5 MG table .5 tab p o qd .    ok to refill 90 days, 3 refills

## 2025-04-01 DIAGNOSIS — J30.9 CHRONIC ALLERGIC RHINITIS: Primary | ICD-10-CM

## 2025-04-01 NOTE — TELEPHONE ENCOUNTER
Refill request for fluticasone propionate (FLONASE) 50 mcg/actuation nasal spray in chart. Next appt 04/25/2025.

## 2025-04-02 RX ORDER — FLUTICASONE PROPIONATE 50 MCG
2 SPRAY, SUSPENSION (ML) NASAL 2 TIMES DAILY
Qty: 16 G | Refills: 1 | Status: SHIPPED | OUTPATIENT
Start: 2025-04-02

## 2025-04-30 DIAGNOSIS — J30.9 CHRONIC ALLERGIC RHINITIS: ICD-10-CM

## 2025-05-04 RX ORDER — FLUTICASONE PROPIONATE 50 MCG
SPRAY, SUSPENSION (ML) NASAL
Qty: 48 ML | Refills: 1 | Status: SHIPPED | OUTPATIENT
Start: 2025-05-04

## 2025-06-25 ENCOUNTER — PATIENT MESSAGE (OUTPATIENT)
Dept: NEPHROLOGY | Facility: CLINIC | Age: 70
End: 2025-06-25
Payer: MEDICARE

## 2025-06-27 ENCOUNTER — OFFICE VISIT (OUTPATIENT)
Dept: NEPHROLOGY | Facility: CLINIC | Age: 70
End: 2025-06-27
Payer: MEDICARE

## 2025-06-27 VITALS
WEIGHT: 152 LBS | TEMPERATURE: 98 F | OXYGEN SATURATION: 98 % | HEIGHT: 61 IN | BODY MASS INDEX: 28.7 KG/M2 | SYSTOLIC BLOOD PRESSURE: 142 MMHG | DIASTOLIC BLOOD PRESSURE: 70 MMHG | HEART RATE: 58 BPM | RESPIRATION RATE: 20 BRPM

## 2025-06-27 DIAGNOSIS — Z94.0 S/P KIDNEY TRANSPLANT: Primary | ICD-10-CM

## 2025-06-27 DIAGNOSIS — I10 PRIMARY HYPERTENSION: ICD-10-CM

## 2025-06-27 DIAGNOSIS — E55.9 HYPOVITAMINOSIS D: ICD-10-CM

## 2025-06-27 DIAGNOSIS — E11.3553 TYPE 2 DIABETES MELLITUS WITH STABLE PROLIFERATIVE RETINOPATHY OF BOTH EYES, WITH LONG-TERM CURRENT USE OF INSULIN: ICD-10-CM

## 2025-06-27 DIAGNOSIS — Z79.4 TYPE 2 DIABETES MELLITUS WITH STABLE PROLIFERATIVE RETINOPATHY OF BOTH EYES, WITH LONG-TERM CURRENT USE OF INSULIN: ICD-10-CM

## 2025-06-27 DIAGNOSIS — N25.81 SECONDARY HYPERPARATHYROIDISM OF RENAL ORIGIN: ICD-10-CM

## 2025-06-27 DIAGNOSIS — E83.42 HYPOMAGNESEMIA: ICD-10-CM

## 2025-06-27 PROCEDURE — 99215 OFFICE O/P EST HI 40 MIN: CPT | Mod: PBBFAC | Performed by: NURSE PRACTITIONER

## 2025-06-27 NOTE — PROGRESS NOTES
[facility]  Nephrology Clinic    Chief complaint: Chronic Kidney Disease (Follow up, feeling dehydrated)    History of present illness:   Kendra Malik is a 69 y.o. Black or  female who presents to Nephrology clinic today for management of chronic kidney disease. The patient's pertinent chronic problems include ESRD due to presumed diabetic kidney disease, status post kidney transplant in , hypertension, diabetes mellitus type 2, dyslipidemia, anemia of chronic disease, hypercalcemia, hypomagnesemia, and secondary hyperparathyroidism.  Patient presents for a follow-up appointment in Nephrology Clinic today, tells me she feels dehydrated, reports dry mouth and polydipsia.  She admits to discontinuing metformin and Lantus on her own because she was afraid to gain extra weight.  She has been drinking liquid IV and Powerade.     Review of Systems  12 point review of systems conducted, negative except as stated in the history of present illness.    Allergies: Patient has no known allergies.     Past Medical History:  has a past medical history of Cataract, DM2 w CKD, Infected blister of fifth toe, Pneumonia, Renal hypertension, and Secondary hyperparathyroidism of renal origin.    Procedure History:  has a past surgical history that includes Peritoneal catheter insertion; Hysterectomy;  section; Retinal detachment surgery; cataract surgery; Colonoscopy (N/A, 2019); Oophorectomy; Kidney transplant (N/A, 11/15/2020); Kidney transplant (Left, 2020); Debridement of foot (Right); application, graft (Right, 2023); Debridement of foot (Right, 2023); Bone biopsy (Left, 2023); Toe amputation (Left, 2023); Tubal ligation; and Eye surgery.    Family History: family history includes Cancer in her paternal uncle; Colon cancer in her father; Diabetes in her brother, brother, father, mother, paternal aunt, and paternal grandmother;  "Gout in her brother; HIV in her sister; Heart disease in her mother; Hypertension in her father and mother; Vision loss in her mother.    Social History:  reports that she has never smoked. She has never used smokeless tobacco. She reports that she does not currently use alcohol after a past usage of about 1.0 standard drink of alcohol per week. She reports that she does not use drugs.    Physical exam  BP (!) 142/70 (BP Location: Left arm, Patient Position: Sitting)   Pulse (!) 58   Temp 97.6 °F (36.4 °C) (Oral)   Resp 20   Ht 5' 1" (1.549 m)   Wt 68.9 kg (152 lb)   SpO2 98%   BMI 28.72 kg/m²   General appearance: Patient is in no acute distress.  Skin: No rashes or wounds.  HEENT: PERRLA, EOMI, no scleral icterus, no JVD. Neck is supple.  Chest: Respirations are unlabored. Lungs sounds are clear.   Heart: S1, S2.   Abdomen: Benign.  : Deferred.  Extremities: No edema, peripheral pulses are palpable.   Neuro: No focal deficits.     Home Medications:  Current Medications[1]    Laboratory data  06/26/2025:   WBC 3.9, hemoglobin 13.9, hematocrit 43, platelet count 208, sodium 136, potassium 4, chloride 101, CO2 24, glucose 382, BUN 8.5, creatinine 0.79, albumin 3.7, calcium 10.4, alkaline phosphatase 132, AST 17, ALT less than 7, total bilirubin 0.4, magnesium 1.5, phosphorus 2.4, intact , vitamin-D level 11, tacrolimus 5 ng/mL    Imaging  Kidney ultrasound of a transplanted kidney 11/16/2020:  Patient is status post left lower quadrant renal transplantation day 1.  The renal transplant demonstrates no focal parenchymal abnormality. No transplant hydronephrosis. Peritransplant fluid collection measuring approximately 4.0 x 1.0 x 2.5 cm.  Renal arterial resistive indices are as follows: Interlobar 0.70, segmental Up 0.77, segmental Mid 0.75, segmental Low 0.71.  There is no evidence of a tardus parvus waveform. The maximum velocity in the main renal artery is 134 cm/sec.  The renal artery to iliac ratio " 0.91.  The renal transplant venous system is unremarkable.  Partially visualized ureteral stent.  Impression:  Satisfactory Doppler and sonographic evaluation of the renal allograft.  Small Peritransplant fluid collection, likely hematoma.  No significant mass effect.    Impression    ICD-10-CM ICD-9-CM   1. S/P kidney transplant  Z94.0 V42.0   2. Primary hypertension  I10 401.9   3. Secondary hyperparathyroidism of renal origin  N25.81 588.81   4. Hypomagnesemia  E83.42 275.2   5. Hypovitaminosis D  E55.9 268.9   6. Type 2 diabetes mellitus with stable proliferative retinopathy of both eyes, with long-term current use of insulin  E11.3553 250.50    Z79.4 362.02     V58.67   7. BMI 28.0-28.9,adult  Z68.28 V85.24        Plan  S/P kidney transplant  -     Comprehensive Metabolic Panel; Future; Expected date: 10/15/2025  -     Microalbumin/Creatinine Ratio, Urine; Future; Expected date: 10/15/2025  -     Urinalysis, Reflex to Urine Culture; Future; Expected date: 10/15/2025  Renal indices remain stable.  Continue MMF, prednisone, and tacrolimus at current doses.  Importance of good glycemic and blood pressure control explained at length to the patient today.  Consider initiation of SGLT2 inhibitor if further lowering of A1c is desired.  Continue renal sparing activities:  -2 g a day dietary sodium restriction  -optimize glycemic control (goal A1c is less than 7%)   -control high blood pressure (goal blood pressure is less than 130/80, patient was advised to check blood pressure once or twice a week and bring blood pressure logs to next office visit)  -exercise at least 30 minutes a day, 5 days a week  -maintain healthy weight  -stay well hydrated (drink water only, avoid juices, sweet tea, and sodas)  -ask about staying up-to-date on vaccinations (flu vaccine, pneumonia vaccine, hepatitis B vaccine)  -avoid excessive use of NSAIDs (ibuprofen, naproxen, Aleve, Advil, Toradol, Mobic), take Tylenol as needed for headache  or mild pain  -take cholesterol lowering medications if prescribed (LDL goal less than 100)    Primary hypertension  Blood pressure reading is at goal, continue current antihypertensive regimen and 2 g a day dietary sodium restriction.      Secondary hyperparathyroidism of renal origin  -     PTH, Intact; Future; Expected date: 10/15/2025  Intact PTH levels are stable.  Continue calcitriol at current dose.      Hypomagnesemia  -     Magnesium; Future; Expected date: 10/15/2025  Hypomagnesemia and vitamin D deficiency are common in CKD and post-transplant patients due to impaired renal handling, medication effects (notably calcineurin inhibitors such as tacrolimus), and altered gastrointestinal absorption.  Patient admits to non-compliance with supplementation.   She was advised on importance of continuing daily magnesium supplementation.  Will check magnesium level prior to next appointment.    Hypovitaminosis D  In patients with secondary hyperparathyroidism, both nutritional vitamin D (to correct deficiency) and active vitamin D analogs (e.g., calcitriol, already prescribed) are often required to suppress PTH and maintain bone health.  Will resume low-dose vitamin-D supplementation, continue careful monitoring of serum calcium, phosphorus, and PTH to monitor for worsening hypercalcemia, especially in light of concurrent calcitriol therapy.    Type 2 diabetes mellitus with stable proliferative retinopathy of both eyes, with long-term current use of insulin  Patient was encouraged to resume metformin and Lantus as prescribed.  She was educated in the fact that both liquid IV and Powerade have added sugar and she should avoid those drinks along with sodas, alcohol, and juices. Consider initiation of SGLT2 inhibitor if further lowering of A1c is desired.    BMI 28.0-28.9,adult  Lifestyle and dietary interventions discussed, patient encouraged to maintain non-sedentary lifestyle and well-balanced diet.       Follow up  in about 4 months (around 10/27/2025).     Orders Placed This Encounter   Procedures    Comprehensive Metabolic Panel     Standing Status:   Future     Expected Date:   10/15/2025     Expiration Date:   11/15/2025    Microalbumin/Creatinine Ratio, Urine     Standing Status:   Future     Expected Date:   10/15/2025     Expiration Date:   11/15/2025     Specimen Source:   Urine     Send normal result to authorizing provider's In Basket if patient is active on MyChart::   Yes    Urinalysis, Reflex to Urine Culture     Standing Status:   Future     Expected Date:   10/15/2025     Expiration Date:   11/15/2025     Specimen Source:   Urine    PTH, Intact     Standing Status:   Future     Expected Date:   10/15/2025     Expiration Date:   11/15/2025    Magnesium     Standing Status:   Future     Expected Date:   10/15/2025     Expiration Date:   11/15/2025        Anali Moura NP  [facility] Nephrology                 [1]   Current Outpatient Medications:     ACCU-CHEK GUIDE ME GLUCOSE MTR Misc, use as directed, Disp: , Rfl:     acetaminophen (TYLENOL EXTRA STRENGTH ORAL), Take 500 mg by mouth every 6 (six) hours as needed., Disp: , Rfl:     blood sugar diagnostic Strp, One touch. For twice daily glucose checks., Disp: 100 each, Rfl: 11    calcitRIOL (ROCALTROL) 0.25 MCG Cap, TAKE 1 CAPSULE BY MOUTH ON MONDAY, WEDNESDAY, AND FRIDAY, Disp: , Rfl:     clopidogreL (PLAVIX) 75 mg tablet, Take 75 mg by mouth once daily., Disp: , Rfl:     cyanocobalamin (VITAMIN B-12) 500 MCG tablet, Take 500 mcg by mouth as needed., Disp: , Rfl:     diphenhydrAMINE (BENADRYL) 25 mg capsule, Take 25 mg by mouth every 6 (six) hours as needed for Itching., Disp: , Rfl:     famotidine/Ca carb/mag hydrox (PEPCID COMPLETE ORAL), Take 1 tablet by mouth daily as needed., Disp: , Rfl:     fluticasone propionate (FLONASE) 50 mcg/actuation nasal spray, SPRAY 2 SPRAYS BY EACH NOSTRIL ROUTE 2 TIMES DAILY., Disp: 48 mL, Rfl: 1    labetaloL (NORMODYNE) 100 MG  "tablet, Take 1 tablet (100 mg total) by mouth 2 (two) times daily., Disp: 180 tablet, Rfl: 1    lancets Misc, 1 each by Misc.(Non-Drug; Combo Route) route 3 (three) times daily. E11.3513, Disp: 100 each, Rfl: 11    LANTUS SOLOSTAR U-100 INSULIN 100 unit/mL (3 mL) InPn pen, INJECT 28 UNITS INTO THE SKIN EVERY EVENING., Disp: 15 each, Rfl: 5    LIDOcaine (LIDODERM) 5 %, Place 1 patch onto the skin daily as needed (back pain). Remove & Discard patch within 12 hours or as directed by MD, Disp: 15 patch, Rfl: 1    metFORMIN (GLUCOPHAGE) 500 MG tablet, TAKE 1 TABLET BY MOUTH TWICE A DAY WITH FOOD, Disp: 180 tablet, Rfl: 3    mycophenolate (CELLCEPT) 250 mg Cap, Take 4 capsules (1,000 mg total) by mouth 2 (two) times daily., Disp: 240 capsule, Rfl: 11    NIFEdipine (PROCARDIA-XL) 30 MG (OSM) 24 hr tablet, Take 1 tablet (30 mg total) by mouth 2 (two) times daily., Disp: 180 tablet, Rfl: 3    pen needle, diabetic (BD RIO 2ND GEN PEN NEEDLE) 32 gauge x 5/32" Ndle, Inject 1 each into the skin nightly., Disp: 100 each, Rfl: 3    predniSONE (DELTASONE) 5 MG tablet, Take 1 tablet (5 mg total) by mouth once daily. (Patient taking differently: Take 2.5 mg by mouth 3 (three) times a week.), Disp: 91 tablet, Rfl: 3    rosuvastatin (CRESTOR) 20 MG tablet, Take 1 tablet (20 mg total) by mouth once daily., Disp: 90 tablet, Rfl: 3    semaglutide (RYBELSUS) 14 mg tablet, Take 1 tablet (14 mg total) by mouth once daily., Disp: 90 tablet, Rfl: 1    tacrolimus (PROGRAF) 5 MG Cap, Take 5 mg by mouth 2 (two) times a day., Disp: , Rfl:     gabapentin (NEURONTIN) 600 MG tablet, Take 600 mg by mouth 2 (two) times daily. (Patient not taking: Reported on 6/27/2025), Disp: , Rfl:     "

## 2025-07-29 RX ORDER — FUROSEMIDE 20 MG/1
TABLET ORAL
Qty: 15 TABLET | Refills: 2 | OUTPATIENT
Start: 2025-07-29

## 2025-08-13 DIAGNOSIS — Z12.31 ENCOUNTER FOR SCREENING MAMMOGRAM FOR MALIGNANT NEOPLASM OF BREAST: Primary | ICD-10-CM

## 2025-08-14 ENCOUNTER — TELEPHONE (OUTPATIENT)
Dept: INTERNAL MEDICINE | Facility: CLINIC | Age: 70
End: 2025-08-14
Payer: MEDICARE

## 2025-08-14 DIAGNOSIS — E11.9 CONTROLLED TYPE 2 DIABETES MELLITUS WITHOUT COMPLICATION, WITHOUT LONG-TERM CURRENT USE OF INSULIN: ICD-10-CM

## 2025-08-14 DIAGNOSIS — I10 PRIMARY HYPERTENSION: Chronic | ICD-10-CM

## 2025-08-14 DIAGNOSIS — E78.2 MIXED HYPERLIPIDEMIA: Primary | Chronic | ICD-10-CM

## 2025-08-19 ENCOUNTER — HOSPITAL ENCOUNTER (OUTPATIENT)
Dept: RADIOLOGY | Facility: HOSPITAL | Age: 70
Discharge: HOME | End: 2025-08-19
Attending: NURSE PRACTITIONER
Payer: MEDICARE

## 2025-08-19 DIAGNOSIS — Z12.31 ENCOUNTER FOR SCREENING MAMMOGRAM FOR MALIGNANT NEOPLASM OF BREAST: ICD-10-CM

## 2025-08-19 PROCEDURE — 77067 SCR MAMMO BI INCL CAD: CPT | Mod: 26,,, | Performed by: RADIOLOGY

## 2025-08-19 PROCEDURE — 77063 BREAST TOMOSYNTHESIS BI: CPT | Mod: TC

## 2025-08-19 PROCEDURE — 77063 BREAST TOMOSYNTHESIS BI: CPT | Mod: 26,,, | Performed by: RADIOLOGY

## 2025-08-30 RX ORDER — FUROSEMIDE 20 MG/1
TABLET ORAL
Qty: 15 TABLET | Refills: 2 | OUTPATIENT
Start: 2025-08-30

## (undated) DEVICE — PLUG CATHETER STERILE FOLEY

## (undated) DEVICE — TOWEL OR XRAY WHITE 17X26IN

## (undated) DEVICE — SUT VICRYL PLUS 4-0 FS-2 27IN

## (undated) DEVICE — STAPLER SKIN PROXIMATE WIDE

## (undated) DEVICE — DRAIN CHANNEL ROUND 15FR

## (undated) DEVICE — SYR ONLY LUER LOCK 20CC

## (undated) DEVICE — SUT 7/0 24IN PROLENE BL MO

## (undated) DEVICE — BOWL UTILITY BLUE 32OZ

## (undated) DEVICE — SUT 2-0 12-18IN SILK

## (undated) DEVICE — CURETTE DERMAL 7MM DISP

## (undated) DEVICE — DRAPE U-DRAPE ADHESIVE 60X60IN

## (undated) DEVICE — BLADES MINI BEAVER 62

## (undated) DEVICE — GLOVE PROTEXIS PI SYN SURG 6.5

## (undated) DEVICE — DRESSING MEPITEL ONE 7X4IN

## (undated) DEVICE — DRAPE MEDIUM SHEET 40X70IN

## (undated) DEVICE — NDL SAFETY 22G X 1.5 ECLIPSE

## (undated) DEVICE — SOL NORMAL USPCA 0.9%

## (undated) DEVICE — STOCKINETTE 2INX36

## (undated) DEVICE — SEE MEDLINE ITEM 146417

## (undated) DEVICE — SUT SILK 2-0 STRANDS 30IN

## (undated) DEVICE — Device

## (undated) DEVICE — HEMOSTAT SURGICEL NU-KNIT 6X9

## (undated) DEVICE — SEE MEDLINE ITEM 156900

## (undated) DEVICE — GAUZE SPONGE 4X4 12PLY

## (undated) DEVICE — DRAPE SLUSH WARMER WITH DISC

## (undated) DEVICE — DRESSING MEPILEX AG SIL 8X4IN

## (undated) DEVICE — SUT 4/0 27IN PDS II VIO MON

## (undated) DEVICE — CONTAINER SPECIMEN SCREW 4OZ

## (undated) DEVICE — DRESSING ADH ISLAND 3.6 X 14

## (undated) DEVICE — BANDAGE CONFORM STRTCH 1IN

## (undated) DEVICE — DRAPE T LIMB BILATERAL STERILE

## (undated) DEVICE — PAD PREP CUFFED NS 24X48IN

## (undated) DEVICE — BANDAGE KERLIX AMD

## (undated) DEVICE — GLOVE PROTEXIS PI SYN SURG 7.5

## (undated) DEVICE — BLADE SURG STAINLESS STEEL #15

## (undated) DEVICE — DRAPE EXTREMITY ORTHOMAX

## (undated) DEVICE — TRAY FOLEY 16FR INFECTION CONT

## (undated) DEVICE — BANDAGE ESMARK ELASTIC ST 4X9

## (undated) DEVICE — FOLEY BLLN 20FR 3WAY 5CC

## (undated) DEVICE — SUT CTD VICRYL 2.0

## (undated) DEVICE — SYR 3CC LUER LOC

## (undated) DEVICE — EVACUATOR WOUND BULB 100CC

## (undated) DEVICE — SUT ETHILON 4-0 PS4 18 BLK

## (undated) DEVICE — GLOVE PROTEXIS HYDROGEL SZ6.5

## (undated) DEVICE — GLOVE PROTEXIS BLUE LATEX 7

## (undated) DEVICE — SUT 3-0 12-18IN SILK

## (undated) DEVICE — SET BONESCALPEL TUBE IRR

## (undated) DEVICE — GLOVE PROTEXIS LTX MICRO 6

## (undated) DEVICE — SUT 4/0 27IN PDS II VIO MO

## (undated) DEVICE — TRAY SKIN SCRUB WET PREMIUM

## (undated) DEVICE — SEE MEDLINE ITEM 152622

## (undated) DEVICE — SUT PROLENE 6-0 BV-1 30IN

## (undated) DEVICE — SUPPORT ULNA NERVE PROTECTOR

## (undated) DEVICE — SUT ETHILON 3-0 FS-1 30

## (undated) DEVICE — SUT ETHILON 3-0 PS2 18 BLK

## (undated) DEVICE — DRESSING XEROFORM GAUZE 5X9

## (undated) DEVICE — SET DECANTER MEDICHOICE

## (undated) DEVICE — NDL HYPO 27G X 1 1/2

## (undated) DEVICE — SOL NS 1000CC

## (undated) DEVICE — DRESSING XEROFORM FOIL PK 1X8

## (undated) DEVICE — CLIPPER BLADE MOD 4406 (CAREF)

## (undated) DEVICE — BANDAGE PREMIERPRO 5YDX1IN

## (undated) DEVICE — SUT PDS BV 6-0

## (undated) DEVICE — DRESSING N ADH OIL EMUL 3X3

## (undated) DEVICE — SUT PROLENE 5-0 36IN C-1

## (undated) DEVICE — SOL NACL IRR 1000ML BTL

## (undated) DEVICE — NDL HYPO REG 25G X 1 1/2

## (undated) DEVICE — SUT 1 36IN PDS II VIO MONO

## (undated) DEVICE — ELECTRODE REM PLYHSV RETURN 9

## (undated) DEVICE — KIT SURGICAL TURNOVER

## (undated) DEVICE — GLOVE PROTEXIS BLUE LATEX 6.5

## (undated) DEVICE — DRAIN CHANNEL ROUND 19FR

## (undated) DEVICE — SUT 4-0 12-18IN SILK BLACK

## (undated) DEVICE — SET IRR URLGY 2LINE UNIV SPIKE

## (undated) DEVICE — SUT 4-0 VICRYL / SH

## (undated) DEVICE — NDL SYR 10ML 18X1.5 LL BLUNT

## (undated) DEVICE — STOCKINET 4INX48

## (undated) DEVICE — SUT SILK 3-0 SH 18IN BLACK

## (undated) DEVICE — DRAPE INCISE IOBAN 2 23X17IN

## (undated) DEVICE — SPONGE KERLIX ANTIMIC 6X6.75IN

## (undated) DEVICE — BANDAGE MATRIX HK LOOP 4IN 5YD

## (undated) DEVICE — SUT SILK 3-0 STRANDS 30IN

## (undated) DEVICE — ELECTRODE PATIENT RETURN DISP